# Patient Record
Sex: MALE | Race: WHITE | NOT HISPANIC OR LATINO | Employment: OTHER | URBAN - METROPOLITAN AREA
[De-identification: names, ages, dates, MRNs, and addresses within clinical notes are randomized per-mention and may not be internally consistent; named-entity substitution may affect disease eponyms.]

---

## 2017-02-27 DIAGNOSIS — E78.5 HYPERLIPIDEMIA LDL GOAL <130: ICD-10-CM

## 2017-02-27 RX ORDER — ATORVASTATIN CALCIUM 40 MG/1
40 TABLET, FILM COATED ORAL DAILY
Qty: 30 TABLET | Refills: 0 | Status: SHIPPED | OUTPATIENT
Start: 2017-02-27 | End: 2017-03-28

## 2017-02-27 NOTE — TELEPHONE ENCOUNTER
Pharmacy calls, lipitor extension not sent, see below  Prescription approved per [facility] Protocol.  Christa Madden, RN, BSN

## 2017-02-27 NOTE — TELEPHONE ENCOUNTER
Pt calls, out of Lipitor x 3-4 weeks.  We scheduled fasting OV with PCP in 4 weeks.   OK to restart.  One 30-day refill sent to pharmacy today.    Message handled by Nurse Triage with [facility] - provider name: Gisela Hoover MD.  Mazin Echavarria RN

## 2017-03-28 ENCOUNTER — OFFICE VISIT (OUTPATIENT)
Dept: FAMILY MEDICINE | Facility: CLINIC | Age: 52
End: 2017-03-28
Payer: COMMERCIAL

## 2017-03-28 VITALS
HEIGHT: 69 IN | TEMPERATURE: 97.8 F | OXYGEN SATURATION: 95 % | RESPIRATION RATE: 12 BRPM | BODY MASS INDEX: 33.84 KG/M2 | SYSTOLIC BLOOD PRESSURE: 111 MMHG | HEART RATE: 65 BPM | DIASTOLIC BLOOD PRESSURE: 70 MMHG | WEIGHT: 228.5 LBS

## 2017-03-28 DIAGNOSIS — E78.5 HYPERLIPIDEMIA LDL GOAL <130: ICD-10-CM

## 2017-03-28 DIAGNOSIS — G47.9 SLEEP DISORDER: ICD-10-CM

## 2017-03-28 DIAGNOSIS — E78.1 HYPERTRIGLYCERIDEMIA: ICD-10-CM

## 2017-03-28 DIAGNOSIS — Z12.5 SCREENING FOR PROSTATE CANCER: Primary | ICD-10-CM

## 2017-03-28 LAB
ALBUMIN SERPL-MCNC: 4.4 G/DL (ref 3.4–5)
ALP SERPL-CCNC: 67 U/L (ref 40–150)
ALT SERPL W P-5'-P-CCNC: 33 U/L (ref 0–70)
ANION GAP SERPL CALCULATED.3IONS-SCNC: 7 MMOL/L (ref 3–14)
AST SERPL W P-5'-P-CCNC: 14 U/L (ref 0–45)
BILIRUB SERPL-MCNC: 0.4 MG/DL (ref 0.2–1.3)
BUN SERPL-MCNC: 18 MG/DL (ref 7–30)
CALCIUM SERPL-MCNC: 9.2 MG/DL (ref 8.5–10.1)
CHLORIDE SERPL-SCNC: 105 MMOL/L (ref 94–109)
CHOLEST SERPL-MCNC: 205 MG/DL
CO2 SERPL-SCNC: 30 MMOL/L (ref 20–32)
CREAT SERPL-MCNC: 1.11 MG/DL (ref 0.66–1.25)
GFR SERPL CREATININE-BSD FRML MDRD: 69 ML/MIN/1.7M2
GLUCOSE SERPL-MCNC: 108 MG/DL (ref 70–99)
HDLC SERPL-MCNC: 39 MG/DL
LDLC SERPL CALC-MCNC: ABNORMAL MG/DL
LDLC SERPL DIRECT ASSAY-MCNC: 109 MG/DL
NONHDLC SERPL-MCNC: 166 MG/DL
POTASSIUM SERPL-SCNC: 4.1 MMOL/L (ref 3.4–5.3)
PROT SERPL-MCNC: 7.6 G/DL (ref 6.8–8.8)
PSA SERPL-ACNC: 0.67 UG/L (ref 0–4)
SODIUM SERPL-SCNC: 142 MMOL/L (ref 133–144)
TRIGL SERPL-MCNC: 465 MG/DL

## 2017-03-28 PROCEDURE — 83721 ASSAY OF BLOOD LIPOPROTEIN: CPT | Mod: 59 | Performed by: FAMILY MEDICINE

## 2017-03-28 PROCEDURE — 80061 LIPID PANEL: CPT | Performed by: FAMILY MEDICINE

## 2017-03-28 PROCEDURE — G0103 PSA SCREENING: HCPCS | Performed by: FAMILY MEDICINE

## 2017-03-28 PROCEDURE — 99214 OFFICE O/P EST MOD 30 MIN: CPT | Performed by: FAMILY MEDICINE

## 2017-03-28 PROCEDURE — 80053 COMPREHEN METABOLIC PANEL: CPT | Performed by: FAMILY MEDICINE

## 2017-03-28 PROCEDURE — 36415 COLL VENOUS BLD VENIPUNCTURE: CPT | Performed by: FAMILY MEDICINE

## 2017-03-28 RX ORDER — TRAZODONE HYDROCHLORIDE 50 MG/1
100 TABLET, FILM COATED ORAL AT BEDTIME
Qty: 180 TABLET | Refills: 2 | COMMUNITY
Start: 2017-03-28 | End: 2017-09-13

## 2017-03-28 RX ORDER — ATORVASTATIN CALCIUM 40 MG/1
40 TABLET, FILM COATED ORAL DAILY
Qty: 90 TABLET | Refills: 3 | Status: SHIPPED | OUTPATIENT
Start: 2017-03-28 | End: 2018-02-23

## 2017-03-28 NOTE — MR AVS SNAPSHOT
"              After Visit Summary   3/28/2017    John Reyes    MRN: [id number]           Patient Information     Date Of Birth          1965        Visit Information        Provider Department      3/28/2017 8:45 AM Remy Mcknight MD [facility]        Today's Diagnoses     Screening for prostate cancer    -  1    Hypertriglyceridemia        Sleep disorder        Hyperlipidemia LDL goal <130           Follow-ups after your visit        Who to contact     If you have questions or need follow up information about today's clinic visit or your schedule please contact [facility] directly at [phone].  Normal or non-critical lab and imaging results will be communicated to you by Taniumhart, letter or phone within 4 business days after the clinic has received the results. If you do not hear from us within 7 days, please contact the clinic through Taniumhart or phone. If you have a critical or abnormal lab result, we will notify you by phone as soon as possible.  Submit refill requests through Neovacs or call your pharmacy and they will forward the refill request to us. Please allow 3 business days for your refill to be completed.          Additional Information About Your Visit        MyChart Information     Neovacs gives you secure access to your electronic health record. If you see a primary care provider, you can also send messages to your care team and make appointments. If you have questions, please call your primary care clinic.  If you do not have a primary care provider, please call [phone] and they will assist you.        Care EveryWhere ID     This is your Care EveryWhere ID. This could be used by other organizations to access your [facility] medical records  [id number]        Your Vitals Were     Pulse Temperature Respirations Height Pulse Oximetry BMI (Body Mass Index)    65 97.8  F (36.6  C) (Oral) 12 5' 9\" (1.753 m) 95% 33.74 kg/m2       Blood " Pressure from Last 3 Encounters:   03/28/17 111/70   08/30/16 118/64   04/07/16 124/86    Weight from Last 3 Encounters:   03/28/17 228 lb 8 oz (103.6 kg)   08/30/16 229 lb (103.9 kg)   04/07/16 239 lb (108.4 kg)              We Performed the Following     Comprehensive metabolic panel     Lipid panel reflex to direct LDL     PSA, screen          Today's Medication Changes          These changes are accurate as of: 3/28/17  9:01 AM.  If you have any questions, ask your nurse or doctor.               These medicines have changed or have updated prescriptions.        Dose/Directions    buPROPion 300 MG 24 hr tablet   Commonly known as:  WELLBUTRIN XL   This may have changed:  additional instructions   Used for:  Moderate major depression (H)        Dose:  300 mg   Take 1 tablet (300 mg) by mouth every morning   Quantity:  90 tablet   Refills:  1       sertraline 100 MG tablet   Commonly known as:  ZOLOFT   This may have changed:    - how much to take  - additional instructions   Used for:  Attention deficit disorder without mention of hyperactivity        Take one and one half pills daily   Quantity:  135 tablet   Refills:  1       traZODone 50 MG tablet   Commonly known as:  DESYREL   This may have changed:    - how much to take  - when to take this   Used for:  Sleep disorder   Changed by:  eRmy Mcknight MD        Dose:  100 mg   Take 2 tablets (100 mg) by mouth At Bedtime   Quantity:  180 tablet   Refills:  2            Where to get your medicines      These medications were sent to [address] [address]     Phone:  [phone]     atorvastatin 40 MG tablet                Primary Care Provider Office Phone # Fax #    Remy Mcknight [phone]       [facility] [address] [facility] [place] [address]        Thank you!     Thank you for choosing [facility]  for  your care. Our goal is always to provide you with excellent care. Hearing back from our patients is one way we can continue to improve our services. Please take a few minutes to complete the written survey that you may receive in the mail after your visit with us. Thank you!             Your Updated Medication List - Protect others around you: Learn how to safely use, store and throw away your medicines at www.disposemymeds.org.          This list is accurate as of: 3/28/17  9:01 AM.  Always use your most recent med list.                   Brand Name Dispense Instructions for use    aspirin 81 MG tablet     30 tablet    Take 1 tablet by mouth daily.       atorvastatin 40 MG tablet    LIPITOR    90 tablet    Take 1 tablet (40 mg) by mouth daily       buPROPion 300 MG 24 hr tablet    WELLBUTRIN XL    90 tablet    Take 1 tablet (300 mg) by mouth every morning       econazole nitrate 1 % cream     30 g    Apply topically daily Apply to affected nails daily       fluocinonide 0.05 % solution    LIDEX    60 mL    Apply to scalp daily as needed       propranolol 40 MG tablet    INDERAL    30 tablet    SIG 1 tid prn anxiety       sertraline 100 MG tablet    ZOLOFT    135 tablet    Take one and one half pills daily       solifenacin 5 MG tablet    VESICARE    90 tablet    Take 1 tablet (5 mg) by mouth daily       sulfamethoxazole-trimethoprim 800-160 MG per tablet    BACTRIM DS/SEPTRA DS    20 tablet    Take 1 tablet by mouth 2 times daily       terbinafine 250 MG tablet    lamISIL    90 tablet    Take 1 tablet (250 mg) by mouth daily       traZODone 50 MG tablet    DESYREL    180 tablet    Take 2 tablets (100 mg) by mouth At Bedtime

## 2017-03-28 NOTE — PROGRESS NOTES
"  SUBJECTIVE:                                                    John Reyes is a 51 year old male who presents to clinic today for the following health issues:      Medication Followup of Lipitor    Taking Medication as prescribed: yes    Side Effects:  None    Medication Helping Symptoms:  yes     Past Medical History:   Diagnosis Date     Acute duodenal ulcer without mention of hemorrhage, perforation, or obstruction 1990    diagnosed on EGD     Allergic rhinitis, cause unspecified      Attention deficit disorder without mention of hyperactivity     \"all my life\"     Cataplexy and narcolepsy     had for many years, but diagnosed in sleep lab 6/03     Esophageal reflux 11/28/2007     Hyperlipidemia LDL goal <130 10/31/2010     Hypertriglyceridemia 8/10/2012     Mild major depression (H) 9/15/2011     OBESITY NOS 11/28/2007     PONV (postoperative nausea and vomiting)      Sleep apnea     CPAP     Sleep disorder 4/12/2011       Past Surgical History:   Procedure Laterality Date     ARTHROSCOPY SHOULDER, OPEN ROTATOR CUFF REPAIR, COMBINED Left 11/5/2014    Procedure: COMBINED ARTHROSCOPY SHOULDER, OPEN ROTATOR CUFF REPAIR;  Surgeon: Maykel Khalil MD;  Location: [facility] OR     C HAND/FINGER SURGERY UNLISTED  1983    ORIF right middle finger     COLONOSCOPY  9/9/2013    Procedure: COMBINED COLONOSCOPY, SINGLE BIOPSY/POLYPECTOMY BY BIOPSY;;  Surgeon: Hali Lema MD;  Location:  [facility]     GI SURGERY      henmmriodectoomy     HC EXPLOR MAXILL SINUS,INTRANASAL  1981`     HC VASECTOMY UNILAT/BILAT W POSTOP SEMEN  4/03       Family History   Problem Relation Age of Onset     Lipids Father      On medication     Cancer - colorectal Maternal Uncle        Social History   Substance Use Topics     Smoking status: Former Smoker     Packs/day: 0.50     Types: Cigarettes, Cigars     Quit date: 1/1/2003     Smokeless tobacco: Never Used      Comment: occasional cigar     Alcohol use 0.0 oz/week     0 Standard drinks " "or equivalent per week      Comment: 3-4 beers/week          Follow up on lipid lowering therapy.    Taking meds  and tolerating the med well. Describes dietary adherance as reliable  Side effects include none   Comorbities include obesity,     smoking is  Not  a problem        No problems with vision, hearing, dental or neck pain.  No chest pain, palpitations, dyspnea, change in bowel habits, blood  in stool or dyspepsia.  No rashes, changing moles, weakness, lassitude or back problems.  No diabetes, thyroid or cancer. No dysuria or menstrual irregularity.  Patient not a smoker. No problems with significant headaches.    On exam the vital signs are stable. No neck masses or thyromegaly.  No bruits, murmers, rubs or extrasounds. No cardiomegaly or chest wall tenderness. Lungs clear, no abdominal masses or organomegaly. No CVA tenderness.    Normal gait and stance. Neck is supple.    Med change:continue with same lipitor  and recheck in six months     ROS:  Constitutional, HEENT, cardiovascular, pulmonary, GI, , musculoskeletal, neuro, skin, endocrine and psych systems are negative, except as otherwise noted.    OBJECTIVE:                                                    /70 (BP Location: Right arm, Patient Position: Chair, Cuff Size: Adult Large)  Pulse 65  Temp 97.8  F (36.6  C) (Oral)  Resp 12  Ht 5' 9\" (1.753 m)  Wt 228 lb 8 oz (103.6 kg)  SpO2 95%  BMI 33.74 kg/m2  Body mass index is 33.74 kg/(m^2).  GENERAL: healthy, alert and no distress  EYES: Eyes grossly normal to inspection, PERRL and conjunctivae and sclerae normal  HENT: ear canals and TM's normal, nose and mouth without ulcers or lesions  NECK: no adenopathy, no asymmetry, masses, or scars and thyroid normal to palpation  RESP: lungs clear to auscultation - no rales, rhonchi or wheezes  CV: regular rate and rhythm, normal S1 S2, no S3 or S4, no murmur, click or rub, no peripheral edema and peripheral pulses strong  ABDOMEN: soft, " nontender, no hepatosplenomegaly, no masses and bowel sounds normal  MS: no gross musculoskeletal defects noted, no edema  SKIN: no suspicious lesions or rashes  NEURO: Normal strength and tone, mentation intact and speech normal  PSYCH: mentation appears normal, affect normal/bright         ASSESSMENT/PLAN:                                                        (Z12.5) Screening for prostate cancer  (primary encounter diagnosis)  Comment:   Plan: PSA, screen            (E78.1) Hypertriglyceridemia  Comment:   Plan: check     (G47.9) Sleep disorder  Comment:   Plan: Lipid panel reflex to direct LDL, Comprehensive        metabolic panel, traZODone (DESYREL) 50 MG         tablet            (E78.5) Hyperlipidemia LDL goal <130  Comment:   Plan:           Work on weight loss  Regular exercise    Remy Mcknight MD  [facility]

## 2017-03-28 NOTE — NURSING NOTE
"Chief Complaint   Patient presents with     Recheck Medication     fasting labs       Initial /70 (BP Location: Right arm, Patient Position: Chair, Cuff Size: Adult Large)  Pulse 65  Temp 97.8  F (36.6  C) (Oral)  Resp 12  Ht 5' 9\" (1.753 m)  Wt 228 lb 8 oz (103.6 kg)  SpO2 95%  BMI 33.74 kg/m2 Estimated body mass index is 33.74 kg/(m^2) as calculated from the following:    Height as of this encounter: 5' 9\" (1.753 m).    Weight as of this encounter: 228 lb 8 oz (103.6 kg).  Medication Reconciliation: complete   Vargas Hernández CMA       "

## 2017-03-29 ASSESSMENT — PATIENT HEALTH QUESTIONNAIRE - PHQ9: SUM OF ALL RESPONSES TO PHQ QUESTIONS 1-9: 0

## 2017-04-11 ENCOUNTER — OFFICE VISIT (OUTPATIENT)
Dept: PODIATRY | Facility: CLINIC | Age: 52
End: 2017-04-11
Payer: COMMERCIAL

## 2017-04-11 VITALS
HEIGHT: 69 IN | WEIGHT: 228 LBS | DIASTOLIC BLOOD PRESSURE: 68 MMHG | BODY MASS INDEX: 33.77 KG/M2 | SYSTOLIC BLOOD PRESSURE: 116 MMHG

## 2017-04-11 DIAGNOSIS — L60.0 INGROWN NAIL OF GREAT TOE OF RIGHT FOOT: ICD-10-CM

## 2017-04-11 DIAGNOSIS — M20.41 HAMMER TOE OF RIGHT FOOT: ICD-10-CM

## 2017-04-11 DIAGNOSIS — M79.671 RIGHT FOOT PAIN: Primary | ICD-10-CM

## 2017-04-11 PROCEDURE — 99213 OFFICE O/P EST LOW 20 MIN: CPT | Mod: 25 | Performed by: PODIATRIST

## 2017-04-11 PROCEDURE — 11750 EXCISION NAIL&NAIL MATRIX: CPT | Mod: T5 | Performed by: PODIATRIST

## 2017-04-11 RX ORDER — SILVER SULFADIAZINE 10 MG/G
CREAM TOPICAL 2 TIMES DAILY
Qty: 50 G | Refills: 0 | Status: SHIPPED | OUTPATIENT
Start: 2017-04-11 | End: 2017-11-08

## 2017-04-11 NOTE — PROGRESS NOTES
"Podiatry / Foot and Ankle Surgery Progress Note    April 11, 2017    Subject: Patient was seen for painful ingrown nail right great toe. Notes it has been going on for about month but has had it on and off for 4 years. Previously removed before. Pain is 5/10. Denies injury. Is wondering what can be done for it more permanently.     Objective:  Vitals: Ht 5' 9\" (1.753 m)  Wt 228 lb (103.4 kg)  BMI 33.67 kg/m2  BMI= Body mass index is 33.67 kg/(m^2).    General:  Patient is alert and orientated.  NAD  Vascular:  DP and PT pulses are palpable.  No varicosities noted  CFT's < 3secs.  Skin temp is normal  Neuro:  Light and gross touch sensation intact to digits, dorsum, and plantar aspects of the feet.  Derm:  Lateral border of the right great toenail is incurvated. Localized redness and pain.   Musculoskeletal:  Semi flexible contracture of right 2nd toe.      Assessment:    Right foot pain  Ingrown nail of great toe of right foot  Hammer toe of right foot    Plan:  The potential causes and nature of an ingrown toenail were discussed with the patient.  We reviewed the natural history/prognosis of the condition and potential risks if no treatment is provided.      Treatment options discussed included conservative management (oral antibiotics, soaking of foot, adequate width shoes)  as well as surgical management (partial or total nail removal).  The pros and cons of both forms of treatment were reviewed.      After thorough discussion and answering all questions, the patient elected to have the border removed permanently. Will soak the foot 2x a day for 6 weeks and apply silvadene cream and bandage. Will call with questions or concerns.     Procedure: After verbal consent, the right big toe was anesthetized with 5cc's of 1% lidocaine plain. A tourniquet was applied to the toe. The lateral border was then raised from the nail bed and then cut the length of the nail.  The offending nail border was then removed.  Three 30 " second applications of phenol were applied to the nail bed and nail matrix.  The area was then flushed with copious amounts of alcohol.  Bacitracin was applied to the nail bed.  The tourniquet was removed.  Bandage was applied to the toe.  The patient tolerated the procedure and anesthesia well.           Kanika Ziegler DPM, Podiatry/Foot and Ankle Surgery    Weight management plan: Patient was referred to their PCP to discuss a diet and exercise plan.

## 2017-04-11 NOTE — NURSING NOTE
"Chief Complaint   Patient presents with     Toenail     right big toe latteral side        Initial /68  Ht 5' 9\" (1.753 m)  Wt 228 lb (103.4 kg)  BMI 33.67 kg/m2 Estimated body mass index is 33.67 kg/(m^2) as calculated from the following:    Height as of this encounter: 5' 9\" (1.753 m).    Weight as of this encounter: 228 lb (103.4 kg).  Medication Reconciliation: complete   Chance Grayson MA      "

## 2017-04-11 NOTE — MR AVS SNAPSHOT
After Visit Summary   2017    John Reyes    MRN: [id number]           Patient Information     Date Of Birth          1965        Visit Information        Provider Department      2017 8:30 AM Kanika Ziegler DPM, Podiatry/Foot and Ankle Surgery [facility] [place]        Care Instructions    DR. ZIEGLER'S CLINIC SCHEDULE     [facility]  [address] [place] [address]  P: [phone]  F: [phone] [facility]  [address] [place] [address]  P: [phone]  F: [phone] [facility]  [address] [place] [address]  P: [phone]  F: [phone]   FRIDAY AM FRIDAY PM SURGERY   [facility]     [facility] [place]  [address]  [place] [address]  P: [phone] [facility]  [address]  [place] [address]  P: [phone]  F: [phone] Surgery [phone]   Appointment [phone] General After Hours:  [phone] Patient [phone]             Body Mass Index (BMI)  Many things can cause foot and ankle problems. Foot structure, activity level, foot mechanics and injuries are common causes of pain.    One very important issue that often goes unmentioned, is body weight.  Extra weight can cause increased stress on muscles, ligaments, bones and tendons.  Sometimes just a few extra pounds is all it takes to put one over her/his threshold.   Without reducing that stress, it can be difficult to alleviate pain.      Some people are uncomfortable addressing this issue, but we feel it is important for you to think about it.  As Foot &  Ankle specialists, our job is addressing the lower extremity problem and possible causes.     Regarding extra body weight, we encourage patients to discuss diet and weight management plans with their primary care doctors.  It is this team approach that  gives you the best opportunity for pain relief and getting you back on your feet.        INGROWN TOENAILS  When a toenail is ingrown, it is curved and grows into the skin, usually at the nail borders (the sides of the nail). This  digging in  of the nail irritates the skin, often creating pain, redness, swelling, and warmth in the toe.  If an ingrown nail causes a break in the skin, bacteria may enter and cause an infection in the area, which is often marked by drainage and a foul odor. However, even if the toe isn t painful, red, swollen, or warm, a nail that curves downward into the skin can progress to an infection.  CAUSES:  Heredity: In many people, the tendency for ingrown toenails is inherited.   Trauma: Sometimes an ingrown toenail is the result of trauma, such as stubbing your toe, having an object fall on your toe, or engaging in activities that involve repeated pressure on the toes, such as kicking or running.   Improper Trimming:  The most common cause of ingrown toenails is cutting your nails too short. This encourages the skin next to the nail to fold over the nail.   Improperly Sized Footwear: Ingrown toenails can result from wearing socks and shoes that are tight or short.   Nail Conditions: Ingrown toenails can be caused by nail problems, such as fungal infections or losing a nail due to trauma.   TREATMENT: Sometimes initial treatment for ingrown toenails can be safely performed at home. However, home treatment is strongly discouraged if an infection is suspected, or for those who have medical conditions that put feet at high risk, such as diabetes, nerve damage in the foot, or poor circulation.  Home care: If you don t have an infection or any of the above medical conditions, you can soak your foot in room-temperature water (adding Epsom s salt may be recommended by your doctor), and gently massage the side of the nail fold to help reduce the inflammation.  Avoid attempting  bathroom surgery.   Repeated cutting of the nail can cause the condition to worsen over time. If your symptoms fail to improve, it s time to see a foot and ankle surgeon.  Physician care: After examining the toe, the foot and ankle surgeon will select the treatment best suited for you. If an infection is present, an oral antibiotic may be prescribed.  Sometimes a minor surgical procedure, often performed in the office, will ease the pain and remove the offending nail. After applying a local anesthetic, the doctor removes part of the nail s side border. Some nails may become ingrown again, requiring removal of the nail root.  Following the nail procedure, a light bandage will be applied. Most people experience very little pain after surgery and may resume normal activity the next day. If your surgeon has prescribed an oral antibiotic, be sure to take all the medication, even if your symptoms have improved.  PREVENTION:  Proper Trimming: Cut toenails in a fairly straight line, and don t cut them too short. You should be able to get your fingernail under the sides and end of the nail.   Well-fitting Footwear: Don t wear shoes that are short or tight in the toe area. Avoid shoes that are loose, because they too cause pressure on the toes, especially when running or walking briskly.     INGROWN TOENAIL POSTOPERATIVE INSTRUCTIONS     Go directly home and elevate the affected foot on one or two pillows for the remainder of the day/evening if possible. Your toe may stay numb anywhere from 2-8 hours.     Take Tylenol, ibuprofen or another anti-inflammatory as needed for pain.     Take antibiotic if that has been prescribed. Finish the entire prescribed antibiotic even if your symptoms have improved.     The evening of the procedure, soak/wash the affected area in warm water (you may add Epsom salt) for 5 to 10 minutes. Do this twice a day for 2-4 weeks (6-8 weeks if you had phenol) (you may count showering/bathing as one soak).  After soaks,  pat the area dry and then allow to airdry for a few minutes. Apply antibiotic ointment to the area and cover with 2 X 2 gauze and paper tape or band-aid.    You may pursue everyday activities as tolerated with either an open toe shoe or cut-out shoe as needed or you may wear regular shoes if no pain is noted.    Watch for any signs and symptoms of infection such as: redness, red streaks going up the foot/leg, swelling, pus or foul odor. Those that have had the phenol procedure, the toe will drain longer and will look like it is infected because it is a chemical burn.      Please call with questions.          Follow-ups after your visit        Who to contact     If you have questions or need follow up information about today's clinic visit or your schedule please contact [facility] directly at [phone].  Normal or non-critical lab and imaging results will be communicated to you by MyChart, letter or phone within 4 business days after the clinic has received the results. If you do not hear from us within 7 days, please contact the clinic through [employer] or phone. If you have a critical or abnormal lab result, we will notify you by phone as soon as possible.  Submit refill requests through [employer] or call your pharmacy and they will forward the refill request to us. Please allow 3 business days for your refill to be completed.          Additional Information About Your Visit        MyChart Information     [employer] gives you secure access to your electronic health record. If you see a primary care provider, you can also send messages to your care team and make appointments. If you have questions, please call your primary care clinic.  If you do not have a primary care provider, please call [phone] and they will assist you.        Care EveryWhere ID     This is your Care EveryWhere ID. This could be used by other organizations to access your [facility] medical records  [id number]        Your  "Vitals Were     Height BMI (Body Mass Index)                5' 9\" (1.753 m) 33.67 kg/m2           Blood Pressure from Last 3 Encounters:   04/11/17 116/68   03/28/17 111/70   08/30/16 118/64    Weight from Last 3 Encounters:   04/11/17 228 lb (103.4 kg)   03/28/17 228 lb 8 oz (103.6 kg)   08/30/16 229 lb (103.9 kg)              Today, you had the following     No orders found for display         Today's Medication Changes          These changes are accurate as of: 4/11/17  9:05 AM.  If you have any questions, ask your nurse or doctor.               These medicines have changed or have updated prescriptions.        Dose/Directions    buPROPion 300 MG 24 hr tablet   Commonly known as:  WELLBUTRIN XL   This may have changed:  additional instructions   Used for:  Moderate major depression (H)        Dose:  300 mg   Take 1 tablet (300 mg) by mouth every morning   Quantity:  90 tablet   Refills:  1       sertraline 100 MG tablet   Commonly known as:  ZOLOFT   This may have changed:    - how much to take  - additional instructions   Used for:  Attention deficit disorder without mention of hyperactivity        Take one and one half pills daily   Quantity:  135 tablet   Refills:  1                Primary Care Provider Office Phone # Fax #    Remy Mcknight [phone]       [address] [address]        Thank you!     Thank you for choosing [facility]  for your care. Our goal is always to provide you with excellent care. Hearing back from our patients is one way we can continue to improve our services. Please take a few minutes to complete the written survey that you may receive in the mail after your visit with us. Thank you!             Your Updated Medication List - Protect others around you: Learn how to safely use, store and throw away your medicines at www.disposemymeds.org.          This list is accurate as of: 4/11/17  9:05 AM.  " Always use your most recent med list.                   Brand Name Dispense Instructions for use    aspirin 81 MG tablet     30 tablet    Take 1 tablet by mouth daily.       atorvastatin 40 MG tablet    LIPITOR    90 tablet    Take 1 tablet (40 mg) by mouth daily       buPROPion 300 MG 24 hr tablet    WELLBUTRIN XL    90 tablet    Take 1 tablet (300 mg) by mouth every morning       econazole nitrate 1 % cream     30 g    Apply topically daily Apply to affected nails daily       fluocinonide 0.05 % solution    LIDEX    60 mL    Apply to scalp daily as needed       propranolol 40 MG tablet    INDERAL    30 tablet    SIG 1 tid prn anxiety       sertraline 100 MG tablet    ZOLOFT    135 tablet    Take one and one half pills daily       solifenacin 5 MG tablet    VESICARE    90 tablet    Take 1 tablet (5 mg) by mouth daily       sulfamethoxazole-trimethoprim 800-160 MG per tablet    BACTRIM DS/SEPTRA DS    20 tablet    Take 1 tablet by mouth 2 times daily       terbinafine 250 MG tablet    lamISIL    90 tablet    Take 1 tablet (250 mg) by mouth daily       traZODone 50 MG tablet    DESYREL    180 tablet    Take 2 tablets (100 mg) by mouth At Bedtime

## 2017-04-11 NOTE — LETTER
"  4/11/2017       RE: John Reyes  [address]  [place] [address]           Dear Colleague,    Thank you for referring your patient, John Reyes, to the [facility]. Please see a copy of my visit note below.    Podiatry / Foot and Ankle Surgery Progress Note    April 11, 2017    Subject: Patient was seen for painful ingrown nail right great toe. Notes it has been going on for about month but has had it on and off for 4 years. Previously removed before. Pain is 5/10. Denies injury. Is wondering what can be done for it more permanently.     Objective:  Vitals: Ht 5' 9\" (1.753 m)  Wt 228 lb (103.4 kg)  BMI 33.67 kg/m2  BMI= Body mass index is 33.67 kg/(m^2).    General:  Patient is alert and orientated.  NAD  Vascular:  DP and PT pulses are palpable.  No varicosities noted  CFT's < 3secs.  Skin temp is normal  Neuro:  Light and gross touch sensation intact to digits, dorsum, and plantar aspects of the feet.  Derm:  Lateral border of the right great toenail is incurvated. Localized redness and pain.   Musculoskeletal:  Semi flexible contracture of right 2nd toe.      Assessment:    Right foot pain  Ingrown nail of great toe of right foot  Hammer toe of right foot    Plan:  The potential causes and nature of an ingrown toenail were discussed with the patient.  We reviewed the natural history/prognosis of the condition and potential risks if no treatment is provided.      Treatment options discussed included conservative management (oral antibiotics, soaking of foot, adequate width shoes)  as well as surgical management (partial or total nail removal).  The pros and cons of both forms of treatment were reviewed.      After thorough discussion and answering all questions, the patient elected to have the border removed permanently. Will soak the foot 2x a day for 6 weeks and apply silvadene cream and bandage. Will call with questions or concerns.     Procedure: After verbal " consent, the right big toe was anesthetized with 5cc's of 1% lidocaine plain. A tourniquet was applied to the toe. The lateral border was then raised from the nail bed and then cut the length of the nail.  The offending nail border was then removed.  Three 30 second applications of phenol were applied to the nail bed and nail matrix.  The area was then flushed with copious amounts of alcohol.  Bacitracin was applied to the nail bed.  The tourniquet was removed.  Bandage was applied to the toe.  The patient tolerated the procedure and anesthesia well.           Kanika Ziegler DPM, Podiatry/Foot and Ankle Surgery    Weight management plan: Patient was referred to their PCP to discuss a diet and exercise plan.        Again, thank you for allowing me to participate in the care of your patient.        Sincerely,              Kanika Ziegler DPM, Podiatry/Foot and Ankle Surgery

## 2017-04-11 NOTE — PATIENT INSTRUCTIONS
DR. REYNOSO'S CLINIC SCHEDULE     [facility]  [address] [place] [address]  P: [phone]  F: [phone] [facility]  [address] [place] [address]  P: [phone]  F: [phone] [facility]  [address] [place] [address]  P: [phone]  F: [phone]   FRIDAY AM FRIDAY PM SURGERY   [facility]     [facility] [place]  [address]  [place] [address]  P: [phone]   [address]  [place] [address]  P: [phone]  F: [phone] Surgery [phone]   Appointment [phone] General After Hours:  [phone] Patient [phone]             Body Mass Index (BMI)  Many things can cause foot and ankle problems. Foot structure, activity level, foot mechanics and injuries are common causes of pain.    One very important issue that often goes unmentioned, is body weight.  Extra weight can cause increased stress on muscles, ligaments, bones and tendons.  Sometimes just a few extra pounds is all it takes to put one over her/his threshold.   Without reducing that stress, it can be difficult to alleviate pain.      Some people are uncomfortable addressing this issue, but we feel it is important for you to think about it.  As Foot &  Ankle specialists, our job is addressing the lower extremity problem and possible causes.     Regarding extra body weight, we encourage patients to discuss diet and weight management plans with their primary care doctors.  It is this team approach that gives you the best opportunity for pain relief and getting you back on your feet.        INGROWN TOENAILS  When a toenail is ingrown, it is curved and grows into the skin, usually at the nail borders (the sides of the nail). This  digging in  of the nail irritates the skin, often creating pain, redness, swelling, and warmth in the toe.  If  an ingrown nail causes a break in the skin, bacteria may enter and cause an infection in the area, which is often marked by drainage and a foul odor. However, even if the toe isn t painful, red, swollen, or warm, a nail that curves downward into the skin can progress to an infection.  CAUSES:  Heredity: In many people, the tendency for ingrown toenails is inherited.   Trauma: Sometimes an ingrown toenail is the result of trauma, such as stubbing your toe, having an object fall on your toe, or engaging in activities that involve repeated pressure on the toes, such as kicking or running.   Improper Trimming:  The most common cause of ingrown toenails is cutting your nails too short. This encourages the skin next to the nail to fold over the nail.   Improperly Sized Footwear: Ingrown toenails can result from wearing socks and shoes that are tight or short.   Nail Conditions: Ingrown toenails can be caused by nail problems, such as fungal infections or losing a nail due to trauma.   TREATMENT: Sometimes initial treatment for ingrown toenails can be safely performed at home. However, home treatment is strongly discouraged if an infection is suspected, or for those who have medical conditions that put feet at high risk, such as diabetes, nerve damage in the foot, or poor circulation.  Home care: If you don t have an infection or any of the above medical conditions, you can soak your foot in room-temperature water (adding Epsom s salt may be recommended by your doctor), and gently massage the side of the nail fold to help reduce the inflammation.  Avoid attempting  bathroom surgery.  Repeated cutting of the nail can cause the condition to worsen over time. If your symptoms fail to improve, it s time to see a foot and ankle surgeon.  Physician care: After examining the toe, the foot and ankle surgeon will select the treatment best suited for you. If an infection is present, an oral antibiotic may be prescribed.  Sometimes a  minor surgical procedure, often performed in the office, will ease the pain and remove the offending nail. After applying a local anesthetic, the doctor removes part of the nail s side border. Some nails may become ingrown again, requiring removal of the nail root.  Following the nail procedure, a light bandage will be applied. Most people experience very little pain after surgery and may resume normal activity the next day. If your surgeon has prescribed an oral antibiotic, be sure to take all the medication, even if your symptoms have improved.  PREVENTION:  Proper Trimming: Cut toenails in a fairly straight line, and don t cut them too short. You should be able to get your fingernail under the sides and end of the nail.   Well-fitting Footwear: Don t wear shoes that are short or tight in the toe area. Avoid shoes that are loose, because they too cause pressure on the toes, especially when running or walking briskly.     INGROWN TOENAIL POSTOPERATIVE INSTRUCTIONS     Go directly home and elevate the affected foot on one or two pillows for the remainder of the day/evening if possible. Your toe may stay numb anywhere from 2-8 hours.     Take Tylenol, ibuprofen or another anti-inflammatory as needed for pain.     Take antibiotic if that has been prescribed. Finish the entire prescribed antibiotic even if your symptoms have improved.     The evening of the procedure, soak/wash the affected area in warm water (you may add Epsom salt) for 5 to 10 minutes. Do this twice a day for 2-4 weeks (6-8 weeks if you had phenol) (you may count showering/bathing as one soak).  After soaks, pat the area dry and then allow to airdry for a few minutes. Apply antibiotic ointment to the area and cover with 2 X 2 gauze and paper tape or band-aid.    You may pursue everyday activities as tolerated with either an open toe shoe or cut-out shoe as needed or you may wear regular shoes if no pain is noted.    Watch for any signs and symptoms  of infection such as: redness, red streaks going up the foot/leg, swelling, pus or foul odor. Those that have had the phenol procedure, the toe will drain longer and will look like it is infected because it is a chemical burn.      Please call with questions.

## 2017-09-13 DIAGNOSIS — G47.9 SLEEP DISORDER: ICD-10-CM

## 2017-09-13 RX ORDER — TRAZODONE HYDROCHLORIDE 50 MG/1
100 TABLET, FILM COATED ORAL AT BEDTIME
Qty: 180 TABLET | Refills: 0 | Status: SHIPPED | OUTPATIENT
Start: 2017-09-13 | End: 2017-11-30

## 2017-09-13 NOTE — TELEPHONE ENCOUNTER
Last prescribed by Dr. Gasper Grissom, but now has all meds through you      Last Written Prescription Date: 8/18/16  Last Fill Quantity: 180,  # refills: 3   Last Office Visit with G, P or [facility] prescribing provider: 3/28/17    Aline Del Valle, Pharmacy [facility]

## 2017-10-30 ENCOUNTER — TELEPHONE (OUTPATIENT)
Dept: FAMILY MEDICINE | Facility: CLINIC | Age: 52
End: 2017-10-30

## 2017-10-30 DIAGNOSIS — F32.1 MODERATE MAJOR DEPRESSION (H): ICD-10-CM

## 2017-10-30 RX ORDER — SERTRALINE HYDROCHLORIDE 100 MG/1
TABLET, FILM COATED ORAL
Qty: 135 TABLET | Refills: 1 | Status: CANCELLED | OUTPATIENT
Start: 2017-10-30

## 2017-10-30 RX ORDER — BUPROPION HYDROCHLORIDE 300 MG/1
300 TABLET ORAL EVERY MORNING
Qty: 90 TABLET | Refills: 1 | Status: SHIPPED | OUTPATIENT
Start: 2017-10-30 | End: 2017-10-30

## 2017-10-30 RX ORDER — SERTRALINE HYDROCHLORIDE 100 MG/1
TABLET, FILM COATED ORAL
Qty: 135 TABLET | Refills: 1 | Status: SHIPPED | OUTPATIENT
Start: 2017-10-30 | End: 2017-10-30

## 2017-10-30 RX ORDER — BUPROPION HYDROCHLORIDE 300 MG/1
300 TABLET ORAL EVERY MORNING
Qty: 90 TABLET | Refills: 1 | Status: CANCELLED | OUTPATIENT
Start: 2017-10-30

## 2017-10-30 NOTE — TELEPHONE ENCOUNTER
These were last prescribed by Dr. Grissom; pt said the refills should go to Dr. Mcknight now. Pt will be out before his appt for you     Last Written Prescription Date: 8/18/16 for each  Last Fill Quantity: 3months, # refills: 3  Last Office Visit with [facility] primary care provider:  3/28/17   Next 5 appointments (look out 90 days)     Nov 03, 2017  9:15 AM CDT   SHORT with Remy Mcknight MD   [facility]    [address] [place] [id number] [phone]                   Last PHQ-9 score on record=   PHQ-9 SCORE 3/28/2017   Total Score -   Total Score 0       Aline Del Valle, Pharmacy [facility]

## 2017-10-30 NOTE — TELEPHONE ENCOUNTER
Last we filled from dr. whitehead was Wellbutrin xl 150mg taking 3 tablets every morning and zoloft 100mg daily.  Would you like to stay at that dose?  Aline Del Valle, Pharmacy [facility]

## 2017-11-08 ENCOUNTER — OFFICE VISIT (OUTPATIENT)
Dept: FAMILY MEDICINE | Facility: CLINIC | Age: 52
End: 2017-11-08
Payer: COMMERCIAL

## 2017-11-08 VITALS
WEIGHT: 233.7 LBS | SYSTOLIC BLOOD PRESSURE: 118 MMHG | HEIGHT: 69 IN | OXYGEN SATURATION: 95 % | BODY MASS INDEX: 34.61 KG/M2 | RESPIRATION RATE: 12 BRPM | HEART RATE: 80 BPM | DIASTOLIC BLOOD PRESSURE: 80 MMHG | TEMPERATURE: 98.4 F

## 2017-11-08 DIAGNOSIS — Z00.00 PREVENTATIVE HEALTH CARE: ICD-10-CM

## 2017-11-08 DIAGNOSIS — K12.0 APHTHOUS ULCER: ICD-10-CM

## 2017-11-08 DIAGNOSIS — Z12.5 SCREENING FOR PROSTATE CANCER: ICD-10-CM

## 2017-11-08 DIAGNOSIS — F41.9 ANXIETY: ICD-10-CM

## 2017-11-08 DIAGNOSIS — F32.0 MILD MAJOR DEPRESSION (H): ICD-10-CM

## 2017-11-08 DIAGNOSIS — Z23 NEED FOR PROPHYLACTIC VACCINATION AND INOCULATION AGAINST INFLUENZA: ICD-10-CM

## 2017-11-08 DIAGNOSIS — E78.5 HYPERLIPIDEMIA LDL GOAL <130: Primary | ICD-10-CM

## 2017-11-08 DIAGNOSIS — Z00.00 ROUTINE GENERAL MEDICAL EXAMINATION AT A HEALTH CARE FACILITY: ICD-10-CM

## 2017-11-08 PROCEDURE — 80061 LIPID PANEL: CPT | Performed by: FAMILY MEDICINE

## 2017-11-08 PROCEDURE — 36415 COLL VENOUS BLD VENIPUNCTURE: CPT | Performed by: FAMILY MEDICINE

## 2017-11-08 PROCEDURE — 90686 IIV4 VACC NO PRSV 0.5 ML IM: CPT | Performed by: FAMILY MEDICINE

## 2017-11-08 PROCEDURE — 99214 OFFICE O/P EST MOD 30 MIN: CPT | Performed by: FAMILY MEDICINE

## 2017-11-08 PROCEDURE — 80053 COMPREHEN METABOLIC PANEL: CPT | Performed by: FAMILY MEDICINE

## 2017-11-08 PROCEDURE — G0103 PSA SCREENING: HCPCS | Performed by: FAMILY MEDICINE

## 2017-11-08 PROCEDURE — 90471 IMMUNIZATION ADMIN: CPT | Performed by: FAMILY MEDICINE

## 2017-11-08 PROCEDURE — 83721 ASSAY OF BLOOD LIPOPROTEIN: CPT | Mod: 59 | Performed by: FAMILY MEDICINE

## 2017-11-08 ASSESSMENT — PATIENT HEALTH QUESTIONNAIRE - PHQ9: SUM OF ALL RESPONSES TO PHQ QUESTIONS 1-9: 13

## 2017-11-08 NOTE — PROGRESS NOTES
"  SUBJECTIVE:   John Reyes is a 52 year old male who presents to clinic today for the following health issues:  Well adult exam and review weight and lipids. Med lab checks. Discuss strategies for weight control . He does well with fitness activity but weight is another matter.    Patient is here to have labs and physical,  flu shot, and has two canker sores on left side of mouth that have been there for about a week and are a little sore.  Discussed viral stomatitis and topical rx     Past Medical History:   Diagnosis Date     Acute duodenal ulcer without mention of hemorrhage, perforation, or obstruction 1990    diagnosed on EGD     Allergic rhinitis, cause unspecified      Attention deficit disorder without mention of hyperactivity     \"all my life\"     Cataplexy and narcolepsy     had for many years, but diagnosed in sleep lab 6/03     Esophageal reflux 11/28/2007     Hyperlipidemia LDL goal <130 10/31/2010     Hypertriglyceridemia 8/10/2012     Mild major depression (H) 9/15/2011     OBESITY NOS 11/28/2007     PONV (postoperative nausea and vomiting)      Sleep apnea     CPAP     Sleep disorder 4/12/2011       Past Surgical History:   Procedure Laterality Date     ARTHROSCOPY SHOULDER, OPEN ROTATOR CUFF REPAIR, COMBINED Left 11/5/2014    Procedure: COMBINED ARTHROSCOPY SHOULDER, OPEN ROTATOR CUFF REPAIR;  Surgeon: Maykel Khalil MD;  Location: [facility] OR     C HAND/FINGER SURGERY UNLISTED  1983    ORIF right middle finger     COLONOSCOPY  9/9/2013    Procedure: COMBINED COLONOSCOPY, SINGLE BIOPSY/POLYPECTOMY BY BIOPSY;;  Surgeon: Hali Lema MD;  Location:  GI     GI SURGERY      henmmriodectoomy     HC EXPLOR MAXILL SINUS,INTRANASAL  1981`     HC VASECTOMY UNILAT/BILAT W POSTOP SEMEN  4/03       Family History   Problem Relation Age of Onset     Lipids Father      On medication     Cancer - colorectal Maternal Uncle        Social History   Substance Use Topics     Smoking status: Former Smoker "     Packs/day: 0.50     Types: Cigarettes, Cigars     Quit date: 1/1/2003     Smokeless tobacco: Never Used      Comment: occasional cigar     Alcohol use 0.0 oz/week     0 Standard drinks or equivalent per week      Comment: 3-4 beers/week      REVIEW OF SYSTEMS    Generally has been  feeling well until this episode. No problems with vision, hearing, dental or neck pain.Has mild  airborne or ingestion allergy  No chest pain, palpitations, dyspnea, change in bowel habits, blood  in stool or dyspepsia.  No rashes, changing moles, weakness, lassitude or back problems.  No chronic issues . No dysuria  Patient not  a smoker. No problems with significant headaches.  On exam the vital signs are stable  Weight is Body mass index is 34.51 kg/(m^2).     Eyes show madelaine   No neck masses or thyromegaly.Ear nose and throat shows the canker sores   No bruits, murmers, rubs or extrasounds. No cardiomegaly or chest wall tenderness. Lungs clear, no abdominal masses or organomegaly. No CVA tenderness.  Skin eval no activic lesion   No hernias, good range of motion neck, back and extremities. No abnormal skin lesions. Normal genitalia. Good peripheral pulses. No adenopathy.  Normal gait and stance. Neck is supple.  Back exam shows good rom   (E78.5) Hyperlipidemia LDL goal <130  (primary encounter diagnosis)  Comment: work on diet and weight   Plan: Lipid panel reflex to direct LDL Fasting,         Comprehensive metabolic panel, LDL cholesterol         direct            (E66.01) Class 3 obesity due to excess calories with serious comorbidity in adult, unspecified BMI  Comment:   Plan:     (F32.0) Mild major depression (H)  Comment:   Plan: DEPRESSION ACTION PLAN (DAP)        meds stable, doing well in his counselling    (F41.9) Anxiety  Comment:   Plan:     (Z12.5) Screening for prostate cancer  Comment:   Plan: PSA, screen        No hematuria     (Z23) Need for prophylactic vaccination and inoculation against influenza  Comment:    Plan: FLU VAC, SPLIT VIRUS IM > 3 YO (QUADRIVALENT)         [98426]          \

## 2017-11-08 NOTE — NURSING NOTE
"Chief Complaint   Patient presents with     Derm Problem     Lab Only     cholesterol       Initial /80 (BP Location: Left arm, Patient Position: Chair, Cuff Size: Adult Large)  Pulse 80  Temp 98.4  F (36.9  C) (Oral)  Resp 12  Ht 5' 9\" (1.753 m)  Wt 233 lb 11.2 oz (106 kg)  SpO2 95%  BMI 34.51 kg/m2 Estimated body mass index is 34.51 kg/(m^2) as calculated from the following:    Height as of this encounter: 5' 9\" (1.753 m).    Weight as of this encounter: 233 lb 11.2 oz (106 kg).  Medication Reconciliation: complete   Vargas Hernández CMA       "

## 2017-11-08 NOTE — MR AVS SNAPSHOT
After Visit Summary   11/8/2017    John Reyes    MRN: [id number]           Patient Information     Date Of Birth          1965        Visit Information        Provider Department      11/8/2017 11:30 AM Remy Mcknight MD [facility]        Today's Diagnoses     Hyperlipidemia LDL goal <130    -  1    Class 3 obesity due to excess calories with serious comorbidity in adult, unspecified BMI        Mild major depression (H)        Anxiety        Screening for prostate cancer           Follow-ups after your visit        Who to contact     If you have questions or need follow up information about today's clinic visit or your schedule please contact [facility] directly at [phone].  Normal or non-critical lab and imaging results will be communicated to you by MyChart, letter or phone within 4 business days after the clinic has received the results. If you do not hear from us within 7 days, please contact the clinic through [employer] or phone. If you have a critical or abnormal lab result, we will notify you by phone as soon as possible.  Submit refill requests through [employer] or call your pharmacy and they will forward the refill request to us. Please allow 3 business days for your refill to be completed.          Additional Information About Your Visit        MyChart Information     [employer] gives you secure access to your electronic health record. If you see a primary care provider, you can also send messages to your care team and make appointments. If you have questions, please call your primary care clinic.  If you do not have a primary care provider, please call [phone] and they will assist you.        Care EveryWhere ID     This is your Care EveryWhere ID. This could be used by other organizations to access your [place] medical records  [id number]        Your Vitals Were     Pulse Temperature Respirations Height Pulse Oximetry BMI (Body  "Mass Index)    80 98.4  F (36.9  C) (Oral) 12 5' 9\" (1.753 m) 95% 34.51 kg/m2       Blood Pressure from Last 3 Encounters:   11/08/17 118/80   04/11/17 116/68   03/28/17 111/70    Weight from Last 3 Encounters:   11/08/17 233 lb 11.2 oz (106 kg)   04/11/17 228 lb (103.4 kg)   03/28/17 228 lb 8 oz (103.6 kg)              We Performed the Following     Comprehensive metabolic panel     DEPRESSION ACTION PLAN (DAP)     Lipid panel reflex to direct LDL Fasting     PSA, screen          Today's Medication Changes          These changes are accurate as of: 11/8/17 12:06 PM.  If you have any questions, ask your nurse or doctor.               Stop taking these medicines if you haven't already. Please contact your care team if you have questions.     sulfamethoxazole-trimethoprim 800-160 MG per tablet   Commonly known as:  BACTRIM DS/SEPTRA DS   Stopped by:  Remy Mcknight MD           terbinafine 250 MG tablet   Commonly known as:  lamISIL   Stopped by:  Remy Mcknight MD                    Primary Care Provider Office Phone # Fax #    Remy Mcknight [phone] [address] [facility] [address]        Equal Access to Services     [facility]: Hadii maria de jesus ku hadasho Soomaali, waaxda luqadaha, qaybta kaalmada adeegyada, waxay idiin hayaan mohan khbruna bolden . So [facility] [phone].    ATENCIÓN: Si habla español, tiene a miller disposición servicios gratuitos de asistencia lingüística. Reneame al [phone].    We comply with applicable federal civil rights laws and [place] laws. We do not discriminate on the basis of race, color, national origin, age, disability, sex, sexual orientation, or gender identity.            Thank you!     Thank you for choosing [facility]  for your care. Our goal is always to provide you with excellent care. Hearing back from our patients is one way we can continue to improve our services. Please take a few minutes to complete " the written survey that you may receive in the mail after your visit with us. Thank you!             Your Updated Medication List - Protect others around you: Learn how to safely use, store and throw away your medicines at www.disposemymeds.org.          This list is accurate as of: 11/8/17 12:06 PM.  Always use your most recent med list.                   Brand Name Dispense Instructions for use Diagnosis    ALPRAZOLAM PO      Take 0.25 mg by mouth daily as needed for anxiety        aspirin 81 MG tablet     30 tablet    Take 1 tablet by mouth daily.    Mixed hyperlipidemia, Hyperlipidemia LDL goal <130       atorvastatin 40 MG tablet    LIPITOR    90 tablet    Take 1 tablet (40 mg) by mouth daily    Hyperlipidemia LDL goal <130       econazole nitrate 1 % cream     30 g    Apply topically daily Apply to affected nails daily    Onychomycosis of right great toe       fluocinonide 0.05 % solution    LIDEX    60 mL    Apply to scalp daily as needed    Dandruff       propranolol 40 MG tablet    INDERAL    30 tablet    SIG 1 tid prn anxiety    Test anxiety       solifenacin 5 MG tablet    VESICARE    90 tablet    Take 1 tablet (5 mg) by mouth daily    Urinary urgency       traZODone 50 MG tablet    DESYREL    180 tablet    Take 2 tablets (100 mg) by mouth At Bedtime    Sleep disorder

## 2017-11-08 NOTE — LETTER
November 10, 2017      John Reyes  [address]  [place] [address]        Dear ,    Blood is decent except for the triglycerides that reflect the weight issue we discussed. This is a good time to declare war on it and consider a formal program with carb counting and calorie reduction. Our Endocrine consultant here, Marina Pratt , can be useful in formulating a plan that you can apply on your own at home with those principles. You schedule with her at our regular number.           Resulted Orders   Lipid panel reflex to direct LDL Fasting   Result Value Ref Range    Cholesterol 216 (H) <200 mg/dL      Comment:      Desirable:       <200 mg/dl    Triglycerides 414 (H) <150 mg/dL      Comment:      Borderline high:  150-199 mg/dl  High:             200-499 mg/dl  Very high:       >499 mg/dl      HDL Cholesterol 42 >39 mg/dL    LDL Cholesterol Calculated  <100 mg/dL     Cannot estimate LDL when triglyceride exceeds 400 mg/dL    Non HDL Cholesterol 174 (H) <130 mg/dL      Comment:      Above Desirable:  130-159 mg/dl  Borderline high:  160-189 mg/dl  High:             190-219 mg/dl  Very high:       >219 mg/dl     Comprehensive metabolic panel   Result Value Ref Range    Sodium 138 133 - 144 mmol/L    Potassium 4.5 3.4 - 5.3 mmol/L    Chloride 103 94 - 109 mmol/L    Carbon Dioxide 28 20 - 32 mmol/L    Anion Gap 7 3 - 14 mmol/L    Glucose 100 (H) 70 - 99 mg/dL    Urea Nitrogen 17 7 - 30 mg/dL    Creatinine 1.14 0.66 - 1.25 mg/dL    GFR Estimate 67 >60 mL/min/1.7m2      Comment:      Non  GFR Calc    GFR Estimate If Black 81 >60 mL/min/1.7m2      Comment:       GFR Calc    Calcium 9.5 8.5 - 10.1 mg/dL    Bilirubin Total 0.6 0.2 - 1.3 mg/dL    Albumin 4.6 3.4 - 5.0 g/dL    Protein Total 7.6 6.8 - 8.8 g/dL    Alkaline Phosphatase 70 40 - 150 U/L    ALT 47 0 - 70 U/L    AST 32 0 - 45 U/L   PSA, screen   Result Value Ref Range    PSA 0.58 0 - 4 ug/L      Comment:       Assay Method:  Chemiluminescence using Siemens Vista analyzer   LDL cholesterol direct   Result Value Ref Range    LDL Cholesterol Direct 110 (H) <100 mg/dL      Comment:      Above desirable:  100-129 mg/dl  Borderline High:  130-159 mg/dL  High:             160-189 mg/dL  Very high:       >189 mg/dl         If you have any questions or concerns, please call the clinic at the number listed above.       Sincerely,        Remy Mcknight MD

## 2017-11-08 NOTE — PROGRESS NOTES

## 2017-11-09 LAB
ALBUMIN SERPL-MCNC: 4.6 G/DL (ref 3.4–5)
ALP SERPL-CCNC: 70 U/L (ref 40–150)
ALT SERPL W P-5'-P-CCNC: 47 U/L (ref 0–70)
ANION GAP SERPL CALCULATED.3IONS-SCNC: 7 MMOL/L (ref 3–14)
AST SERPL W P-5'-P-CCNC: 32 U/L (ref 0–45)
BILIRUB SERPL-MCNC: 0.6 MG/DL (ref 0.2–1.3)
BUN SERPL-MCNC: 17 MG/DL (ref 7–30)
CALCIUM SERPL-MCNC: 9.5 MG/DL (ref 8.5–10.1)
CHLORIDE SERPL-SCNC: 103 MMOL/L (ref 94–109)
CHOLEST SERPL-MCNC: 216 MG/DL
CO2 SERPL-SCNC: 28 MMOL/L (ref 20–32)
CREAT SERPL-MCNC: 1.14 MG/DL (ref 0.66–1.25)
GFR SERPL CREATININE-BSD FRML MDRD: 67 ML/MIN/1.7M2
GLUCOSE SERPL-MCNC: 100 MG/DL (ref 70–99)
HDLC SERPL-MCNC: 42 MG/DL
LDLC SERPL CALC-MCNC: ABNORMAL MG/DL
LDLC SERPL DIRECT ASSAY-MCNC: 110 MG/DL
NONHDLC SERPL-MCNC: 174 MG/DL
POTASSIUM SERPL-SCNC: 4.5 MMOL/L (ref 3.4–5.3)
PROT SERPL-MCNC: 7.6 G/DL (ref 6.8–8.8)
PSA SERPL-ACNC: 0.58 UG/L (ref 0–4)
SODIUM SERPL-SCNC: 138 MMOL/L (ref 133–144)
TRIGL SERPL-MCNC: 414 MG/DL

## 2017-11-30 DIAGNOSIS — G47.9 SLEEP DISORDER: ICD-10-CM

## 2017-11-30 RX ORDER — TRAZODONE HYDROCHLORIDE 50 MG/1
TABLET, FILM COATED ORAL
Qty: 180 TABLET | Refills: 0 | Status: SHIPPED | OUTPATIENT
Start: 2017-11-30 | End: 2018-03-02

## 2017-11-30 NOTE — TELEPHONE ENCOUNTER
Patient calling and states forgot to get filled at visit and only has 2 pills left for tonight.  Medication approved per standing orders.     Khloe Núñez RN

## 2017-12-05 ENCOUNTER — OFFICE VISIT (OUTPATIENT)
Dept: FAMILY MEDICINE | Facility: CLINIC | Age: 52
End: 2017-12-05
Payer: COMMERCIAL

## 2017-12-05 VITALS
SYSTOLIC BLOOD PRESSURE: 114 MMHG | TEMPERATURE: 98.2 F | DIASTOLIC BLOOD PRESSURE: 76 MMHG | RESPIRATION RATE: 16 BRPM | BODY MASS INDEX: 34.8 KG/M2 | HEIGHT: 69 IN | OXYGEN SATURATION: 95 % | WEIGHT: 235 LBS | HEART RATE: 66 BPM

## 2017-12-05 DIAGNOSIS — R19.7 DIARRHEA OF PRESUMED INFECTIOUS ORIGIN: Primary | ICD-10-CM

## 2017-12-05 PROCEDURE — 99213 OFFICE O/P EST LOW 20 MIN: CPT | Performed by: FAMILY MEDICINE

## 2017-12-05 RX ORDER — CIPROFLOXACIN 500 MG/1
500 TABLET, FILM COATED ORAL 2 TIMES DAILY
Qty: 14 TABLET | Refills: 0 | Status: SHIPPED | OUTPATIENT
Start: 2017-12-05 | End: 2018-01-09

## 2017-12-05 NOTE — MR AVS SNAPSHOT
"              After Visit Summary   12/5/2017    John Reyes    MRN: [id number]           Patient Information     Date Of Birth          1965        Visit Information        Provider Department      12/5/2017 8:40 AM Amy Sims, DO [facility]        Today's Diagnoses     Diarrhea of presumed infectious origin    -  1       Follow-ups after your visit        Who to contact     If you have questions or need follow up information about today's clinic visit or your schedule please contact [facility] directly at [phone].  Normal or non-critical lab and imaging results will be communicated to you by Purchhart, letter or phone within 4 business days after the clinic has received the results. If you do not hear from us within 7 days, please contact the clinic through [employer] or phone. If you have a critical or abnormal lab result, we will notify you by phone as soon as possible.  Submit refill requests through [employer] or call your pharmacy and they will forward the refill request to us. Please allow 3 business days for your refill to be completed.          Additional Information About Your Visit        MyChart Information     [employer] gives you secure access to your electronic health record. If you see a primary care provider, you can also send messages to your care team and make appointments. If you have questions, please call your primary care clinic.  If you do not have a primary care provider, please call [phone] and they will assist you.        Care EveryWhere ID     This is your Care EveryWhere ID. This could be used by other organizations to access your [place] medical records  [id number]        Your Vitals Were     Pulse Temperature Respirations Height Pulse Oximetry BMI (Body Mass Index)    66 98.2  F (36.8  C) (Oral) 16 5' 9\" (1.753 m) 95% 34.7 kg/m2       Blood Pressure from Last 3 Encounters:   12/05/17 114/76   11/08/17 118/80   04/11/17 116/68 "    Weight from Last 3 Encounters:   12/05/17 235 lb (106.6 kg)   11/08/17 233 lb 11.2 oz (106 kg)   04/11/17 228 lb (103.4 kg)              Today, you had the following     No orders found for display         Today's Medication Changes          These changes are accurate as of: 12/5/17  9:43 AM.  If you have any questions, ask your nurse or doctor.               Start taking these medicines.        Dose/Directions    ciprofloxacin 500 MG tablet   Commonly known as:  CIPRO   Used for:  Diarrhea of presumed infectious origin   Started by:  Amy Sims,         Dose:  500 mg   Take 1 tablet (500 mg) by mouth 2 times daily   Quantity:  14 tablet   Refills:  0            Where to get your medicines      These medications were sent to [facility] [id number] [place] [address]  [id number] [place] [address]     Phone:  [phone]     ciprofloxacin 500 MG tablet                Primary Care Provider Office Phone # Fax #    Remy Mcknight [phone] [address] [facility] [address]        Equal Access to Services     [facility]: Hadii maria de jesus greene hadasho Sonic, waaxda luqadaha, qaybta kaalmada khushbu, chandni smith. So [facility] [phone].    ATENCIÓN: Si habla español, tiene a miller disposición servicios gratuitos de asistencia lingüística. Chloe al [phone].    We comply with applicable federal civil rights laws and [place] laws. We do not discriminate on the basis of race, color, national origin, age, disability, sex, sexual orientation, or gender identity.            Thank you!     Thank you for choosing [facility]  for your care. Our goal is always to provide you with excellent care. Hearing back from our patients is one way we can continue to improve our services. Please take a few minutes to complete the written survey that you may receive in the mail after your visit with us. Thank  you!             Your Updated Medication List - Protect others around you: Learn how to safely use, store and throw away your medicines at www.disposemymeds.org.          This list is accurate as of: 12/5/17  9:43 AM.  Always use your most recent med list.                   Brand Name Dispense Instructions for use Diagnosis    ALPRAZOLAM PO      Take 0.25 mg by mouth daily as needed for anxiety        aspirin 81 MG tablet     30 tablet    Take 1 tablet by mouth daily.    Mixed hyperlipidemia, Hyperlipidemia LDL goal <130       atorvastatin 40 MG tablet    LIPITOR    90 tablet    Take 1 tablet (40 mg) by mouth daily    Hyperlipidemia LDL goal <130       ciprofloxacin 500 MG tablet    CIPRO    14 tablet    Take 1 tablet (500 mg) by mouth 2 times daily    Diarrhea of presumed infectious origin       econazole nitrate 1 % cream     30 g    Apply topically daily Apply to affected nails daily    Onychomycosis of right great toe       fluocinonide 0.05 % solution    LIDEX    60 mL    Apply to scalp daily as needed    Dandruff       propranolol 40 MG tablet    INDERAL    30 tablet    SIG 1 tid prn anxiety    Test anxiety       solifenacin 5 MG tablet    VESICARE    90 tablet    Take 1 tablet (5 mg) by mouth daily    Urinary urgency       traZODone 50 MG tablet    DESYREL    180 tablet    TAKE TWO TABLETS BY MOUTH AT BEDTIME    Sleep disorder

## 2017-12-05 NOTE — NURSING NOTE
"Chief Complaint   Patient presents with     Diarrhea       Initial /76 (BP Location: Right arm, Patient Position: Chair, Cuff Size: Adult Large)  Pulse 66  Temp 98.2  F (36.8  C) (Oral)  Resp 16  Ht 5' 9\" (1.753 m)  Wt 235 lb (106.6 kg)  SpO2 95%  BMI 34.7 kg/m2 Estimated body mass index is 34.7 kg/(m^2) as calculated from the following:    Height as of this encounter: 5' 9\" (1.753 m).    Weight as of this encounter: 235 lb (106.6 kg).  Medication Reconciliation: complete   "

## 2017-12-05 NOTE — PROGRESS NOTES
SUBJECTIVE:   John Reyes is a 52 year old male who presents to clinic today for the following health issues:      Diarrhea  Onset: 9- 10 days     Description:   Consistency of stool: watery and loose  Blood in stool: no   Number of loose stools in past 24 hours: 5    Progression of Symptoms:  same    Accompanying Signs & Symptoms:  Fever: no   Nausea or vomiting; YES  Abdominal pain: no   Episodes of constipation: no   Weight loss: no     History:   Ill contacts: no   Recent use of antibiotics: no    Recent travels: no          Recent medication-new or changes(Rx or OTC): no     Precipitating factors: Possibly from some  Cottage cheese      Alleviating factors:   None    Therapies Tried and outcome:  Imodium AD; Outcome: Helps a little      Problem list and histories reviewed & adjusted, as indicated.  Additional history: as documented    Patient Active Problem List   Diagnosis     Attention deficit disorder     Obesity     Esophageal reflux     HYPERLIPIDEMIA LDL GOAL <130     Sleep disorder     Mild major depression (H)     Hypertriglyceridemia     Anxiety     Testosterone deficiency     Impaired fasting glucose     Hypogonadism male     Elevated ferritin     Past Surgical History:   Procedure Laterality Date     ARTHROSCOPY SHOULDER, OPEN ROTATOR CUFF REPAIR, COMBINED Left 2014    Procedure: COMBINED ARTHROSCOPY SHOULDER, OPEN ROTATOR CUFF REPAIR;  Surgeon: Maykel Khalil MD;  Location:  OR      HAND/FINGER SURGERY UNLISTED      ORIF right middle finger     COLONOSCOPY  2013    Procedure: COMBINED COLONOSCOPY, SINGLE BIOPSY/POLYPECTOMY BY BIOPSY;;  Surgeon: Hali Lema MD;  Location:  GI     GI SURGERY      henmmriodectoomy      EXPLOR MAXILL SINUS,INTRANASAL        VASECTOMY UNILAT/BILAT W POSTOP SEMEN         Social History   Substance Use Topics     Smoking status: Former Smoker     Packs/day: 0.50     Types: Cigarettes, Cigars     Quit date: 2003  "    Smokeless tobacco: Never Used      Comment: occasional cigar     Alcohol use 0.0 oz/week     0 Standard drinks or equivalent per week      Comment: 3-4 beers/week     Family History   Problem Relation Age of Onset     Lipids Father      On medication     Cancer - colorectal Maternal Uncle              Reviewed and updated as needed this visit by clinical staff     Reviewed and updated as needed this visit by Provider         ROS:  Constitutional, HEENT, cardiovascular, pulmonary, gi and gu systems are negative, except as otherwise noted.      OBJECTIVE:   /76 (BP Location: Right arm, Patient Position: Chair, Cuff Size: Adult Large)  Pulse 66  Temp 98.2  F (36.8  C) (Oral)  Resp 16  Ht 5' 9\" (1.753 m)  Wt 235 lb (106.6 kg)  SpO2 95%  BMI 34.7 kg/m2  Body mass index is 34.7 kg/(m^2).  GENERAL: healthy, alert and no distress  RESP: lungs clear to auscultation - no rales, rhonchi or wheezes  CV: regular rates and rhythm, normal S1 S2, no S3 or S4 and no murmur, click or rub  ABDOMEN: tenderness LLQ, no organomegaly or masses and bowel sounds normal    ASSESSMENT/PLAN:     1. Diarrhea of presumed infectious origin  - Continue immodium  - Will start cipro for presumed infectious diarrhea  - If symptoms do not improve will get stool testing done  - ciprofloxacin (CIPRO) 500 MG tablet; Take 1 tablet (500 mg) by mouth 2 times daily  Dispense: 14 tablet; Refill: 0    Follow up if not improved within a few days    Amy Sims, DO  [facility]"

## 2018-01-04 ENCOUNTER — TELEPHONE (OUTPATIENT)
Dept: FAMILY MEDICINE | Facility: CLINIC | Age: 53
End: 2018-01-04

## 2018-01-04 DIAGNOSIS — L21.0 DANDRUFF: ICD-10-CM

## 2018-01-04 RX ORDER — FLUOCINONIDE TOPICAL SOLUTION USP, 0.05% 0.5 MG/ML
SOLUTION TOPICAL
Qty: 60 ML | Refills: 1 | Status: SHIPPED | OUTPATIENT
Start: 2018-01-04 | End: 2023-04-17

## 2018-01-04 NOTE — TELEPHONE ENCOUNTER
Approved per pharmacist refill protocol. Patient is current on office visit and labs. Thanks.    Mirtha Pratt, Pharm.D  Pharmacist in Charge  [facility]

## 2018-01-08 ENCOUNTER — TELEPHONE (OUTPATIENT)
Dept: FAMILY MEDICINE | Facility: CLINIC | Age: 53
End: 2018-01-08

## 2018-01-08 NOTE — TELEPHONE ENCOUNTER
Pt calls stating he recently saw Dr. Mcknight for a cold but now he has discomfort in one of his lungs with cough.  Advised needs visit.  PCP is already over booked today , we transferred to appointment line to see if any openings in nearby clinics, if not,  will give you hours/locations UC today.  Pt agrees to plan.   Mazin Echavarria RN

## 2018-01-09 ENCOUNTER — RADIANT APPOINTMENT (OUTPATIENT)
Dept: GENERAL RADIOLOGY | Facility: CLINIC | Age: 53
End: 2018-01-09
Attending: FAMILY MEDICINE
Payer: COMMERCIAL

## 2018-01-09 ENCOUNTER — OFFICE VISIT (OUTPATIENT)
Dept: FAMILY MEDICINE | Facility: CLINIC | Age: 53
End: 2018-01-09
Payer: COMMERCIAL

## 2018-01-09 VITALS
HEART RATE: 90 BPM | OXYGEN SATURATION: 95 % | DIASTOLIC BLOOD PRESSURE: 81 MMHG | SYSTOLIC BLOOD PRESSURE: 131 MMHG | WEIGHT: 233.1 LBS | RESPIRATION RATE: 14 BRPM | HEIGHT: 69 IN | BODY MASS INDEX: 34.52 KG/M2 | TEMPERATURE: 97.5 F

## 2018-01-09 DIAGNOSIS — R07.1 PAINFUL RESPIRATION: Primary | ICD-10-CM

## 2018-01-09 PROCEDURE — 71046 X-RAY EXAM CHEST 2 VIEWS: CPT

## 2018-01-09 PROCEDURE — 99213 OFFICE O/P EST LOW 20 MIN: CPT | Performed by: FAMILY MEDICINE

## 2018-01-09 RX ORDER — AZITHROMYCIN 250 MG/1
TABLET, FILM COATED ORAL
Qty: 6 TABLET | Refills: 0 | Status: SHIPPED | OUTPATIENT
Start: 2018-01-09 | End: 2018-12-26

## 2018-01-09 NOTE — NURSING NOTE
"Chief Complaint   Patient presents with     URI     chest tightness and hurts to cough        Initial /81 (BP Location: Right arm, Patient Position: Chair, Cuff Size: Adult Large)  Pulse 90  Temp 97.5  F (36.4  C) (Oral)  Resp 14  Ht 5' 9\" (1.753 m)  Wt 233 lb 1.6 oz (105.7 kg)  SpO2 95%  BMI 34.42 kg/m2 Estimated body mass index is 34.42 kg/(m^2) as calculated from the following:    Height as of this encounter: 5' 9\" (1.753 m).    Weight as of this encounter: 233 lb 1.6 oz (105.7 kg).  Medication Reconciliation: complete   "

## 2018-01-09 NOTE — PROGRESS NOTES
"  SUBJECTIVE:   John Reyes is a 52 year old male who presents to clinic today for the following health issues:      RESPIRATORY SYMPTOMS  /81 (BP Location: Right arm, Patient Position: Chair, Cuff Size: Adult Large)  Pulse 90  Temp 97.5  F (36.4  C) (Oral)  Resp 14  Ht 5' 9\" (1.753 m)  Wt 233 lb 1.6 oz (105.7 kg)  SpO2 95%  BMI 34.42 kg/m2      Duration: 1 1/2 week     Description  rhinorrhea, sore throat, facial pain/pressure, fever, chills and fatigue/malaise    Severity: severe    Accompanying signs and symptoms: None    History (predisposing factors):  none    Precipitating or alleviating factors: None    Therapies tried and outcome:  Ibuprofen   Chest tightness and productive cough,     Past Medical History:   Diagnosis Date     Acute duodenal ulcer without mention of hemorrhage, perforation, or obstruction 1990    diagnosed on EGD     Allergic rhinitis, cause unspecified      Attention deficit disorder without mention of hyperactivity     \"all my life\"     Cataplexy and narcolepsy     had for many years, but diagnosed in sleep lab 6/03     Esophageal reflux 11/28/2007     Hyperlipidemia LDL goal <130 10/31/2010     Hypertriglyceridemia 8/10/2012     Mild major depression (H) 9/15/2011     OBESITY NOS 11/28/2007     PONV (postoperative nausea and vomiting)      Sleep apnea     CPAP     Sleep disorder 4/12/2011       Past Surgical History:   Procedure Laterality Date     ARTHROSCOPY SHOULDER, OPEN ROTATOR CUFF REPAIR, COMBINED Left 11/5/2014    Procedure: COMBINED ARTHROSCOPY SHOULDER, OPEN ROTATOR CUFF REPAIR;  Surgeon: Maykel Khalil MD;  Location:  OR     C HAND/FINGER SURGERY UNLISTED  1983    ORIF right middle finger     COLONOSCOPY  9/9/2013    Procedure: COMBINED COLONOSCOPY, SINGLE BIOPSY/POLYPECTOMY BY BIOPSY;;  Surgeon: Hali Lema MD;  Location:  GI     GI SURGERY      henmmriodectoomy     HC EXPLOR MAXILL SINUS,INTRANASAL  1981`     HC VASECTOMY UNILAT/BILAT W " POSTOP SEMEN  4/03       Family History   Problem Relation Age of Onset     Lipids Father      On medication     Cancer - colorectal Maternal Uncle        Social History   Substance Use Topics     Smoking status: Former Smoker     Packs/day: 0.50     Types: Cigarettes, Cigars     Quit date: 1/1/2003     Smokeless tobacco: Never Used      Comment: occasional cigar     Alcohol use 0.0 oz/week     0 Standard drinks or equivalent per week      Comment: 3-4 beers/week     Patient complains of congestion with sore throat, nasal congestion and sinus pain. Some mucopurulant discharge but no upper dental pain and no sustained fever. Duration less than .  Has past history of airborn allergy or asthma.   No chest pain, diaphoresis, or sob.  moderatelyproductive cough.  TMs dull not *red, sinus tenderness left, has pleuritic left chest pain  lungs clear, s1s2,soft abd,no distress, cyanosis or stridor.  A:URI with acute bronchitis and pleurisy   P:fluids, antipyretics,rest and meds as directed.  Recheck PRN worsening or non-improvement over 5 days.  Discussed signs of systemic or constutional illness.  Current Outpatient Prescriptions   Medication     azithromycin (ZITHROMAX) 250 MG tablet     fluocinonide (LIDEX) 0.05 % solution     ciprofloxacin (CIPRO) 500 MG tablet     traZODone (DESYREL) 50 MG tablet     ALPRAZOLAM PO     atorvastatin (LIPITOR) 40 MG tablet     solifenacin (VESICARE) 5 MG tablet     econazole nitrate 1 % cream     propranolol (INDERAL) 40 MG tablet     aspirin 81 MG tablet     No current facility-administered medications for this visit.

## 2018-01-09 NOTE — MR AVS SNAPSHOT
"              After Visit Summary   1/9/2018    John Reyes    MRN: [id number]           Patient Information     Date Of Birth          1965        Visit Information        Provider Department      1/9/2018 1:30 PM Remy Mcknight MD [facility]        Today's Diagnoses     Painful respiration    -  1       Follow-ups after your visit        Who to contact     If you have questions or need follow up information about today's clinic visit or your schedule please contact [facility] directly at [phone].  Normal or non-critical lab and imaging results will be communicated to you by Faradayhart, letter or phone within 4 business days after the clinic has received the results. If you do not hear from us within 7 days, please contact the clinic through VocalIQt or phone. If you have a critical or abnormal lab result, we will notify you by phone as soon as possible.  Submit refill requests through [email or web address] or call your pharmacy and they will forward the refill request to us. Please allow 3 business days for your refill to be completed.          Additional Information About Your Visit        MyChart Information     [email or web address] gives you secure access to your electronic health record. If you see a primary care provider, you can also send messages to your care team and make appointments. If you have questions, please call your primary care clinic.  If you do not have a primary care provider, please call [phone] and they will assist you.        Care EveryWhere ID     This is your Care EveryWhere ID. This could be used by other organizations to access your [facility] medical records  [id number]        Your Vitals Were     Pulse Temperature Respirations Height Pulse Oximetry BMI (Body Mass Index)    90 97.5  F (36.4  C) (Oral) 14 5' 9\" (1.753 m) 95% 34.42 kg/m2       Blood Pressure from Last 3 Encounters:   01/09/18 131/81   12/05/17 114/76   11/08/17 118/80    Weight from " Last 3 Encounters:   01/09/18 233 lb 1.6 oz (105.7 kg)   12/05/17 235 lb (106.6 kg)   11/08/17 233 lb 11.2 oz (106 kg)              We Performed the Following     XR Chest 2 Views          Today's Medication Changes          These changes are accurate as of: 1/9/18  2:18 PM.  If you have any questions, ask your nurse or doctor.               Start taking these medicines.        Dose/Directions    azithromycin 250 MG tablet   Commonly known as:  ZITHROMAX   Used for:  Painful respiration   Started by:  Remy Mcknight MD        Two tablets first day, then one tablet daily for four days.   Quantity:  6 tablet   Refills:  0            Where to get your medicines      These medications were sent to [facility] [address]  [id number] [place] [address]     Phone:  [phone]     azithromycin 250 MG tablet                Primary Care Provider Office Phone # Fax #    Remy Mcknight [phone] [address] [facility] [address]        Equal Access to Services     [facility]: Hadii aad ku hadasho Soomaali, waaxda luqadaha, qaybta kaalmada adeegyada, waxcarlene bolden . So [facility] [phone].    ATENCIÓN: Si habla español, tiene a miller disposición servicios gratuitos de asistencia lingüística. [facility] [phone].    We comply with applicable federal civil rights laws and [place] laws. We do not discriminate on the basis of race, color, national origin, age, disability, sex, sexual orientation, or gender identity.            Thank you!     Thank you for choosing [facility]  for your care. Our goal is always to provide you with excellent care. Hearing back from our patients is one way we can continue to improve our services. Please take a few minutes to complete the written survey that you may receive in the mail after your visit with us. Thank you!             Your Updated Medication  List - Protect others around you: Learn how to safely use, store and throw away your medicines at www.disposemymeds.org.          This list is accurate as of: 1/9/18  2:18 PM.  Always use your most recent med list.                   Brand Name Dispense Instructions for use Diagnosis    ALPRAZOLAM PO      Take 0.25 mg by mouth daily as needed for anxiety        aspirin 81 MG tablet     30 tablet    Take 1 tablet by mouth daily.    Mixed hyperlipidemia, Hyperlipidemia LDL goal <130       atorvastatin 40 MG tablet    LIPITOR    90 tablet    Take 1 tablet (40 mg) by mouth daily    Hyperlipidemia LDL goal <130       azithromycin 250 MG tablet    ZITHROMAX    6 tablet    Two tablets first day, then one tablet daily for four days.    Painful respiration       ciprofloxacin 500 MG tablet    CIPRO    14 tablet    Take 1 tablet (500 mg) by mouth 2 times daily    Diarrhea of presumed infectious origin       econazole nitrate 1 % cream     30 g    Apply topically daily Apply to affected nails daily    Onychomycosis of right great toe       fluocinonide 0.05 % solution    LIDEX    60 mL    Apply to scalp daily as needed    Dandruff       propranolol 40 MG tablet    INDERAL    30 tablet    SIG 1 tid prn anxiety    Test anxiety       solifenacin 5 MG tablet    VESICARE    90 tablet    Take 1 tablet (5 mg) by mouth daily    Urinary urgency       traZODone 50 MG tablet    DESYREL    180 tablet    TAKE TWO TABLETS BY MOUTH AT BEDTIME    Sleep disorder

## 2018-02-23 DIAGNOSIS — R39.15 URINARY URGENCY: ICD-10-CM

## 2018-02-23 DIAGNOSIS — F32.1 MODERATE MAJOR DEPRESSION (H): ICD-10-CM

## 2018-02-23 DIAGNOSIS — E78.5 HYPERLIPIDEMIA LDL GOAL <130: ICD-10-CM

## 2018-02-23 RX ORDER — BUPROPION HYDROCHLORIDE 300 MG/1
300 TABLET ORAL EVERY MORNING
Qty: 90 TABLET | Refills: 0 | Status: SHIPPED | OUTPATIENT
Start: 2018-02-23 | End: 2018-06-04

## 2018-02-23 RX ORDER — ATORVASTATIN CALCIUM 40 MG/1
40 TABLET, FILM COATED ORAL DAILY
Qty: 90 TABLET | Refills: 1 | Status: SHIPPED | OUTPATIENT
Start: 2018-02-23 | End: 2018-08-31

## 2018-02-23 RX ORDER — SERTRALINE HYDROCHLORIDE 100 MG/1
TABLET, FILM COATED ORAL
Qty: 135 TABLET | Refills: 0 | Status: SHIPPED | OUTPATIENT
Start: 2018-02-23 | End: 2018-06-04

## 2018-02-23 RX ORDER — SOLIFENACIN SUCCINATE 5 MG/1
5 TABLET, FILM COATED ORAL DAILY
Qty: 90 TABLET | Refills: 1 | Status: SHIPPED | OUTPATIENT
Start: 2018-02-23 | End: 2020-11-30

## 2018-02-23 NOTE — TELEPHONE ENCOUNTER
Seolifenacin (Vesicare)    Last Written Prescription Date:  12/7/2016  Last Fill Quantity: 90,  # refills: 1   Last office visit: 1/9/2018 with prescribing provider:  1/9/2018   Future Office Visit:   Next 5 appointments (look out 90 days)     Feb 26, 2018  9:45 AM CST   Telephone Visit with Remy Mcknight MD   [facility])    [id number] [place] [address]   [phone]                Routing refill request to provider for review/approval because:  A break in medication    Bupropion (Wellbutrin XL)    Last Written Prescription Date:  10/30/2017  Last Fill Quantity: 90,  # refills: 1   Last office visit: 1/9/2018 with prescribing provider:  1/9/2018   Future Office Visit:   Next 5 appointments (look out 90 days)     Feb 26, 2018  9:45 AM CST   Telephone Visit with Remy [facility]cknight MD   [facility]    [address] [place] [address]   [phone]                 Routing refill request to provider for review/approval because:  Drug not active on patient's medication list- Was this an Accident?    PHQ-9 score:    PHQ-9 SCORE 11/8/2017   Total Score -   Total Score 13       Sertraline (Zoloft)  Last Written Prescription Date:  10/30/2017  Last Fill Quantity: 90,  # refills: 1   Last office visit: 1/9/2018 with prescribing provider:  1/9/2018   Future Office Visit:   Next 5 appointments (look out 90 days)     Feb 26, 2018  9:45 AM CST   Telephone Visit with Remy Mcknight MD   [facility]    [address] [facility] [address]   [phone]                 Routing refill request to provider for review/approval because:  Drug not active on patient's medication list  PHQ-9>5    Vilma HURST RN, BSN, PHN  [facility]

## 2018-02-23 NOTE — TELEPHONE ENCOUNTER
"Requested Prescriptions   Pending Prescriptions Disp Refills     buPROPion (WELLBUTRIN XL) 300 MG 24 hr tablet 90 tablet 0     Sig: Take 1 tablet (300 mg) by mouth every morning    SSRIs Protocol Failed    2/23/2018  1:47 PM       Failed - PHQ-9 score less than 5 in past 6 months    The PHQ-9 criteria is meant to fail. It requires a PHQ-9 score review         Passed - Medication is Bupropion    If the medication is Bupropion (Wellbutrin), and the patient is taking for smoking cessation; OK to refill.         Passed - Patient is age 18 or older       Passed - Recent (6 mo) or future visit with authorizing provider's specialty    Patient had office visit in the last 6 months or has a visit in the next 30 days with authorizing provider.  See \"Patient Info\" tab in inbasket, or \"Choose Columns\" in Meds & Orders section of the refill encounter.            atorvastatin (LIPITOR) 40 MG tablet 90 tablet 1     Sig: Take 1 tablet (40 mg) by mouth daily    Statins Protocol Passed    2/23/2018  1:47 PM       Passed - LDL on file in past 12 months    Recent Labs   Lab Test  11/08/17   1213   LDL  Cannot estimate LDL when triglyceride exceeds 400 mg/dL  110*            Passed - No abnormal creatine kinase in past 12 months    No lab results found.         Passed - Recent or future visit with authorizing provider    Patient had office visit in the last year or has a visit in the next 30 days with authorizing provider.  See \"Patient Info\" tab in inbasket, or \"Choose Columns\" in Meds & Orders section of the refill encounter.            Passed - Patient is age 18 or older        sertraline (ZOLOFT) 100 MG tablet 135 tablet 0     Sig: Take one and one half pills daily    SSRIs Protocol Failed    2/23/2018  1:47 PM       Failed - PHQ-9 score less than 5 in past 6 months    The PHQ-9 criteria is meant to fail. It requires a PHQ-9 score review         Passed - Medication is Bupropion    If the medication is Bupropion (Wellbutrin), and " "the patient is taking for smoking cessation; OK to refill.         Passed - Patient is age 18 or older       Passed - Recent (6 mo) or future visit with authorizing provider's specialty    Patient had office visit in the last 6 months or has a visit in the next 30 days with authorizing provider.  See \"Patient Info\" tab in inbasket, or \"Choose Columns\" in Meds & Orders section of the refill encounter.            solifenacin (VESICARE) 5 MG tablet 90 tablet 1     Sig: Take 1 tablet (5 mg) by mouth daily    Muscarinic Antagonists (Urinary Incontinence Agents) Passed    2/23/2018  1:47 PM       Passed - Recent or future visit with authorizing provider's specialty    Patient had office visit in the last year or has a visit in the next 30 days with authorizing provider.  See \"Patient Info\" tab in inbasket, or \"Choose Columns\" in Meds & Orders section of the refill encounter.            Passed - Patient does not have a diagnosis of glaucoma on the problem list    If glaucoma diagnosis is new, refer refill to physician.         Passed - Patient is 18 years of age or older          "

## 2018-02-23 NOTE — TELEPHONE ENCOUNTER
Atorvastatin     Last Written Prescription Date:  3/28/2017  Last Fill Quantity: 90,  # refills: 3   Last office visit: 1/9/2018 with prescribing provider:  1/9/2018   Future Office Visit:   Next 5 appointments (look out 90 days)     Feb 26, 2018  9:45 AM CST   Telephone Visit with Remy Mcknight MD   [facility] ([facility])    [address] [place] [id number] [phone]                 Prescription approved per [facility] Refill Protocol.    Patient reported he will schedule soon to see LS for dieting needs d/t elevated TRIG.    Vilma HURST RN, BSN, PHN  [facility] RN

## 2018-02-26 ENCOUNTER — VIRTUAL VISIT (OUTPATIENT)
Dept: FAMILY MEDICINE | Facility: CLINIC | Age: 53
End: 2018-02-26

## 2018-02-26 DIAGNOSIS — T75.3XXD MOTION SICKNESS, SUBSEQUENT ENCOUNTER: Primary | ICD-10-CM

## 2018-02-26 PROCEDURE — 99441 ZZC PHYSICIAN TELEPHONE EVALUATION 5-10 MIN: CPT | Performed by: FAMILY MEDICINE

## 2018-02-26 RX ORDER — SCOLOPAMINE TRANSDERMAL SYSTEM 1 MG/1
PATCH, EXTENDED RELEASE TRANSDERMAL
Qty: 2 PATCH | Refills: 0 | Status: SHIPPED | OUTPATIENT
Start: 2018-02-26 | End: 2018-06-05

## 2018-02-26 NOTE — PROGRESS NOTES
"Jonh Reyes is a 52 year old male who is being evaluated via a telephone visit.      The patient has been notified of following:     \"This telephone visit will be conducted via a call between you and your physician/provider. We have found that certain health care needs can be provided without the need for a physical exam.  This service lets us provide the care you need with a short phone conversation.  If a prescription is necessary we can send it directly to your pharmacy.  If lab work is needed we can place an order for that and you can then stop by our lab to have the test done at a later time.    We will bill your insurance company for this service.  Please check with your medical insurance if this type of visit is covered. You may be responsible for the cost of this type of visit if insurance coverage is denied.  The typical cost is $30 (10min), $59 (11-20min) and $85 (21-30min).  Most often these visits are shorter than 10 minutes.    If during the course of the call the physician/provider feels a telephone visit is not appropriate, you will not be charged for this service.\"       Consent has been obtained for this service by care team member: yes.   See the scanned image in the medical record.    John Reyes complains of  No chief complaint on file.      I have reviewed and updated the patient's Past Medical History, Social History, Family History and Medication List.    ALLERGIES  Penicillins    Discussed seagoing motions sickness and the interactions of anti vertigo agents with alcohol. He's had previous good experience. Plans to try prn and cover all of the sea days if needed   (T75.3XXD) Motion sickness, subsequent encounter  (primary encounter diagnosis)  Comment:   Plan: scopolamine (TRANSDERM) 72 hr patch        I have reviewed the note as documented above.  This accurately captures the substance of my conversation with the patient,      Total time of call between patient and provider was " 8 minutes

## 2018-02-26 NOTE — MR AVS SNAPSHOT
After Visit Summary   2/26/2018    John Reyes    MRN: [id number]           Patient Information     Date Of Birth          1965        Visit Information        Provider Department      2/26/2018 9:45 AM Remy Mcknight MD [facility]        Today's Diagnoses     Motion sickness, subsequent encounter    -  1       Follow-ups after your visit        Who to contact     If you have questions or need follow up information about today's clinic visit or your schedule please contact [facility] directly at [phone].  Normal or non-critical lab and imaging results will be communicated to you by HoozOnhart, letter or phone within 4 business days after the clinic has received the results. If you do not hear from us within 7 days, please contact the clinic through DecisionPoint Systemst or phone. If you have a critical or abnormal lab result, we will notify you by phone as soon as possible.  Submit refill requests through [employer] or call your pharmacy and they will forward the refill request to us. Please allow 3 business days for your refill to be completed.          Additional Information About Your Visit        MyChart Information     [employer] gives you secure access to your electronic health record. If you see a primary care provider, you can also send messages to your care team and make appointments. If you have questions, please call your primary care clinic.  If you do not have a primary care provider, please call [phone] and they will assist you.        Care EveryWhere ID     This is your Care EveryWhere ID. This could be used by other organizations to access your [facility] medical records  [id number]         Blood Pressure from Last 3 Encounters:   01/09/18 131/81   12/05/17 114/76   11/08/17 118/80    Weight from Last 3 Encounters:   01/09/18 233 lb 1.6 oz (105.7 kg)   12/05/17 235 lb (106.6 kg)   11/08/17 233 lb 11.2 oz (106 kg)              Today, you had  the following     No orders found for display         Today's Medication Changes          These changes are accurate as of 2/26/18  9:48 AM.  If you have any questions, ask your nurse or doctor.               Start taking these medicines.        Dose/Directions    scopolamine 72 hr patch   Commonly known as:  TRANSDERM   Used for:  Motion sickness, subsequent encounter   Started by:  Remy Mcknight MD        Apply 1 patch to hairless area behind one ear at least 4 hours before travel.  Remove old patch and change every 3 days (72 hours).   Quantity:  2 patch   Refills:  0            Where to get your medicines      These medications were sent to [facility] [address]  [address] [facility] [address]     Phone:  [phone]     scopolamine 72 hr patch                Primary Care Provider Office Phone # Fax #    Remy Mcknight [phone] [address] [facility] [address]        Equal Access to Services     [facility]: Hadii aad ku hadasho Soomaali, waaxda luqadaha, qaybta kaalmada adeegyada, waxay idiin hayaan mohan kharaelly bolden . So [facility] [phone].    ATENCIÓN: Si habla español, tiene a miller disposición servicios gratuitos de asistencia lingüística. Llame al [phone].    We comply with applicable federal civil rights laws and [place] laws. We do not discriminate on the basis of race, color, national origin, age, disability, sex, sexual orientation, or gender identity.            Thank you!     Thank you for choosing [facility]  for your care. Our goal is always to provide you with excellent care. Hearing back from our patients is one way we can continue to improve our services. Please take a few minutes to complete the written survey that you may receive in the mail after your visit with us. Thank you!             Your Updated Medication List - Protect others around you: Learn how to  safely use, store and throw away your medicines at www.disposemymeds.org.          This list is accurate as of 2/26/18  9:48 AM.  Always use your most recent med list.                   Brand Name Dispense Instructions for use Diagnosis    ALPRAZOLAM PO      Take 0.25 mg by mouth daily as needed for anxiety        aspirin 81 MG tablet     30 tablet    Take 1 tablet by mouth daily.    Mixed hyperlipidemia, Hyperlipidemia LDL goal <130       atorvastatin 40 MG tablet    LIPITOR    90 tablet    Take 1 tablet (40 mg) by mouth daily    Hyperlipidemia LDL goal <130       azithromycin 250 MG tablet    ZITHROMAX    6 tablet    Two tablets first day, then one tablet daily for four days.    Painful respiration       buPROPion 300 MG 24 hr tablet    WELLBUTRIN XL    90 tablet    Take 1 tablet (300 mg) by mouth every morning    Moderate major depression (H)       econazole nitrate 1 % cream     30 g    Apply topically daily Apply to affected nails daily    Onychomycosis of right great toe       fluocinonide 0.05 % solution    LIDEX    60 mL    Apply to scalp daily as needed    Dandruff       propranolol 40 MG tablet    INDERAL    30 tablet    SIG 1 tid prn anxiety    Test anxiety       scopolamine 72 hr patch    TRANSDERM    2 patch    Apply 1 patch to hairless area behind one ear at least 4 hours before travel.  Remove old patch and change every 3 days (72 hours).    Motion sickness, subsequent encounter       sertraline 100 MG tablet    ZOLOFT    135 tablet    Take one and one half pills daily    Moderate major depression (H)       solifenacin 5 MG tablet    VESICARE    90 tablet    Take 1 tablet (5 mg) by mouth daily    Urinary urgency       traZODone 50 MG tablet    DESYREL    180 tablet    TAKE TWO TABLETS BY MOUTH AT BEDTIME    Sleep disorder

## 2018-05-14 ENCOUNTER — TELEPHONE (OUTPATIENT)
Dept: FAMILY MEDICINE | Facility: CLINIC | Age: 53
End: 2018-05-14

## 2018-05-14 DIAGNOSIS — E66.01 MORBID OBESITY (H): Primary | ICD-10-CM

## 2018-05-14 NOTE — TELEPHONE ENCOUNTER
Pt calling and requesting a referral to see a nutritionist. Pt states he has spoken to you about this.    Yanira-Referrals

## 2018-05-21 ENCOUNTER — TELEPHONE (OUTPATIENT)
Dept: FAMILY MEDICINE | Facility: CLINIC | Age: 53
End: 2018-05-21

## 2018-05-21 ENCOUNTER — MYC MEDICAL ADVICE (OUTPATIENT)
Dept: FAMILY MEDICINE | Facility: CLINIC | Age: 53
End: 2018-05-21

## 2018-05-21 ASSESSMENT — PATIENT HEALTH QUESTIONNAIRE - PHQ9
SUM OF ALL RESPONSES TO PHQ QUESTIONS 1-9: 5
SUM OF ALL RESPONSES TO PHQ QUESTIONS 1-9: 5
10. IF YOU CHECKED OFF ANY PROBLEMS, HOW DIFFICULT HAVE THESE PROBLEMS MADE IT FOR YOU TO DO YOUR WORK, TAKE CARE OF THINGS AT HOME, OR GET ALONG WITH OTHER PEOPLE: NOT DIFFICULT AT ALL

## 2018-05-21 NOTE — TELEPHONE ENCOUNTER
Panel Management Review      Patient has the following on his problem list:     Depression / Dysthymia review    Measure:  Needs PHQ-9 score of 4 or less during index window.  Administer PHQ-9 and if score is 5 or more, send encounter to provider for next steps.    5 - 7 month window range: 4/8/2018 - 6/8/2018    PHQ-9 SCORE 8/30/2016 3/28/2017 11/8/2017   Total Score - - -   Total Score 8 0 13       If PHQ-9 recheck is 5 or more, route to provider for next steps.    Patient is due for:  PHQ9      Composite cancer screening  Chart review shows that this patient is due/due soon for the following None  Summary:    Patient is due/failing the following:   PHQ9    Action needed:   Patient needs to do PHQ9.    Type of outreach:    Sent Codesion message.    Questions for provider review:    None                                                                                                                                    Jigna Avendano, [facility]       Chart routed to Care Team .

## 2018-05-22 ASSESSMENT — PATIENT HEALTH QUESTIONNAIRE - PHQ9: SUM OF ALL RESPONSES TO PHQ QUESTIONS 1-9: 5

## 2018-06-04 DIAGNOSIS — F32.1 MODERATE MAJOR DEPRESSION (H): ICD-10-CM

## 2018-06-04 DIAGNOSIS — G47.9 SLEEP DISORDER: ICD-10-CM

## 2018-06-05 ENCOUNTER — TELEPHONE (OUTPATIENT)
Dept: FAMILY MEDICINE | Facility: CLINIC | Age: 53
End: 2018-06-05

## 2018-06-05 NOTE — TELEPHONE ENCOUNTER
"Requested Prescriptions   Pending Prescriptions Disp Refills     buPROPion (WELLBUTRIN XL) 300 MG 24 hr tablet [Pharmacy Med Name: BUPROPION HCL ER (XL) 300MG TB24] 90 tablet 0     Sig: TAKE ONE TABLET BY MOUTH EVERY MORNING    SSRIs Protocol Failed    6/4/2018 12:58 PM       Failed - PHQ-9 score less than 5 in past 6 months    Please review last PHQ-9 score.          Passed - Medication is Bupropion    If the medication is Bupropion (Wellbutrin), and the patient is taking for smoking cessation; OK to refill.         Passed - Patient is age 18 or older       Passed - Recent (6 mo) or future (30 days) visit within the authorizing provider's specialty    Patient had office visit in the last 6 months or has a visit in the next 30 days with authorizing provider or within the authorizing provider's specialty.  See \"Patient Info\" tab in inbasket, or \"Choose Columns\" in Meds & Orders section of the refill encounter.         Last Written Prescription Date:  2/23/18  Last Fill Quantity: 90 tablet,  # refills: 0   Last office visit: 1/9/2018 with prescribing provider:  Roxanna   Future Office Visit:           sertraline (ZOLOFT) 100 MG tablet [Pharmacy Med Name: SERTRALINE HCL 100MG TABS] 135 tablet 0     Sig: TAKE ONE AND ONE-HALF TABLETS BY MOUTH EVERY DAY    SSRIs Protocol Failed    6/4/2018 12:58 PM       Failed - PHQ-9 score less than 5 in past 6 months    Please review last PHQ-9 score.          Passed - Medication is Bupropion    If the medication is Bupropion (Wellbutrin), and the patient is taking for smoking cessation; OK to refill.         Passed - Patient is age 18 or older       Passed - Recent (6 mo) or future (30 days) visit within the authorizing provider's specialty    Patient had office visit in the last 6 months or has a visit in the next 30 days with authorizing provider or within the authorizing provider's specialty.  See \"Patient Info\" tab in inbasket, or \"Choose Columns\" in Meds & Orders section of the " "refill encounter.         Last Written Prescription Date:  2/23/18  Last Fill Quantity: 135 tablet,  # refills: 0   Last office visit: 1/9/2018 with prescribing provider:  Roxanna Parker Office Visit:           traZODone (DESYREL) 50 MG tablet [Pharmacy Med Name: TRAZODONE HCL 50MG TABS] 180 tablet 0     Sig: TAKE TWO TABLETS BY MOUTH EVERY NIGHT AT BEDTIME    Serotonin Modulators Passed    6/4/2018 12:58 PM       Passed - Recent (12 mo) or future (30 days) visit within the authorizing provider's specialty    Patient had office visit in the last 12 months or has a visit in the next 30 days with authorizing provider or within the authorizing provider's specialty.  See \"Patient Info\" tab in inbasket, or \"Choose Columns\" in Meds & Orders section of the refill encounter.           Passed - Patient is age 18 or older     Last Written Prescription Date:  3/02/18  Last Fill Quantity: 180 tablet,  # refills: 0   Last office visit: 1/9/2018 with prescribing provider:  Roxanna Parker Office Visit:           PHQ-9 SCORE 3/28/2017 11/8/2017 5/21/2018   Total Score - - -   Total Score MyChart - - 5 (Mild depression)   Total Score 0 13 5     CARLOS-7 SCORE 5/28/2014 3/10/2016 8/30/2016   Total Score 15 - -   Total Score - 9 5         "

## 2018-06-06 NOTE — TELEPHONE ENCOUNTER
I don't have a way to know what your insurance plan will allow you in terms of alternatives (tolteradine, or oxybutynin) but your pharmacy may be able to determine what you'd get the best coverage for.  Check with our pharmacy and see if they can help pin it down OR if they have access to coupon savings for the vesicare or an equivalent.

## 2018-06-06 NOTE — TELEPHONE ENCOUNTER
Patient Contact    Attempt # 1    Was call answered?  No.  Unable to leave message.mail box full  Christa Madden RN, BSN  Message handled by Nurse Triage.

## 2018-06-07 RX ORDER — TRAZODONE HYDROCHLORIDE 50 MG/1
TABLET, FILM COATED ORAL
Qty: 180 TABLET | Refills: 1 | Status: SHIPPED | OUTPATIENT
Start: 2018-06-07 | End: 2018-11-30

## 2018-06-07 RX ORDER — SERTRALINE HYDROCHLORIDE 100 MG/1
TABLET, FILM COATED ORAL
Qty: 135 TABLET | Refills: 1 | Status: SHIPPED | OUTPATIENT
Start: 2018-06-07 | End: 2018-11-30

## 2018-06-07 RX ORDER — BUPROPION HYDROCHLORIDE 300 MG/1
TABLET ORAL
Qty: 90 TABLET | Refills: 1 | Status: SHIPPED | OUTPATIENT
Start: 2018-06-07 | End: 2018-11-30

## 2018-06-07 NOTE — TELEPHONE ENCOUNTER
PHQ-9 score:    PHQ-9 SCORE 5/21/2018   Total Score -   Total Score MyChart 5 (Mild depression)   Total Score 5       Prescription approved per [facility] [place].  Christa Madden RN, BSN

## 2018-06-08 NOTE — TELEPHONE ENCOUNTER
Called pt, will investigate and call us back with update prn  Christa Madden RN, BSN  Message handled by Nurse Triage.

## 2018-06-11 ENCOUNTER — ALLIED HEALTH/NURSE VISIT (OUTPATIENT)
Dept: EDUCATION SERVICES | Facility: CLINIC | Age: 53
End: 2018-06-11
Payer: COMMERCIAL

## 2018-06-11 DIAGNOSIS — E78.1 HYPERTRIGLYCERIDEMIA: Primary | ICD-10-CM

## 2018-06-11 DIAGNOSIS — E66.01 MORBID OBESITY (H): ICD-10-CM

## 2018-06-11 PROCEDURE — 97802 MEDICAL NUTRITION INDIV IN: CPT

## 2018-06-11 NOTE — PATIENT INSTRUCTIONS
Triglycerides -- too high from added sugars and/or alcohol intake    Granola is quite high in sugar -- maybe change to 1/4 cup serving with plain yogurt OR Cheerios/Wheaties/Special K (no add ins) as cereal options     2 slices PB toast could work in the mornings    Could try 4 ounce milk with 1 scoop protein shake (no banana) as a smaller between meal snack    Most between meal snacks ~100-150 calories total    Aim for 1/2 plate vegetables at EACH lunch and dinner - helps with both cholesterol and weight!    4 ounces protein per meal (eventually..), lean as possible     Try to add a day of Spin class to add in some exercise

## 2018-06-11 NOTE — PROGRESS NOTES
Medical Nutrition Therapy  Visit Type:Initial assessment and intervention    John Reyes presents today for MNT and education related to weight management.   He is accompanied by self.     ASSESSMENT:   Patient comments/concerns relating to nutrition: Wants to try to lose weight, cholesterol is too high    Has tried Nutrisystem and Weight Watchers, but wonders if he is eating the right foods? Has lost weight, but usually gains it back    NUTRITION HISTORY:    Breakfast: granola ([place], usually 1 cup or so) with milk, and coffee (sometimes some cream) OR eggs, connell  Snack - shake (protein shake, store bought, [employer] - meal replacement shake 170 calories, then with a banana)  Lunch: chicken salad sandwich (homemade) OR salad  Dinner: chicken lettuce wraps (restaurant) OR homemade fajitas  Snacks: not usually after dinner, sometimes cheese stick, mini M&Ms  Beverages: milk (skim), water, coffee, diet coke, 2 beers on occasion    Misses meals? no  Eats out:  2 meals/per week     Previous diet education:  No     Food allergies/intolerances: no    Diet is high in: calories and carbs  Diet is low in: fiber, fruits and vegetables    EXERCISE: 3-4 times per week, 2 days spin classes, yoga, strength training    SOCIO/ECONOMIC:   Lives with: significant other    MEDICATIONS:  Current Outpatient Prescriptions   Medication     ALPRAZOLAM PO     aspirin 81 MG tablet     atorvastatin (LIPITOR) 40 MG tablet     azithromycin (ZITHROMAX) 250 MG tablet     buPROPion (WELLBUTRIN XL) 300 MG 24 hr tablet     econazole nitrate 1 % cream     fluocinonide (LIDEX) 0.05 % solution     propranolol (INDERAL) 40 MG tablet     sertraline (ZOLOFT) 100 MG tablet     solifenacin (VESICARE) 5 MG tablet     traZODone (DESYREL) 50 MG tablet     No current facility-administered medications for this visit.        LABS:  Last Basic Metabolic Panel:  Lab Results   Component Value Date     11/08/2017      Lab Results   Component  Value Date    POTASSIUM 4.5 11/08/2017     Lab Results   Component Value Date    CHLORIDE 103 11/08/2017     Lab Results   Component Value Date    FRANCISCO 9.5 11/08/2017     Lab Results   Component Value Date    CO2 28 11/08/2017     Lab Results   Component Value Date    BUN 17 11/08/2017     Lab Results   Component Value Date    CR 1.14 11/08/2017     Lab Results   Component Value Date     11/08/2017       ANTHROPOMETRICS:  Vitals: There were no vitals taken for this visit.  There is no height or weight on file to calculate BMI.      Wt Readings from Last 5 Encounters:   01/09/18 105.7 kg (233 lb 1.6 oz)   12/05/17 106.6 kg (235 lb)   11/08/17 106 kg (233 lb 11.2 oz)   04/11/17 103.4 kg (228 lb)   03/28/17 103.6 kg (228 lb 8 oz)       Weight Change: stable    NUTRITION DIAGNOSIS: Food- and nutrition-related knowledge deficit related to no prior formal education on weight loss as evidenced by patient report    NUTRITION INTERVENTION:  Education given to support: general nutrition guidelines, weight reduction, fat modification, behavior modification and portion control  Education Materials Provided: My Plate Planner/Choose My Plate, Heart Health Guidelines and Heart Healthy Food Choices  Motivational Interviewing    PATIENT'S BEHAVIOR CHANGE GOALS:   See Patient Instructions for patient stated behavior change goals. AVS was printed and given to patient at today's appointment.    MONITOR / EVALUATE:  RD will monitor/evaluate:  Readiness to change nutrition-related behaviors  Weight change    FOLLOW-UP:  Follow up with RD as needed.    Nicole Wayne RD LD CDE  Time spent in minutes: 45  Encounter: Individual

## 2018-06-11 NOTE — MR AVS SNAPSHOT
After Visit Summary   6/11/2018    John Reyes    MRN: [id number]           Patient Information     Date Of Birth          1965        Visit Information        Provider Department      6/11/2018 1:30 PM [facility] [place]        Care Instructions    Triglycerides -- too high from added sugars and/or alcohol intake    Granola is quite high in sugar -- maybe change to 1/4 cup serving with plain yogurt OR Cheerios/Wheaties/Special K (no add ins) as cereal options     2 slices PB toast could work in the mornings    Could try 4 ounce milk with 1 scoop protein shake (no banana) as a smaller between meal snack    Most between meal snacks ~100-150 calories total    Aim for 1/2 plate vegetables at EACH lunch and dinner - helps with both cholesterol and weight!    4 ounces protein per meal (eventually..), lean as possible     Try to add a day of Spin class to add in some exercise               Follow-ups after your visit        Who to contact     If you have questions or need follow up information about today's clinic visit or your schedule please contact [facility] directly at [phone].  Normal or non-critical lab and imaging results will be communicated to you by eCareerhart, letter or phone within 4 business days after the clinic has received the results. If you do not hear from us within 7 days, please contact the clinic through Ailolat or phone. If you have a critical or abnormal lab result, we will notify you by phone as soon as possible.  Submit refill requests through Testif or call your pharmacy and they will forward the refill request to us. Please allow 3 business days for your refill to be completed.          Additional Information About Your Visit        MyChart Information     Testif gives you secure access to your electronic health record. If you see a primary care provider, you can also send messages to your care  team and make appointments. If you have questions, please call your primary care clinic.  If you do not have a primary care provider, please call [phone] and they will assist you.        Care EveryWhere ID     This is your Care EveryWhere ID. This could be used by other organizations to access your [facility] medical records  [id number]         Blood Pressure from Last 3 Encounters:   01/09/18 131/81   12/05/17 114/76   11/08/17 118/80    Weight from Last 3 Encounters:   01/09/18 105.7 kg (233 lb 1.6 oz)   12/05/17 106.6 kg (235 lb)   11/08/17 106 kg (233 lb 11.2 oz)              Today, you had the following     No orders found for display       Primary Care Provider Office Phone # Fax #    Remy Mcknight [phone] [address] [facility] [address]        Equal Access to Services     [facility] : Hadii aad ku hadasho Sonic, waaxda luqadaha, qaybta kaalmada adeegyada, chandni bolden . So [facility] [phone].    ATENCIÓN: Si habla español, tiene a miller disposición servicios gratuitos de asistencia lingüística. Llame al [phone].    We comply with applicable federal civil rights laws and [place] laws. We do not discriminate on the basis of race, color, national origin, age, disability, sex, sexual orientation, or gender identity.            Thank you!     Thank you for choosing [facility]  for your care. Our goal is always to provide you with excellent care. Hearing back from our patients is one way we can continue to improve our services. Please take a few minutes to complete the written survey that you may receive in the mail after your visit with us. Thank you!             Your Updated Medication List - Protect others around you: Learn how to safely use, store and throw away your medicines at www.disposemymeds.org.          This list is accurate as of 6/11/18  2:12 PM.  Always use your most recent med list.                    Brand Name Dispense Instructions for use Diagnosis    ALPRAZOLAM PO      Take 0.25 mg by mouth daily as needed for anxiety        aspirin 81 MG tablet     30 tablet    Take 1 tablet by mouth daily.    Mixed hyperlipidemia, Hyperlipidemia LDL goal <130       atorvastatin 40 MG tablet    LIPITOR    90 tablet    Take 1 tablet (40 mg) by mouth daily    Hyperlipidemia LDL goal <130       azithromycin 250 MG tablet    ZITHROMAX    6 tablet    Two tablets first day, then one tablet daily for four days.    Painful respiration       buPROPion 300 MG 24 hr tablet    WELLBUTRIN XL    90 tablet    TAKE ONE TABLET BY MOUTH EVERY MORNING    Moderate major depression (H)       econazole nitrate 1 % cream     30 g    Apply topically daily Apply to affected nails daily    Onychomycosis of right great toe       fluocinonide 0.05 % solution    LIDEX    60 mL    Apply to scalp daily as needed    Dandruff       propranolol 40 MG tablet    INDERAL    30 tablet    SIG 1 tid prn anxiety    Test anxiety       sertraline 100 MG tablet    ZOLOFT    135 tablet    TAKE ONE AND ONE-HALF TABLETS BY MOUTH EVERY DAY    Moderate major depression (H)       solifenacin 5 MG tablet    VESICARE    90 tablet    Take 1 tablet (5 mg) by mouth daily    Urinary urgency       traZODone 50 MG tablet    DESYREL    180 tablet    TAKE TWO TABLETS BY MOUTH EVERY NIGHT AT BEDTIME    Sleep disorder

## 2018-08-31 DIAGNOSIS — E78.5 HYPERLIPIDEMIA LDL GOAL <130: ICD-10-CM

## 2018-08-31 NOTE — TELEPHONE ENCOUNTER
"Requested Prescriptions   Pending Prescriptions Disp Refills     atorvastatin (LIPITOR) 40 MG tablet [Pharmacy Med Name: ATORVASTATIN CALCIUM 40MG TABS]    Last Written Prescription Date:  2/23/18  Last Fill Quantity: 90 tablest,  # refills: 1   Last office visit: 2/26/2018 with prescribing provider:  Roxanna   Future Office Visit:     90 tablet 1     Sig: TAKE ONE TABLET BY MOUTH EVERY DAY    Statins Protocol Passed    8/31/2018 12:03 PM       Passed - LDL on file in past 12 months    Recent Labs   Lab Test  11/08/17   1213   LDL  Cannot estimate LDL when triglyceride exceeds 400 mg/dL  110*            Passed - No abnormal creatine kinase in past 12 months    No lab results found.            Passed - Recent (12 mo) or future (30 days) visit within the authorizing provider's specialty    Patient had office visit in the last 12 months or has a visit in the next 30 days with authorizing provider or within the authorizing provider's specialty.  See \"Patient Info\" tab in inbasket, or \"Choose Columns\" in Meds & Orders section of the refill encounter.           Passed - Patient is age 18 or older          "

## 2018-09-05 RX ORDER — ATORVASTATIN CALCIUM 40 MG/1
TABLET, FILM COATED ORAL
Qty: 90 TABLET | Refills: 0 | Status: SHIPPED | OUTPATIENT
Start: 2018-09-05 | End: 2018-11-30

## 2018-09-05 NOTE — TELEPHONE ENCOUNTER
Last refill sent  Prescription approved per [facility] Refill Protocol.  Christa Madden, RN, BSN

## 2018-11-30 DIAGNOSIS — G47.9 SLEEP DISORDER: ICD-10-CM

## 2018-11-30 DIAGNOSIS — F32.1 MODERATE MAJOR DEPRESSION (H): ICD-10-CM

## 2018-11-30 DIAGNOSIS — E78.5 HYPERLIPIDEMIA LDL GOAL <130: ICD-10-CM

## 2018-11-30 RX ORDER — TRAZODONE HYDROCHLORIDE 50 MG/1
TABLET, FILM COATED ORAL
Qty: 60 TABLET | Refills: 0 | Status: SHIPPED | OUTPATIENT
Start: 2018-11-30 | End: 2018-12-26

## 2018-11-30 RX ORDER — BUPROPION HYDROCHLORIDE 300 MG/1
TABLET ORAL
Qty: 30 TABLET | Refills: 0 | Status: SHIPPED | OUTPATIENT
Start: 2018-11-30 | End: 2018-12-26

## 2018-11-30 RX ORDER — ATORVASTATIN CALCIUM 40 MG/1
TABLET, FILM COATED ORAL
Qty: 30 TABLET | Refills: 0 | Status: SHIPPED | OUTPATIENT
Start: 2018-11-30 | End: 2018-12-26

## 2018-11-30 RX ORDER — SERTRALINE HYDROCHLORIDE 100 MG/1
TABLET, FILM COATED ORAL
Qty: 45 TABLET | Refills: 0 | Status: SHIPPED | OUTPATIENT
Start: 2018-11-30 | End: 2018-12-26

## 2018-11-30 NOTE — LETTER
November 30, 2018      John Reyes  [address] [id number]        Dear John,     We recently received a call from your pharmacy requesting a refill of your medications.    A review of your chart indicates that an appointment is required.  Please contact our office at [phone] to schedule your FASTING doctor's appointment.    We have authorized one refill (one month) of your medication to allow time for you to schedule your appointment.    Taking care of your health is important to us and ongoing visits with your provider are vital to your care.  We look forward to seeing you in the near future.    Sincerely,    Remy Mcknight MD/Christa MARSHALL

## 2018-11-30 NOTE — TELEPHONE ENCOUNTER
Annual visit due, letter sent, one month sent  Prescription approved per [facility] Refill Protocol.  Christa Madden RN, BSN

## 2018-11-30 NOTE — TELEPHONE ENCOUNTER
"Requested Prescriptions   Pending Prescriptions Disp Refills     traZODone (DESYREL) 50 MG tablet [Pharmacy Med Name: TRAZODONE HCL 50MG TABS]    Last Written Prescription Date:  6/7/18  Last Fill Quantity: 180 tablets,  # refills: 1   Last office visit: 2/26/2018 with prescribing provider:  Roxanna Parker Office Visit:  Roxanna   180 tablet 1     Sig: TAKE TWO TABLETS BY MOUTH EVERY NIGHT AT BEDTIME    Serotonin Modulators Passed    11/30/2018  8:24 AM       Passed - Recent (12 mo) or future (30 days) visit within the authorizing provider's specialty    Patient had office visit in the last 12 months or has a visit in the next 30 days with authorizing provider or within the authorizing provider's specialty.  See \"Patient Info\" tab in inbasket, or \"Choose Columns\" in Meds & Orders section of the refill encounter.             Passed - Patient is age 18 or older        sertraline (ZOLOFT) 100 MG tablet [Pharmacy Med Name: SERTRALINE HCL 100MG TABS]    Last Written Prescription Date:  6/7/18  Last Fill Quantity: 135 tablet,  # refills: 1   Last office visit: 2/26/2018 with prescribing provider:  Roxanna Parker Office Visit:     135 tablet 1     Sig: TAKE ONE AND ONE-HALF TABLETS BY MOUTH EVERY DAY    SSRIs Protocol Failed    11/30/2018  8:24 AM       Failed - PHQ-9 score less than 5 in past 6 months    Please review last PHQ-9 score.       PHQ-9 SCORE 3/28/2017 11/8/2017 5/21/2018   PHQ-9 Total Score - - -   PHQ-9 Total Score MyChart - - 5 (Mild depression)   PHQ-9 Total Score 0 13 5       CARLOS-7 SCORE 5/28/2014 3/10/2016 8/30/2016   Total Score 15 - -   Total Score - 9 5            Failed - Recent (6 mo) or future (30 days) visit within the authorizing provider's specialty    Patient had office visit in the last 6 months or has a visit in the next 30 days with authorizing provider or within the authorizing provider's specialty.  See \"Patient Info\" tab in inbasket, or \"Choose Columns\" in Meds & Orders section of the " "refill encounter.           Passed - Medication is Bupropion    If the medication is Bupropion (Wellbutrin), and the patient is taking for smoking cessation; OK to refill.         Passed - Patient is age 18 or older        buPROPion (WELLBUTRIN XL) 300 MG 24 hr tablet [Pharmacy Med Name: BUPROPION HCL ER (XL) 300MG TB24]    Last Written Prescription Date:  6/7/18  Last Fill Quantity: 90 tablet,  # refills: 1   Last office visit: 2/26/2018 with prescribing provider:  Roxanna   Future Office Visit:     90 tablet 1     Sig: TAKE ONE TABLET BY MOUTH EVERY MORNING    SSRIs Protocol Failed    11/30/2018  8:24 AM       Failed - PHQ-9 score less than 5 in past 6 months    Please review last PHQ-9 score.       PHQ-9 SCORE 3/28/2017 11/8/2017 5/21/2018   PHQ-9 Total Score - - -   PHQ-9 Total Score MyChart - - 5 (Mild depression)   PHQ-9 Total Score 0 13 5         CARLOS-7 SCORE 5/28/2014 3/10/2016 8/30/2016   Total Score 15 - -   Total Score - 9 5            Failed - Recent (6 mo) or future (30 days) visit within the authorizing provider's specialty    Patient had office visit in the last 6 months or has a visit in the next 30 days with authorizing provider or within the authorizing provider's specialty.  See \"Patient Info\" tab in inbasket, or \"Choose Columns\" in Meds & Orders section of the refill encounter.           Passed - Medication is Bupropion    If the medication is Bupropion (Wellbutrin), and the patient is taking for smoking cessation; OK to refill.         Passed - Patient is age 18 or older        atorvastatin (LIPITOR) 40 MG tablet [Pharmacy Med Name: ATORVASTATIN CALCIUM 40MG TABS]    Last Written Prescription Date:  9/5/18  Last Fill Quantity: 90 tablet,  # refills: 0   Last office visit: 2/26/2018 with prescribing provider:  Roxanna   Future Office Visit:     90 tablet 0     Sig: TAKE ONE TABLET BY MOUTH EVERY DAY (NEED TO BE SEEN IN CLINIC FOR FURTHER REFILLS - FASTING LABS AND OFFICE VISIT)    Statins Protocol " "Failed    11/30/2018  8:24 AM       Failed - LDL on file in past 12 months    Recent Labs   Lab Test  11/08/17   1213   LDL  Cannot estimate LDL when triglyceride exceeds 400 mg/dL  110*            Passed - No abnormal creatine kinase in past 12 months    No lab results found.            Passed - Recent (12 mo) or future (30 days) visit within the authorizing provider's specialty    Patient had office visit in the last 12 months or has a visit in the next 30 days with authorizing provider or within the authorizing provider's specialty.  See \"Patient Info\" tab in inbasket, or \"Choose Columns\" in Meds & Orders section of the refill encounter.             Passed - Patient is age 18 or older          "

## 2018-12-26 ENCOUNTER — OFFICE VISIT (OUTPATIENT)
Dept: FAMILY MEDICINE | Facility: CLINIC | Age: 53
End: 2018-12-26
Payer: COMMERCIAL

## 2018-12-26 VITALS
DIASTOLIC BLOOD PRESSURE: 83 MMHG | HEART RATE: 66 BPM | TEMPERATURE: 97.8 F | WEIGHT: 233.7 LBS | BODY MASS INDEX: 34.51 KG/M2 | SYSTOLIC BLOOD PRESSURE: 127 MMHG | OXYGEN SATURATION: 93 %

## 2018-12-26 DIAGNOSIS — E78.5 HYPERLIPIDEMIA LDL GOAL <100: Primary | ICD-10-CM

## 2018-12-26 DIAGNOSIS — F32.1 MODERATE MAJOR DEPRESSION (H): ICD-10-CM

## 2018-12-26 DIAGNOSIS — G47.9 SLEEP DISORDER: ICD-10-CM

## 2018-12-26 DIAGNOSIS — F32.0 MILD MAJOR DEPRESSION (H): ICD-10-CM

## 2018-12-26 DIAGNOSIS — I10 ESSENTIAL HYPERTENSION: ICD-10-CM

## 2018-12-26 DIAGNOSIS — E66.01 MORBID OBESITY (H): ICD-10-CM

## 2018-12-26 DIAGNOSIS — E78.5 HYPERLIPIDEMIA LDL GOAL <130: ICD-10-CM

## 2018-12-26 PROCEDURE — 99214 OFFICE O/P EST MOD 30 MIN: CPT | Performed by: FAMILY MEDICINE

## 2018-12-26 PROCEDURE — 80061 LIPID PANEL: CPT | Performed by: FAMILY MEDICINE

## 2018-12-26 PROCEDURE — 84443 ASSAY THYROID STIM HORMONE: CPT | Performed by: FAMILY MEDICINE

## 2018-12-26 PROCEDURE — 80053 COMPREHEN METABOLIC PANEL: CPT | Performed by: FAMILY MEDICINE

## 2018-12-26 PROCEDURE — 36415 COLL VENOUS BLD VENIPUNCTURE: CPT | Performed by: FAMILY MEDICINE

## 2018-12-26 RX ORDER — BUPROPION HYDROCHLORIDE 300 MG/1
300 TABLET ORAL EVERY MORNING
Qty: 90 TABLET | Refills: 1 | Status: SHIPPED | OUTPATIENT
Start: 2018-12-26 | End: 2019-06-25

## 2018-12-26 RX ORDER — SERTRALINE HYDROCHLORIDE 100 MG/1
TABLET, FILM COATED ORAL
Qty: 135 TABLET | Refills: 1 | Status: SHIPPED | OUTPATIENT
Start: 2018-12-26 | End: 2019-06-25

## 2018-12-26 RX ORDER — TRAZODONE HYDROCHLORIDE 50 MG/1
150 TABLET, FILM COATED ORAL AT BEDTIME
Qty: 270 TABLET | Refills: 1 | Status: SHIPPED | OUTPATIENT
Start: 2018-12-26 | End: 2019-06-25

## 2018-12-26 RX ORDER — ATORVASTATIN CALCIUM 40 MG/1
TABLET, FILM COATED ORAL
Qty: 90 TABLET | Refills: 1 | Status: SHIPPED | OUTPATIENT
Start: 2018-12-26 | End: 2019-06-25

## 2018-12-26 ASSESSMENT — PATIENT HEALTH QUESTIONNAIRE - PHQ9
SUM OF ALL RESPONSES TO PHQ QUESTIONS 1-9: 8
10. IF YOU CHECKED OFF ANY PROBLEMS, HOW DIFFICULT HAVE THESE PROBLEMS MADE IT FOR YOU TO DO YOUR WORK, TAKE CARE OF THINGS AT HOME, OR GET ALONG WITH OTHER PEOPLE: SOMEWHAT DIFFICULT
SUM OF ALL RESPONSES TO PHQ QUESTIONS 1-9: 8

## 2018-12-26 NOTE — PROGRESS NOTES
"Answers for HPI/ROS submitted by the patient on 12/26/2018   If you checked off any problems, how difficult have these problems made it for you to do your work, take care of things at home, or get along with other people?: Somewhat difficult  PHQ9 TOTAL SCORE: 8    SUBJECTIVE:   John Reyes is a 53 year old male who presents to clinic today for the following health issues:      SUBJECTIVE:    CC: John Reyes is a 53 year old male who presents for follow up depression and morbid obesity with hyperlipidemia     HPI: feels generally well, does yoga now for mindfulness, exercise otherwise is moderate         PROBLEM LIST:                   Patient Active Problem List   Diagnosis     Attention deficit disorder     Esophageal reflux     HYPERLIPIDEMIA LDL GOAL <130     Sleep disorder     Mild major depression (H)     Hypertriglyceridemia     Anxiety     Testosterone deficiency     Impaired fasting glucose     Hypogonadism male     Elevated ferritin       PAST MEDICAL HISTORY:                  Past Medical History:   Diagnosis Date     Acute duodenal ulcer without mention of hemorrhage, perforation, or obstruction 1990    diagnosed on EGD     Allergic rhinitis, cause unspecified      Attention deficit disorder without mention of hyperactivity     \"all my life\"     Cataplexy and narcolepsy     had for many years, but diagnosed in sleep lab 6/03     Esophageal reflux 11/28/2007     Hyperlipidemia LDL goal <130 10/31/2010     Hypertriglyceridemia 8/10/2012     Mild major depression (H) 9/15/2011     OBESITY NOS 11/28/2007     PONV (postoperative nausea and vomiting)      Sleep apnea     CPAP     Sleep disorder 4/12/2011       PAST SURGICAL HISTORY:                  Past Surgical History:   Procedure Laterality Date     ARTHROSCOPY SHOULDER, OPEN ROTATOR CUFF REPAIR, COMBINED Left 11/5/2014    Procedure: COMBINED ARTHROSCOPY SHOULDER, OPEN ROTATOR CUFF REPAIR;  Surgeon: Maykel Khalil MD;  Location:  OR     " C HAND/FINGER SURGERY UNLISTED  1983    [facility] right middle finger     COLONOSCOPY  9/9/2013    Procedure: COMBINED COLONOSCOPY, SINGLE BIOPSY/POLYPECTOMY BY BIOPSY;;  Surgeon: Hali Lema MD;  Location:  GI     GI SURGERY      henmmriodectoomy     HC EXPLOR MAXILL SINUS,INTRANASAL  1981`     HC VASECTOMY UNILAT/BILAT W POSTOP SEMEN  4/03       CURRENT MEDICATIONS:                  Current Outpatient Medications   Medication Sig Dispense Refill     ALPRAZOLAM PO Take 0.25 mg by mouth daily as needed for anxiety       aspirin 81 MG tablet Take 1 tablet by mouth daily. 30 tablet 3     atorvastatin (LIPITOR) 40 MG tablet TAKE ONE TABLET BY MOUTH EVERY DAY (NEED TO BE SEEN IN CLINIC FOR FURTHER REFILLS - FASTING LABS AND OFFICE VISIT) 30 tablet 0     buPROPion (WELLBUTRIN XL) 300 MG 24 hr tablet TAKE ONE TABLET BY MOUTH EVERY MORNING 30 tablet 0     econazole nitrate 1 % cream Apply topically daily Apply to affected nails daily 30 g 3     fluocinonide (LIDEX) 0.05 % solution Apply to scalp daily as needed 60 mL 1     propranolol (INDERAL) 40 MG tablet SIG 1 tid prn anxiety 30 tablet 11     sertraline (ZOLOFT) 100 MG tablet TAKE ONE AND ONE-HALF TABLETS BY MOUTH EVERY DAY 45 tablet 0     solifenacin (VESICARE) 5 MG tablet Take 1 tablet (5 mg) by mouth daily 90 tablet 1     traZODone (DESYREL) 50 MG tablet TAKE TWO TABLETS BY MOUTH EVERY NIGHT AT BEDTIME 60 tablet 0             FAMILY HISTORY:                   Family History   Problem Relation Age of Onset     Lipids Father         On medication     Cancer - colorectal Maternal Uncle        HEALTH MAINTENANCE:                    REVIEW OF OUTSIDE RECORDS: NO    REVIEW OF SYSTEMS:  CONSTITUTIONAL: NEGATIVE for fever, chills  EYES: NEGATIVE for vision changes   RESP: NEGATIVE for significant cough or SOB  CV: NEGATIVE for chest pain, palpitations   GI: NEGATIVE for nausea, abdominal pain, heartburn, or change in bowel habits  : NEGATIVE for frequency, dysuria,  or hematuria  MUSCULOSKELETAL: NEGATIVE for significant arthralgias or myalgia  NEURO: NEGATIVE for weakness, dizziness or paresthesias or headache  PSYCHIATRIC: POSITIVE forHx anxiety and stress management steadily getting better   NVS:no headache or balance issus  INTEG:no moles or new rashes  LYMPH:no nodes or night sweats    EXAM:    /83 (BP Location: Right arm, Patient Position: Chair, Cuff Size: Adult Large)   Pulse 66   Temp 97.8  F (36.6  C) (Oral)   Wt 106 kg (233 lb 11.2 oz)   SpO2 93%   BMI 34.51 kg/m    GENERAL APPEARANCE: healthy, alert and no distress   EXAM:  GENERAL APPEARANCE: healthy, alert and no distress  EYES: EOMI,  PERRL  HENT: ear canals and TM's normal and nose and mouth without ulcers or lesions  RESP: lungs clear to auscultation - no rales, rhonchi or wheezes  CV: regular rates and rhythm, normal S1 S2, no S3 or S4 and no murmur, click or rub -  ABDOMEN:  soft, nontender, no HSM or masses and bowel sounds normal  MS: extremities normal- no gross deformities noted, no evidence of inflammation in joints, FROM in all extremities.  BACK:no tenderness or pain on straight let raise           ASSESSMENT/PLAN    (E78.5) Hyperlipidemia LDL goal <100  (primary encounter diagnosis)  Comment:   Plan: Lipid panel reflex to direct LDL Fasting            (I10) Essential hypertension  Comment:   Plan: Comprehensive metabolic panel, TSH        /83 (BP Location: Right arm, Patient Position: Chair, Cuff Size: Adult Large)   Pulse 66   Temp 97.8  F (36.6  C) (Oral)   Wt 106 kg (233 lb 11.2 oz)   SpO2 93%   BMI 34.51 kg/m        (E66.01) Morbid obesity (H)  Comment:   Plan: Body mass index is 34.51 kg/m .      (E78.5) Hyperlipidemia LDL goal <130  Comment: check lab   Plan: atorvastatin (LIPITOR) 40 MG tablet            (F32.0) Mild major depression (H)  Comment:   Plan: improved, meds effective, six month follow up     (F32.1) Moderate major depression (H)  Comment:   Plan: buPROPion  (WELLBUTRIN XL) 300 MG 24 hr tablet,         sertraline (ZOLOFT) 100 MG tablet        See trazodone doseage    (G47.9) Sleep disorder  Comment:   Plan: traZODone (DESYREL) 50 MG tablet                                           I have discussed with patient the risks, benefits, medications, treatment options and modalities.   I have instructed the patient to call or schedule a follow-up appointment if any problems or failure to improve.

## 2018-12-26 NOTE — LETTER
December 31, 2018      John Reyes  [address]  [place] [address]        Dear ,    We are writing to inform you of your test results. Blood sugar and triglycerides are up from your weight. Work on trimming down 10 pounds to bring it down to safer levels.       Resulted Orders   Lipid panel reflex to direct LDL Fasting   Result Value Ref Range    Cholesterol 202 (H) <200 mg/dL      Comment:      Desirable:       <200 mg/dl    Triglycerides 388 (H) <150 mg/dL      Comment:      Borderline high:  150-199 mg/dl  High:             200-499 mg/dl  Very high:       >499 mg/dl  Fasting specimen      HDL Cholesterol 36 (L) >39 mg/dL    LDL Cholesterol Calculated 88 <100 mg/dL      Comment:      Desirable:       <100 mg/dl    Non HDL Cholesterol 166 (H) <130 mg/dL      Comment:      Above Desirable:  130-159 mg/dl  Borderline high:  160-189 mg/dl  High:             190-219 mg/dl  Very high:       >219 mg/dl     Comprehensive metabolic panel   Result Value Ref Range    Sodium 139 133 - 144 mmol/L    Potassium 4.7 3.4 - 5.3 mmol/L    Chloride 106 94 - 109 mmol/L    Carbon Dioxide 28 20 - 32 mmol/L    Anion Gap 5 3 - 14 mmol/L    Glucose 113 (H) 70 - 99 mg/dL      Comment:      Fasting specimen    Urea Nitrogen 16 7 - 30 mg/dL    Creatinine 1.06 0.66 - 1.25 mg/dL    GFR Estimate 78 >60 mL/min/[1.73_m2]      Comment:      Non  GFR Calc  Starting 12/18/2018, serum creatinine based estimated GFR (eGFR) will be   calculated using the Chronic Kidney Disease Epidemiology Collaboration   (CKD-EPI) equation.      GFR Estimate If Black >90 >60 mL/min/[1.73_m2]      Comment:       GFR Calc  Starting 12/18/2018, serum creatinine based estimated GFR (eGFR) will be   calculated using the Chronic Kidney Disease Epidemiology Collaboration   (CKD-EPI) equation.      Calcium 9.2 8.5 - 10.1 mg/dL    Bilirubin Total 0.6 0.2 - 1.3 mg/dL    Albumin 4.2 3.4 - 5.0 g/dL    Protein Total 7.2 6.8  - 8.8 g/dL    Alkaline Phosphatase 66 40 - 150 U/L    ALT 41 0 - 70 U/L    AST 24 0 - 45 U/L   TSH   Result Value Ref Range    TSH 2.93 0.40 - 4.00 mU/L       If you have any questions or concerns, please call the clinic at the number listed above.       Sincerely,        Remy Mcknight MD

## 2018-12-27 LAB
ALBUMIN SERPL-MCNC: 4.2 G/DL (ref 3.4–5)
ALP SERPL-CCNC: 66 U/L (ref 40–150)
ALT SERPL W P-5'-P-CCNC: 41 U/L (ref 0–70)
ANION GAP SERPL CALCULATED.3IONS-SCNC: 5 MMOL/L (ref 3–14)
AST SERPL W P-5'-P-CCNC: 24 U/L (ref 0–45)
BILIRUB SERPL-MCNC: 0.6 MG/DL (ref 0.2–1.3)
BUN SERPL-MCNC: 16 MG/DL (ref 7–30)
CALCIUM SERPL-MCNC: 9.2 MG/DL (ref 8.5–10.1)
CHLORIDE SERPL-SCNC: 106 MMOL/L (ref 94–109)
CHOLEST SERPL-MCNC: 202 MG/DL
CO2 SERPL-SCNC: 28 MMOL/L (ref 20–32)
CREAT SERPL-MCNC: 1.06 MG/DL (ref 0.66–1.25)
GFR SERPL CREATININE-BSD FRML MDRD: 78 ML/MIN/{1.73_M2}
GLUCOSE SERPL-MCNC: 113 MG/DL (ref 70–99)
HDLC SERPL-MCNC: 36 MG/DL
LDLC SERPL CALC-MCNC: 88 MG/DL
NONHDLC SERPL-MCNC: 166 MG/DL
POTASSIUM SERPL-SCNC: 4.7 MMOL/L (ref 3.4–5.3)
PROT SERPL-MCNC: 7.2 G/DL (ref 6.8–8.8)
SODIUM SERPL-SCNC: 139 MMOL/L (ref 133–144)
TRIGL SERPL-MCNC: 388 MG/DL
TSH SERPL DL<=0.005 MIU/L-ACNC: 2.93 MU/L (ref 0.4–4)

## 2018-12-27 ASSESSMENT — PATIENT HEALTH QUESTIONNAIRE - PHQ9: SUM OF ALL RESPONSES TO PHQ QUESTIONS 1-9: 8

## 2019-06-25 DIAGNOSIS — F32.1 MODERATE MAJOR DEPRESSION (H): ICD-10-CM

## 2019-06-25 DIAGNOSIS — E78.5 HYPERLIPIDEMIA LDL GOAL <130: ICD-10-CM

## 2019-06-25 DIAGNOSIS — G47.9 SLEEP DISORDER: ICD-10-CM

## 2019-06-25 NOTE — TELEPHONE ENCOUNTER
"atorvastatin (LIPITOR) 40 MG tablet  Last Written Prescription Date:  12/26/18  Last Fill Quantity: 90 tablet,  # refills: 1   Last office visit: 12/26/2018 with prescribing provider:  STEVIE Mcknight   Future Office Visit:    Requested Prescriptions   Pending Prescriptions Disp Refills     atorvastatin (LIPITOR) 40 MG tablet [Pharmacy Med Name: ATORVASTATIN CALCIUM 40MG TABS] 90 tablet 1     Sig: TAKE ONE TABLET BY MOUTH ONCE DAILY       Statins Protocol Passed - 6/25/2019  2:43 PM        Passed - LDL on file in past 12 months     Recent Labs   Lab Test 12/26/18  1040   LDL 88           Passed - No abnormal creatine kinase in past 12 months     No lab results found.         Passed - Recent (12 mo) or future (30 days) visit within the authorizing provider's specialty     Patient had office visit in the last 12 months or has a visit in the next 30 days with authorizing provider or within the authorizing provider's specialty.  See \"Patient Info\" tab in inbasket, or \"Choose Columns\" in Meds & Orders section of the refill encounter.              Passed - Medication is active on med list        Passed - Patient is age 18 or older   Prescription approved per [facility] Refill Protocol.         sertraline (ZOLOFT) 100 MG tablet [Pharmacy Med Name: SERTRALINE HCL 100MG TABS] 135 tablet 1     Sig: TAKE ONE AND ONE-HALF TABLETS BY MOUTH ONCE DAILY       sertraline (ZOLOFT) 100 MG tablet  Last Written Prescription Date:  12/26/18  Last Fill Quantity: 135 tablet,  # refills: 1   Last office visit: 12/26/2018 with prescribing provider:  STEVIE Mcknight   Future Office Visit:      SSRIs Protocol Failed - 6/25/2019  2:43 PM        Failed - PHQ-9 score less than 5 in past 6 months     Please review last PHQ-9 score.   PHQ-9 score:    PHQ-9 SCORE 12/26/2018   PHQ-9 Total Score -   PHQ-9 Total Score MyChart 8 (Mild depression)   PHQ-9 Total Score 8           Failed - Recent (6 mo) or future (30 days) visit within the authorizing provider's specialty " "    Patient had office visit in the last 6 months or has a visit in the next 30 days with authorizing provider or within the authorizing provider's specialty.  See \"Patient Info\" tab in inbasket, or \"Choose Columns\" in Meds & Orders section of the refill encounter.            Passed - Medication is Bupropion     If the medication is Bupropion (Wellbutrin), and the patient is taking for smoking cessation; OK to refill.          Passed - Medication is active on med list        Passed - Patient is age 18 or older   Routing refill request to provider for review/approval because:  Labs out of range:  PHQ9  Labs not current:  PHQ9         buPROPion (WELLBUTRIN XL) 300 MG 24 hr tablet [Pharmacy Med Name: BUPROPION HCL ER (XL) 300MG TB24] 90 tablet 1     Sig: TAKE ONE TABLET BY MOUTH EVERY MORNING     buPROPion (WELLBUTRIN XL) 300 MG 24 hr tablet  Last Written Prescription Date:  12/26/18  Last Fill Quantity: 90 tablet,  # refills: 1   Last office visit: 12/26/2018 with prescribing provider:  STEVIE Mcknight   Future Office Visit:      SSRIs Protocol Failed - 6/25/2019  2:43 PM        Failed - PHQ-9 score less than 5 in past 6 months     Please review last PHQ-9 score.   PHQ-9 score:    PHQ-9 SCORE 12/26/2018   PHQ-9 Total Score -   PHQ-9 Total Score MyChart 8 (Mild depression)   PHQ-9 Total Score 8           Failed - Recent (6 mo) or future (30 days) visit within the authorizing provider's specialty     Patient had office visit in the last 6 months or has a visit in the next 30 days with authorizing provider or within the authorizing provider's specialty.  See \"Patient Info\" tab in inbasket, or \"Choose Columns\" in Meds & Orders section of the refill encounter.            Passed - Medication is Bupropion     If the medication is Bupropion (Wellbutrin), and the patient is taking for smoking cessation; OK to refill.          Passed - Medication is active on med list        Passed - Patient is age 18 or older   Routing refill " "request to provider for review/approval because:  Labs out of range:  PHQ9  Labs not current:  PHQ9           traZODone (DESYREL) 50 MG tablet [Pharmacy Med Name: TRAZODONE HCL 50MG TABS] 270 tablet 1     Sig: TAKE THREE TABLETS BY MOUTH AT BEDTIME     traZODone (DESYREL) 50 MG tablet  Last Written Prescription Date:  12/26/18  Last Fill Quantity: 270 tablet,  # refills: 1   Last office visit: 12/26/2018 with prescribing provider:  STEVIE Mcknight   Future Office Visit:        Serotonin Modulators Passed - 6/25/2019  2:43 PM        Passed - Recent (12 mo) or future (30 days) visit within the authorizing provider's specialty     Patient had office visit in the last 12 months or has a visit in the next 30 days with authorizing provider or within the authorizing provider's specialty.  See \"Patient Info\" tab in inbasket, or \"Choose Columns\" in Meds & Orders section of the refill encounter.              Passed - Medication is active on med list        Passed - Patient is age 18 or older      Prescription approved per [facility] Refill Protocol.        "

## 2019-06-26 RX ORDER — SERTRALINE HYDROCHLORIDE 100 MG/1
TABLET, FILM COATED ORAL
Qty: 135 TABLET | Refills: 1 | Status: SHIPPED | OUTPATIENT
Start: 2019-06-26 | End: 2020-06-16

## 2019-06-26 RX ORDER — BUPROPION HYDROCHLORIDE 300 MG/1
TABLET ORAL
Qty: 90 TABLET | Refills: 1 | Status: SHIPPED | OUTPATIENT
Start: 2019-06-26 | End: 2020-02-19

## 2019-06-26 RX ORDER — ATORVASTATIN CALCIUM 40 MG/1
TABLET, FILM COATED ORAL
Qty: 90 TABLET | Refills: 1 | Status: SHIPPED | OUTPATIENT
Start: 2019-06-26 | End: 2020-01-21

## 2019-06-26 RX ORDER — TRAZODONE HYDROCHLORIDE 50 MG/1
TABLET, FILM COATED ORAL
Qty: 270 TABLET | Refills: 1 | Status: SHIPPED | OUTPATIENT
Start: 2019-06-26 | End: 2019-11-21

## 2019-07-11 ENCOUNTER — APPOINTMENT (OUTPATIENT)
Dept: GENERAL RADIOLOGY | Facility: CLINIC | Age: 54
End: 2019-07-11
Attending: EMERGENCY MEDICINE
Payer: COMMERCIAL

## 2019-07-11 ENCOUNTER — HOSPITAL ENCOUNTER (EMERGENCY)
Facility: CLINIC | Age: 54
Discharge: HOME | End: 2019-07-11
Attending: EMERGENCY MEDICINE | Admitting: EMERGENCY MEDICINE
Payer: COMMERCIAL

## 2019-07-11 VITALS
SYSTOLIC BLOOD PRESSURE: 150 MMHG | WEIGHT: 225 LBS | RESPIRATION RATE: 14 BRPM | DIASTOLIC BLOOD PRESSURE: 100 MMHG | BODY MASS INDEX: 33.23 KG/M2 | TEMPERATURE: 98.4 F | OXYGEN SATURATION: 94 %

## 2019-07-11 DIAGNOSIS — S97.101A CRUSHING INJURY OF TOE OF RIGHT FOOT, INITIAL ENCOUNTER: ICD-10-CM

## 2019-07-11 DIAGNOSIS — S90.111A CONTUSION OF RIGHT GREAT TOE WITHOUT DAMAGE TO NAIL, INITIAL ENCOUNTER: ICD-10-CM

## 2019-07-11 PROCEDURE — 99284 EMERGENCY DEPT VISIT MOD MDM: CPT

## 2019-07-11 PROCEDURE — 25000132 ZZH RX MED GY IP 250 OP 250 PS 637: Performed by: EMERGENCY MEDICINE

## 2019-07-11 PROCEDURE — 73660 X-RAY EXAM OF TOE(S): CPT | Mod: RT

## 2019-07-11 RX ORDER — OXYCODONE AND ACETAMINOPHEN 5; 325 MG/1; MG/1
2 TABLET ORAL ONCE
Status: COMPLETED | OUTPATIENT
Start: 2019-07-11 | End: 2019-07-11

## 2019-07-11 RX ORDER — OXYCODONE AND ACETAMINOPHEN 5; 325 MG/1; MG/1
1-2 TABLET ORAL EVERY 6 HOURS PRN
Qty: 6 TABLET | Refills: 0 | Status: SHIPPED | OUTPATIENT
Start: 2019-07-11 | End: 2019-10-17

## 2019-07-11 RX ADMIN — OXYCODONE HYDROCHLORIDE AND ACETAMINOPHEN 2 TABLET: 5; 325 TABLET ORAL at 21:39

## 2019-07-11 NOTE — ED AVS SNAPSHOT
[facility]  [address]  [facility] [address]  Phone:  [phone]  Fax:  [phone]                                    John Reyes   MRN: [id number]    Department:  [facility]   Date of Visit:  7/11/2019           After Visit Summary Signature Page    I have received my discharge instructions, and my questions have been answered. I have discussed any challenges I see with this plan with the nurse or doctor.    ..........................................................................................................................................  Patient/Patient Representative Signature      ..........................................................................................................................................  Patient Representative Print Name and Relationship to Patient    ..................................................               ................................................  Date                                   Time    ..........................................................................................................................................  Reviewed by Signature/Title    ...................................................              ..............................................  Date                                               Time          22EPIC Rev 08/18

## 2019-07-12 NOTE — DISCHARGE INSTRUCTIONS
You can wear hard sole shoe for immobilization of the great toe while it heals. Ice and ibuprofen (600mg every 6 hours) and elevation can help. You can take percocet for severe pain.

## 2019-07-12 NOTE — ED TRIAGE NOTES
Dropped about 100# piece of equipment onto his right great toe. CMS is intact. Toe is bruised and painful. Able to ambulate.

## 2019-09-28 ENCOUNTER — HEALTH MAINTENANCE LETTER (OUTPATIENT)
Age: 54
End: 2019-09-28

## 2019-10-17 ENCOUNTER — OFFICE VISIT (OUTPATIENT)
Dept: FAMILY MEDICINE | Facility: CLINIC | Age: 54
End: 2019-10-17
Payer: COMMERCIAL

## 2019-10-17 VITALS
OXYGEN SATURATION: 95 % | HEART RATE: 69 BPM | TEMPERATURE: 97.9 F | BODY MASS INDEX: 34.29 KG/M2 | SYSTOLIC BLOOD PRESSURE: 126 MMHG | DIASTOLIC BLOOD PRESSURE: 77 MMHG | RESPIRATION RATE: 14 BRPM | HEIGHT: 69 IN | WEIGHT: 231.5 LBS

## 2019-10-17 DIAGNOSIS — E66.01 MORBID OBESITY (H): ICD-10-CM

## 2019-10-17 DIAGNOSIS — H00.024 HORDEOLUM INTERNUM OF LEFT UPPER EYELID: Primary | ICD-10-CM

## 2019-10-17 DIAGNOSIS — F32.0 MILD MAJOR DEPRESSION (H): ICD-10-CM

## 2019-10-17 DIAGNOSIS — Z23 NEED FOR PROPHYLACTIC VACCINATION AND INOCULATION AGAINST INFLUENZA: ICD-10-CM

## 2019-10-17 DIAGNOSIS — H91.93 BILATERAL HEARING LOSS, UNSPECIFIED HEARING LOSS TYPE: ICD-10-CM

## 2019-10-17 PROCEDURE — 90682 RIV4 VACC RECOMBINANT DNA IM: CPT | Performed by: PHYSICIAN ASSISTANT

## 2019-10-17 PROCEDURE — 90471 IMMUNIZATION ADMIN: CPT | Performed by: PHYSICIAN ASSISTANT

## 2019-10-17 PROCEDURE — 99214 OFFICE O/P EST MOD 30 MIN: CPT | Mod: 25 | Performed by: PHYSICIAN ASSISTANT

## 2019-10-17 RX ORDER — FEXOFENADINE HCL 180 MG/1
180 TABLET ORAL DAILY
COMMUNITY
End: 2021-02-10 | Stop reason: ALTCHOICE

## 2019-10-17 RX ORDER — FLUTICASONE PROPIONATE 50 MCG
1-2 SPRAY, SUSPENSION (ML) NASAL DAILY
Qty: 16 G | Refills: 3 | Status: SHIPPED | OUTPATIENT
Start: 2019-10-17 | End: 2021-02-10 | Stop reason: ALTCHOICE

## 2019-10-17 RX ORDER — AZELASTINE HYDROCHLORIDE 0.5 MG/ML
1 SOLUTION/ DROPS OPHTHALMIC 2 TIMES DAILY
Qty: 1 BOTTLE | Refills: 0 | Status: SHIPPED | OUTPATIENT
Start: 2019-10-17 | End: 2021-02-10 | Stop reason: ALTCHOICE

## 2019-10-17 ASSESSMENT — ANXIETY QUESTIONNAIRES
2. NOT BEING ABLE TO STOP OR CONTROL WORRYING: NEARLY EVERY DAY
5. BEING SO RESTLESS THAT IT IS HARD TO SIT STILL: SEVERAL DAYS
GAD7 TOTAL SCORE: 14
7. FEELING AFRAID AS IF SOMETHING AWFUL MIGHT HAPPEN: SEVERAL DAYS
3. WORRYING TOO MUCH ABOUT DIFFERENT THINGS: NEARLY EVERY DAY
1. FEELING NERVOUS, ANXIOUS, OR ON EDGE: MORE THAN HALF THE DAYS
IF YOU CHECKED OFF ANY PROBLEMS ON THIS QUESTIONNAIRE, HOW DIFFICULT HAVE THESE PROBLEMS MADE IT FOR YOU TO DO YOUR WORK, TAKE CARE OF THINGS AT HOME, OR GET ALONG WITH OTHER PEOPLE: SOMEWHAT DIFFICULT
6. BECOMING EASILY ANNOYED OR IRRITABLE: MORE THAN HALF THE DAYS

## 2019-10-17 ASSESSMENT — MIFFLIN-ST. JEOR: SCORE: 1870.46

## 2019-10-17 ASSESSMENT — PATIENT HEALTH QUESTIONNAIRE - PHQ9
5. POOR APPETITE OR OVEREATING: MORE THAN HALF THE DAYS
SUM OF ALL RESPONSES TO PHQ QUESTIONS 1-9: 15

## 2019-10-17 NOTE — PROGRESS NOTES
Subjective     John Reyse is a 54 year old male who presents to clinic today for the following health issues:    HPI   Eye(s) Problem  Onset: 2 weeks    Description:   Location: left  Pain: YES- mostly on eye lid not able to sleep  Redness: YES    Accompanying Signs & Symptoms:  Discharge/mattering: YES- water and goopy  Swelling: YES- mornings  Visual changes: no   Fever: no   Nasal Congestion: YES- regular allergies  Bothered by bright lights: YES    History:   Trauma: no   Foreign body exposure: no     Precipitating factors:   Wearing contacts: no     Alleviating factors:  Improved by: saline solution    Therapies Tried and outcome: hot rag helps temporarily.    States worsening hearing bilaterally. Feels it's related to his allergies. No treatments tried other than typical allergra.       Patient Active Problem List   Diagnosis     Attention deficit disorder     Esophageal reflux     HYPERLIPIDEMIA LDL GOAL <130     Sleep disorder     Mild major depression (H)     Hypertriglyceridemia     Anxiety     Testosterone deficiency     Impaired fasting glucose     Hypogonadism male     Elevated ferritin     Morbid obesity (H)     Past Surgical History:   Procedure Laterality Date     ARTHROSCOPY SHOULDER, OPEN ROTATOR CUFF REPAIR, COMBINED Left 2014    Procedure: COMBINED ARTHROSCOPY SHOULDER, OPEN ROTATOR CUFF REPAIR;  Surgeon: Maykel Khalil MD;  Location:  OR     C HAND/FINGER SURGERY UNLISTED      [facility] right middle finger     COLONOSCOPY  2013    Procedure: COMBINED COLONOSCOPY, SINGLE BIOPSY/POLYPECTOMY BY BIOPSY;;  Surgeon: Hali Lema MD;  Location:  GI     GI SURGERY      henmmriodectoomy     HC EXPLOR MAXILL SINUS,INTRANASAL       HC VASECTOMY UNILAT/BILAT W POSTOP SEMEN         Social History     Tobacco Use     Smoking status: Former Smoker     Packs/day: 0.50     Types: Cigarettes, Cigars     Last attempt to quit: 2003     Years since quittin.8      Smokeless tobacco: Never Used     Tobacco comment: occasional cigar   Substance Use Topics     Alcohol use: Yes     Alcohol/week: 0.0 standard drinks     Comment: 3-4 beers/week     Family History   Problem Relation Age of Onset     Lipids Father         On medication     Cancer - colorectal Maternal Uncle          Current Outpatient Medications   Medication Sig Dispense Refill     azelastine (OPTIVAR) 0.05 % ophthalmic solution Apply 1 drop to eye 2 times daily 1 Bottle 0     fexofenadine (ALLEGRA) 180 MG tablet Take 180 mg by mouth daily       fluticasone (FLONASE) 50 MCG/ACT nasal spray Spray 1-2 sprays into both nostrils daily 16 g 3     atorvastatin (LIPITOR) 40 MG tablet TAKE ONE TABLET BY MOUTH ONCE DAILY 90 tablet 1     buPROPion (WELLBUTRIN XL) 300 MG 24 hr tablet TAKE ONE TABLET BY MOUTH EVERY MORNING 90 tablet 1     fluocinonide (LIDEX) 0.05 % solution Apply to scalp daily as needed 60 mL 1     sertraline (ZOLOFT) 100 MG tablet TAKE ONE AND ONE-HALF TABLETS BY MOUTH ONCE DAILY 135 tablet 1     solifenacin (VESICARE) 5 MG tablet Take 1 tablet (5 mg) by mouth daily 90 tablet 1     traZODone (DESYREL) 50 MG tablet TAKE THREE TABLETS BY MOUTH AT BEDTIME 270 tablet 1     Allergies   Allergen Reactions     Penicillins Itching, Swelling and Rash     Rash     Recent Labs   Lab Test 12/26/18  1040 11/08/17  1213 03/28/17  0900  09/04/15  1228 10/23/14  0930   A1C  --   --   --   --  5.3 5.2   LDL 88 Cannot estimate LDL when triglyceride exceeds 400 mg/dL  110* Cannot estimate LDL when triglyceride exceeds 400 mg/dL  109*   < >  --  72   HDL 36* 42 39*   < >  --  43   TRIG 388* 414* 465*   < >  --  365*   ALT 41 47 33   < > 48 47   CR 1.06 1.14 1.11   < > 1.05 1.07   GFRESTIMATED 78 67 69   < > 74 73   GFRESTBLACK >90 81 84   < > 90 88   POTASSIUM 4.7 4.5 4.1   < > 4.4 4.1   TSH 2.93  --   --   --  3.90 3.73    < > = values in this interval not displayed.      BP Readings from Last 3 Encounters:   10/17/19 126/77  "  07/11/19 (!) 150/100   12/26/18 127/83    Wt Readings from Last 3 Encounters:   10/17/19 105 kg (231 lb 8 oz)   07/11/19 102.1 kg (225 lb)   12/26/18 106 kg (233 lb 11.2 oz)                    Reviewed and updated as needed this visit by Provider  Tobacco  Allergies  Meds  Problems  Med Hx  Surg Hx  Fam Hx         Review of Systems   ROS COMP: Constitutional, HEENT, cardiovascular, pulmonary, gi and gu systems are negative, except as otherwise noted.      Objective    /77 (BP Location: Right arm, Patient Position: Chair, Cuff Size: Adult Large)   Pulse 69   Temp 97.9  F (36.6  C) (Oral)   Resp 14   Ht 1.753 m (5' 9\")   Wt 105 kg (231 lb 8 oz)   SpO2 95%   BMI 34.19 kg/m    Body mass index is 34.19 kg/m .  Physical Exam   GENERAL: alert and no distress  EYES: PERRL, EOMI, visual fields normal and eyelids- hordeolum/sty OS  HENT: ear canals and TM's normal, nose and mouth without ulcers or lesions  PSYCH: mentation appears normal, affect normal/bright    Diagnostic Test Results:  none         Assessment & Plan     (H00.024) Hordeolum internum of left upper eyelid  (primary encounter diagnosis)  Comment: evident on exam. Educated on hot compresses and avoiding irritation. Likely allergic etiology. optivar for etiology and continued supportive care. If no improvement in 3 weeks, will have see ophthalmology   Plan: azelastine (OPTIVAR) 0.05 % ophthalmic solution        -Medication use and side effects discussed with the patient. Patient is in complete understanding and agreement with plan.       (H91.93) Bilateral hearing loss, unspecified hearing loss type  Comment: will attempt adding flonase. However, possible natural sensorineural loss. If no improvement, referral to be given for formal testing.   Plan: fluticasone (FLONASE) 50 MCG/ACT nasal spray        -Medication use and side effects discussed with the patient. Patient is in complete understanding and agreement with plan.       (F32.0) Mild " major depression (H)  Comment: patient reports stable.   Plan:     (E66.01) Morbid obesity (H)  Comment: see pcp in 2 months for physical.   Plan:     (Z23) Need for prophylactic vaccination and inoculation against influenza  Comment:   Plan: INFLUENZA QUAD, RECOMBINANT, P-FREE (RIV4)         (FLUBLOCK) [00752], Vaccine Administration,         Initial [97658]                   Return in about 2 months (around 12/17/2019) for Physical Exam, Lab Work.    Cleveland Lopez PA-C  [facility]

## 2019-10-17 NOTE — PATIENT INSTRUCTIONS
Zaditor-over the counter allergy eye drop.     Patient Education     Sty (or Stye)  A sty is an infection of the oil gland of the eyelid. It may develop into a small pocket of pus (an abscess). This can cause pain, redness, and swelling. In early stages, a sty is treated with antibiotic cream, eye drops, or a small towel soaked in warm water (a warm compress). More severe cases may need to be opened and drained by a healthcare provider.  Home care    Eye drops or ointment are usually prescribed to treat the infection. Use these as directed.     Artificial tears may also be used to lubricate the eye and make it more comfortable. You can buy these over the counter without a prescription. Talk with your healthcare provider before using any over-the-counter treatment for a sty.    Apply a warm, damp towel to the affected eye for at least 5 minutes, 3 to 4 times a day for a week. Warm compresses open the pores and speed the healing. But if the compresses are too hot, they may burn your eyelid.    Sometimes the sty will drain with this treatment alone. If this happens, keep using the antibiotic until all the redness and swelling are gone.    Wash your hands before and after touching the infected eyelid to avoid spreading the infection.    Don t squeeze or try to break open the sty.  Follow-up care  Follow up with your healthcare provider, or as advised.   When to seek medical advice  Call your healthcare provider right away if any of these occur:    Increase in swelling or redness around the eyelid after 48 to 72 hours    Increase in eye pain or the eyelid blisters    Increase in warmth--the eyelid feels hot    Drainage of blood or thick pus from the sty    Blister on the eyelid    Inability to open the eyelid due to swelling    Fever of 100.4 F (38 C) or above, or as directed by your provider    Vision changes    Headache or stiff neck    The sty comes back  Date Last Reviewed: 8/1/2017 2000-2018 [employer]. [address] [place] [address], [place] [address]. All rights reserved. This information is not intended as a substitute for professional medical care. Always follow your healthcare professional's instructions.

## 2019-10-18 ASSESSMENT — ANXIETY QUESTIONNAIRES: GAD7 TOTAL SCORE: 14

## 2019-11-21 DIAGNOSIS — G47.9 SLEEP DISORDER: ICD-10-CM

## 2019-11-22 RX ORDER — TRAZODONE HYDROCHLORIDE 50 MG/1
TABLET, FILM COATED ORAL
Qty: 270 TABLET | Refills: 0 | Status: SHIPPED | OUTPATIENT
Start: 2019-11-22 | End: 2020-03-25

## 2019-11-22 NOTE — TELEPHONE ENCOUNTER
"Requested Prescriptions   Pending Prescriptions Disp Refills     traZODone (DESYREL) 50 MG tablet [Pharmacy Med Name: TRAZODONE HCL 50MG TABS] 270 tablet 1     Sig: TAKE THREE TABLETS BY MOUTH AT BEDTIME       Last Written Prescription Date: 6/26/19   Last Fill Quantity: 270 tablets,  # refills: 1   Last office visit: 10/17/2019 with prescribing provider:  John   Future Office Visit:   Next 5 appointments (look out 90 days)    Dec 18, 2019  8:25 AM CST  Adult physical with Remy Mcknight MD, CR EXAM ROOM 14  [facility] [address] [place] [id number]  [phone]             Serotonin Modulators Passed - 11/21/2019  1:31 PM        Passed - Recent (12 mo) or future (30 days) visit within the authorizing provider's specialty     Patient has had an office visit with the authorizing provider or a provider within the authorizing providers department within the previous 12 mos or has a future within next 30 days. See \"Patient Info\" tab in inbasket, or \"Choose Columns\" in Meds & Orders section of the refill encounter.              Passed - Medication is active on med list        Passed - Patient is age 18 or older          "

## 2019-12-17 NOTE — PROGRESS NOTES
Pre-Visit Planning     Future Appointments   Date Time Provider Department Center   12/18/2019  8:25 AM Remy Mcknight MD [facility]     Arrival Time for this Appointment:  8:25 AM   Appointment Notes for this encounter:   Physical    Questionnaires Reviewed/Assigned  No additional questionnaires are needed      Patient preferred phone number: [phone]    Unable to reach. Left voicemail. Advised patient to call clinic back at [phone].    Omi Webb, EMT/Clinic Health Guide

## 2019-12-18 ENCOUNTER — OFFICE VISIT (OUTPATIENT)
Dept: FAMILY MEDICINE | Facility: CLINIC | Age: 54
End: 2019-12-18
Payer: COMMERCIAL

## 2019-12-18 VITALS
HEART RATE: 60 BPM | DIASTOLIC BLOOD PRESSURE: 80 MMHG | WEIGHT: 227.1 LBS | BODY MASS INDEX: 33.64 KG/M2 | TEMPERATURE: 97.7 F | SYSTOLIC BLOOD PRESSURE: 128 MMHG | HEIGHT: 69 IN

## 2019-12-18 DIAGNOSIS — T75.3XXD MOTION SICKNESS, SUBSEQUENT ENCOUNTER: ICD-10-CM

## 2019-12-18 DIAGNOSIS — Z12.5 SCREENING FOR PROSTATE CANCER: ICD-10-CM

## 2019-12-18 DIAGNOSIS — E78.5 HYPERLIPIDEMIA LDL GOAL <130: Primary | ICD-10-CM

## 2019-12-18 LAB
ALBUMIN SERPL-MCNC: 4.2 G/DL (ref 3.4–5)
ALP SERPL-CCNC: 78 U/L (ref 40–150)
ALT SERPL W P-5'-P-CCNC: 44 U/L (ref 0–70)
ANION GAP SERPL CALCULATED.3IONS-SCNC: 7 MMOL/L (ref 3–14)
AST SERPL W P-5'-P-CCNC: 23 U/L (ref 0–45)
BILIRUB SERPL-MCNC: 0.5 MG/DL (ref 0.2–1.3)
BUN SERPL-MCNC: 19 MG/DL (ref 7–30)
CALCIUM SERPL-MCNC: 9.1 MG/DL (ref 8.5–10.1)
CHLORIDE SERPL-SCNC: 106 MMOL/L (ref 94–109)
CHOLEST SERPL-MCNC: 177 MG/DL
CO2 SERPL-SCNC: 28 MMOL/L (ref 20–32)
CREAT SERPL-MCNC: 1.09 MG/DL (ref 0.66–1.25)
GFR SERPL CREATININE-BSD FRML MDRD: 75 ML/MIN/{1.73_M2}
GLUCOSE SERPL-MCNC: 117 MG/DL (ref 70–99)
HDLC SERPL-MCNC: 33 MG/DL
LDLC SERPL CALC-MCNC: ABNORMAL MG/DL
LDLC SERPL DIRECT ASSAY-MCNC: 84 MG/DL
NONHDLC SERPL-MCNC: 144 MG/DL
POTASSIUM SERPL-SCNC: 4.2 MMOL/L (ref 3.4–5.3)
PROT SERPL-MCNC: 7.6 G/DL (ref 6.8–8.8)
PSA SERPL-ACNC: 0.63 UG/L (ref 0–4)
SODIUM SERPL-SCNC: 141 MMOL/L (ref 133–144)
TRIGL SERPL-MCNC: 452 MG/DL

## 2019-12-18 PROCEDURE — 83721 ASSAY OF BLOOD LIPOPROTEIN: CPT | Mod: 59 | Performed by: FAMILY MEDICINE

## 2019-12-18 PROCEDURE — 99396 PREV VISIT EST AGE 40-64: CPT | Mod: 25 | Performed by: FAMILY MEDICINE

## 2019-12-18 PROCEDURE — 90471 IMMUNIZATION ADMIN: CPT | Performed by: FAMILY MEDICINE

## 2019-12-18 PROCEDURE — 90750 HZV VACC RECOMBINANT IM: CPT | Performed by: FAMILY MEDICINE

## 2019-12-18 PROCEDURE — G0103 PSA SCREENING: HCPCS | Performed by: FAMILY MEDICINE

## 2019-12-18 PROCEDURE — 36415 COLL VENOUS BLD VENIPUNCTURE: CPT | Performed by: FAMILY MEDICINE

## 2019-12-18 PROCEDURE — 80061 LIPID PANEL: CPT | Performed by: FAMILY MEDICINE

## 2019-12-18 PROCEDURE — 80053 COMPREHEN METABOLIC PANEL: CPT | Performed by: FAMILY MEDICINE

## 2019-12-18 RX ORDER — SCOLOPAMINE TRANSDERMAL SYSTEM 1 MG/1
1 PATCH, EXTENDED RELEASE TRANSDERMAL
Qty: 4 PATCH | Refills: 1 | Status: SHIPPED | OUTPATIENT
Start: 2019-12-18 | End: 2020-11-30

## 2019-12-18 RX ORDER — SCOLOPAMINE TRANSDERMAL SYSTEM 1 MG/1
1 PATCH, EXTENDED RELEASE TRANSDERMAL ONCE
Status: DISCONTINUED | OUTPATIENT
Start: 2019-12-18 | End: 2019-12-18

## 2019-12-18 SDOH — ECONOMIC STABILITY: INCOME INSECURITY: HOW HARD IS IT FOR YOU TO PAY FOR THE VERY BASICS LIKE FOOD, HOUSING, MEDICAL CARE, AND HEATING?: SOMEWHAT HARD

## 2019-12-18 SDOH — HEALTH STABILITY: PHYSICAL HEALTH: ON AVERAGE, HOW MANY MINUTES DO YOU ENGAGE IN EXERCISE AT THIS LEVEL?: 50 MIN

## 2019-12-18 SDOH — HEALTH STABILITY: MENTAL HEALTH
STRESS IS WHEN SOMEONE FEELS TENSE, NERVOUS, ANXIOUS, OR CAN'T SLEEP AT NIGHT BECAUSE THEIR MIND IS TROUBLED. HOW STRESSED ARE YOU?: TO SOME EXTENT

## 2019-12-18 SDOH — HEALTH STABILITY: MENTAL HEALTH: HOW MANY STANDARD DRINKS CONTAINING ALCOHOL DO YOU HAVE ON A TYPICAL DAY?: 5 OR 6

## 2019-12-18 SDOH — SOCIAL STABILITY: SOCIAL NETWORK: IN A TYPICAL WEEK, HOW MANY TIMES DO YOU TALK ON THE PHONE WITH FAMILY, FRIENDS, OR NEIGHBORS?: THREE TIMES A WEEK

## 2019-12-18 SDOH — HEALTH STABILITY: PHYSICAL HEALTH: ON AVERAGE, HOW MANY DAYS PER WEEK DO YOU ENGAGE IN MODERATE TO STRENUOUS EXERCISE (LIKE A BRISK WALK)?: 3 DAYS

## 2019-12-18 SDOH — HEALTH STABILITY: MENTAL HEALTH: HOW OFTEN DO YOU HAVE 6 OR MORE DRINKS ON ONE OCCASION?: MONTHLY

## 2019-12-18 SDOH — SOCIAL STABILITY: SOCIAL NETWORK: HOW OFTEN DO YOU GET TOGETHER WITH FRIENDS OR RELATIVES?: ONCE A WEEK

## 2019-12-18 SDOH — SOCIAL STABILITY: SOCIAL NETWORK: HOW OFTEN DO YOU ATTENT MEETINGS OF THE CLUB OR ORGANIZATION YOU BELONG TO?: 1 TO 4 TIMES PER YEAR

## 2019-12-18 SDOH — ECONOMIC STABILITY: FOOD INSECURITY: WITHIN THE PAST 12 MONTHS, YOU WORRIED THAT YOUR FOOD WOULD RUN OUT BEFORE YOU GOT MONEY TO BUY MORE.: NEVER TRUE

## 2019-12-18 SDOH — SOCIAL STABILITY: SOCIAL NETWORK: HOW OFTEN DO YOU ATTEND CHURCH OR RELIGIOUS SERVICES?: MORE THAN 4 TIMES PER YEAR

## 2019-12-18 SDOH — SOCIAL STABILITY: SOCIAL NETWORK
DO YOU BELONG TO ANY CLUBS OR ORGANIZATIONS SUCH AS CHURCH GROUPS UNIONS, FRATERNAL OR ATHLETIC GROUPS, OR SCHOOL GROUPS?: YES

## 2019-12-18 SDOH — ECONOMIC STABILITY: TRANSPORTATION INSECURITY
IN THE PAST 12 MONTHS, HAS THE LACK OF TRANSPORTATION KEPT YOU FROM MEDICAL APPOINTMENTS OR FROM GETTING MEDICATIONS?: NO

## 2019-12-18 SDOH — HEALTH STABILITY: MENTAL HEALTH: HOW OFTEN DO YOU HAVE A DRINK CONTAINING ALCOHOL?: 2-3 TIMES A WEEK

## 2019-12-18 SDOH — SOCIAL STABILITY: SOCIAL NETWORK: ARE YOU MARRIED, WIDOWED, DIVORCED, SEPARATED, NEVER MARRIED, OR LIVING WITH A PARTNER?: MARRIED

## 2019-12-18 SDOH — ECONOMIC STABILITY: TRANSPORTATION INSECURITY
IN THE PAST 12 MONTHS, HAS LACK OF TRANSPORTATION KEPT YOU FROM MEETINGS, WORK, OR FROM GETTING THINGS NEEDED FOR DAILY LIVING?: NO

## 2019-12-18 ASSESSMENT — ENCOUNTER SYMPTOMS
DIZZINESS: 0
ABDOMINAL PAIN: 0
PARESTHESIAS: 0
JOINT SWELLING: 1
SHORTNESS OF BREATH: 0
HEMATOCHEZIA: 0
PALPITATIONS: 0
DYSURIA: 0
CONSTIPATION: 0
MYALGIAS: 1
SORE THROAT: 0
NAUSEA: 1
HEMATURIA: 0
HEADACHES: 1
CHILLS: 0
FREQUENCY: 1
ARTHRALGIAS: 1
FEVER: 0
COUGH: 0
HEARTBURN: 1
NERVOUS/ANXIOUS: 0
DIARRHEA: 0
EYE PAIN: 0

## 2019-12-18 ASSESSMENT — ANXIETY QUESTIONNAIRES
1. FEELING NERVOUS, ANXIOUS, OR ON EDGE: SEVERAL DAYS
4. TROUBLE RELAXING: SEVERAL DAYS
5. BEING SO RESTLESS THAT IT IS HARD TO SIT STILL: SEVERAL DAYS
GAD7 TOTAL SCORE: 7
GAD7 TOTAL SCORE: 7
7. FEELING AFRAID AS IF SOMETHING AWFUL MIGHT HAPPEN: SEVERAL DAYS
GAD7 TOTAL SCORE: 7
3. WORRYING TOO MUCH ABOUT DIFFERENT THINGS: SEVERAL DAYS
2. NOT BEING ABLE TO STOP OR CONTROL WORRYING: SEVERAL DAYS
6. BECOMING EASILY ANNOYED OR IRRITABLE: SEVERAL DAYS
7. FEELING AFRAID AS IF SOMETHING AWFUL MIGHT HAPPEN: SEVERAL DAYS

## 2019-12-18 ASSESSMENT — MIFFLIN-ST. JEOR: SCORE: 1850.5

## 2019-12-18 ASSESSMENT — PATIENT HEALTH QUESTIONNAIRE - PHQ9
10. IF YOU CHECKED OFF ANY PROBLEMS, HOW DIFFICULT HAVE THESE PROBLEMS MADE IT FOR YOU TO DO YOUR WORK, TAKE CARE OF THINGS AT HOME, OR GET ALONG WITH OTHER PEOPLE: SOMEWHAT DIFFICULT
SUM OF ALL RESPONSES TO PHQ QUESTIONS 1-9: 8
SUM OF ALL RESPONSES TO PHQ QUESTIONS 1-9: 8

## 2019-12-18 NOTE — PROGRESS NOTES
SUBJECTIVE:   CC: John Reyes is an 54 year old male who presents for preventative health visit.     Healthy Habits:     Getting at least 3 servings of Calcium per day:  Yes    Bi-annual eye exam:  Yes    Dental care twice a year:  Yes    Sleep apnea or symptoms of sleep apnea:  Daytime drowsiness and Excessive snoring    Diet:  Low salt and Low fat/cholesterol    Frequency of exercise:  4-5 days/week    Duration of exercise:  30-45 minutes    Taking medications regularly:  Yes    Medication side effects:  Muscle aches    PHQ-2 Total Score: 2    Additional concerns today:  No          Feels well, going on a cruise     Today's PHQ-2 Score:   PHQ-2 (  [employer]) 2019   Q1: Little interest or pleasure in doing things 1   Q2: Feeling down, depressed or hopeless 1   PHQ-2 Score 2   Q1: Little interest or pleasure in doing things Several days   Q2: Feeling down, depressed or hopeless Several days   PHQ-2 Score 2       Abuse: Current or Past(Physical, Sexual or Emotional)- No  Do you feel safe in your environment? Yes    Have you ever done Advance Care Planning? (For example, a Health Directive, POLST, or a discussion with a medical provider or your loved ones about your wishes): Yes, advance care planning is on file.    Social History     Tobacco Use     Smoking status: Former Smoker     Packs/day: 0.50     Types: Cigarettes, Cigars     Last attempt to quit: 2003     Years since quittin.9     Smokeless tobacco: Never Used     Tobacco comment: occasional cigar   Substance Use Topics     Alcohol use: Yes     Alcohol/week: 0.0 standard drinks     Frequency: 2-3 times a week     Drinks per session: 5 or 6     Binge frequency: Monthly     Comment: 3-4 beers/week         Alcohol Use 2019   Prescreen: >3 drinks/day or >7 drinks/week? No       Last PSA:   PSA   Date Value Ref Range Status   2017 0.58 0 - 4 ug/L Final     Comment:     Assay Method:  Chemiluminescence using Siemens Vista analyzer        Reviewed orders with patient. Reviewed health maintenance and updated orders accordingly - Yes  BP Readings from Last 3 Encounters:   12/18/19 128/80   10/17/19 126/77   07/11/19 (!) 150/100    Wt Readings from Last 3 Encounters:   12/18/19 103 kg (227 lb 1.6 oz)   10/17/19 105 kg (231 lb 8 oz)   07/11/19 102.1 kg (225 lb)                    Reviewed and updated as needed this visit by clinical staff         Reviewed and updated as needed this visit by Provider            Review of Systems   Constitutional: Negative for chills and fever.   HENT: Negative for congestion, ear pain, hearing loss and sore throat.    Eyes: Positive for visual disturbance. Negative for pain.   Respiratory: Negative for cough and shortness of breath.    Cardiovascular: Negative for chest pain, palpitations and peripheral edema.   Gastrointestinal: Positive for heartburn and nausea. Negative for abdominal pain, constipation, diarrhea and hematochezia.   Genitourinary: Positive for frequency. Negative for dysuria, genital sores, hematuria and urgency.   Musculoskeletal: Positive for arthralgias, joint swelling and myalgias.   Skin: Negative for rash.   Neurological: Positive for headaches. Negative for dizziness and paresthesias.   Psychiatric/Behavioral: Positive for mood changes. The patient is not nervous/anxious.      CONSTITUTIONAL: NEGATIVE for fever, chills, change in weight  INTEGUMENTARY/SKIN: NEGATIVE for worrisome rashes, moles or lesions  EYES: NEGATIVE for vision changes or irritation  ENT: NEGATIVE for ear, mouth and throat problems  RESP: NEGATIVE for significant cough or SOB  CV: NEGATIVE for chest pain, palpitations or peripheral edema  GI: NEGATIVE for nausea, abdominal pain, heartburn, or change in bowel habits   male: negative for dysuria, hematuria, decreased urinary stream, erectile dysfunction, urethral discharge  MUSCULOSKELETAL: NEGATIVE for significant arthralgias or myalgia  NEURO: NEGATIVE for weakness,  "dizziness or paresthesias  PSYCHIATRIC: NEGATIVE for changes in mood or affect    OBJECTIVE:   There were no vitals taken for this visit.    Physical Exam  GENERAL: healthy, alert and no distress  EYES: Eyes grossly normal to inspection, PERRL and conjunctivae and sclerae normal  HENT: ear canals and TM's normal, nose and mouth without ulcers or lesions  NECK: no adenopathy, no asymmetry, masses, or scars and thyroid normal to palpation  RESP: lungs clear to auscultation - no rales, rhonchi or wheezes  CV: regular rate and rhythm, normal S1 S2, no S3 or S4, no murmur, click or rub, no peripheral edema and peripheral pulses strong  ABDOMEN: soft, nontender, no hepatosplenomegaly, no masses and bowel sounds normal  MS: no gross musculoskeletal defects noted, no edema  SKIN: no suspicious lesions or rashes  NEURO: Normal strength and tone, mentation intact and speech normal  PSYCH: mentation appears normal, affect normal/bright    Diagnostic Test Results:  Labs reviewed in Epic    ASSESSMENT/PLAN:   1. Hyperlipidemia LDL goal <130  Continue meds   - Lipid panel reflex to direct LDL Fasting  - Comprehensive metabolic panel    2. Motion sickness, subsequent encounter  Situational on cruise   - scopolamine (TRANSDERM) 72 hr patch 1 patch    3. Screening for prostate cancer    - PSA, screen    COUNSELING:   Reviewed preventive health counseling, as reflected in patient instructions       Regular exercise       Healthy diet/nutrition       Vision screening    Estimated body mass index is 34.19 kg/m  as calculated from the following:    Height as of 10/17/19: 1.753 m (5' 9\").    Weight as of 10/17/19: 105 kg (231 lb 8 oz).     Weight management plan: Specific weight management program called carbohydrate reduction and total calories  discussed     reports that he quit smoking about 16 years ago. His smoking use included cigarettes and cigars. He smoked 0.50 packs per day. He has never used smokeless tobacco.      Counseling " Resources:  ATP IV Guidelines  Pooled Cohorts Equation Calculator  FRAX Risk Assessment  ICSI Preventive Guidelines  Dietary Guidelines for Americans, 2010  USDA's MyPlate  ASA Prophylaxis  Lung CA Screening    Remy Mcknight MD  [facility]  Answers for HPI/ROS submitted by the patient on 12/18/2019   Annual Exam:  If you checked off any problems, how difficult have these problems made it for you to do your work, take care of things at home, or get along with other people?: Somewhat difficult  PHQ9 TOTAL SCORE: 8  CARLOS 7 TOTAL SCORE: 7

## 2019-12-19 ASSESSMENT — ANXIETY QUESTIONNAIRES: GAD7 TOTAL SCORE: 7

## 2019-12-19 ASSESSMENT — PATIENT HEALTH QUESTIONNAIRE - PHQ9: SUM OF ALL RESPONSES TO PHQ QUESTIONS 1-9: 8

## 2020-01-21 DIAGNOSIS — E78.5 HYPERLIPIDEMIA LDL GOAL <130: ICD-10-CM

## 2020-01-21 RX ORDER — ATORVASTATIN CALCIUM 40 MG/1
TABLET, FILM COATED ORAL
Qty: 90 TABLET | Refills: 1 | Status: SHIPPED | OUTPATIENT
Start: 2020-01-21 | End: 2020-08-28

## 2020-01-21 NOTE — TELEPHONE ENCOUNTER
"Requested Prescriptions   Pending Prescriptions Disp Refills     atorvastatin (LIPITOR) 40 MG tablet [Pharmacy Med Name: ATORVASTATIN CALCIUM 40MG TABS] 90 tablet 1     Sig: TAKE ONE TABLET BY MOUTH ONCE DAILY       Statins Protocol Passed - 1/21/2020 12:27 PM        Passed - LDL on file in past 12 months     Recent Labs   Lab Test 12/18/19  0921   LDL Cannot estimate LDL when triglyceride exceeds 400 mg/dL  84             Passed - No abnormal creatine kinase in past 12 months     No lab results found.             Passed - Recent (12 mo) or future (30 days) visit within the authorizing provider's specialty     Patient has had an office visit with the authorizing provider or a provider within the authorizing providers department within the previous 12 mos or has a future within next 30 days. See \"Patient Info\" tab in inbasket, or \"Choose Columns\" in Meds & Orders section of the refill encounter.              Passed - Medication is active on med list        Passed - Patient is age 18 or older        Prescription approved per [facility] Refill Protocol.  Madai Shah RN on 1/21/2020 at 1:20 PM    "

## 2020-02-18 DIAGNOSIS — F32.1 MODERATE MAJOR DEPRESSION (H): ICD-10-CM

## 2020-02-19 RX ORDER — BUPROPION HYDROCHLORIDE 300 MG/1
TABLET ORAL
Qty: 90 TABLET | Refills: 0 | Status: SHIPPED | OUTPATIENT
Start: 2020-02-19 | End: 2020-03-25

## 2020-02-19 NOTE — TELEPHONE ENCOUNTER
"  Prescription approved per [facility] Refill Protocol.  Libra AHMADI RN    Last Written Prescription Date:  6/26/2019  Last Fill Quantity: 90,  # refills: 1   Last office visit: 12/18/2019 with prescribing provider:     Future Office Visit:    Requested Prescriptions   Pending Prescriptions Disp Refills     BUPROPION 300 MG PO 24 hr tablet [Pharmacy Med Name: BUPROPION HCL ER (XL) 300MG TB24] 90 tablet 1     Sig: TAKE ONE TABLET BY MOUTH EVERY MORNING       SSRIs Protocol Failed - 2/18/2020  1:39 PM        Failed - PHQ-9 score less than 5 in past 6 months     Please review last PHQ-9 score.           Passed - Medication is Bupropion     If the medication is Bupropion (Wellbutrin), and the patient is taking for smoking cessation; OK to refill.          Passed - Medication is active on med list        Passed - Patient is age 18 or older        Passed - Recent (6 mo) or future (30 days) visit within the authorizing provider's specialty     Patient had office visit in the last 6 months or has a visit in the next 30 days with authorizing provider or within the authorizing provider's specialty.  See \"Patient Info\" tab in inbasket, or \"Choose Columns\" in Meds & Orders section of the refill encounter.            "

## 2020-03-25 DIAGNOSIS — G47.9 SLEEP DISORDER: ICD-10-CM

## 2020-03-25 RX ORDER — TRAZODONE HYDROCHLORIDE 50 MG/1
TABLET, FILM COATED ORAL
Qty: 270 TABLET | Refills: 0 | Status: SHIPPED | OUTPATIENT
Start: 2020-03-25 | End: 2020-06-16

## 2020-03-25 NOTE — TELEPHONE ENCOUNTER
Routing refill request to provider to review approval because:  Last appointment for sleep > 12 months (12/2018)    Christa Madden RN, BSN  Message handled by Nurse Triage.

## 2020-04-04 ENCOUNTER — E-VISIT (OUTPATIENT)
Dept: FAMILY MEDICINE | Facility: CLINIC | Age: 55
End: 2020-04-04
Payer: COMMERCIAL

## 2020-04-04 DIAGNOSIS — K12.30 STOMATITIS AND MUCOSITIS: Primary | ICD-10-CM

## 2020-04-04 DIAGNOSIS — K12.1 STOMATITIS AND MUCOSITIS: Primary | ICD-10-CM

## 2020-04-04 PROCEDURE — 99421 OL DIG E/M SVC 5-10 MIN: CPT | Performed by: FAMILY MEDICINE

## 2020-05-18 ENCOUNTER — OFFICE VISIT (OUTPATIENT)
Dept: URGENT CARE | Facility: URGENT CARE | Age: 55
End: 2020-05-18
Payer: COMMERCIAL

## 2020-05-18 ENCOUNTER — VIRTUAL VISIT (OUTPATIENT)
Dept: FAMILY MEDICINE | Facility: OTHER | Age: 55
End: 2020-05-18

## 2020-05-18 DIAGNOSIS — Z20.822 SUSPECTED 2019 NOVEL CORONAVIRUS INFECTION: Primary | ICD-10-CM

## 2020-05-18 PROCEDURE — 99000 SPECIMEN HANDLING OFFICE-LAB: CPT | Performed by: FAMILY MEDICINE

## 2020-05-18 PROCEDURE — 87635 SARS-COV-2 COVID-19 AMP PRB: CPT | Mod: 90 | Performed by: FAMILY MEDICINE

## 2020-05-18 PROCEDURE — 99207 ZZC NO BILLABLE SERVICE THIS VISIT: CPT

## 2020-05-18 NOTE — PROGRESS NOTES
"Date: 2020 10:45:54  Clinician: Julio Cesar Luo  Clinician NPI: [id number]  Patient: John Reyes  Patient : 1965  Patient Address: [address] [place] [id number]  Patient Phone: [phone]  Visit Protocol: URI  Patient Summary:  John is a 55 year old ( : 1965 ) male who initiated a Visit for COVID-19 (Coronavirus) evaluation and screening. When asked the question \"Please sign me up to receive news, health information and promotions from [employer].\", John responded \"No\".    John states his symptoms started gradually 5-6 days ago.   His symptoms consist of nausea, ageusia, diarrhea, chills, a sore throat, rhinitis, a headache, malaise, and myalgia. John also feels feverish but was unable to measure his temperature.   Symptom details     Nasal secretions: The color of his mucus is clear.    Sore throat: John reports having mild throat pain (1-3 on a 10 point pain scale), does not have exudate on his tonsils, and can swallow liquids. He is not sure if the lymph nodes in his neck are enlarged. A rash has not appeared on the skin since the sore throat started.     Headache: He states the headache is moderate (4-6 on a 10 point pain scale).      John denies having teeth pain, facial pain or pressure, wheezing, cough, nasal congestion, vomiting, ear pain, and anosmia. He also denies having recent facial or sinus surgery in the past 60 days, double sickening (worsening symptoms after initial improvement), taking antibiotic medication for the symptoms, and having a sinus infection within the past year. He is not experiencing dyspnea.   Precipitating events  Within the past week, John has not been exposed to someone with strep throat. He has not recently been exposed to someone with influenza. John has not been in close contact with any high risk individuals.   Pertinent COVID-19 (Coronavirus) information  In the past 14 days, John has not worked in a congregate " living setting.   He does not work or volunteer as healthcare worker or a  and does not work or volunteer in a healthcare facility.   John also has not lived in a congregate living setting in the past 14 days. He lives with a healthcare worker.   John has not had a close contact with a laboratory-confirmed COVID-19 patient within 14 days of symptom onset.   Pertinent medical history  John does not need a return to work/school note.   Weight: 235 lbs   John does not smoke or use smokeless tobacco.   Weight: 235 lbs    MEDICATIONS: sertraline oral, Wellbutrin SR oral, trazodone oral, Lipitor oral, ALLERGIES: Penicillins  Clinician Response:  Dear John,   Dear John  Your symptoms show that you may have coronavirus (COVID-19). This illness can cause fever, cough and trouble breathing. Many people get a mild case and get better on their own. Some people can get very sick.  What should I do?  We would like to test you for this virus. This will be a curbside test done outside the clinic.  Please call [phone] to schedule your visit. Explain that you were referred by [facility] to have a COVID-19 test. Be ready to share your [facility] visit ID number.  Starting now:  Stay at least 6 feet away from others. (If someone will drive you to your test, stay in the backseat, as far away from the  as you can.)   Don't go to work, school or anywhere else. When it's time for your test, go straight to the testing site. Don't make any stops on the way there or back.   Wash your hands and face often. Use soap and water.   Cover your mouth and nose with a mask, tissue or washcloth.   Don't touch anyone. No hugging, kissing or handshakes.  While at home   Stay home and away from others (self-isolate) until:  You've had no fever---and no medicine that reduces fever---for 3 full days (72 hours). And...  Your other symptoms have gotten better. For example, your cough or breathing has improved. And...  At  "least 10 days have passed since your symptoms started.  During this time:  Stay in your own room (and use your own bathroom), if you can.  Don't go to work, school or anywhere else.  Stay away from others in your home. No hugging, kissing or shaking hands.  Don't let anyone visit.  Cover your mouth and nose with a mask, tissue or washcloth to avoid spreading germs.  Clean \"high touch\" surfaces often (doorknobs, counters, handles, etc.). Use a household cleaning spray or wipes.  Wash your hands and face often. Use soap and water.  How can I take care of myself?  1. Get lots of rest. Drink extra fluids (unless your doctor has told you not to).  2. Take Tylenol (acetaminophen) for fever or pain. If you have liver or kidney problems, ask your family doctor if it's okay to take Tylenol.  Adults can take either:   650 mg (two 325 mg pills) every 4 to 6 hours, or...  1,000 mg (two 500 mg pills) every 8 hours as needed.   Note: Don't take more than 3,000 mg in one day.   Acetaminophen is found in many medicines (both prescribed and over-the-counter medicines). Read all labels to be sure you don't take too much.   For children, check the Tylenol bottle for the right dose. The dose is based on the child's age or weight.  3. If you have other health problems (like cancer, heart failure, an organ transplant or severe kidney disease): Call your specialty clinic if you don't feel better in the next 2 days.  4. Know when to call 911: If your breathing is so bad that it keeps you from doing normal activities, call 911 or go to the emergency room. Tell them that you've been staying home and may have COVID-19.  5. Sign up for [employer]. We know it's scary to hear that you might have COVID-19. We want to track your symptoms to make sure you're okay over the next 2 weeks. Please look for an email from [employer]---this is a free, online program that we'll use to keep in touch. To sign up, follow the link in the email. Learn more " at [email or web address].  6. The following will serve as your written order for this Covid Test ordered by me for the indication of suspected Covid [Z20.828]: The test will be ordered in [employer], our electronic health record after you are scheduled and will show as ordered and authorized by Sky Carbajal MD   Order: Covid-19 (Coronavirus) PCR for SYMPTOMATIC testing from [facility]  Where can I get more information?  To learn more about COVID-19 and how to care for yourself at home, please visit the CDC website at https://www.cdc.gov/coronavirus/2019-ncov/about/steps-when-sick.html.  For more about your care at [facility], please visit [email or web address]  If you'd like to be part of a COVID-19 clinical trial (research study) at the [facility], go to [email or web address] for details.    Diagnosis: Acute upper respiratory infection, unspecified  Diagnosis ICD: J06.9

## 2020-05-19 LAB
SARS-COV-2 RNA SPEC QL NAA+PROBE: NOT DETECTED
SPECIMEN SOURCE: NORMAL

## 2020-06-22 ENCOUNTER — MYC MEDICAL ADVICE (OUTPATIENT)
Dept: FAMILY MEDICINE | Facility: CLINIC | Age: 55
End: 2020-06-22

## 2020-08-28 DIAGNOSIS — F32.1 MODERATE MAJOR DEPRESSION (H): ICD-10-CM

## 2020-08-28 DIAGNOSIS — G47.9 SLEEP DISORDER: ICD-10-CM

## 2020-08-28 DIAGNOSIS — E78.5 HYPERLIPIDEMIA LDL GOAL <130: ICD-10-CM

## 2020-08-28 RX ORDER — TRAZODONE HYDROCHLORIDE 50 MG/1
TABLET, FILM COATED ORAL
Qty: 270 TABLET | Refills: 0 | Status: SHIPPED | OUTPATIENT
Start: 2020-08-28 | End: 2020-11-24

## 2020-08-28 RX ORDER — SERTRALINE HYDROCHLORIDE 100 MG/1
TABLET, FILM COATED ORAL
Qty: 135 TABLET | Refills: 0 | Status: SHIPPED | OUTPATIENT
Start: 2020-08-28 | End: 2020-11-24

## 2020-08-28 RX ORDER — ATORVASTATIN CALCIUM 40 MG/1
TABLET, FILM COATED ORAL
Qty: 90 TABLET | Refills: 0 | Status: SHIPPED | OUTPATIENT
Start: 2020-08-28 | End: 2021-02-19

## 2020-08-28 NOTE — TELEPHONE ENCOUNTER
Routing refill request to provider for review/approval because:  Drug interaction warning  Labs not current:  PHQ  Patient needs to be seen because:  Patient due for visit    Claire LYONS RN, BSN

## 2020-11-30 ENCOUNTER — VIRTUAL VISIT (OUTPATIENT)
Dept: FAMILY MEDICINE | Facility: CLINIC | Age: 55
End: 2020-11-30
Payer: COMMERCIAL

## 2020-11-30 DIAGNOSIS — R68.82 LOSS OF LIBIDO: ICD-10-CM

## 2020-11-30 DIAGNOSIS — E66.01 MORBID OBESITY (H): ICD-10-CM

## 2020-11-30 DIAGNOSIS — F33.41 RECURRENT MAJOR DEPRESSIVE DISORDER, IN PARTIAL REMISSION (H): Primary | ICD-10-CM

## 2020-11-30 PROCEDURE — 99214 OFFICE O/P EST MOD 30 MIN: CPT | Mod: 95 | Performed by: FAMILY MEDICINE

## 2020-11-30 ASSESSMENT — ANXIETY QUESTIONNAIRES
5. BEING SO RESTLESS THAT IT IS HARD TO SIT STILL: NOT AT ALL
2. NOT BEING ABLE TO STOP OR CONTROL WORRYING: SEVERAL DAYS
7. FEELING AFRAID AS IF SOMETHING AWFUL MIGHT HAPPEN: NOT AT ALL
GAD7 TOTAL SCORE: 3
3. WORRYING TOO MUCH ABOUT DIFFERENT THINGS: NOT AT ALL
6. BECOMING EASILY ANNOYED OR IRRITABLE: SEVERAL DAYS
IF YOU CHECKED OFF ANY PROBLEMS ON THIS QUESTIONNAIRE, HOW DIFFICULT HAVE THESE PROBLEMS MADE IT FOR YOU TO DO YOUR WORK, TAKE CARE OF THINGS AT HOME, OR GET ALONG WITH OTHER PEOPLE: SOMEWHAT DIFFICULT
1. FEELING NERVOUS, ANXIOUS, OR ON EDGE: SEVERAL DAYS

## 2020-11-30 ASSESSMENT — PATIENT HEALTH QUESTIONNAIRE - PHQ9
SUM OF ALL RESPONSES TO PHQ QUESTIONS 1-9: 12
5. POOR APPETITE OR OVEREATING: NOT AT ALL

## 2020-11-30 NOTE — PROGRESS NOTES
"John Reyes is a 55 year old male who is being evaluated via a billable video visit.      The patient has been notified of following:     \"This video visit will be conducted via a call between you and your physician/provider. We have found that certain health care needs can be provided without the need for an in-person physical exam.  This service lets us provide the care you need with a video conversation.  If a prescription is necessary we can send it directly to your pharmacy.  If lab work is needed we can place an order for that and you can then stop by our lab to have the test done at a later time.    Video visits are billed at different rates depending on your insurance coverage.  Please reach out to your insurance provider with any questions.    If during the course of the call the physician/provider feels a video visit is not appropriate, you will not be charged for this service.\"    Patient has given verbal consent for Video visit? Yes  How would you like to obtain your AVS? MyChart  If you are dropped from the video visit, the video invite should be resent to: Text to cell phone: [phone]  Will anyone else be joining your video visit? No  Subjective     John Reyes is a 55 year old male who presents today via video visit for the following health issues:    HPI       Worries that he has little sexual interest  Wonders about testosterone boosters     How are you doing with your depression since your last visit? No change    How are you doing with your anxiety since your last visit?  No change    Are you having other symptoms that might be associated with depression or anxiety? Low energy and weight gain    Have you had a significant life event? No     Do you have any concerns with your use of alcohol or other drugs? No    Social History     Tobacco Use     Smoking status: Former Smoker     Packs/day: 0.50     Types: Cigarettes, Cigars     Quit date: 2003     Years since quittin.9 "     Smokeless tobacco: Never Used     Tobacco comment: occasional cigar   Substance Use Topics     Alcohol use: Yes     Alcohol/week: 0.0 standard drinks     Frequency: 2-3 times a week     Drinks per session: 5 or 6     Binge frequency: Monthly     Comment: 3-4 beers/week     Drug use: No     PHQ 12/26/2018 10/17/2019 12/18/2019   PHQ-9 Total Score 8 15 8   Q9: Thoughts of better off dead/self-harm past 2 weeks Not at all Not at all Not at all     CARLOS-7 SCORE 8/30/2016 10/17/2019 12/18/2019   Total Score - - -   Total Score - - 7 (mild anxiety)   Total Score 5 14 7     Last PHQ-9 11/30/2020   1.  Little interest or pleasure in doing things 1   2.  Feeling down, depressed, or hopeless 1   3.  Trouble falling or staying asleep, or sleeping too much 3   4.  Feeling tired or having little energy 3   5.  Poor appetite or overeating 1   6.  Feeling bad about yourself 0   7.  Trouble concentrating 3   8.  Moving slowly or restless 0   Q9: Thoughts of better off dead/self-harm past 2 weeks 0   PHQ-9 Total Score 12   Difficulty at work, home, or with people Somewhat difficult       Suicide Assessment Five-step Evaluation and Treatment (SAFE-T)      How many servings of fruits and vegetables do you eat daily?  2-3    On average, how many sweetened beverages do you drink each day (Examples: soda, juice, sweet tea, etc.  Do NOT count diet or artificially sweetened beverages)?   1    How many days per week do you exercise enough to make your heart beat faster? 5    How many minutes a day do you exercise enough to make your heart beat faster? 60 or more    How many days per week do you miss taking your medication? 0    Abdominal/Flank Pain  Onset/Duration: 1 month  Description:   Character: pressure  Location: entire stomach  Radiation: None  Intensity: moderate  Progression of Symptoms:  worsening  Accompanying Signs & Symptoms:  Fever/Chills: no  Gas/Bloating: YES  Nausea: no  Vomitting: no  Diarrhea: no  Constipation:  YES  Dysuria or Hematuriia: none   FOOD QUELLS THE FEELING, HE TAKES PEPCID. WE DISCUSSED TRYING A FULL ppi   History:   Trauma: no  Previous similar pain: YES  Previous tests done: none  Precipitating factors:   Does the pain change with:     Food: no    Bowel Movement: YES    Urination: going more frequently but smaller amount   Other factors:  no  Therapies tried and outcome: zantac      Inventory of mental status shows issues with anxiety  depression.    Discussed regarding anxiety, sleep, anhedonia, irritability, energy,  perspective, self image, affect, current stressors, holistic parameters, work situation,  physical and social mitigating factors.    Past history has included past depressive  episodes and positive family history.    Prior treatment has included ssri and talk therapy is not current  .    Suicidal ideation is  absent.    No problems with vision, hearing, thyroid, chest pain, dyspnea,rash,  stomach upset, polyuria, polydipsia, dysuria,muscle aches, numbness,  cough, tics or balance issues. Memory is reliable  .    KRISTA,thyroid normal, lungs clear, s1s2,soft abdoman, symmetrical dtrs,  no abdominal mass,good peripheral pulses, vs stable.  Discussed ssri vs dop/ser grouping vs wellbutrin in depression mgmt  Discussed anxiolytics as situational tools not specific therapy  And the potential adverse effects for testosterone on the heart      Video Start Time: 10:00    Visit from home, video end time 10:25    Review of Systems   Constitutional, HEENT, cardiovascular, pulmonary, GI, , musculoskeletal, neuro, skin, endocrine and psych systems are negative, except as otherwise noted.      Objective      sounds gloomy and discouraged     Vitals:  No vitals were obtained today due to virtual visit.    Physical Exam     GENERAL: Healthy, alert and no distress  EYES: Eyes grossly normal to inspection.  No discharge or erythema, or obvious scleral/conjunctival abnormalities.          Assessment & Plan  "  Depression   Loss of Libido   (F33.41) Recurrent major depressive disorder, in partial remission (H)  (primary encounter diagnosis)  Comment: work on his physical and mental determinents   Plan: consider CBT    (R68.82) Loss of libido  Comment:   Plan:     (E66.01) Morbid obesity (H)  Comment:   Plan: not new issues          BMI:   Estimated body mass index is 33.54 kg/m  as calculated from the following:    Height as of 12/18/19: 1.753 m (5' 9\").    Weight as of 12/18/19: 103 kg (227 lb 1.6 oz).   Weight management plan: Specific weight management program called carb reduction  discussed              - Schedule a fasting blood draw and check his  Steroid hormones   No follow-ups on file.    Remy Mcknight MD  [facility]      Video-Visit Details    Type of service:  Video Doximit  Originating Location (pt. Location): Home    Distant Location (provider location):  [facility]     Platform used for Video Visit: Doximity        "

## 2020-12-01 ASSESSMENT — ANXIETY QUESTIONNAIRES: GAD7 TOTAL SCORE: 3

## 2020-12-10 ENCOUNTER — DOCUMENTATION ONLY (OUTPATIENT)
Dept: LAB | Facility: CLINIC | Age: 55
End: 2020-12-10

## 2020-12-10 NOTE — PROGRESS NOTES
Patient is coming in for a lab only appointment tomorrow 12/11/20 and the appointment notes state he is coming in for a Hemoglobin check but we do not have any orders. Please place orders for this patient. Thank You

## 2020-12-11 DIAGNOSIS — E66.01 MORBID OBESITY (H): ICD-10-CM

## 2020-12-11 DIAGNOSIS — E78.5 HYPERLIPIDEMIA LDL GOAL <130: Primary | ICD-10-CM

## 2020-12-11 DIAGNOSIS — R73.01 IMPAIRED FASTING GLUCOSE: ICD-10-CM

## 2020-12-11 DIAGNOSIS — Z12.5 SCREENING FOR PROSTATE CANCER: ICD-10-CM

## 2020-12-11 DIAGNOSIS — F34.1 DYSTHYMIA: Primary | ICD-10-CM

## 2020-12-11 DIAGNOSIS — E78.1 HYPERTRIGLYCERIDEMIA: ICD-10-CM

## 2020-12-11 DIAGNOSIS — F34.1 DYSTHYMIA: ICD-10-CM

## 2020-12-11 LAB
BASOPHILS # BLD AUTO: 0 10E9/L (ref 0–0.2)
BASOPHILS NFR BLD AUTO: 0.3 %
DIFFERENTIAL METHOD BLD: ABNORMAL
EOSINOPHIL # BLD AUTO: 0.2 10E9/L (ref 0–0.7)
EOSINOPHIL NFR BLD AUTO: 3.2 %
ERYTHROCYTE [DISTWIDTH] IN BLOOD BY AUTOMATED COUNT: 12.6 % (ref 10–15)
HCT VFR BLD AUTO: 43.1 % (ref 40–53)
HGB BLD-MCNC: 14.7 G/DL (ref 13.3–17.7)
LYMPHOCYTES # BLD AUTO: 1.4 10E9/L (ref 0.8–5.3)
LYMPHOCYTES NFR BLD AUTO: 21.6 %
MCH RBC QN AUTO: 31.1 PG (ref 26.5–33)
MCHC RBC AUTO-ENTMCNC: 34.1 G/DL (ref 31.5–36.5)
MCV RBC AUTO: 91 FL (ref 78–100)
MONOCYTES # BLD AUTO: 0.6 10E9/L (ref 0–1.3)
MONOCYTES NFR BLD AUTO: 9.1 %
NEUTROPHILS # BLD AUTO: 4.2 10E9/L (ref 1.6–8.3)
NEUTROPHILS NFR BLD AUTO: 65.8 %
PLATELET # BLD AUTO: 123 10E9/L (ref 150–450)
RBC # BLD AUTO: 4.72 10E12/L (ref 4.4–5.9)
WBC # BLD AUTO: 6.3 10E9/L (ref 4–11)

## 2020-12-11 PROCEDURE — 36415 COLL VENOUS BLD VENIPUNCTURE: CPT | Performed by: FAMILY MEDICINE

## 2020-12-11 PROCEDURE — 85025 COMPLETE CBC W/AUTO DIFF WBC: CPT | Performed by: FAMILY MEDICINE

## 2020-12-11 NOTE — PROGRESS NOTES
Lab informs pt here per CBC, wants fasting labs today, see earlier appointment note to RTC Feb fasting, orders placed per pt request, FYI to MD Christa KAYE, RN, BSN  Message handled by CLINIC NURSE.

## 2020-12-22 ENCOUNTER — MYC MEDICAL ADVICE (OUTPATIENT)
Dept: FAMILY MEDICINE | Facility: CLINIC | Age: 55
End: 2020-12-22

## 2021-01-25 ENCOUNTER — PATIENT OUTREACH (OUTPATIENT)
Dept: FAMILY MEDICINE | Facility: CLINIC | Age: 56
End: 2021-01-25

## 2021-01-25 NOTE — TELEPHONE ENCOUNTER
[employer] message sent to patient as RN PAL outreach. Patient due for preventative care visit, anxiety follow-up, and med check.     Cristo CRUZ RN

## 2021-02-08 ASSESSMENT — ENCOUNTER SYMPTOMS
PARESTHESIAS: 0
NERVOUS/ANXIOUS: 1
SHORTNESS OF BREATH: 0
DIZZINESS: 0
HEARTBURN: 1
ABDOMINAL PAIN: 1
EYE PAIN: 0
CHILLS: 0
HEMATURIA: 0
FREQUENCY: 1
ARTHRALGIAS: 1
COUGH: 0
SORE THROAT: 0
WEAKNESS: 0
HEADACHES: 0
DIARRHEA: 1
FEVER: 0
PALPITATIONS: 0
JOINT SWELLING: 0
NAUSEA: 1
CONSTIPATION: 0
MYALGIAS: 0
DYSURIA: 0
HEMATOCHEZIA: 0

## 2021-02-10 ENCOUNTER — OFFICE VISIT (OUTPATIENT)
Dept: FAMILY MEDICINE | Facility: CLINIC | Age: 56
End: 2021-02-10
Payer: COMMERCIAL

## 2021-02-10 VITALS
DIASTOLIC BLOOD PRESSURE: 83 MMHG | OXYGEN SATURATION: 95 % | TEMPERATURE: 97.4 F | HEIGHT: 69 IN | WEIGHT: 230.9 LBS | BODY MASS INDEX: 34.2 KG/M2 | SYSTOLIC BLOOD PRESSURE: 135 MMHG | HEART RATE: 80 BPM | RESPIRATION RATE: 16 BRPM

## 2021-02-10 DIAGNOSIS — Z99.89 CPAP (CONTINUOUS POSITIVE AIRWAY PRESSURE) DEPENDENCE: ICD-10-CM

## 2021-02-10 DIAGNOSIS — E66.01 MORBID OBESITY (H): Primary | ICD-10-CM

## 2021-02-10 DIAGNOSIS — Z00.00 ROUTINE GENERAL MEDICAL EXAMINATION AT A HEALTH CARE FACILITY: ICD-10-CM

## 2021-02-10 DIAGNOSIS — E29.1 HYPOGONADISM MALE: ICD-10-CM

## 2021-02-10 DIAGNOSIS — N32.81 OVERACTIVE BLADDER: ICD-10-CM

## 2021-02-10 DIAGNOSIS — Z12.5 SCREENING FOR PROSTATE CANCER: ICD-10-CM

## 2021-02-10 DIAGNOSIS — K21.00 GASTROESOPHAGEAL REFLUX DISEASE WITH ESOPHAGITIS WITHOUT HEMORRHAGE: ICD-10-CM

## 2021-02-10 DIAGNOSIS — F33.41 RECURRENT MAJOR DEPRESSIVE DISORDER, IN PARTIAL REMISSION (H): ICD-10-CM

## 2021-02-10 DIAGNOSIS — Z91.018 FOOD ALLERGY: ICD-10-CM

## 2021-02-10 PROCEDURE — 99396 PREV VISIT EST AGE 40-64: CPT | Performed by: FAMILY MEDICINE

## 2021-02-10 RX ORDER — SOLIFENACIN SUCCINATE 5 MG/1
5 TABLET, FILM COATED ORAL DAILY
Qty: 90 TABLET | Refills: 0 | Status: SHIPPED | OUTPATIENT
Start: 2021-02-10 | End: 2021-05-21

## 2021-02-10 ASSESSMENT — MIFFLIN-ST. JEOR: SCORE: 1862.74

## 2021-02-10 ASSESSMENT — ENCOUNTER SYMPTOMS
NAUSEA: 1
HEARTBURN: 1
HEMATOCHEZIA: 0
DYSURIA: 0
PALPITATIONS: 0
DIZZINESS: 0
SHORTNESS OF BREATH: 0
DIARRHEA: 1
FREQUENCY: 1
FEVER: 0
HEADACHES: 0
ABDOMINAL PAIN: 1
EYE PAIN: 0
SORE THROAT: 0
CHILLS: 0
HEMATURIA: 0
PARESTHESIAS: 0
CONSTIPATION: 0
WEAKNESS: 0
ARTHRALGIAS: 1
NERVOUS/ANXIOUS: 1
COUGH: 0
JOINT SWELLING: 0
MYALGIAS: 0

## 2021-02-10 ASSESSMENT — ANXIETY QUESTIONNAIRES
7. FEELING AFRAID AS IF SOMETHING AWFUL MIGHT HAPPEN: SEVERAL DAYS
7. FEELING AFRAID AS IF SOMETHING AWFUL MIGHT HAPPEN: SEVERAL DAYS
GAD7 TOTAL SCORE: 11
GAD7 TOTAL SCORE: 11
1. FEELING NERVOUS, ANXIOUS, OR ON EDGE: SEVERAL DAYS
2. NOT BEING ABLE TO STOP OR CONTROL WORRYING: MORE THAN HALF THE DAYS
3. WORRYING TOO MUCH ABOUT DIFFERENT THINGS: MORE THAN HALF THE DAYS
5. BEING SO RESTLESS THAT IT IS HARD TO SIT STILL: MORE THAN HALF THE DAYS
GAD7 TOTAL SCORE: 11
4. TROUBLE RELAXING: SEVERAL DAYS
6. BECOMING EASILY ANNOYED OR IRRITABLE: MORE THAN HALF THE DAYS

## 2021-02-10 ASSESSMENT — PATIENT HEALTH QUESTIONNAIRE - PHQ9
10. IF YOU CHECKED OFF ANY PROBLEMS, HOW DIFFICULT HAVE THESE PROBLEMS MADE IT FOR YOU TO DO YOUR WORK, TAKE CARE OF THINGS AT HOME, OR GET ALONG WITH OTHER PEOPLE: SOMEWHAT DIFFICULT
SUM OF ALL RESPONSES TO PHQ QUESTIONS 1-9: 16
SUM OF ALL RESPONSES TO PHQ QUESTIONS 1-9: 16

## 2021-02-10 NOTE — PROGRESS NOTES
Subjective   John is a 55 year old who presents for the following health issues well adult exam and issues with overactive bladder, allergy and he's getting a workup at [facility] for stool retention and potential pelvic florr issues     Healthy Habits:     Getting at least 3 servings of Calcium per day:  Yes    Bi-annual eye exam:  Yes    Dental care twice a year:  Yes    Sleep apnea or symptoms of sleep apnea:  Excessive snoring    Diet:  Low fat/cholesterol    Frequency of exercise:  4-5 days/week    Duration of exercise:  30-45 minutes    Taking medications regularly:  Yes    PHQ-2 Total Score: 1    Additional concerns today:  Yes         Concern - Discuss allergies, sleep disorder and CPAP --has a severe problem    Review of Systems   Constitutional: Negative for chills and fever.   HENT: Negative for congestion, ear pain, hearing loss and sore throat.    Eyes: Negative for pain and visual disturbance.   Respiratory: Negative for cough and shortness of breath.    Cardiovascular: Negative for chest pain, palpitations and peripheral edema.   Gastrointestinal: Positive for abdominal pain, diarrhea, heartburn and nausea. Negative for constipation and hematochezia.   Genitourinary: Positive for frequency. Negative for discharge, dysuria, genital sores, hematuria, impotence and urgency.   Musculoskeletal: Positive for arthralgias. Negative for joint swelling and myalgias.   Skin: Negative for rash.   Neurological: Negative for dizziness, weakness, headaches and paresthesias.   Psychiatric/Behavioral: Negative for mood changes. The patient is nervous/anxious.             Objective    There were no vitals taken for this visit.  There is no height or weight on file to calculate BMI.  Physical Exam   GENERAL: healthy, alert and no distress  EYES: Eyes grossly normal to inspection, PERRL and conjunctivae and sclerae normal  HENT: ear canals and TM's normal, nose and mouth without ulcers or lesions  NECK: no  adenopathy, no asymmetry, masses, or scars and thyroid normal to palpation  RESP: lungs clear to auscultation - no rales, rhonchi or wheezes  CV: regular rate and rhythm, normal S1 S2, no S3 or S4, no murmur, click or rub, no peripheral edema and peripheral pulses strong  ABDOMEN: soft, nontender, no hepatosplenomegaly, no masses and bowel sounds normal  MS: no gross musculoskeletal defects noted, no edema  SKIN: no suspicious lesions or rashes  NEURO: Normal strength and tone, mentation intact and speech normal  PSYCH: mentation appears normal, affect normal/bright    Body mass index is 34.1 kg/m .  Morbid obesity (sleep disorder)    (E66.01) Morbid obesity (H)  (primary encounter diagnosis)  Comment:   Plan: REVIEW OF HEALTH MAINTENANCE PROTOCOL ORDERS            (Z00.00) Routine general medical examination at a health care facility  Comment:   Plan:     (F33.41) Recurrent major depressive disorder, in partial remission (H)  Comment:   Plan: **Testosterone Free and Total FUTURE anytime        This was overlooked at last draw     (N32.81) Overactive bladder  Comment:   Plan: solifenacin (VESICARE) 5 MG tablet        Worked in the past, cost may have come down     (Z99.89) CPAP (continuous positive airway pressure) dependence  Comment:   Plan: sleep clinic    (Z12.5) Screening for prostate cancer  Comment:   Plan:     (K21.00) Gastroesophageal reflux disease with esophagitis without hemorrhage  Comment:   Plan: omeprazole (PRILOSEC) 20 MG DR capsule        Takes daily otc     (Z91.018) Food allergy  Comment:   Plan: ALLERGY/ASTHMA ADULT REFERRAL        None established, wants to research     (E29.1) Hypogonadism male  Comment: tbd   Plan: **Testosterone Free and Total FUTURE anytime

## 2021-02-11 ASSESSMENT — ANXIETY QUESTIONNAIRES: GAD7 TOTAL SCORE: 11

## 2021-02-11 ASSESSMENT — PATIENT HEALTH QUESTIONNAIRE - PHQ9: SUM OF ALL RESPONSES TO PHQ QUESTIONS 1-9: 16

## 2021-02-19 DIAGNOSIS — F32.1 MODERATE MAJOR DEPRESSION (H): ICD-10-CM

## 2021-02-19 DIAGNOSIS — G47.9 SLEEP DISORDER: ICD-10-CM

## 2021-02-19 DIAGNOSIS — E78.5 HYPERLIPIDEMIA LDL GOAL <130: ICD-10-CM

## 2021-02-19 RX ORDER — TRAZODONE HYDROCHLORIDE 50 MG/1
TABLET, FILM COATED ORAL
Qty: 270 TABLET | Refills: 0 | Status: SHIPPED | OUTPATIENT
Start: 2021-02-19 | End: 2021-05-17

## 2021-02-19 RX ORDER — SERTRALINE HYDROCHLORIDE 100 MG/1
TABLET, FILM COATED ORAL
Qty: 135 TABLET | Refills: 0 | Status: SHIPPED | OUTPATIENT
Start: 2021-02-19 | End: 2021-05-17

## 2021-02-19 RX ORDER — ATORVASTATIN CALCIUM 40 MG/1
TABLET, FILM COATED ORAL
Qty: 90 TABLET | Refills: 0 | Status: ON HOLD | OUTPATIENT
Start: 2021-02-19 | End: 2021-06-18

## 2021-02-19 RX ORDER — BUPROPION HYDROCHLORIDE 300 MG/1
TABLET ORAL
Qty: 90 TABLET | Refills: 0 | Status: SHIPPED | OUTPATIENT
Start: 2021-02-19 | End: 2021-05-17

## 2021-02-19 NOTE — TELEPHONE ENCOUNTER
Medication is being filled for 1 time refill only due to:  Patient needs to be seen because visit due.  Pt has appointment scheduled  Prescription approved per [facility] Refill Protocol.  Christa Madden RN, BSN  Message handled by CLINIC NURSE.

## 2021-03-02 ENCOUNTER — MYC MEDICAL ADVICE (OUTPATIENT)
Dept: FAMILY MEDICINE | Facility: CLINIC | Age: 56
End: 2021-03-02

## 2021-03-05 DIAGNOSIS — Z12.5 SCREENING FOR PROSTATE CANCER: ICD-10-CM

## 2021-03-05 DIAGNOSIS — E29.1 HYPOGONADISM MALE: ICD-10-CM

## 2021-03-05 DIAGNOSIS — F33.41 RECURRENT MAJOR DEPRESSIVE DISORDER, IN PARTIAL REMISSION (H): ICD-10-CM

## 2021-03-05 DIAGNOSIS — E78.5 HYPERLIPIDEMIA LDL GOAL <130: ICD-10-CM

## 2021-03-05 DIAGNOSIS — E78.1 HYPERTRIGLYCERIDEMIA: ICD-10-CM

## 2021-03-05 DIAGNOSIS — E66.01 MORBID OBESITY (H): ICD-10-CM

## 2021-03-05 PROCEDURE — 80061 LIPID PANEL: CPT | Performed by: FAMILY MEDICINE

## 2021-03-05 PROCEDURE — 83721 ASSAY OF BLOOD LIPOPROTEIN: CPT | Mod: 59 | Performed by: FAMILY MEDICINE

## 2021-03-05 PROCEDURE — G0103 PSA SCREENING: HCPCS | Performed by: FAMILY MEDICINE

## 2021-03-05 PROCEDURE — 84403 ASSAY OF TOTAL TESTOSTERONE: CPT | Mod: 90 | Performed by: FAMILY MEDICINE

## 2021-03-05 PROCEDURE — 84270 ASSAY OF SEX HORMONE GLOBUL: CPT | Performed by: FAMILY MEDICINE

## 2021-03-05 PROCEDURE — 99000 SPECIMEN HANDLING OFFICE-LAB: CPT | Performed by: FAMILY MEDICINE

## 2021-03-05 PROCEDURE — 80053 COMPREHEN METABOLIC PANEL: CPT | Performed by: FAMILY MEDICINE

## 2021-03-05 PROCEDURE — 36415 COLL VENOUS BLD VENIPUNCTURE: CPT | Performed by: FAMILY MEDICINE

## 2021-03-06 LAB
ALBUMIN SERPL-MCNC: 4.4 G/DL (ref 3.4–5)
ALP SERPL-CCNC: 75 U/L (ref 40–150)
ALT SERPL W P-5'-P-CCNC: 65 U/L (ref 0–70)
ANION GAP SERPL CALCULATED.3IONS-SCNC: 3 MMOL/L (ref 3–14)
AST SERPL W P-5'-P-CCNC: 30 U/L (ref 0–45)
BILIRUB SERPL-MCNC: 0.7 MG/DL (ref 0.2–1.3)
BUN SERPL-MCNC: 17 MG/DL (ref 7–30)
CALCIUM SERPL-MCNC: 9.5 MG/DL (ref 8.5–10.1)
CHLORIDE SERPL-SCNC: 107 MMOL/L (ref 94–109)
CHOLEST SERPL-MCNC: 255 MG/DL
CO2 SERPL-SCNC: 28 MMOL/L (ref 20–32)
CREAT SERPL-MCNC: 1.1 MG/DL (ref 0.66–1.25)
GFR SERPL CREATININE-BSD FRML MDRD: 74 ML/MIN/{1.73_M2}
GLUCOSE SERPL-MCNC: 103 MG/DL (ref 70–99)
HDLC SERPL-MCNC: 35 MG/DL
LDLC SERPL CALC-MCNC: ABNORMAL MG/DL
LDLC SERPL DIRECT ASSAY-MCNC: 158 MG/DL
NONHDLC SERPL-MCNC: 220 MG/DL
POTASSIUM SERPL-SCNC: 4.4 MMOL/L (ref 3.4–5.3)
PROT SERPL-MCNC: 7.7 G/DL (ref 6.8–8.8)
PSA SERPL-ACNC: 0.63 UG/L (ref 0–4)
SODIUM SERPL-SCNC: 138 MMOL/L (ref 133–144)
TRIGL SERPL-MCNC: 404 MG/DL

## 2021-03-09 LAB
SHBG SERPL-SCNC: 31 NMOL/L (ref 11–80)
TESTOST FREE SERPL-MCNC: 4.71 NG/DL (ref 4.7–24.4)
TESTOST SERPL-MCNC: 236 NG/DL (ref 240–950)

## 2021-03-11 ENCOUNTER — TRANSFERRED RECORDS (OUTPATIENT)
Dept: HEALTH INFORMATION MANAGEMENT | Facility: CLINIC | Age: 56
End: 2021-03-11

## 2021-03-30 ENCOUNTER — DOCUMENTATION ONLY (OUTPATIENT)
Dept: FAMILY MEDICINE | Facility: CLINIC | Age: 56
End: 2021-03-30

## 2021-03-31 ENCOUNTER — OFFICE VISIT (OUTPATIENT)
Dept: FAMILY MEDICINE | Facility: CLINIC | Age: 56
End: 2021-03-31
Payer: COMMERCIAL

## 2021-03-31 VITALS
SYSTOLIC BLOOD PRESSURE: 123 MMHG | WEIGHT: 230.1 LBS | HEIGHT: 69 IN | OXYGEN SATURATION: 95 % | TEMPERATURE: 97.6 F | HEART RATE: 77 BPM | DIASTOLIC BLOOD PRESSURE: 80 MMHG | BODY MASS INDEX: 34.08 KG/M2 | RESPIRATION RATE: 16 BRPM

## 2021-03-31 DIAGNOSIS — N52.1 ERECTILE DYSFUNCTION DUE TO DISEASES CLASSIFIED ELSEWHERE: ICD-10-CM

## 2021-03-31 DIAGNOSIS — F41.9 ANXIETY: ICD-10-CM

## 2021-03-31 DIAGNOSIS — R35.0 URINARY FREQUENCY: ICD-10-CM

## 2021-03-31 DIAGNOSIS — F32.0 MILD MAJOR DEPRESSION (H): ICD-10-CM

## 2021-03-31 DIAGNOSIS — F90.0 ATTENTION DEFICIT HYPERACTIVITY DISORDER (ADHD), PREDOMINANTLY INATTENTIVE TYPE: ICD-10-CM

## 2021-03-31 DIAGNOSIS — Z11.4 SCREENING FOR HIV (HUMAN IMMUNODEFICIENCY VIRUS): Primary | ICD-10-CM

## 2021-03-31 DIAGNOSIS — G47.9 SLEEP DISORDER: ICD-10-CM

## 2021-03-31 DIAGNOSIS — E34.9 TESTOSTERONE DEFICIENCY: ICD-10-CM

## 2021-03-31 PROCEDURE — 99214 OFFICE O/P EST MOD 30 MIN: CPT | Performed by: FAMILY MEDICINE

## 2021-03-31 RX ORDER — SILDENAFIL CITRATE 20 MG/1
TABLET ORAL
Qty: 30 TABLET | Refills: 1 | Status: SHIPPED | OUTPATIENT
Start: 2021-03-31 | End: 2022-04-22

## 2021-03-31 ASSESSMENT — MIFFLIN-ST. JEOR: SCORE: 1859.11

## 2021-03-31 NOTE — PROGRESS NOTES
"        Subjective   John is a 55 year old who presents for the following health issues     HPI     Concern - Recheck bladder, Rash    Description: Per Pt the bladder infection has been improved with medication.No symptoms currently.    Doing much better, sleep still a big challenge, also wonders about ed treatment options   Does have ED issues.  Has past smoking history, no diabetes history,   known hypertension and  lipid history  no trauma history and his  urological problems        Pt says he has a rash on his left lower calf. Started 4 days ago, Itching and pain tried lotion, no changes its the same., no visible rash, dry skin and discussed hard water and detergent with moisturizers     Review of Systems   Constitutional, HEENT, cardiovascular, pulmonary, GI, , musculoskeletal, neuro, skin, endocrine and psych systems are negative, except as otherwise noted.      Physical Exam   /80 (BP Location: Right arm, Patient Position: Chair, Cuff Size: Adult Large)   Pulse 77   Temp 97.6  F (36.4  C) (Oral)   Resp 16   Ht 1.753 m (5' 9\")   Wt 104.4 kg (230 lb 1.6 oz)   SpO2 95%   BMI 33.98 kg/m      GENERAL: healthy, alert and no distress  EYES: Eyes grossly normal to inspection, PERRL and conjunctivae and sclerae normal  HENT: ear canals and TM's normal, nose and mouth without ulcers or lesions  NECK: no adenopathy, no asymmetry, masses, or scars and thyroid normal to palpation  RESP: lungs clear to auscultation - no rales, rhonchi or wheezes  CV: regular rate and rhythm, normal S1 S2, no S3 or S4, no murmur, click or rub, no peripheral edema and peripheral pulses strong  ABDOMEN: soft, nontender, no hepatosplenomegaly, no masses and bowel sounds normal  MS: no gross musculoskeletal defects noted, no edema  SKIN: no suspicious lesions or rashes  NEURO: Normal strength and tone, mentation intact and speech normal  PSYCH: mentation appears normal, affect normal/bright  (Z11.4) Screening for HIV (human " immunodeficiency virus)  (primary encounter diagnosis)  Comment:   Plan: lifetime screen    (R35.0) Urinary frequency  Comment:   Plan: especially at night     (N52.1) Erectile dysfunction due to diseases classified elsewhere  Comment:   Plan: sildenafil (REVATIO) 20 MG tablet        Discussed use and effects    (E34.9) Testosterone deficiency  Comment:   Plan: discussed libido and his prior endocrine consult and treatment failure     (F32.0) Mild major depression (H)  Comment:   Plan: contributes     (F41.9) Anxiety  Comment:   Plan: performance anxiety     (F90.0) Attention deficit hyperactivity disorder (ADHD), predominantly inattentive type  Comment:   Plan: wellbutrin helps him focus     (G47.9) Sleep disorder  Comment:   Plan: sleep onset

## 2021-04-08 ENCOUNTER — IMMUNIZATION (OUTPATIENT)
Dept: NURSING | Facility: CLINIC | Age: 56
End: 2021-04-08
Payer: COMMERCIAL

## 2021-04-08 PROCEDURE — 91300 PR COVID VAC PFIZER DIL RECON 30 MCG/0.3 ML IM: CPT

## 2021-04-08 PROCEDURE — 0001A PR COVID VAC PFIZER DIL RECON 30 MCG/0.3 ML IM: CPT

## 2021-04-29 ENCOUNTER — IMMUNIZATION (OUTPATIENT)
Dept: NURSING | Facility: CLINIC | Age: 56
End: 2021-04-29
Attending: INTERNAL MEDICINE
Payer: COMMERCIAL

## 2021-04-29 PROCEDURE — 0002A PR COVID VAC PFIZER DIL RECON 30 MCG/0.3 ML IM: CPT

## 2021-04-29 PROCEDURE — 91300 PR COVID VAC PFIZER DIL RECON 30 MCG/0.3 ML IM: CPT

## 2021-05-15 DIAGNOSIS — F32.1 MODERATE MAJOR DEPRESSION (H): ICD-10-CM

## 2021-05-15 DIAGNOSIS — G47.9 SLEEP DISORDER: ICD-10-CM

## 2021-05-17 RX ORDER — BUPROPION HYDROCHLORIDE 300 MG/1
TABLET ORAL
Qty: 90 TABLET | Refills: 0 | Status: SHIPPED | OUTPATIENT
Start: 2021-05-17 | End: 2021-08-16

## 2021-05-17 RX ORDER — TRAZODONE HYDROCHLORIDE 50 MG/1
TABLET, FILM COATED ORAL
Qty: 270 TABLET | Refills: 0 | Status: SHIPPED | OUTPATIENT
Start: 2021-05-17 | End: 2021-08-16

## 2021-05-17 RX ORDER — SERTRALINE HYDROCHLORIDE 100 MG/1
TABLET, FILM COATED ORAL
Qty: 135 TABLET | Refills: 0 | Status: SHIPPED | OUTPATIENT
Start: 2021-05-17 | End: 2021-08-16

## 2021-05-17 NOTE — TELEPHONE ENCOUNTER
Routing refill request to provider for review/approval because:  Drug interaction warning- sertraline and trazodone  Labs out of range:  PHQ-9    Cristo CRUZ RN

## 2021-05-20 DIAGNOSIS — N32.81 OVERACTIVE BLADDER: ICD-10-CM

## 2021-05-21 ENCOUNTER — MYC MEDICAL ADVICE (OUTPATIENT)
Dept: FAMILY MEDICINE | Facility: CLINIC | Age: 56
End: 2021-05-21

## 2021-05-21 ENCOUNTER — PATIENT OUTREACH (OUTPATIENT)
Dept: FAMILY MEDICINE | Facility: CLINIC | Age: 56
End: 2021-05-21

## 2021-05-21 RX ORDER — SOLIFENACIN SUCCINATE 5 MG/1
TABLET, FILM COATED ORAL
Qty: 90 TABLET | Refills: 2 | Status: SHIPPED | OUTPATIENT
Start: 2021-05-21 | End: 2022-04-22

## 2021-05-21 NOTE — TELEPHONE ENCOUNTER
"Called patient regarding: \"Return in about 2 months (around 5/31/2021) for follow up, Routine Visit.\" He asked to be sent a [employer] message.    [employer] message sent to patient as RN PAL outreach. Patient due for follow-up.     RENETTA Suarez, RN - Patient Advocate and Liaison (PAL)   [facility]   [phone]    "

## 2021-06-09 ENCOUNTER — OFFICE VISIT (OUTPATIENT)
Dept: FAMILY MEDICINE | Facility: CLINIC | Age: 56
End: 2021-06-09
Payer: COMMERCIAL

## 2021-06-09 ENCOUNTER — TELEPHONE (OUTPATIENT)
Dept: FAMILY MEDICINE | Facility: CLINIC | Age: 56
End: 2021-06-09

## 2021-06-09 VITALS
BODY MASS INDEX: 34.41 KG/M2 | WEIGHT: 233 LBS | RESPIRATION RATE: 16 BRPM | OXYGEN SATURATION: 96 % | TEMPERATURE: 98.6 F | DIASTOLIC BLOOD PRESSURE: 64 MMHG | HEART RATE: 74 BPM | SYSTOLIC BLOOD PRESSURE: 120 MMHG

## 2021-06-09 DIAGNOSIS — J30.2 SEASONAL ALLERGIC RHINITIS, UNSPECIFIED TRIGGER: ICD-10-CM

## 2021-06-09 DIAGNOSIS — K21.00 GASTROESOPHAGEAL REFLUX DISEASE WITH ESOPHAGITIS WITHOUT HEMORRHAGE: ICD-10-CM

## 2021-06-09 DIAGNOSIS — E78.5 HYPERLIPIDEMIA LDL GOAL <130: Primary | ICD-10-CM

## 2021-06-09 DIAGNOSIS — R06.09 DOE (DYSPNEA ON EXERTION): ICD-10-CM

## 2021-06-09 PROCEDURE — 99214 OFFICE O/P EST MOD 30 MIN: CPT | Performed by: PHYSICIAN ASSISTANT

## 2021-06-09 PROCEDURE — 93000 ELECTROCARDIOGRAM COMPLETE: CPT | Performed by: PHYSICIAN ASSISTANT

## 2021-06-09 RX ORDER — ROSUVASTATIN CALCIUM 20 MG/1
20 TABLET, COATED ORAL DAILY
Qty: 90 TABLET | Refills: 1 | Status: ON HOLD | OUTPATIENT
Start: 2021-06-09 | End: 2021-06-23

## 2021-06-09 RX ORDER — FLUTICASONE PROPIONATE 50 MCG
1 SPRAY, SUSPENSION (ML) NASAL DAILY
Qty: 16 G | Refills: 11 | Status: SHIPPED | OUTPATIENT
Start: 2021-06-09 | End: 2022-07-07

## 2021-06-09 RX ORDER — ALBUTEROL SULFATE 90 UG/1
2 AEROSOL, METERED RESPIRATORY (INHALATION) EVERY 4 HOURS PRN
Qty: 18 G | Refills: 0 | Status: SHIPPED | OUTPATIENT
Start: 2021-06-09 | End: 2023-04-17

## 2021-06-09 RX ORDER — OMEPRAZOLE 40 MG/1
40 CAPSULE, DELAYED RELEASE ORAL DAILY
Qty: 90 CAPSULE | Refills: 1 | Status: SHIPPED | OUTPATIENT
Start: 2021-06-09 | End: 2022-01-12

## 2021-06-09 NOTE — PATIENT INSTRUCTIONS
Follow-up with the stress test.     You can try using albuterol inhaler 30 minutes prior to physical activity. You may also use it as needed when symptoms develop. About 4 hours between doses.

## 2021-06-09 NOTE — TELEPHONE ENCOUNTER
Wife calls, has CTC on file, wants message sent to AB, please review and advise:    ~[facility] does not do stress test with PFT's, talked to cardiopulmonary and not sure who does this  ~wife works at [employer], talked to cardiologist and wife informs recommend stress echo  ~wants AB to order Stress echo instead, pt has lots of risk factors, exertional SOB, diaphoresis, former cholesterol, BP and cholesterol issues    Routed to AB, please advise order, inform Tracy when final  Christa Madden RN, BSN  Message handled by CLINIC NURSE.

## 2021-06-09 NOTE — PROGRESS NOTES
"    Assessment & Plan     Hyperlipidemia LDL goal <130    Will switch to rosuvastatin as this was the recommendation from primary care provider to help with triglyceride level.    - rosuvastatin (CRESTOR) 20 MG tablet; Take 1 tablet (20 mg) by mouth daily      METZ (dyspnea on exertion)    EKG is normal. Will check stress test. Trial albuterol to see if lung related.    - EKG 12-lead complete w/read - Clinics  - Exercise Stress Test - Adult; Future  - albuterol (PROAIR HFA/PROVENTIL HFA/VENTOLIN HFA) 108 (90 Base) MCG/ACT inhaler; Inhale 2 puffs into the lungs every 4 hours as needed for shortness of breath / dyspnea or wheezing      Gastroesophageal reflux disease with esophagitis without hemorrhage    Will increase omeprazole to 40 mg.     - omeprazole (PRILOSEC) 40 MG DR capsule; Take 1 capsule (40 mg) by mouth daily      Seasonal allergic rhinitis, unspecified trigger    Re-start Flonase.    - fluticasone (FLONASE) 50 MCG/ACT nasal spray; Spray 1 spray into both nostrils daily             BMI:   Estimated body mass index is 34.41 kg/m  as calculated from the following:    Height as of 3/31/21: 1.753 m (5' 9\").    Weight as of this encounter: 105.7 kg (233 lb).   Weight management plan: Discussed healthy diet and exercise guidelines    Patient Instructions   Follow-up with the stress test.     You can try using albuterol inhaler 30 minutes prior to physical activity. You may also use it as needed when symptoms develop. About 4 hours between doses.      No follow-ups on file.    Roosevelt Gleason PA-C  [facility]    Gamaliel Lopez is a 56 year old who presents for the following health issues     HPI       Hyperlipidemia Follow-Up      Are you regularly taking any medication or supplement to lower your cholesterol?   No    Are you having muscle aches or other side effects that you think could be caused by your cholesterol lowering medication?  No      -SOB with exercise 2-3x per week " x 1 month, getting worse this past week noticing sx daily. Happening more frequently and lasts longer. Mowing the lawn last night and started to get short of breath, dizzy, and lightheaded. Sat down and drank water which helped it pass. Sometimes waking up at night short of breath. Occurs during intercourse. Has chest tightness when this happens. Occasionally sore throat with it. He does have seasonal allergies and wears a CPAP. Hasn't worn it lately because allergies have been worse. Takes Allegra and has used Flonase in the past.       Review of Systems   Constitutional, HEENT, cardiovascular, pulmonary, gi and gu systems are negative, except as otherwise noted.        Objective    /64 (BP Location: Right arm, Patient Position: Chair, Cuff Size: Adult Large)   Pulse 74   Temp 98.6  F (37  C) (Oral)   Resp 16   Wt 105.7 kg (233 lb)   SpO2 96%   BMI 34.41 kg/m    Body mass index is 34.41 kg/m .         Physical Exam   GENERAL: healthy, alert and no distress  EYES: Eyes grossly normal to inspection, PERRL and conjunctivae and sclerae normal  RESP: lungs clear to auscultation - no rales, rhonchi or wheezes  CV: regular rate and rhythm, normal S1 S2, no S3 or S4, no murmur, click or rub, no peripheral edema and peripheral pulses strong  MS: no gross musculoskeletal defects noted, no edema  SKIN: no suspicious lesions or rashes  NEURO: Normal strength and tone, mentation intact and speech normal  PSYCH: mentation appears normal, affect normal/bright    EKG - Reviewed and interpreted by me appears normal, NSR, normal axis, normal intervals, no acute ST/T changes c/w ischemia, no LVH by voltage criteria, unchanged from previous tracings

## 2021-06-09 NOTE — TELEPHONE ENCOUNTER
Patient informed via phone call of ordered stress test.     GISELLE ToddN, RN  [facility]

## 2021-06-18 ENCOUNTER — HOSPITAL ENCOUNTER (INPATIENT)
Facility: CLINIC | Age: 56
LOS: 5 days | Discharge: HOME | DRG: 247 | End: 2021-06-23
Attending: INTERNAL MEDICINE | Admitting: INTERNAL MEDICINE
Payer: COMMERCIAL

## 2021-06-18 ENCOUNTER — HOSPITAL ENCOUNTER (OUTPATIENT)
Dept: CARDIOLOGY | Facility: CLINIC | Age: 56
Discharge: HOME | End: 2021-06-18
Attending: PHYSICIAN ASSISTANT | Admitting: PHYSICIAN ASSISTANT
Payer: COMMERCIAL

## 2021-06-18 ENCOUNTER — HOSPITAL ENCOUNTER (OUTPATIENT)
Facility: CLINIC | Age: 56
Discharge: OTHER INSTITUTION | End: 2021-06-18
Admitting: INTERNAL MEDICINE
Payer: COMMERCIAL

## 2021-06-18 ENCOUNTER — CARE COORDINATION (OUTPATIENT)
Dept: CARDIOLOGY | Facility: CLINIC | Age: 56
End: 2021-06-18

## 2021-06-18 VITALS
DIASTOLIC BLOOD PRESSURE: 78 MMHG | HEART RATE: 66 BPM | OXYGEN SATURATION: 97 % | TEMPERATURE: 96.2 F | SYSTOLIC BLOOD PRESSURE: 122 MMHG | RESPIRATION RATE: 16 BRPM

## 2021-06-18 DIAGNOSIS — I20.0 UNSTABLE ANGINA (H): Primary | ICD-10-CM

## 2021-06-18 DIAGNOSIS — R94.39 ABNORMAL STRESS TEST: ICD-10-CM

## 2021-06-18 DIAGNOSIS — I20.0 UNSTABLE ANGINA (H): ICD-10-CM

## 2021-06-18 DIAGNOSIS — Z98.61 PERCUTANEOUS TRANSLUMINAL CORONARY ANGIOPLASTY STATUS: ICD-10-CM

## 2021-06-18 DIAGNOSIS — R06.09 DOE (DYSPNEA ON EXERTION): ICD-10-CM

## 2021-06-18 LAB
ANION GAP SERPL CALCULATED.3IONS-SCNC: 5 MMOL/L (ref 3–14)
BUN SERPL-MCNC: 19 MG/DL (ref 7–30)
CALCIUM SERPL-MCNC: 8.9 MG/DL (ref 8.5–10.1)
CHLORIDE SERPL-SCNC: 108 MMOL/L (ref 94–109)
CO2 SERPL-SCNC: 25 MMOL/L (ref 20–32)
CREAT SERPL-MCNC: 1.02 MG/DL (ref 0.66–1.25)
ERYTHROCYTE [DISTWIDTH] IN BLOOD BY AUTOMATED COUNT: 12.1 % (ref 10–15)
ERYTHROCYTE [DISTWIDTH] IN BLOOD BY AUTOMATED COUNT: 12.2 % (ref 10–15)
GFR SERPL CREATININE-BSD FRML MDRD: 81 ML/MIN/{1.73_M2}
GLUCOSE SERPL-MCNC: 112 MG/DL (ref 70–99)
HBA1C MFR BLD: 5.1 % (ref 0–5.6)
HCT VFR BLD AUTO: 39.5 % (ref 40–53)
HCT VFR BLD AUTO: 43.2 % (ref 40–53)
HGB BLD-MCNC: 13.5 G/DL (ref 13.3–17.7)
HGB BLD-MCNC: 14.7 G/DL (ref 13.3–17.7)
INR PPP: 1.03 (ref 0.86–1.14)
KCT BLD-ACNC: 272 SEC (ref 75–150)
KCT BLD-ACNC: 284 SEC (ref 75–150)
KCT BLD-ACNC: 376 SEC (ref 75–150)
LABORATORY COMMENT REPORT: NORMAL
MCH RBC QN AUTO: 31.5 PG (ref 26.5–33)
MCH RBC QN AUTO: 31.7 PG (ref 26.5–33)
MCHC RBC AUTO-ENTMCNC: 34 G/DL (ref 31.5–36.5)
MCHC RBC AUTO-ENTMCNC: 34.2 G/DL (ref 31.5–36.5)
MCV RBC AUTO: 93 FL (ref 78–100)
MCV RBC AUTO: 93 FL (ref 78–100)
PLATELET # BLD AUTO: 106 10E9/L (ref 150–450)
PLATELET # BLD AUTO: 118 10E9/L (ref 150–450)
POTASSIUM SERPL-SCNC: 4 MMOL/L (ref 3.4–5.3)
RBC # BLD AUTO: 4.26 10E12/L (ref 4.4–5.9)
RBC # BLD AUTO: 4.66 10E12/L (ref 4.4–5.9)
SARS-COV-2 RNA RESP QL NAA+PROBE: NEGATIVE
SODIUM SERPL-SCNC: 138 MMOL/L (ref 133–144)
SPECIMEN SOURCE: NORMAL
WBC # BLD AUTO: 6.7 10E9/L (ref 4–11)
WBC # BLD AUTO: 7 10E9/L (ref 4–11)

## 2021-06-18 PROCEDURE — 99152 MOD SED SAME PHYS/QHP 5/>YRS: CPT | Performed by: INTERNAL MEDICINE

## 2021-06-18 PROCEDURE — 250N000011 HC RX IP 250 OP 636

## 2021-06-18 PROCEDURE — 255N000002 HC RX 255 OP 636: Performed by: PHYSICIAN ASSISTANT

## 2021-06-18 PROCEDURE — C1894 INTRO/SHEATH, NON-LASER: HCPCS | Performed by: INTERNAL MEDICINE

## 2021-06-18 PROCEDURE — 85027 COMPLETE CBC AUTOMATED: CPT | Performed by: INTERNAL MEDICINE

## 2021-06-18 PROCEDURE — 92920 PRQ TRLUML C ANGIOP 1ART&/BR: CPT | Performed by: INTERNAL MEDICINE

## 2021-06-18 PROCEDURE — 250N000013 HC RX MED GY IP 250 OP 250 PS 637

## 2021-06-18 PROCEDURE — 80048 BASIC METABOLIC PNL TOTAL CA: CPT | Performed by: INTERNAL MEDICINE

## 2021-06-18 PROCEDURE — 93016 CV STRESS TEST SUPVJ ONLY: CPT | Performed by: INTERNAL MEDICINE

## 2021-06-18 PROCEDURE — 93325 DOPPLER ECHO COLOR FLOW MAPG: CPT | Mod: TC

## 2021-06-18 PROCEDURE — 93458 L HRT ARTERY/VENTRICLE ANGIO: CPT | Mod: XU | Performed by: INTERNAL MEDICINE

## 2021-06-18 PROCEDURE — 85347 COAGULATION TIME ACTIVATED: CPT

## 2021-06-18 PROCEDURE — 93321 DOPPLER ECHO F-UP/LMTD STD: CPT | Mod: 26 | Performed by: INTERNAL MEDICINE

## 2021-06-18 PROCEDURE — 250N000009 HC RX 250

## 2021-06-18 PROCEDURE — 99153 MOD SED SAME PHYS/QHP EA: CPT | Performed by: INTERNAL MEDICINE

## 2021-06-18 PROCEDURE — 210N000002 HC R&B HEART CARE

## 2021-06-18 PROCEDURE — C1887 CATHETER, GUIDING: HCPCS | Performed by: INTERNAL MEDICINE

## 2021-06-18 PROCEDURE — C1769 GUIDE WIRE: HCPCS | Performed by: INTERNAL MEDICINE

## 2021-06-18 PROCEDURE — 99223 1ST HOSP IP/OBS HIGH 75: CPT | Mod: AI | Performed by: INTERNAL MEDICINE

## 2021-06-18 PROCEDURE — 93018 CV STRESS TEST I&R ONLY: CPT | Performed by: INTERNAL MEDICINE

## 2021-06-18 PROCEDURE — 99204 OFFICE O/P NEW MOD 45 MIN: CPT | Mod: 25 | Performed by: INTERNAL MEDICINE

## 2021-06-18 PROCEDURE — 83036 HEMOGLOBIN GLYCOSYLATED A1C: CPT | Performed by: INTERNAL MEDICINE

## 2021-06-18 PROCEDURE — 93350 STRESS TTE ONLY: CPT | Mod: 26 | Performed by: INTERNAL MEDICINE

## 2021-06-18 PROCEDURE — 85610 PROTHROMBIN TIME: CPT | Performed by: INTERNAL MEDICINE

## 2021-06-18 PROCEDURE — 272N000001 HC OR GENERAL SUPPLY STERILE: Performed by: INTERNAL MEDICINE

## 2021-06-18 PROCEDURE — 36415 COLL VENOUS BLD VENIPUNCTURE: CPT | Performed by: INTERNAL MEDICINE

## 2021-06-18 PROCEDURE — C1725 CATH, TRANSLUMIN NON-LASER: HCPCS | Performed by: INTERNAL MEDICINE

## 2021-06-18 PROCEDURE — 5A09357 ASSISTANCE WITH RESPIRATORY VENTILATION, LESS THAN 24 CONSECUTIVE HOURS, CONTINUOUS POSITIVE AIRWAY PRESSURE: ICD-10-PCS | Performed by: INTERNAL MEDICINE

## 2021-06-18 PROCEDURE — 87635 SARS-COV-2 COVID-19 AMP PRB: CPT | Performed by: INTERNAL MEDICINE

## 2021-06-18 PROCEDURE — 93325 DOPPLER ECHO COLOR FLOW MAPG: CPT | Mod: 26 | Performed by: INTERNAL MEDICINE

## 2021-06-18 PROCEDURE — 250N000013 HC RX MED GY IP 250 OP 250 PS 637: Performed by: INTERNAL MEDICINE

## 2021-06-18 PROCEDURE — 258N000003 HC RX IP 258 OP 636: Performed by: INTERNAL MEDICINE

## 2021-06-18 RX ORDER — LIDOCAINE 40 MG/G
CREAM TOPICAL
Status: DISCONTINUED | OUTPATIENT
Start: 2021-06-18 | End: 2021-06-18 | Stop reason: HOSPADM

## 2021-06-18 RX ORDER — LIDOCAINE HYDROCHLORIDE 10 MG/ML
INJECTION, SOLUTION EPIDURAL; INFILTRATION; INTRACAUDAL; PERINEURAL
Status: DISCONTINUED
Start: 2021-06-18 | End: 2021-06-18 | Stop reason: HOSPADM

## 2021-06-18 RX ORDER — ONDANSETRON 2 MG/ML
4 INJECTION INTRAMUSCULAR; INTRAVENOUS EVERY 6 HOURS PRN
Status: DISCONTINUED | OUTPATIENT
Start: 2021-06-18 | End: 2021-06-23 | Stop reason: HOSPADM

## 2021-06-18 RX ORDER — OXYCODONE HYDROCHLORIDE 5 MG/1
10 TABLET ORAL EVERY 4 HOURS PRN
Status: DISCONTINUED | OUTPATIENT
Start: 2021-06-18 | End: 2021-06-18 | Stop reason: HOSPADM

## 2021-06-18 RX ORDER — ATORVASTATIN CALCIUM 40 MG/1
40 TABLET, FILM COATED ORAL DAILY
Status: DISCONTINUED | OUTPATIENT
Start: 2021-06-18 | End: 2021-06-23 | Stop reason: HOSPADM

## 2021-06-18 RX ORDER — ONDANSETRON 4 MG/1
4 TABLET, ORALLY DISINTEGRATING ORAL EVERY 6 HOURS PRN
Status: DISCONTINUED | OUTPATIENT
Start: 2021-06-18 | End: 2021-06-18 | Stop reason: HOSPADM

## 2021-06-18 RX ORDER — CLOPIDOGREL 300 MG/1
TABLET, FILM COATED ORAL
Status: DISCONTINUED
Start: 2021-06-18 | End: 2021-06-18 | Stop reason: HOSPADM

## 2021-06-18 RX ORDER — HEPARIN SODIUM 10000 [USP'U]/100ML
0-5000 INJECTION, SOLUTION INTRAVENOUS CONTINUOUS
Status: DISCONTINUED | OUTPATIENT
Start: 2021-06-18 | End: 2021-06-18 | Stop reason: HOSPADM

## 2021-06-18 RX ORDER — NITROGLYCERIN 0.4 MG/1
0.4 TABLET SUBLINGUAL EVERY 5 MIN PRN
Status: DISCONTINUED | OUTPATIENT
Start: 2021-06-18 | End: 2021-06-18 | Stop reason: HOSPADM

## 2021-06-18 RX ORDER — VERAPAMIL HYDROCHLORIDE 2.5 MG/ML
INJECTION, SOLUTION INTRAVENOUS
Status: DISCONTINUED
Start: 2021-06-18 | End: 2021-06-18 | Stop reason: HOSPADM

## 2021-06-18 RX ORDER — FENTANYL CITRATE 50 UG/ML
INJECTION, SOLUTION INTRAMUSCULAR; INTRAVENOUS
Status: DISCONTINUED
Start: 2021-06-18 | End: 2021-06-18 | Stop reason: HOSPADM

## 2021-06-18 RX ORDER — ATROPINE SULFATE 0.1 MG/ML
0.5 INJECTION INTRAVENOUS
Status: DISCONTINUED | OUTPATIENT
Start: 2021-06-18 | End: 2021-06-18 | Stop reason: HOSPADM

## 2021-06-18 RX ORDER — ASPIRIN 81 MG/1
81 TABLET, CHEWABLE ORAL DAILY
Status: DISCONTINUED | OUTPATIENT
Start: 2021-06-19 | End: 2021-06-21

## 2021-06-18 RX ORDER — BUPROPION HYDROCHLORIDE 150 MG/1
300 TABLET ORAL DAILY
Status: DISCONTINUED | OUTPATIENT
Start: 2021-06-19 | End: 2021-06-18 | Stop reason: HOSPADM

## 2021-06-18 RX ORDER — HEPARIN SODIUM 1000 [USP'U]/ML
INJECTION, SOLUTION INTRAVENOUS; SUBCUTANEOUS
Status: DISCONTINUED
Start: 2021-06-18 | End: 2021-06-18 | Stop reason: HOSPADM

## 2021-06-18 RX ORDER — BUPROPION HYDROCHLORIDE 150 MG/1
300 TABLET ORAL DAILY
Status: DISCONTINUED | OUTPATIENT
Start: 2021-06-19 | End: 2021-06-23 | Stop reason: HOSPADM

## 2021-06-18 RX ORDER — TOLTERODINE 2 MG/1
2 CAPSULE, EXTENDED RELEASE ORAL DAILY
Refills: 2 | Status: DISCONTINUED | OUTPATIENT
Start: 2021-06-19 | End: 2021-06-18 | Stop reason: HOSPADM

## 2021-06-18 RX ORDER — FLUMAZENIL 0.1 MG/ML
0.2 INJECTION, SOLUTION INTRAVENOUS
Status: DISCONTINUED | OUTPATIENT
Start: 2021-06-18 | End: 2021-06-18 | Stop reason: HOSPADM

## 2021-06-18 RX ORDER — METOPROLOL TARTRATE 1 MG/ML
5-10 INJECTION, SOLUTION INTRAVENOUS
Status: DISCONTINUED | OUTPATIENT
Start: 2021-06-18 | End: 2021-06-18 | Stop reason: HOSPADM

## 2021-06-18 RX ORDER — TRAZODONE HYDROCHLORIDE 100 MG/1
100 TABLET ORAL
Status: DISCONTINUED | OUTPATIENT
Start: 2021-06-18 | End: 2021-06-18 | Stop reason: HOSPADM

## 2021-06-18 RX ORDER — AMOXICILLIN 250 MG
2 CAPSULE ORAL 2 TIMES DAILY PRN
Status: DISCONTINUED | OUTPATIENT
Start: 2021-06-18 | End: 2021-06-23 | Stop reason: HOSPADM

## 2021-06-18 RX ORDER — HEPARIN SODIUM 10000 [USP'U]/100ML
0-5000 INJECTION, SOLUTION INTRAVENOUS CONTINUOUS
Status: DISCONTINUED | OUTPATIENT
Start: 2021-06-18 | End: 2021-06-19

## 2021-06-18 RX ORDER — ASPIRIN 325 MG
325 TABLET ORAL DAILY
Status: DISCONTINUED | OUTPATIENT
Start: 2021-06-18 | End: 2021-06-19 | Stop reason: HOSPADM

## 2021-06-18 RX ORDER — ONDANSETRON 4 MG/1
4 TABLET, ORALLY DISINTEGRATING ORAL EVERY 6 HOURS PRN
Status: DISCONTINUED | OUTPATIENT
Start: 2021-06-18 | End: 2021-06-23 | Stop reason: HOSPADM

## 2021-06-18 RX ORDER — FENTANYL CITRATE 50 UG/ML
25-50 INJECTION, SOLUTION INTRAMUSCULAR; INTRAVENOUS
Status: DISCONTINUED | OUTPATIENT
Start: 2021-06-18 | End: 2021-06-18 | Stop reason: HOSPADM

## 2021-06-18 RX ORDER — LIDOCAINE 40 MG/G
CREAM TOPICAL
Status: CANCELLED | OUTPATIENT
Start: 2021-06-18

## 2021-06-18 RX ORDER — NALOXONE HYDROCHLORIDE 0.4 MG/ML
0.2 INJECTION, SOLUTION INTRAMUSCULAR; INTRAVENOUS; SUBCUTANEOUS
Status: DISCONTINUED | OUTPATIENT
Start: 2021-06-18 | End: 2021-06-18 | Stop reason: HOSPADM

## 2021-06-18 RX ORDER — HYDRALAZINE HYDROCHLORIDE 20 MG/ML
10 INJECTION INTRAMUSCULAR; INTRAVENOUS EVERY 4 HOURS PRN
Status: DISCONTINUED | OUTPATIENT
Start: 2021-06-18 | End: 2021-06-18 | Stop reason: HOSPADM

## 2021-06-18 RX ORDER — ACETAMINOPHEN 325 MG/1
650 TABLET ORAL EVERY 4 HOURS PRN
Status: DISCONTINUED | OUTPATIENT
Start: 2021-06-18 | End: 2021-06-18 | Stop reason: HOSPADM

## 2021-06-18 RX ORDER — POTASSIUM CHLORIDE 1500 MG/1
20 TABLET, EXTENDED RELEASE ORAL
Status: DISCONTINUED | OUTPATIENT
Start: 2021-06-18 | End: 2021-06-18 | Stop reason: HOSPADM

## 2021-06-18 RX ORDER — ASPIRIN 81 MG/1
81 TABLET ORAL DAILY
Status: DISCONTINUED | OUTPATIENT
Start: 2021-06-19 | End: 2021-06-18 | Stop reason: HOSPADM

## 2021-06-18 RX ORDER — ATORVASTATIN CALCIUM 40 MG/1
80 TABLET, FILM COATED ORAL DAILY
Status: DISCONTINUED | OUTPATIENT
Start: 2021-06-19 | End: 2021-06-18 | Stop reason: HOSPADM

## 2021-06-18 RX ORDER — SODIUM CHLORIDE 9 MG/ML
INJECTION, SOLUTION INTRAVENOUS CONTINUOUS
Status: DISCONTINUED | OUTPATIENT
Start: 2021-06-18 | End: 2021-06-18 | Stop reason: HOSPADM

## 2021-06-18 RX ORDER — TOLTERODINE 2 MG/1
2 CAPSULE, EXTENDED RELEASE ORAL DAILY
Refills: 2 | Status: DISCONTINUED | OUTPATIENT
Start: 2021-06-18 | End: 2021-06-23 | Stop reason: HOSPADM

## 2021-06-18 RX ORDER — LIDOCAINE 40 MG/G
CREAM TOPICAL
Status: DISCONTINUED | OUTPATIENT
Start: 2021-06-18 | End: 2021-06-19

## 2021-06-18 RX ORDER — SODIUM CHLORIDE 9 MG/ML
INJECTION, SOLUTION INTRAVENOUS CONTINUOUS
Status: DISCONTINUED | OUTPATIENT
Start: 2021-06-19 | End: 2021-06-22

## 2021-06-18 RX ORDER — SODIUM CHLORIDE 9 MG/ML
INJECTION, SOLUTION INTRAVENOUS CONTINUOUS
Status: CANCELLED | OUTPATIENT
Start: 2021-06-18

## 2021-06-18 RX ORDER — OXYCODONE HYDROCHLORIDE 5 MG/1
5-10 TABLET ORAL
Status: DISCONTINUED | OUTPATIENT
Start: 2021-06-18 | End: 2021-06-23 | Stop reason: HOSPADM

## 2021-06-18 RX ORDER — CLOPIDOGREL BISULFATE 75 MG/1
TABLET ORAL
Status: DISCONTINUED | OUTPATIENT
Start: 2021-06-18 | End: 2021-06-18 | Stop reason: HOSPADM

## 2021-06-18 RX ORDER — NITROGLYCERIN 5 MG/ML
VIAL (ML) INTRAVENOUS
Status: DISCONTINUED | OUTPATIENT
Start: 2021-06-18 | End: 2021-06-18 | Stop reason: HOSPADM

## 2021-06-18 RX ORDER — NITROGLYCERIN 5 MG/ML
VIAL (ML) INTRAVENOUS
Status: DISCONTINUED
Start: 2021-06-18 | End: 2021-06-18 | Stop reason: HOSPADM

## 2021-06-18 RX ORDER — ACETAMINOPHEN 325 MG/1
650 TABLET ORAL EVERY 4 HOURS PRN
Status: DISCONTINUED | OUTPATIENT
Start: 2021-06-18 | End: 2021-06-21

## 2021-06-18 RX ORDER — ACETAMINOPHEN 650 MG/1
650 SUPPOSITORY RECTAL EVERY 4 HOURS PRN
Status: DISCONTINUED | OUTPATIENT
Start: 2021-06-18 | End: 2021-06-23 | Stop reason: HOSPADM

## 2021-06-18 RX ORDER — NITROGLYCERIN 0.4 MG/1
0.4 TABLET SUBLINGUAL EVERY 5 MIN PRN
Status: DISCONTINUED | OUTPATIENT
Start: 2021-06-18 | End: 2021-06-18

## 2021-06-18 RX ORDER — OXYCODONE HYDROCHLORIDE 5 MG/1
5 TABLET ORAL EVERY 4 HOURS PRN
Status: DISCONTINUED | OUTPATIENT
Start: 2021-06-18 | End: 2021-06-18 | Stop reason: HOSPADM

## 2021-06-18 RX ORDER — NALOXONE HYDROCHLORIDE 0.4 MG/ML
0.4 INJECTION, SOLUTION INTRAMUSCULAR; INTRAVENOUS; SUBCUTANEOUS
Status: DISCONTINUED | OUTPATIENT
Start: 2021-06-18 | End: 2021-06-18 | Stop reason: HOSPADM

## 2021-06-18 RX ORDER — ASPIRIN 81 MG/1
81 TABLET, CHEWABLE ORAL ONCE
Status: DISCONTINUED | OUTPATIENT
Start: 2021-06-18 | End: 2021-06-18 | Stop reason: HOSPADM

## 2021-06-18 RX ORDER — ATORVASTATIN CALCIUM 40 MG/1
40 TABLET, FILM COATED ORAL DAILY
Status: DISCONTINUED | OUTPATIENT
Start: 2021-06-19 | End: 2021-06-18

## 2021-06-18 RX ORDER — FENTANYL CITRATE 50 UG/ML
INJECTION, SOLUTION INTRAMUSCULAR; INTRAVENOUS
Status: DISCONTINUED | OUTPATIENT
Start: 2021-06-18 | End: 2021-06-18 | Stop reason: HOSPADM

## 2021-06-18 RX ORDER — HEPARIN SODIUM 10000 [USP'U]/100ML
0-5000 INJECTION, SOLUTION INTRAVENOUS CONTINUOUS
Status: DISCONTINUED | OUTPATIENT
Start: 2021-06-18 | End: 2021-06-18

## 2021-06-18 RX ORDER — ONDANSETRON 2 MG/ML
4 INJECTION INTRAMUSCULAR; INTRAVENOUS EVERY 6 HOURS PRN
Status: DISCONTINUED | OUTPATIENT
Start: 2021-06-18 | End: 2021-06-18 | Stop reason: HOSPADM

## 2021-06-18 RX ORDER — HEPARIN SODIUM 1000 [USP'U]/ML
INJECTION, SOLUTION INTRAVENOUS; SUBCUTANEOUS
Status: DISCONTINUED | OUTPATIENT
Start: 2021-06-18 | End: 2021-06-18 | Stop reason: HOSPADM

## 2021-06-18 RX ORDER — ALBUTEROL SULFATE 90 UG/1
2 AEROSOL, METERED RESPIRATORY (INHALATION) EVERY 4 HOURS PRN
Status: DISCONTINUED | OUTPATIENT
Start: 2021-06-18 | End: 2021-06-23 | Stop reason: HOSPADM

## 2021-06-18 RX ORDER — CLOPIDOGREL BISULFATE 75 MG/1
75 TABLET ORAL DAILY
Status: DISCONTINUED | OUTPATIENT
Start: 2021-06-19 | End: 2021-06-18 | Stop reason: HOSPADM

## 2021-06-18 RX ORDER — POTASSIUM CHLORIDE 750 MG/1
20 TABLET, EXTENDED RELEASE ORAL
Status: CANCELLED | OUTPATIENT
Start: 2021-06-18

## 2021-06-18 RX ORDER — ALBUTEROL SULFATE 90 UG/1
2 AEROSOL, METERED RESPIRATORY (INHALATION) EVERY 4 HOURS PRN
Status: DISCONTINUED | OUTPATIENT
Start: 2021-06-18 | End: 2021-06-18 | Stop reason: HOSPADM

## 2021-06-18 RX ORDER — AMOXICILLIN 250 MG
1 CAPSULE ORAL 2 TIMES DAILY PRN
Status: DISCONTINUED | OUTPATIENT
Start: 2021-06-18 | End: 2021-06-23 | Stop reason: HOSPADM

## 2021-06-18 RX ORDER — METOPROLOL TARTRATE 25 MG/1
25 TABLET, FILM COATED ORAL 2 TIMES DAILY
Status: DISCONTINUED | OUTPATIENT
Start: 2021-06-18 | End: 2021-06-18 | Stop reason: HOSPADM

## 2021-06-18 RX ADMIN — ACETAMINOPHEN 650 MG: 325 TABLET, FILM COATED ORAL at 23:26

## 2021-06-18 RX ADMIN — ATORVASTATIN CALCIUM 40 MG: 40 TABLET, FILM COATED ORAL at 23:26

## 2021-06-18 RX ADMIN — ASPIRIN 325 MG: 325 TABLET, COATED ORAL at 12:49

## 2021-06-18 RX ADMIN — SODIUM CHLORIDE: 9 INJECTION, SOLUTION INTRAVENOUS at 12:49

## 2021-06-18 RX ADMIN — METOPROLOL TARTRATE 12.5 MG: 25 TABLET, FILM COATED ORAL at 23:40

## 2021-06-18 RX ADMIN — TOLTERODINE TARTRATE 2 MG: 2 CAPSULE, EXTENDED RELEASE ORAL at 23:26

## 2021-06-18 RX ADMIN — OXYCODONE HYDROCHLORIDE 5 MG: 5 TABLET ORAL at 23:43

## 2021-06-18 RX ADMIN — HUMAN ALBUMIN MICROSPHERES AND PERFLUTREN 3 ML: 10; .22 INJECTION, SOLUTION INTRAVENOUS at 08:14

## 2021-06-18 NOTE — PRE-PROCEDURE
GENERAL PRE-PROCEDURE:   Date/Time:  6/18/2021 3:47 PM    Written consent obtained?: Yes    Risks and benefits: Risks, benefits and alternatives were discussed    Consent given by:  Patient  Patient states understanding of procedure being performed: Yes    Patient's understanding of procedure matches consent: Yes    Procedure consent matches procedure scheduled: Yes    Expected level of sedation:  Moderate  Appropriately NPO:  Yes  ASA Class:  Class 3- Severe systemic disease, definite functional limitations  Mallampati  :  Grade 1- soft palate, uvula, tonsillar pillars, and posterior pharyngeal wall visible  Lungs:  Other (comment)  Lung exam comment:  Normal respirations.   Heart:  Normal heart sounds and rate and RRR  History & Physical reviewed:  History and physical reviewed and no updates needed  Statement of review:  I have reviewed the lab findings, diagnostic data, medications, and the plan for sedation

## 2021-06-18 NOTE — Clinical Note
Single balloon inflation. The first balloon was inserted into the left anterior descending and middle left anterior descending.Max pressure = 6 shira. Total duration = 7 seconds.

## 2021-06-18 NOTE — Clinical Note
The first balloon was inserted into the left anterior descending and middle left anterior descending.Max pressure = 6 shira. Total duration = 15 seconds.     Max pressure = 6 shira. Total duration = 10 seconds.    Balloon reinflated a second time: Max pressure = 6 shira. Total duration = 10 seconds.  Balloon reinflated a third time: Max pressure = 6 shira. Total duration = 10 seconds.  Balloon reinflated a fourth time: Max pressure = 6 shira. Total duration = 10 seconds.  Balloon reinflated a fourth time: Max pressure = 12 shira. Total duration = 15 seconds.

## 2021-06-18 NOTE — PROGRESS NOTES
Recommend Starting heparin low dose protocol with no bolus 6 hours after TR band removal if access site is stable with no hematoma.      A possible small hematoma was noted, so the order for heparin was discontinued and will need to be reordered.    Roxie Schrader MD on 6/18/2021 at 7:00 PM

## 2021-06-18 NOTE — PROGRESS NOTES
VORB per Dr. Blair, pt with unstable angina and abnormal stress test. Discuss with Dr. Schrader and she agreed to add pt to [facility] Cath schedule this afternoon for 12pm check in. Dr. Blair already placed orders for 325mg ASA now and labs. Scheduled Cath and placed pre-procedural orders. Pt also to have 2 week RAYRAY follow up post cath.     Alycia Staley RN, BSN, CHFN  06/18/21 at 10:17 AM

## 2021-06-18 NOTE — Clinical Note
Stent deployed in the ostium marginal circumflex. Max pressure = 13 shira. Total duration = 21 seconds. Balloon reinflated a second time: Max pressure = 12 shira. Total duration = 17 seconds.

## 2021-06-18 NOTE — Clinical Note
The first balloon was inserted into the circumflex.Max pressure = 12 shira. Total duration = 12 seconds.     Max pressure = 12 shira. Total duration = 17 seconds.    Balloon reinflated a second time: Max pressure = 12 shira. Total duration = 17 seconds.  Balloon reinflated a third time: Max pressure = 12 shira. Total duration = 11 seconds.  Balloon reinflated a fourth time: Max pressure = 12 shira. Total duration = 7 seconds.

## 2021-06-18 NOTE — Clinical Note
The first balloon was inserted into the left anterior descending and proximal left anterior descending.Max pressure = 12 shira. Total duration = 10 seconds.     Max pressure = 12 shira. Total duration = 10 seconds.    Balloon reinflated a second time: Max pressure = 12 shira. Total duration = 10 seconds.  Balloon reinflated a third time: Max pressure = 12 shira. Total duration = 8 seconds.  Balloon reinflated a fourth time: Max pressure = 12 shira. Total duration = 7 seconds.

## 2021-06-18 NOTE — Clinical Note
Stent deployed in the ostium marginal circumflex. Max pressure = 12 shira. Total duration = 23 seconds. Balloon reinflated a second time: Max pressure = 12 shira. Total duration = 18 seconds.

## 2021-06-18 NOTE — Clinical Note
The first balloon was inserted into the left anterior descending and proximal left anterior descending.Max pressure = 6 shira. Total duration = 10 seconds.     Max pressure = 6 shira. Total duration = 6 seconds.    Balloon reinflated a second time: Max pressure = 6 shira. Total duration = 6 seconds.  Balloon reinflated a third time: Max pressure = 10 shira. Total duration = 10 seconds.  Balloon reinflated a fourth time: Max pressure = 12 shira. Total duration = 6 seconds.

## 2021-06-18 NOTE — Clinical Note
The first balloon was inserted into the circumflex and ostium marginal circumflex.Max pressure = 6 shira. Total duration = 18 seconds.

## 2021-06-18 NOTE — Clinical Note
Procedure aborted.   Unable to cross lesion with balloons or stent.  Will transfer to [facility] for Rotoblader Therapy

## 2021-06-18 NOTE — CONSULTS
"CARDIOLOGY CONSULT    REASON FOR CONSULT: Abnormal stress test    PRIMARY CARE PHYSICIAN:  Remy Mcknight    HISTORY OF PRESENT ILLNESS:  56-year-old male with no cardiac history is seen for unstable angina and abnormal stress test.  He has dyslipidemia and family history of coronary disease, otherwise no known hypertension or diabetes, he is a non-smoker.    At baseline he is moderately active.  He will do regular yard work and other activities.  In the past 1 month he has had progressive exertional dyspnea and midsternal chest tightness.  This has occurred with less exertion in the past 1 week including climbing stairs and mowing the lawn.  He has had a few episodes of rest angina as well that awoke him from sleep.    He comes in for stress test today which reproduced his typical chest tightness, he had 1 mm of ST depression diffusely with upsloping segments and 1 mm of ST elevation in aVR.  Resting echo showed EF of 60% with no valve disease, stress echo showed mid to distal anterolateral and lateral hypokinesis.    Results were reviewed with the patient in the stress lab today.  He denies any history of bleeding issues and any upcoming procedures.  He denies any significant drug allergies.    PAST MEDICAL HISTORY:  Past Medical History:   Diagnosis Date     Acute duodenal ulcer without mention of hemorrhage, perforation, or obstruction 1990    diagnosed on EGD     Allergic rhinitis, cause unspecified      Attention deficit disorder without mention of hyperactivity     \"all my life\"     Cataplexy and narcolepsy     had for many years, but diagnosed in sleep lab 6/03     Esophageal reflux 11/28/2007     Hyperlipidemia LDL goal <130 10/31/2010     Hypertriglyceridemia 8/10/2012     Mild major depression (H) 9/15/2011     OBESITY NOS 11/28/2007     PONV (postoperative nausea and vomiting)      Sleep apnea     CPAP     Sleep disorder 4/12/2011     MEDICATIONS:  Current Outpatient Medications   Medication     " albuterol (PROAIR HFA/PROVENTIL HFA/VENTOLIN HFA) 108 (90 Base) MCG/ACT inhaler     atorvastatin (LIPITOR) 40 MG tablet     buPROPion (WELLBUTRIN XL) 300 MG 24 hr tablet     fluocinonide (LIDEX) 0.05 % solution     fluticasone (FLONASE) 50 MCG/ACT nasal spray     omeprazole (PRILOSEC) 20 MG DR capsule     omeprazole (PRILOSEC) 40 MG DR capsule     rosuvastatin (CRESTOR) 20 MG tablet     sertraline (ZOLOFT) 100 MG tablet     sildenafil (REVATIO) 20 MG tablet     solifenacin (VESICARE) 5 MG tablet     traZODone (DESYREL) 50 MG tablet     Current Facility-Administered Medications   Medication     aspirin (ASA) tablet 325 mg       ALLERGIES:  Allergies   Allergen Reactions     Penicillins Itching, Swelling and Rash     Rash       SOCIAL HISTORY:  I have reviewed this patient's social history and updated it with pertinent information if needed. John Reyes  reports that he quit smoking about 18 years ago. His smoking use included cigarettes and cigars. He smoked 0.50 packs per day. He has never used smokeless tobacco. He reports current alcohol use. He reports that he does not use drugs.    FAMILY HISTORY:  I have reviewed this patient's family history and updated it with pertinent information if needed.   Family History   Problem Relation Age of Onset     Lipids Father         On medication     Cancer - colorectal Maternal Uncle        REVIEW OF SYSTEMS:  Constitutional:  No weight loss, fever, chills, weakness or fatigue.  HEENT:  Eyes:  No visual loss, blurred vision, double vision or yellow sclerae. No hearing loss, sneezing, congestion, runny nose or sore throat.  Skin:  No rash or itching.  Cardiovascular: per HPI  Respiratory: per HPI  GI:  No anorexia, nausea, vomiting or diarrhea. No abdominal pain or blood.  :  No dysurea, hematuria  Neurologic:  No headache, dizziness, syncope, paralysis, ataxia, numbness or tingling in the extremities. No change in bowel or bladder control.  Musculoskeletal:  No  muscle, back pain, joint pain or stiffness.  Hematologic:  No anemia, bleeding or bruising.  Lymphatics:  No enlarged nodes. No history of splenectomy.  Psychiatric:  No history of depression or anxiety.  Endocrine:  No reports of sweating, cold or heat intolerance. No polyuria or polydipsia.  Allergies:  No history of asthma, hives, eczema or rhinitis.    PHYSICAL EXAM:  Heart rate 70, blood pressure 130/80  Constitutional: awake, alert, no distress  Eyes: PERRL, sclera nonicteric  ENT: trachea midline  Respiratory: Lungs clear  Cardiovascular: Regular rate and rhythm, no murmurs  GI: nondistended, nontender, bowel sounds present  Lymph/Hematologic: no lymphadenopathy  Skin: dry, no rash  Musculoskeletal: good muscle tone, strength 5/5 in upper and lower extremities  Neurologic: no focal deficits  Neuropsychiatric: appropriate affact    DATA:  Labs:     Recent Labs   Lab Test 03/05/21  1124 12/18/19  0921 10/30/15  1100 10/30/15  1100 10/23/14  0930   CHOL 255* 177   < > 177 188   HDL 35* 33*   < > 37* 43   LDL Cannot estimate LDL when triglyceride exceeds 400 mg/dL  158* Cannot estimate LDL when triglyceride exceeds 400 mg/dL  84   < > 78 72   TRIG 404* 452*   < > 308* 365*   CHOLHDLRATIO  --   --   --  4.8 4.4    < > = values in this interval not displayed.     ASSESSMENT:  57 y/o male seen for classic unstable angina with abnormal stress test.  Based on his symptoms and stress test, there is a high probability of obstructive coronary disease.  Results were reviewed with him and his wife today.  Angiogram is recommended.  The risk and benefit of the procedure was explained in detail, he understands and wishes to proceed.  Plans are being made to do this this afternoon.    RECOMMENDATIONS:  1.  Unstable angina with abnormal stress test  -Coronary angiogram today   - aspirin 325mg now  - labs including CBC, BMP, INR, A1c    Will arrange follow-up within a few weeks in the cardiology clinic regardless of angiogram  findings.    Marcus Blair MD  Cardiology - [facility]  Pager:  [phone]  Text Page  June 18, 2021

## 2021-06-18 NOTE — Clinical Note
The first balloon was inserted into the left anterior descending and proximal left anterior descending.Max pressure = 18 shira. Total duration = 25 seconds.     Max pressure = 12 shira. Total duration = 12 seconds.    Balloon reinflated a second time: Max pressure = 12 shira. Total duration = 12 seconds.  Balloon reinflated a third time: Max pressure = 14 shira. Total duration = 15 seconds.

## 2021-06-18 NOTE — Clinical Note
The first balloon was inserted into the circumflex and ostium marginal circumflex.Max pressure = 12 shira. Total duration = 18 seconds.     Max pressure = 14 shira. Total duration = 20 seconds.    Balloon reinflated a second time: Max pressure = 14 shira. Total duration = 20 seconds.

## 2021-06-18 NOTE — Clinical Note
The first balloon was inserted into the left anterior descending and middle left anterior descending.Max pressure = 12 shira. Total duration = 10 seconds.     Max pressure = 14 shira. Total duration = 15 seconds.    Balloon reinflated a second time: Max pressure = 14 shira. Total duration = 15 seconds.  Balloon reinflated a third time: Max pressure = 14 shira. Total duration = 10 seconds.  Balloon reinflated a fourth time: Max pressure = 18 shira. Total duration = 30 seconds.

## 2021-06-18 NOTE — PROGRESS NOTES
Stress echo completed. Contrast Optison used for image enhancement. Patient experienced up to 7/10 chest pain during exercise. Ended test due to chest pain.     Contacted Dr. Blair regarding positive stress test. Dr. Blair will have a cardiology consult with the patient this morning with the probable plan to set up an angiogram for the patient ASAP.     Morgan Landaverde [facility]iac Testing  [phone]

## 2021-06-18 NOTE — Clinical Note
Max pressure = 12 shira. Total duration = 13 seconds.     Max pressure = 12 shira. Total duration = 18 seconds.    Balloon reinflated a second time: Max pressure = 12 shira. Total duration = 18 seconds.  Balloon reinflated a third time: Max pressure = 8 shira. Total duration = 13 seconds.

## 2021-06-19 LAB
ANION GAP SERPL CALCULATED.3IONS-SCNC: 3 MMOL/L (ref 3–14)
BUN SERPL-MCNC: 16 MG/DL (ref 7–30)
CALCIUM SERPL-MCNC: 8.3 MG/DL (ref 8.5–10.1)
CHLORIDE SERPL-SCNC: 109 MMOL/L (ref 94–109)
CO2 SERPL-SCNC: 27 MMOL/L (ref 20–32)
CREAT SERPL-MCNC: 1.05 MG/DL (ref 0.66–1.25)
GFR SERPL CREATININE-BSD FRML MDRD: 78 ML/MIN/{1.73_M2}
GLUCOSE SERPL-MCNC: 122 MG/DL (ref 70–99)
POTASSIUM SERPL-SCNC: 4.3 MMOL/L (ref 3.4–5.3)
SODIUM SERPL-SCNC: 139 MMOL/L (ref 133–144)
UFH PPP CHRO-ACNC: 0.44 IU/ML
UFH PPP CHRO-ACNC: 0.54 IU/ML
UFH PPP CHRO-ACNC: <0.1 IU/ML

## 2021-06-19 PROCEDURE — 85520 HEPARIN ASSAY: CPT | Performed by: INTERNAL MEDICINE

## 2021-06-19 PROCEDURE — 36415 COLL VENOUS BLD VENIPUNCTURE: CPT | Performed by: INTERNAL MEDICINE

## 2021-06-19 PROCEDURE — 258N000003 HC RX IP 258 OP 636: Performed by: STUDENT IN AN ORGANIZED HEALTH CARE EDUCATION/TRAINING PROGRAM

## 2021-06-19 PROCEDURE — 210N000002 HC R&B HEART CARE

## 2021-06-19 PROCEDURE — 80048 BASIC METABOLIC PNL TOTAL CA: CPT | Performed by: INTERNAL MEDICINE

## 2021-06-19 PROCEDURE — 93005 ELECTROCARDIOGRAM TRACING: CPT

## 2021-06-19 PROCEDURE — 99233 SBSQ HOSP IP/OBS HIGH 50: CPT | Performed by: INTERNAL MEDICINE

## 2021-06-19 PROCEDURE — 258N000003 HC RX IP 258 OP 636: Performed by: INTERNAL MEDICINE

## 2021-06-19 PROCEDURE — 250N000011 HC RX IP 250 OP 636: Performed by: INTERNAL MEDICINE

## 2021-06-19 PROCEDURE — 250N000013 HC RX MED GY IP 250 OP 250 PS 637

## 2021-06-19 PROCEDURE — 250N000011 HC RX IP 250 OP 636: Performed by: STUDENT IN AN ORGANIZED HEALTH CARE EDUCATION/TRAINING PROGRAM

## 2021-06-19 PROCEDURE — 250N000013 HC RX MED GY IP 250 OP 250 PS 637: Performed by: INTERNAL MEDICINE

## 2021-06-19 PROCEDURE — 36415 COLL VENOUS BLD VENIPUNCTURE: CPT | Performed by: HOSPITALIST

## 2021-06-19 PROCEDURE — 85520 HEPARIN ASSAY: CPT | Performed by: HOSPITALIST

## 2021-06-19 PROCEDURE — 99232 SBSQ HOSP IP/OBS MODERATE 35: CPT | Performed by: HOSPITALIST

## 2021-06-19 PROCEDURE — 250N000013 HC RX MED GY IP 250 OP 250 PS 637: Performed by: STUDENT IN AN ORGANIZED HEALTH CARE EDUCATION/TRAINING PROGRAM

## 2021-06-19 RX ORDER — HEPARIN SODIUM 10000 [USP'U]/100ML
0-5000 INJECTION, SOLUTION INTRAVENOUS CONTINUOUS
Status: DISCONTINUED | OUTPATIENT
Start: 2021-06-19 | End: 2021-06-21

## 2021-06-19 RX ORDER — LIDOCAINE 40 MG/G
CREAM TOPICAL
Status: DISCONTINUED | OUTPATIENT
Start: 2021-06-19 | End: 2021-06-22

## 2021-06-19 RX ORDER — CLOPIDOGREL BISULFATE 75 MG/1
75 TABLET ORAL DAILY
Status: DISCONTINUED | OUTPATIENT
Start: 2021-06-19 | End: 2021-06-23 | Stop reason: HOSPADM

## 2021-06-19 RX ORDER — SUMATRIPTAN 50 MG/1
50 TABLET, FILM COATED ORAL ONCE
Status: COMPLETED | OUTPATIENT
Start: 2021-06-19 | End: 2021-06-19

## 2021-06-19 RX ORDER — NITROGLYCERIN 0.4 MG/1
TABLET SUBLINGUAL
Status: COMPLETED
Start: 2021-06-19 | End: 2021-06-19

## 2021-06-19 RX ORDER — NITROGLYCERIN 0.4 MG/1
0.4 TABLET SUBLINGUAL EVERY 5 MIN PRN
Status: DISCONTINUED | OUTPATIENT
Start: 2021-06-19 | End: 2021-06-19

## 2021-06-19 RX ORDER — NITROGLYCERIN 20 MG/100ML
10-200 INJECTION INTRAVENOUS CONTINUOUS
Status: DISCONTINUED | OUTPATIENT
Start: 2021-06-19 | End: 2021-06-19

## 2021-06-19 RX ORDER — NITROGLYCERIN 0.4 MG/1
0.4 TABLET SUBLINGUAL EVERY 5 MIN PRN
Status: DISCONTINUED | OUTPATIENT
Start: 2021-06-19 | End: 2021-06-23 | Stop reason: HOSPADM

## 2021-06-19 RX ADMIN — Medication 1 MG: at 20:27

## 2021-06-19 RX ADMIN — ACETAMINOPHEN 650 MG: 325 TABLET, FILM COATED ORAL at 09:28

## 2021-06-19 RX ADMIN — OXYCODONE HYDROCHLORIDE 5 MG: 5 TABLET ORAL at 09:28

## 2021-06-19 RX ADMIN — SUMATRIPTAN SUCCINATE 50 MG: 50 TABLET ORAL at 19:00

## 2021-06-19 RX ADMIN — TRAZODONE HYDROCHLORIDE 150 MG: 100 TABLET ORAL at 20:27

## 2021-06-19 RX ADMIN — OMEPRAZOLE 40 MG: 20 CAPSULE, DELAYED RELEASE ORAL at 08:25

## 2021-06-19 RX ADMIN — HEPARIN SODIUM 1200 UNITS/HR: 10000 INJECTION, SOLUTION INTRAVENOUS at 06:12

## 2021-06-19 RX ADMIN — TOLTERODINE TARTRATE 2 MG: 2 CAPSULE, EXTENDED RELEASE ORAL at 20:28

## 2021-06-19 RX ADMIN — ASPIRIN 81 MG CHEWABLE TABLET 81 MG: 81 TABLET CHEWABLE at 08:25

## 2021-06-19 RX ADMIN — ATORVASTATIN CALCIUM 40 MG: 40 TABLET, FILM COATED ORAL at 20:29

## 2021-06-19 RX ADMIN — METOPROLOL TARTRATE 12.5 MG: 25 TABLET, FILM COATED ORAL at 08:25

## 2021-06-19 RX ADMIN — SODIUM CHLORIDE 500 ML: 9 INJECTION, SOLUTION INTRAVENOUS at 18:55

## 2021-06-19 RX ADMIN — MAGNESIUM SULFATE HEPTAHYDRATE 1 G: 500 INJECTION, SOLUTION INTRAMUSCULAR; INTRAVENOUS at 18:55

## 2021-06-19 RX ADMIN — NITROGLYCERIN 0.4 MG: 0.4 TABLET SUBLINGUAL at 11:30

## 2021-06-19 RX ADMIN — BUPROPION HYDROCHLORIDE 300 MG: 150 TABLET, FILM COATED, EXTENDED RELEASE ORAL at 08:24

## 2021-06-19 RX ADMIN — CLOPIDOGREL BISULFATE 75 MG: 75 TABLET ORAL at 13:42

## 2021-06-19 RX ADMIN — OXYCODONE HYDROCHLORIDE 10 MG: 5 TABLET ORAL at 16:07

## 2021-06-19 RX ADMIN — SERTRALINE HYDROCHLORIDE 150 MG: 100 TABLET ORAL at 08:25

## 2021-06-19 RX ADMIN — ACETAMINOPHEN 650 MG: 325 TABLET, FILM COATED ORAL at 13:42

## 2021-06-19 RX ADMIN — METOPROLOL TARTRATE 12.5 MG: 25 TABLET, FILM COATED ORAL at 20:29

## 2021-06-19 RX ADMIN — SODIUM CHLORIDE: 9 INJECTION, SOLUTION INTRAVENOUS at 00:00

## 2021-06-19 RX ADMIN — NITROGLYCERIN 10 MCG/MIN: 20 INJECTION INTRAVENOUS at 13:38

## 2021-06-19 ASSESSMENT — ACTIVITIES OF DAILY LIVING (ADL)
ADLS_ACUITY_SCORE: 12
ADLS_ACUITY_SCORE: 14
ADLS_ACUITY_SCORE: 12

## 2021-06-19 NOTE — PROVIDER NOTIFICATION
Pt ambulated in halls with wife. After returning to his room pt called RN to report that he was having 6/10 CP. STAT EKG completed and cardiology was notified and reviewed EKG. 1 SL nitroglycerin was given after EKG completed pt rating pain 2/10. CP was relieved. Dr. Denis would like pt on bedrest.

## 2021-06-19 NOTE — PLAN OF CARE
Patient arrived via EMS from [facility] at 2000. VSS. A&O x4. Denies chest pain or shortness of breath. C/o pain in right neck and shoulder which is not new, heat applied. Right wrist radial site with TR band in place upon admission. Site slightly firm, non tender, scant bruising below TR band. CMS intact. Started releasing air from TR band at 2030. No change in site upon air removal. Second RN assessed site x2 and verified no change at site. Plan for heparin to start 6 hours after TR band removal if no hematoma. Patient up independently to bathroom. C/o headache upon shift change, tylenol given and next shift RN aware.

## 2021-06-19 NOTE — PROGRESS NOTES
[facility]    Cardiology Progress Note    Date of Service (when I saw the patient): 06/19/2021            Assessment and Plan:   1,  Unstable angina: Coronary angiogram performed at [facility] demonstrates two vessel CAD with severe proximal to mid LAD disease and severe OM2 disease.  S/P balloon angioplasty of LAD stenosis.   Unable to cross with cutting balloon or stent resultin gin small/subtle contained dissection without perforation in the LAD.  Transferred to [facility] for complex PIC using rotablator  2.  Hyperlipidemia    PLAN/RECOMMENDATIONS:  -Reviewed case with interventional colleagues Dr. Schrader and Dr. Cohen (on call this weekend).   Plan to do complex PCI on Monday.  -Given recurrent chest pain, recommend starting nitroglycerin gtt  -Continue ASA, plavix, statin, metoprolol  -Continue heparin gtt  -Anticipate coronary angiogram on Monday.  Would perform urgently over the weekend in the event of worsening chest pain unrelieved with medical management, dynamic ECG changes or hemodynamic instability.    Africa Denis MD [facility]          Interval History:     No new issues overnight.  Chest pain while ambulating in hallway and to bathroom today.           Medications:       aspirin  81 mg Oral Daily     atorvastatin  40 mg Oral Daily     buPROPion  300 mg Oral Daily     metoprolol tartrate  12.5 mg Oral BID     omeprazole  40 mg Oral Daily     sertraline  150 mg Oral Daily     sodium chloride (PF)  3 mL Intracatheter Q8H     tolterodine ER  2 mg Oral Daily     traZODone  150 mg Oral At Bedtime            Physical Exam:   Blood pressure (!) 144/82, pulse 57, temperature 97.8  F (36.6  C), temperature source Oral, resp. rate 18, weight 104.8 kg (231 lb), SpO2 97 %.  Vitals:    06/19/21 0521   Weight: 104.8 kg (231 lb)       Intake/Output Summary (Last 24 hours) at 6/19/2021 1310  Last data filed at 6/19/2021 0900  Gross per 24 hour   Intake 240 ml   Output  --   Net 240 ml           Vital Sign Ranges  Temperature Temp  Av  F (36.1  C)  Min: 96.2  F (35.7  C)  Max: 97.8  F (36.6  C)   Blood pressure Systolic (24hrs), Av , Min:107 , Max:149        Diastolic (24hrs), Av, Min:68, Max:101      Pulse Pulse  Av.3  Min: 57  Max: 74   Respirations Resp  Avg: 15.1  Min: 8  Max: 20   Pulse oximetry SpO2  Av.3 %  Min: 97 %  Max: 98 %         EXAM:    Constitutional:    in no apparent distress    Skin:    normal    Head:    Normocephalic, atramatic    Eyes:    pupils equal, round and reactive to light, extra ocular muscles intact, sclera clear, conjunctiva normal    Neck:    JVP    Lungs:    CTAB   Cardiovascular:    S1, S2 RRR on m/r/g, no JVD   Abdomen:    normal bowel sounds    Extremities and Back:    no cyanosis or clubbing and No edema bilaterally   Neurological:    No gross or focal neurologic abnormalities               Data:     Recent Labs   Lab Test 21  1756 21  1005   WBC 7.0 6.7   HGB 13.5 14.7   MCV 93 93   * 118*   INR  --  1.03      Recent Labs   Lab Test 21  0604 21  1005    138   POTASSIUM 4.3 4.0   CHLORIDE 109 108   BUN 16 19   CR 1.05 1.02     Recent Labs   Lab 21  0604 21  1005   * 112*     Recent Labs   Lab Test 21  1124 19  0921   ALT 65 44   AST 30 23     No results found for: TROPI      No lab results found.    Lab Results   Component Value Date    CHOL 255 2021     Lab Results   Component Value Date    HDL 35 2021     Lab Results   Component Value Date    LDL  2021     Cannot estimate LDL when triglyceride exceeds 400 mg/dL     2021     Lab Results   Component Value Date    TRIG 404 2021     Lab Results   Component Value Date    CHOLHDLRATIO 4.8 10/30/2015          TSH   Date Value Ref Range Status   2018 2.93 0.40 - 4.00 mU/L Final     Africa Denis MD, FACC  Cardiology

## 2021-06-19 NOTE — PROVIDER NOTIFICATION
Cross Cover     Pt has been having a pounding headache all day. He is on nitroglycering gtt for chest pain but only 10mcg.     They have attempted tylenol and oxycodone without relief. Pt also had some caffeine earlier without relief.     Will avoid NSAIDs while on heparin and plavix.     Will try 500cc NS, 1g mag and 50mg Imitrex now.     Blanca Childress MD

## 2021-06-19 NOTE — PLAN OF CARE
CR: Orders received, chart reviewed. Pt awaiting cardiology consult to determine plan. Will hold CR eval/intervention until POC determined.

## 2021-06-19 NOTE — PHARMACY-ADMISSION MEDICATION HISTORY
Pharmacy Medication History  Admission medication history interview status for the 6/18/2021  admission is complete. See EPIC admission navigator for prior to admission medications     Location of Interview: Patient room  Medication history sources: Patient    Significant changes made to the medication list:  Changed Fluticasone to PRN  Deleted Atorvastatin      In the past week, patient estimated taking medication this percent of the time: greater than 90%    Additional medication history information:       Medication reconciliation completed by provider prior to medication history? No    Time spent in this activity: 10 minutes     Prior to Admission medications    Medication Sig Last Dose Taking? Auth Provider   albuterol (PROAIR HFA/PROVENTIL HFA/VENTOLIN HFA) 108 (90 Base) MCG/ACT inhaler Inhale 2 puffs into the lungs every 4 hours as needed for shortness of breath / dyspnea or wheezing  at PRN Yes Roosevelt Gleason PA-C   buPROPion (WELLBUTRIN XL) 300 MG 24 hr tablet TAKE ONE TABLET BY MOUTH ONCE DAILY 6/18/2021 at 0630 Yes Cleveland Lopez PA-C   fluocinonide (LIDEX) 0.05 % solution Apply to scalp daily as needed  at PRN Yes Remy Mcknight MD   fluticasone (FLONASE) 50 MCG/ACT nasal spray Spray 1 spray into both nostrils daily  Patient taking differently: Spray 1 spray into both nostrils daily as needed for rhinitis or allergies   at PRN Yes Roosevelt Gleason PA-C   omeprazole (PRILOSEC) 20 MG DR capsule Take 1 capsule (20 mg) by mouth daily 6/18/2021 at 0630 Yes Remy Mcknight MD   rosuvastatin (CRESTOR) 20 MG tablet Take 1 tablet (20 mg) by mouth daily 6/18/2021 at 0630 Yes Roosevelt Gleason PA-C   sertraline (ZOLOFT) 100 MG tablet TAKE ONE AND ONE-HALF TABLETS BY MOUTH EVERY DAY 6/18/2021 at 0630 Yes Cleveland Lopez PA-C   sildenafil (REVATIO) 20 MG tablet Take one to three tablets one hour before sex  at PRN Yes Remy Mcknight MD   solifenacin (VESICARE) 5 MG tablet  TAKE ONE TABLET BY MOUTH ONCE DAILY 6/17/2021 at 2100 Yes Remy Mcknight MD   traZODone (DESYREL) 50 MG tablet TAKE THREE TABLETS BY MOUTH EVERY NIGHT AT BEDTIME 6/17/2021 at 2100 Yes Cleveland Lopez PA-C   omeprazole (PRILOSEC) 40 MG DR capsule Take 1 capsule (40 mg) by mouth daily  at [facility]  Roosevelt Gleason PA-C       The information provided in this note is only as accurate as the sources available at the time of update(s)

## 2021-06-19 NOTE — PROVIDER NOTIFICATION
Pt c/o severe pressure HA that has not be relieved with Tylenol, Oxycodone and ice-packs. Asking for additional PRNs to try help relieve BOWDEN. On-call Dr. Monroe BARAJAS was paged. Called back and orders will be placed.

## 2021-06-19 NOTE — H&P
Admitted: 06/18/2021    CHIEF COMPLAINT:  Chest pain.    HISTORY OF PRESENT ILLNESS:  Has been obtained from the patient, who is a good historian.  I did discuss with Cardiology, Dr. Roxie Schrader from cath lab from [facility].  He had been transferred from [facility] earlier today.    Mr. John Reyes is an extremely pleasant 56-year-old gentleman with a past medical history of dyslipidemia, depression/anxiety, gastroesophageal reflux disease, obstructive sleep apnea and attention deficit disorder, who was transferred from [facility] cath lab after he had a coronary angiogram and was found to have 2-vessel coronary artery disease.  He was transferred to [facility] for complex PCI.    The patient reports that for the last month, he is experiencing chest pain with exertion associated with dyspnea on exertion and dizziness.  He states that initially the chest pain was with exertion and relieved by rest.  More recently the chest pain was coming more frequently, even at rest.  He describes the pain as being located retrosternal, radiating to his neck, and sometimes he was feeling a sore throat.  He also reports having some headaches that are worse in the last month.  He denies any recent fevers.  No coughing.  He denies any nausea, no vomiting.  No abdominal pain.  He denies any leg swelling recently.  He was seen in his primary care physician's office, and initial impression was that his dyspnea on exertion may be related to asthma, and he was given a trial of albuterol, but also he had ordered a stress test as outpatient, which was done today, and it was abnormal.  As per report, the patient had 7/10 chest pain and dyspnea with exertion that resolved a few minutes into recovery.  There was also diffuse 1 mm ST depression with upsloping segment.  Rest echocardiogram showed EF of 55-60%, and a stress echocardiogram showed EF of 60-65% and mid to distal anterolateral and lateral hypokinesis.   He was taken to cath lab at [facility], and he was found to have 2-vessel coronary artery disease with severe calcification and stenosis of the proximal to mid LAD and severe stenosis of OM1 and moderate stenosis of mid left circumflex.  He had a balloon angioplasty of LAD stenosis, but unable to cross with cutting balloon or stent.  There is a small, subtle, contained dissection without perforation of LAD.  It was recommended the patient to be transferred to [facility] for complex PCI with possible rotablation and surgical back-up due to complex coronary artery disease.  He developed a small right wrist hematoma that seemed to be stable.  It was recommended the patient be started on low-dose heparin drip with no bolus 6 hours after TR band removal.  He was also given aspirin and Plavix at [facility].    I saw the patient after he arrived in [facility] at [facility].  The patient was resting comfortable.  He was chest pain-free.  His wife and his daughter were present at the time of my examination.    PAST MEDICAL HISTORY:    1.  Dyslipidemia.  2.  Depression/anxiety.  3.  Gastroesophageal reflux disease.  4.  Attention deficit disorder.  5.  Obstructive sleep apnea, using CPAP.    PAST SURGICAL HISTORY:  Past Surgical History:   Procedure Laterality Date     ARTHROSCOPY SHOULDER, OPEN ROTATOR CUFF REPAIR, COMBINED Left 11/5/2014    Procedure: COMBINED ARTHROSCOPY SHOULDER, OPEN ROTATOR CUFF REPAIR;  Surgeon: Maykel Khalil MD;  Location:  OR     C HAND/FINGER SURGERY UNLISTED  1983    ORIF right middle finger     COLONOSCOPY  9/9/2013    Procedure: COMBINED COLONOSCOPY, SINGLE BIOPSY/POLYPECTOMY BY BIOPSY;;  Surgeon: Hali Lema MD;  Location:  GI     GI SURGERY      henmmriodectoomy     HC EXPLOR MAXILL SINUS,INTRANASAL  1981`      VASECTOMY UNILAT/BILAT W POSTOP SEMEN  4/03       SOCIAL HISTORY:  The patient denies smoking.  He reports drinking 5-6 alcoholic beverages weekly.   He denies illicit drug abuse.    FAMILY HISTORY:  Reviewed with the patient.  It seems that his father had a myocardial infarction when he was 44 years of age.  His mother had elevated blood pressure and stroke.  His brothers seems to be in good state of health.  He has 2 daughters with no major medical problems.      PRIOR TO ADMISSION MEDICATIONS:  No current facility-administered medications on file prior to encounter.   albuterol (PROAIR HFA/PROVENTIL HFA/VENTOLIN HFA) 108 (90 Base) MCG/ACT inhaler, Inhale 2 puffs into the lungs every 4 hours as needed for shortness of breath / dyspnea or wheezing  buPROPion (WELLBUTRIN XL) 300 MG 24 hr tablet, TAKE ONE TABLET BY MOUTH ONCE DAILY  fluocinonide (LIDEX) 0.05 % solution, Apply to scalp daily as needed  fluticasone (FLONASE) 50 MCG/ACT nasal spray, Spray 1 spray into both nostrils daily (Patient taking differently: Spray 1 spray into both nostrils daily as needed for rhinitis or allergies )  omeprazole (PRILOSEC) 20 MG DR capsule, Take 1 capsule (20 mg) by mouth daily  rosuvastatin (CRESTOR) 20 MG tablet, Take 1 tablet (20 mg) by mouth daily  sertraline (ZOLOFT) 100 MG tablet, TAKE ONE AND ONE-HALF TABLETS BY MOUTH EVERY DAY  sildenafil (REVATIO) 20 MG tablet, Take one to three tablets one hour before sex  solifenacin (VESICARE) 5 MG tablet, TAKE ONE TABLET BY MOUTH ONCE DAILY  traZODone (DESYREL) 50 MG tablet, TAKE THREE TABLETS BY MOUTH EVERY NIGHT AT BEDTIME  omeprazole (PRILOSEC) 40 MG DR capsule, Take 1 capsule (40 mg) by mouth daily    ALLERGIES:  Allergies   Allergen Reactions     Penicillins Itching, Swelling and Rash     Rash       REVIEW OF SYSTEMS:  A 10-point review of systems was conducted, and it was negative except for pertinent positives mentioned in History of Present Illness.    PHYSICAL EXAMINATION:    VITAL SIGNS:  Blood pressure is 136/77, heart rate 66, respiratory rate 16, oxygen saturation 97% on room air.  GENERAL:  The patient is awake,  alert, very pleasant, in no acute distress at the time of my examination.  HEENT:  Head is normocephalic, atraumatic.  Pupils are equally round and reactive to light.  Oral mucosa is mildly dry.  NECK:  Supple, no cervical lymphadenopathy, no thyromegaly.  CHEST:  There is bilateral air entry.  No wheezing, no rales, no crackles.  CARDIOVASCULAR:  There is normal S1 and S2, regular rate and rhythm.  No murmurs, no rubs.  ABDOMEN:  Soft, nontender, nondistended.  Bowel sounds are present.  EXTREMITIES:  There is no leg swelling, no calf tenderness;  2+ peripheral pulses are palpable.  SKIN:  Intact.  No rash, no cyanosis.  NEUROLOGIC:  The patient is awake, alert, oriented to self, place and time.  There are no focal neurological deficits.  PSYCHIATRIC:  Normal mood, normal affect.  MUSCULOSKELETAL:  He moves all extremities freely.  There are no obvious joint deformities.    LABORATORY DATA:  From earlier today at [facility] showed BMP with sodium of 138, potassium 4, chloride 108, bicarbonate 25, BUN 19, creatinine 1.02.  His calcium was 8.9, anion gap of 5, hemoglobin A1c 5.1, glucose 112.  CBC with white blood cells of 6.7, hemoglobin 14.7, hematocrit 43.2 and platelet count 118.  INR was 1.03.  He was tested negative for COVID-19.  A stress echocardiogram was abnormal with the report dictated above, and a cardiac catheterization also with the above-mentioned report.    IMPRESSION AND PLAN:  Mr. John Reyes is an extremely pleasant 56-year-old gentleman with a past medical history of dyslipidemia, depression/anxiety, gastroesophageal reflux disease, obstructive sleep apnea and attention deficit disorder, who was having worsening exertional chest pain and dyspnea on exertion for the last month.  He was found to have an abnormal stress test earlier today and was taken to cath lab and found to have 2-vessel coronary artery disease, and he underwent a balloon angioplasty to left anterior descending stenosis, and he  was transferred to [facility] for a complex percutaneous coronary intervention with possible rotablation..    PLAN:    1.  Unstable angina.  2.  Two-vessel coronary artery disease.   The patient was experiencing exertional chest pain in the last month, associated with dyspnea on exertion that would get better with rest.  More recently, he started having chest pain at rest suggestive of unstable angina.  He had a stress echocardiogram earlier today at [facility], which was abnormal, and he had immediate cardiac catheterization at [facility], which showed a 2-vessel coronary artery disease with severe calcified stenosis of the proximal to mid LAD and severe stenosis of OM2.  He underwent balloon angioplasty of LAD stenosis, but unable to cross with cutting balloon or stent.  He had a small, subtle, contained dissection without perforation of the LAD.  He was transferred to [facility] for complex PCI with possible rotablation.  He was given aspirin and Plavix at [facility].  It was recommended him to be started on a heparin drip 6 hours after TR band will be removed, given concern for a small right wrist hematoma.  I saw the patient after he arrived in [facility].  He is resting comfortable.  Denies any chest pain.  Plan to continue with aspirin and a heparin drip at this time.  He will have fasting lipid profile checked in the morning.  His qpdol-ze-acivgtyrg Crestor was switched to Lipitor.  He was started on a low dose of Lopressor 12.5 mg p.o. twice daily.  He will be monitored on telemetry.  Cardiology consult requested.  We will keep him n.p.o. after midnight in case that he might have to go to cath lab tomorrow, but I anticipate that if he remains stable, his procedure might be postponed until Monday.  Defer to Cardiology if they want to start Plavix before or after complex PCI.    3.  Dyslipidemia:  Again, his pvzzg-iv-sxmovhdgr Crestor was switched to Lipitor.    4.   Gastroesophageal reflux disease:  We will resume his lnwyp-qm-fvbpvipwc omeprazole.    5.  Depression/anxiety:  We will resume his efeth-gs-uzzsgdfhq Wellbutrin 300 mg p.o. daily, Zoloft 150 mg p.o. daily and trazodone at bedtime.    6.  Obstructive sleep apnea:  CPAP at bedtime had been ordered.    CODE STATUS:  Was discussed with the patient.  The patient is full code.    DEEP VENOUS THROMBOSIS PROPHYLAXIS:  The patient is fully anticoagulated with heparin drip.    DISPOSITION:  Admit inpatient.  I anticipate a couple of days of hospitalization.    Carole Mays MD        D: 2021   T: 2021   MT: CMAMT    Name:     JEREMIE COLLIER  MRN:      [id number]        Account:     [id number]   :      1965           Admitted:    2021       Document: [id number]    cc:  Remy Mcknight MD

## 2021-06-19 NOTE — PROGRESS NOTES
[facility]    Medicine Progress Note - Hospitalist Service       Date of Admission:  6/18/2021  Assessment & Plan       Mr. John Reyes is an extremely pleasant 56-year-old gentleman with a past medical history of dyslipidemia, depression/anxiety, gastroesophageal reflux disease, obstructive sleep apnea and attention deficit disorder, who was having worsening exertional chest pain and dyspnea on exertion for the last month.  He was found to have an abnormal stress test earlier today and was taken to cath lab and found to have 2-vessel coronary artery disease, and he underwent a balloon angioplasty to left anterior descending stenosis, and he was transferred to [facility] for a complex percutaneous coronary intervention with possible rotablator.     Unstable angina.  Two-vessel coronary artery disease.   The patient was experiencing exertional chest pain in the last month, associated with dyspnea on exertion that would get better with rest.  More recently, he started having chest pain at rest suggestive of unstable angina.  He had a stress echocardiogram earlier today at [facility], which was abnormal, and he had immediate cardiac catheterization at [facility], which showed a 2-vessel coronary artery disease with severe calcified stenosis of the proximal to mid LAD and severe stenosis of OM2.  He underwent balloon angioplasty of LAD stenosis, but unable to cross with cutting balloon or stent.  He had a small, subtle, contained dissection without perforation of the LAD.  He was transferred to [facility] for complex PCI with possible rotablation.  He was given aspirin and Plavix at [facility].  It was recommended him to be started on a heparin drip 6 hours after TR band will be removed, given concern for a small right wrist hematoma.  I saw the patient after he arrived in [facility].  He is resting comfortable.  Denies any chest pain.    - continue  telemetry monitoring  - ASA and heparin drip  - lipid profile pending  - Crestor changed to lipitor  - Metoprolol 12.5mg BID started. Continue  - Cardiology following  - defer plavix to cardiology  - complex PCI planned for Monday. Will make NPO at midnight tomorrow.    Dyslipidemia:  Again, his covoo-qq-kljsdxhln Crestor was switched to Lipitor.    Gastroesophageal reflux disease:  We will resume his trqyq-zo-mvbwggmcy omeprazole.    Depression/anxiety:  We will resume his kgkup-ms-rhlgcgzso Wellbutrin 300 mg p.o. daily, Zoloft 150 mg p.o. daily and trazodone at bedtime.    Obstructive sleep apnea:  CPAP at bedtime had been ordered.       Diet: NPO per Anesthesia Guidelines for Procedure/Surgery Except for: Meds    DVT Prophylaxis: heparin drip  Melissa Catheter: not present  Code Status: Full Code           Disposition Plan   Expected discharge: 2 - 3 days, recommended to prior living arrangement once cardiac procedure complete and pt recovered.  Entered: Janay Salgado MD 06/19/2021, 9:45 AM       The patient's care was discussed with the Bedside Nurse, Patient and Patient's Family.    Janay Salgado MD  Hospitalist Service  [facility]  Contact information available via [facility] Paging/Directory    ______________________________________________________________________    Interval History   Patient has no complaints this morning. Sitting up eating breakfast. Denies CP or SOB now. Aware of plan for PCI Monday. Had already talked to cardiology. Wife at bedside.    Data reviewed today: I reviewed all medications, new labs and imaging results over the last 24 hours. I personally reviewed no images or EKG's today.    Physical Exam   Vital Signs: Temp: 97.8  F (36.6  C) Temp src: Oral BP: 107/68 Pulse: 57   Resp: 18 SpO2: 97 % O2 Device: None (Room air)    Weight: 231 lbs 0 oz  Constitutional: Awake, alert, cooperative, no apparent distress  HEENT: neck supple, no LAD noted, moist mucous  membranes  Respiratory: Clear to auscultation bilaterally, no crackles or wheezing  Cardiovascular: Regular rate and rhythm, normal S1 and S2, and no murmur noted  GI: Normal bowel sounds, soft, non-distended, non-tender  Skin/Integumen: No rashes, no cyanosis, no edema  Ext: no edema, moving all extremities  Neuro: CN 2-12 intact, non focal exam      Data   Recent Labs   Lab 06/19/21  0604 06/18/21  1756 06/18/21  1005   WBC  --  7.0 6.7   HGB  --  13.5 14.7   MCV  --  93 93   PLT  --  106* 118*   INR  --   --  1.03     --  138   POTASSIUM 4.3  --  4.0   CHLORIDE 109  --  108   CO2 27  --  25   BUN 16  --  19   CR 1.05  --  1.02   ANIONGAP 3  --  5   FRANCISCO 8.3*  --  8.9   *  --  112*     Recent Results (from the past 24 hour(s))   Cardiac Catheterization    Narrative      Prox LAD to Mid LAD lesion is 75% stenosed.    Dist LAD lesion is 40% stenosed.    Prox RCA lesion is 50% stenosed.    1st Diag lesion is 60% stenosed.    2nd Mrg-1 lesion is 90% stenosed.    2nd Mrg-2 lesion is 50% stenosed.    Dist Cx lesion is 50% stenosed.    Left ventricular filling pressures are mildly elevated.     1.  Left dominant coronary arteries with two vessel CAD.  2.  Severe calcific stenosis of the proximal to midLAD.  3.  Severe stenosis of OM2. Moderate stenosis of the midLCx.   4.  Balloon angioplasty of LAD stenosis.  Unable to cross with cutting   balloon or stent.    5.  Small/subtle contained dissection without perforation in the LAD.    6.  Mildly elevated LVEDP 14mmHg.        Medications     heparin 1,200 Units/hr (06/19/21 0830)     - MEDICATION INSTRUCTIONS -       - MEDICATION INSTRUCTIONS -       ACE/ARB/ARNI NOT PRESCRIBED       sodium chloride 100 mL/hr at 06/19/21 0000       aspirin  81 mg Oral Daily     atorvastatin  40 mg Oral Daily     buPROPion  300 mg Oral Daily     metoprolol tartrate  12.5 mg Oral BID     omeprazole  40 mg Oral Daily     sertraline  150 mg Oral Daily     sodium chloride (PF)  3 mL  Intracatheter Q8H     tolterodine ER  2 mg Oral Daily     traZODone  150 mg Oral At Bedtime

## 2021-06-19 NOTE — PLAN OF CARE
VSS. Tele: SR/ SB. Pt had CP 2 times this afternoon and was started on nitroglycerin drip, remains CP free. Heparin drips continues. Plan for angio Monday. See provider notification notes regarding HA. Will continue to monitor.

## 2021-06-19 NOTE — PROGRESS NOTES
Patient initially complained of severe 8/10 headache, PRN tylenol and 5mg oxy given along with ice pack. This adequately relieved his pain. A&O. Vitals stable on room air. Sinus rhythm. Small hematoma near radial artery access point from previous angio, has remained unchanged throughout the night. TR band was re-inflated with 2mls, and then gradually removed. TR band was completely off by 0040. Due to order stating not to start heparin drip until 8hrs after TR band removal, heparin drip was started at 0615. Patient has been NPO since midnight in anticipation for cath lab today.  Yoselin Nicole RN on 6/19/2021 at 6:36 AM

## 2021-06-20 LAB
ANION GAP SERPL CALCULATED.3IONS-SCNC: 5 MMOL/L (ref 3–14)
BUN SERPL-MCNC: 13 MG/DL (ref 7–30)
CALCIUM SERPL-MCNC: 8.6 MG/DL (ref 8.5–10.1)
CHLORIDE SERPL-SCNC: 107 MMOL/L (ref 94–109)
CO2 SERPL-SCNC: 29 MMOL/L (ref 20–32)
CREAT SERPL-MCNC: 1.06 MG/DL (ref 0.66–1.25)
ERYTHROCYTE [DISTWIDTH] IN BLOOD BY AUTOMATED COUNT: 12.3 % (ref 10–15)
GFR SERPL CREATININE-BSD FRML MDRD: 77 ML/MIN/{1.73_M2}
GLUCOSE SERPL-MCNC: 100 MG/DL (ref 70–99)
HCT VFR BLD AUTO: 39.5 % (ref 40–53)
HGB BLD-MCNC: 13.3 G/DL (ref 13.3–17.7)
MAGNESIUM SERPL-MCNC: 2.3 MG/DL (ref 1.6–2.3)
MCH RBC QN AUTO: 31 PG (ref 26.5–33)
MCHC RBC AUTO-ENTMCNC: 33.7 G/DL (ref 31.5–36.5)
MCV RBC AUTO: 92 FL (ref 78–100)
PLATELET # BLD AUTO: 122 10E9/L (ref 150–450)
POTASSIUM SERPL-SCNC: 4.1 MMOL/L (ref 3.4–5.3)
RBC # BLD AUTO: 4.29 10E12/L (ref 4.4–5.9)
SODIUM SERPL-SCNC: 141 MMOL/L (ref 133–144)
UFH PPP CHRO-ACNC: 0.32 IU/ML
UFH PPP CHRO-ACNC: 0.34 IU/ML
WBC # BLD AUTO: 7 10E9/L (ref 4–11)

## 2021-06-20 PROCEDURE — 99232 SBSQ HOSP IP/OBS MODERATE 35: CPT | Performed by: HOSPITALIST

## 2021-06-20 PROCEDURE — 85027 COMPLETE CBC AUTOMATED: CPT | Performed by: INTERNAL MEDICINE

## 2021-06-20 PROCEDURE — 80048 BASIC METABOLIC PNL TOTAL CA: CPT | Performed by: INTERNAL MEDICINE

## 2021-06-20 PROCEDURE — 210N000002 HC R&B HEART CARE

## 2021-06-20 PROCEDURE — 250N000013 HC RX MED GY IP 250 OP 250 PS 637: Performed by: INTERNAL MEDICINE

## 2021-06-20 PROCEDURE — 93005 ELECTROCARDIOGRAM TRACING: CPT

## 2021-06-20 PROCEDURE — 250N000011 HC RX IP 250 OP 636: Performed by: INTERNAL MEDICINE

## 2021-06-20 PROCEDURE — 99233 SBSQ HOSP IP/OBS HIGH 50: CPT | Performed by: INTERNAL MEDICINE

## 2021-06-20 PROCEDURE — 36415 COLL VENOUS BLD VENIPUNCTURE: CPT | Performed by: INTERNAL MEDICINE

## 2021-06-20 PROCEDURE — 85520 HEPARIN ASSAY: CPT | Performed by: INTERNAL MEDICINE

## 2021-06-20 PROCEDURE — 83735 ASSAY OF MAGNESIUM: CPT | Performed by: INTERNAL MEDICINE

## 2021-06-20 RX ORDER — NALOXONE HYDROCHLORIDE 0.4 MG/ML
0.4 INJECTION, SOLUTION INTRAMUSCULAR; INTRAVENOUS; SUBCUTANEOUS
Status: DISCONTINUED | OUTPATIENT
Start: 2021-06-20 | End: 2021-06-23 | Stop reason: HOSPADM

## 2021-06-20 RX ORDER — POTASSIUM CHLORIDE 1500 MG/1
20 TABLET, EXTENDED RELEASE ORAL
Status: COMPLETED | OUTPATIENT
Start: 2021-06-21 | End: 2021-06-21

## 2021-06-20 RX ORDER — LORAZEPAM 2 MG/ML
0.5 INJECTION INTRAMUSCULAR
Status: DISCONTINUED | OUTPATIENT
Start: 2021-06-21 | End: 2021-06-21 | Stop reason: HOSPADM

## 2021-06-20 RX ORDER — HYDROMORPHONE HYDROCHLORIDE 2 MG/1
2 TABLET ORAL
Status: DISCONTINUED | OUTPATIENT
Start: 2021-06-20 | End: 2021-06-22

## 2021-06-20 RX ORDER — LORAZEPAM 0.5 MG/1
0.5 TABLET ORAL
Status: DISCONTINUED | OUTPATIENT
Start: 2021-06-21 | End: 2021-06-21 | Stop reason: HOSPADM

## 2021-06-20 RX ORDER — NALOXONE HYDROCHLORIDE 0.4 MG/ML
0.2 INJECTION, SOLUTION INTRAMUSCULAR; INTRAVENOUS; SUBCUTANEOUS
Status: DISCONTINUED | OUTPATIENT
Start: 2021-06-20 | End: 2021-06-23 | Stop reason: HOSPADM

## 2021-06-20 RX ADMIN — HEPARIN SODIUM 1000 UNITS/HR: 10000 INJECTION, SOLUTION INTRAVENOUS at 03:51

## 2021-06-20 RX ADMIN — CLOPIDOGREL BISULFATE 75 MG: 75 TABLET ORAL at 08:40

## 2021-06-20 RX ADMIN — ACETAMINOPHEN 650 MG: 325 TABLET, FILM COATED ORAL at 09:59

## 2021-06-20 RX ADMIN — TRAZODONE HYDROCHLORIDE 150 MG: 100 TABLET ORAL at 20:07

## 2021-06-20 RX ADMIN — ATORVASTATIN CALCIUM 40 MG: 40 TABLET, FILM COATED ORAL at 20:07

## 2021-06-20 RX ADMIN — METOPROLOL TARTRATE 12.5 MG: 25 TABLET, FILM COATED ORAL at 20:07

## 2021-06-20 RX ADMIN — TOLTERODINE TARTRATE 2 MG: 2 CAPSULE, EXTENDED RELEASE ORAL at 20:07

## 2021-06-20 RX ADMIN — METOPROLOL TARTRATE 12.5 MG: 25 TABLET, FILM COATED ORAL at 08:41

## 2021-06-20 RX ADMIN — ASPIRIN 81 MG CHEWABLE TABLET 81 MG: 81 TABLET CHEWABLE at 08:40

## 2021-06-20 RX ADMIN — ACETAMINOPHEN 650 MG: 325 TABLET, FILM COATED ORAL at 21:09

## 2021-06-20 RX ADMIN — OMEPRAZOLE 40 MG: 20 CAPSULE, DELAYED RELEASE ORAL at 08:40

## 2021-06-20 RX ADMIN — HYDROMORPHONE HYDROCHLORIDE 2 MG: 2 TABLET ORAL at 23:57

## 2021-06-20 RX ADMIN — SERTRALINE HYDROCHLORIDE 150 MG: 100 TABLET ORAL at 08:40

## 2021-06-20 RX ADMIN — HYDROMORPHONE HYDROCHLORIDE 2 MG: 2 TABLET ORAL at 18:32

## 2021-06-20 RX ADMIN — BUPROPION HYDROCHLORIDE 300 MG: 150 TABLET, FILM COATED, EXTENDED RELEASE ORAL at 08:40

## 2021-06-20 RX ADMIN — ACETAMINOPHEN 650 MG: 325 TABLET, FILM COATED ORAL at 16:05

## 2021-06-20 RX ADMIN — OXYCODONE HYDROCHLORIDE 10 MG: 5 TABLET ORAL at 10:57

## 2021-06-20 RX ADMIN — HYDROMORPHONE HYDROCHLORIDE 2 MG: 2 TABLET ORAL at 21:09

## 2021-06-20 RX ADMIN — HYDROMORPHONE HYDROCHLORIDE 2 MG: 2 TABLET ORAL at 15:02

## 2021-06-20 ASSESSMENT — ACTIVITIES OF DAILY LIVING (ADL)
ADLS_ACUITY_SCORE: 12

## 2021-06-20 NOTE — PROGRESS NOTES
[facility]    Cardiology Progress Note    Date of Service (when I saw the patient): 06/20/2021            Assessment and Plan:   1,  Unstable angina: Coronary angiogram performed at [facility] demonstrates two vessel CAD with severe proximal to mid LAD disease and severe OM2 disease.  S/P balloon angioplasty of LAD stenosis.   Unable to cross with cutting balloon or stent resultin gin small/subtle contained dissection without perforation in the LAD.  Transferred to [facility] for complex PCI using rotablator.  Chest pain when ambulating to the bathroom, resolved with increasing nitroglycerin gtt  2.  Hyperlipidemia     PLAN/RECOMMENDATIONS:  -Reviewed case with interventional colleagues Dr. Schrader and Dr. Cohen (on call this weekend).   Plan to do complex PCI on Monday.  -Continue nitroglycerin gtt  -Continue ASA, plavix, statin, metoprolol  -Continue heparin gtt  -Headache management per hospitalist service. Would avoid further use of imitrex given risk for adverse cardiovascular events  -Anticipate coronary angiogram on Monday.  Would perform urgently over the weekend in the event of worsening chest pain unrelieved with medical management, dynamic ECG changes or hemodynamic instability.     Africa Denis MD [facility]                Interval History:     Recurrent chest pain with ambulation to the bathroom.  Relieved with increasing nitroglycerin gtt.  No ECG changes.  No having headache.            Medications:       aspirin  81 mg Oral Daily     atorvastatin  40 mg Oral Daily     buPROPion  300 mg Oral Daily     clopidogrel  75 mg Oral Daily     metoprolol tartrate  12.5 mg Oral BID     omeprazole  40 mg Oral Daily     sertraline  150 mg Oral Daily     sodium chloride (PF)  3 mL Intracatheter Q8H     tolterodine ER  2 mg Oral Daily     traZODone  150 mg Oral At Bedtime            Physical Exam:   Blood pressure 111/71, pulse 60, temperature 97.9  F (36.6  C),  temperature source Oral, resp. rate 16, weight 104.8 kg (231 lb), SpO2 96 %.  Vitals:    21 0521   Weight: 104.8 kg (231 lb)       Intake/Output Summary (Last 24 hours) at 2021 1341  Last data filed at 2021 0600  Gross per 24 hour   Intake 1650 ml   Output --   Net 1650 ml           Vital Sign Ranges  Temperature Temp  Av.7  F (36.5  C)  Min: 97.4  F (36.3  C)  Max: 97.9  F (36.6  C)   Blood pressure Systolic (24hrs), Av , Min:111 , Max:166        Diastolic (24hrs), Av, Min:65, Max:79      Pulse Pulse  Av.3  Min: 54  Max: 67   Respirations Resp  Av.1  Min: 12  Max: 20   Pulse oximetry SpO2  Av.6 %  Min: 94 %  Max: 97 %         EXAM:    Constitutional:    in no apparent distress    Skin:    normal    Head:    Normocephalic, atramatic    Eyes:    pupils equal, round and reactive to light, extra ocular muscles intact, sclera clear, conjunctiva normal    Neck:    JVP    Lungs:    CTAB   Cardiovascular:    S1, S2  RRR no m/rg   Abdomen:    normal bowel sounds    Extremities and Back:    no cyanosis or clubbing and No edema bilaterally   Neurological:    No gross or focal neurologic abnormalities               Data:     Recent Labs   Lab Test 21  0342 21  1756 21  1005   WBC 7.0 7.0 6.7   HGB 13.3 13.5 14.7   MCV 92 93 93   * 106* 118*   INR  --   --  1.03      Recent Labs   Lab Test 21  0342 21  0604    139   POTASSIUM 4.1 4.3   CHLORIDE 107 109   BUN 13 16   CR 1.06 1.05     Recent Labs   Lab 21  0342 21  0604 21  1005   * 122* 112*     Recent Labs   Lab Test 21  1124 19  0921   ALT 65 44   AST 30 23     No results found for: TROPI      Recent Labs   Lab Test 21  0342   MAG 2.3       Lab Results   Component Value Date    CHOL 255 2021     Lab Results   Component Value Date    HDL 35 2021     Lab Results   Component Value Date    LDL  2021     Cannot estimate LDL when  triglyceride exceeds 400 mg/dL     03/05/2021     Lab Results   Component Value Date    TRIG 404 03/05/2021     Lab Results   Component Value Date    CHOLHDLRATIO 4.8 10/30/2015          TSH   Date Value Ref Range Status   12/26/2018 2.93 0.40 - 4.00 mU/L Final         Africa Denis MD, FACC  Cardiology

## 2021-06-20 NOTE — PROGRESS NOTES
[facility]    Medicine Progress Note - Hospitalist Service       Date of Admission:  6/18/2021  Assessment & Plan       Mr. John Reyes is an extremely pleasant 56-year-old gentleman with a past medical history of dyslipidemia, depression/anxiety, gastroesophageal reflux disease, obstructive sleep apnea and attention deficit disorder, who was having worsening exertional chest pain and dyspnea on exertion for the last month.  He was found to have an abnormal stress test earlier today and was taken to cath lab and found to have 2-vessel coronary artery disease, and he underwent a balloon angioplasty to left anterior descending stenosis, and he was transferred to [facility] for a complex percutaneous coronary intervention with possible rotablator.     Unstable angina.  Two-vessel coronary artery disease.   The patient was experiencing exertional chest pain in the last month, associated with dyspnea on exertion that would get better with rest.  More recently, he started having chest pain at rest suggestive of unstable angina.  He had a stress echocardiogram earlier today at [facility], which was abnormal, and he had immediate cardiac catheterization at [facility], which showed a 2-vessel coronary artery disease with severe calcified stenosis of the proximal to mid LAD and severe stenosis of OM2.  He underwent balloon angioplasty of LAD stenosis, but unable to cross with cutting balloon or stent.  He had a small, subtle, contained dissection without perforation of the LAD.  He was transferred to [facility] for complex PCI with possible rotablation.  He was given aspirin and Plavix at [facility].  It was recommended him to be started on a heparin drip 6 hours after TR band will be removed, given concern for a small right wrist hematoma.  I saw the patient after he arrived in [facility].  He is resting comfortable.  Denies any chest pain.    - continue  telemetry monitoring  - ASA and heparin drip  - on nitroglycerin drip since yesterday late afternoon per cardiology  - Crestor changed to lipitor  - Metoprolol 12.5mg BID started.   - Cardiology following  - defer plavix to cardiology  - complex PCI planned for Monday. Will make NPO at midnight.  - HA with nitroglycerin drip - avoid any further imitrex for HAs.    Dyslipidemia:  Again, his ypmec-xn-btvujbfag Crestor was switched to Lipitor.    Gastroesophageal reflux disease:  We will resume his rqajx-ke-kcjisnuyf omeprazole.    Depression/anxiety:  We will resume his nzccu-ye-hvztasoyl Wellbutrin 300 mg p.o. daily, Zoloft 150 mg p.o. daily and trazodone at bedtime.    Obstructive sleep apnea:  CPAP at bedtime had been ordered.       Diet: Low Saturated Fat Na <2400 mg    DVT Prophylaxis: heparin drip  Melissa Catheter: not present  Code Status: Full Code           Disposition Plan   Expected discharge: 2 - 3 days, recommended to prior living arrangement once cardiac procedure complete and pt recovered.  Entered: Janay Salgado MD 06/20/2021, 9:16 AM       The patient's care was discussed with the Bedside Nurse, Patient and Patient's Family.    Janay Salgado MD  Hospitalist Service  [facility]  Contact information available via [facility] Paging/Directory    ______________________________________________________________________    Interval History   Patient had an episode of chest pain yesterday afternoon and SL nitroglycerin was given. Cardiology then started nitroglycerin infusion for ongoing chest pain. Noted that overnight cross cover was called for HA that began with infusion. Patient woke up feeling better but on my visit was having HA again; this worsened after nitroglycerin drip rate increased. Had just received oxycodone.     Data reviewed today: I reviewed all medications, new labs and imaging results over the last 24 hours. I personally reviewed no images or EKG's  today.    Physical Exam   Vital Signs: Temp: 97.6  F (36.4  C) Temp src: Oral BP: 133/79 Pulse: 60   Resp: 16 SpO2: 94 % O2 Device: None (Room air)    Weight: 231 lbs 0 oz  Constitutional: Awake, alert, cooperative, no apparent distress  HEENT: neck supple, no LAD noted, moist mucous membranes  Respiratory: Clear to auscultation bilaterally, no crackles or wheezing  Cardiovascular: Regular rate and rhythm, normal S1 and S2, and no murmur noted  GI: Normal bowel sounds, soft, non-distended, non-tender  Skin/Integumen: No rashes, no cyanosis, no edema  Ext: no edema, moving all extremities  Neuro: CN 2-12 intact, non focal exam      Data   Recent Labs   Lab 06/20/21  0342 06/19/21  0604 06/18/21  1756 06/18/21  1005   WBC 7.0  --  7.0 6.7   HGB 13.3  --  13.5 14.7   MCV 92  --  93 93   *  --  106* 118*   INR  --   --   --  1.03    139  --  138   POTASSIUM 4.1 4.3  --  4.0   CHLORIDE 107 109  --  108   CO2 29 27  --  25   BUN 13 16  --  19   CR 1.06 1.05  --  1.02   ANIONGAP 5 3  --  5   FRANCISCO 8.6 8.3*  --  8.9   * 122*  --  112*     No results found for this or any previous visit (from the past 24 hour(s)).  Medications     heparin 1,000 Units/hr (06/20/21 0842)     - MEDICATION INSTRUCTIONS -       nitroGLYcerin 50 mg in D5W 250 mL 10 mcg/min (06/20/21 0842)     - MEDICATION INSTRUCTIONS -       ACE/ARB/ARNI NOT PRESCRIBED       sodium chloride 100 mL/hr at 06/19/21 0000       aspirin  81 mg Oral Daily     atorvastatin  40 mg Oral Daily     buPROPion  300 mg Oral Daily     clopidogrel  75 mg Oral Daily     metoprolol tartrate  12.5 mg Oral BID     omeprazole  40 mg Oral Daily     sertraline  150 mg Oral Daily     sodium chloride (PF)  3 mL Intracatheter Q8H     tolterodine ER  2 mg Oral Daily     traZODone  150 mg Oral At Bedtime

## 2021-06-20 NOTE — PLAN OF CARE
PT/CR:  coronary angiogram on Monday, per cardiology notes. Hold CR for optimal education and assessment post PCI.

## 2021-06-20 NOTE — PLAN OF CARE
Tele: SB/SR. VSS. Pt had chest pain after going to the bathroom. Nitroglycerin drip titrated up until relief of CP. Per cardiology - bedrest. Heparin drip continues. Pt C/o HA most of day, tylenol, oxycodone, Dilaudid and ice packs given with some relief noted. NPO and midnight, angio tomorrow.  Will continue to monitor.

## 2021-06-20 NOTE — PLAN OF CARE
Patient alert , SBA. Pt  had throbbing headache,gone after sleeping a few hours s/p HS meds, 500ml bolus, Mg+ and Imitrex. Nitroglycerin drip at 10mcg hr continues. Denies CP or SOB entire shift. BP stable. Tele SR SB 58.Heparin drip continues. Uses own CPAP at night. Pt to have complex PCI on Monday if remains stable.

## 2021-06-21 LAB
ANION GAP SERPL CALCULATED.3IONS-SCNC: 3 MMOL/L (ref 3–14)
BUN SERPL-MCNC: 19 MG/DL (ref 7–30)
CALCIUM SERPL-MCNC: 8.5 MG/DL (ref 8.5–10.1)
CHLORIDE SERPL-SCNC: 107 MMOL/L (ref 94–109)
CO2 SERPL-SCNC: 28 MMOL/L (ref 20–32)
CREAT SERPL-MCNC: 1.14 MG/DL (ref 0.66–1.25)
ERYTHROCYTE [DISTWIDTH] IN BLOOD BY AUTOMATED COUNT: 12.2 % (ref 10–15)
GFR SERPL CREATININE-BSD FRML MDRD: 70 ML/MIN/{1.73_M2}
GLUCOSE SERPL-MCNC: 101 MG/DL (ref 70–99)
HCT VFR BLD AUTO: 38.4 % (ref 40–53)
HGB BLD-MCNC: 12.9 G/DL (ref 13.3–17.7)
KCT BLD-ACNC: 324 SEC (ref 75–150)
KCT BLD-ACNC: 392 SEC (ref 75–150)
MCH RBC QN AUTO: 30.9 PG (ref 26.5–33)
MCHC RBC AUTO-ENTMCNC: 33.6 G/DL (ref 31.5–36.5)
MCV RBC AUTO: 92 FL (ref 78–100)
PLATELET # BLD AUTO: 114 10E9/L (ref 150–450)
POTASSIUM SERPL-SCNC: 3.8 MMOL/L (ref 3.4–5.3)
RBC # BLD AUTO: 4.17 10E12/L (ref 4.4–5.9)
SODIUM SERPL-SCNC: 138 MMOL/L (ref 133–144)
UFH PPP CHRO-ACNC: 0.36 IU/ML
WBC # BLD AUTO: 7.1 10E9/L (ref 4–11)

## 2021-06-21 PROCEDURE — 80048 BASIC METABOLIC PNL TOTAL CA: CPT | Performed by: HOSPITALIST

## 2021-06-21 PROCEDURE — 250N000011 HC RX IP 250 OP 636: Performed by: INTERNAL MEDICINE

## 2021-06-21 PROCEDURE — 99232 SBSQ HOSP IP/OBS MODERATE 35: CPT | Performed by: HOSPITALIST

## 2021-06-21 PROCEDURE — 250N000013 HC RX MED GY IP 250 OP 250 PS 637: Performed by: INTERNAL MEDICINE

## 2021-06-21 PROCEDURE — 99153 MOD SED SAME PHYS/QHP EA: CPT | Performed by: INTERNAL MEDICINE

## 2021-06-21 PROCEDURE — 250N000009 HC RX 250: Performed by: INTERNAL MEDICINE

## 2021-06-21 PROCEDURE — B2111ZZ FLUOROSCOPY OF MULTIPLE CORONARY ARTERIES USING LOW OSMOLAR CONTRAST: ICD-10-PCS | Performed by: INTERNAL MEDICINE

## 2021-06-21 PROCEDURE — 272N000001 HC OR GENERAL SUPPLY STERILE: Performed by: INTERNAL MEDICINE

## 2021-06-21 PROCEDURE — 99232 SBSQ HOSP IP/OBS MODERATE 35: CPT | Mod: 25 | Performed by: INTERNAL MEDICINE

## 2021-06-21 PROCEDURE — 210N000002 HC R&B HEART CARE

## 2021-06-21 PROCEDURE — C1894 INTRO/SHEATH, NON-LASER: HCPCS | Performed by: INTERNAL MEDICINE

## 2021-06-21 PROCEDURE — 85347 COAGULATION TIME ACTIVATED: CPT

## 2021-06-21 PROCEDURE — 85520 HEPARIN ASSAY: CPT | Performed by: HOSPITALIST

## 2021-06-21 PROCEDURE — C1760 CLOSURE DEV, VASC: HCPCS | Performed by: INTERNAL MEDICINE

## 2021-06-21 PROCEDURE — 93005 ELECTROCARDIOGRAM TRACING: CPT

## 2021-06-21 PROCEDURE — 85027 COMPLETE CBC AUTOMATED: CPT | Performed by: HOSPITALIST

## 2021-06-21 PROCEDURE — 027035Z DILATION OF CORONARY ARTERY, ONE ARTERY WITH TWO DRUG-ELUTING INTRALUMINAL DEVICES, PERCUTANEOUS APPROACH: ICD-10-PCS | Performed by: INTERNAL MEDICINE

## 2021-06-21 PROCEDURE — C9600 PERC DRUG-EL COR STENT SING: HCPCS | Mod: LC | Performed by: INTERNAL MEDICINE

## 2021-06-21 PROCEDURE — 99152 MOD SED SAME PHYS/QHP 5/>YRS: CPT | Performed by: INTERNAL MEDICINE

## 2021-06-21 PROCEDURE — C1874 STENT, COATED/COV W/DEL SYS: HCPCS | Performed by: INTERNAL MEDICINE

## 2021-06-21 PROCEDURE — 258N000003 HC RX IP 258 OP 636: Performed by: INTERNAL MEDICINE

## 2021-06-21 PROCEDURE — 36415 COLL VENOUS BLD VENIPUNCTURE: CPT | Performed by: HOSPITALIST

## 2021-06-21 PROCEDURE — C1725 CATH, TRANSLUMIN NON-LASER: HCPCS | Performed by: INTERNAL MEDICINE

## 2021-06-21 PROCEDURE — C1769 GUIDE WIRE: HCPCS | Performed by: INTERNAL MEDICINE

## 2021-06-21 PROCEDURE — C1887 CATHETER, GUIDING: HCPCS | Performed by: INTERNAL MEDICINE

## 2021-06-21 DEVICE — STENT SYNERGY DRUG ELUTING 2.75X24MM  H7493926024270: Type: IMPLANTABLE DEVICE | Status: FUNCTIONAL

## 2021-06-21 RX ORDER — IOPAMIDOL 755 MG/ML
INJECTION, SOLUTION INTRAVASCULAR
Status: DISCONTINUED | OUTPATIENT
Start: 2021-06-21 | End: 2021-06-21 | Stop reason: HOSPADM

## 2021-06-21 RX ORDER — NALOXONE HYDROCHLORIDE 0.4 MG/ML
0.2 INJECTION, SOLUTION INTRAMUSCULAR; INTRAVENOUS; SUBCUTANEOUS
Status: DISCONTINUED | OUTPATIENT
Start: 2021-06-21 | End: 2021-06-21

## 2021-06-21 RX ORDER — NALOXONE HYDROCHLORIDE 0.4 MG/ML
0.4 INJECTION, SOLUTION INTRAMUSCULAR; INTRAVENOUS; SUBCUTANEOUS
Status: DISCONTINUED | OUTPATIENT
Start: 2021-06-21 | End: 2021-06-21

## 2021-06-21 RX ORDER — ONDANSETRON 4 MG/1
4 TABLET, ORALLY DISINTEGRATING ORAL EVERY 6 HOURS PRN
Status: DISCONTINUED | OUTPATIENT
Start: 2021-06-21 | End: 2021-06-21

## 2021-06-21 RX ORDER — LIDOCAINE 40 MG/G
CREAM TOPICAL
Status: DISCONTINUED | OUTPATIENT
Start: 2021-06-21 | End: 2021-06-21

## 2021-06-21 RX ORDER — HEPARIN SODIUM 1000 [USP'U]/ML
INJECTION, SOLUTION INTRAVENOUS; SUBCUTANEOUS
Status: DISCONTINUED | OUTPATIENT
Start: 2021-06-21 | End: 2021-06-21 | Stop reason: HOSPADM

## 2021-06-21 RX ORDER — OXYCODONE HYDROCHLORIDE 5 MG/1
10 TABLET ORAL EVERY 4 HOURS PRN
Status: DISCONTINUED | OUTPATIENT
Start: 2021-06-21 | End: 2021-06-23 | Stop reason: HOSPADM

## 2021-06-21 RX ORDER — NITROGLYCERIN 0.4 MG/1
TABLET SUBLINGUAL
Status: DISCONTINUED | OUTPATIENT
Start: 2021-06-21 | End: 2021-06-21 | Stop reason: HOSPADM

## 2021-06-21 RX ORDER — ONDANSETRON 2 MG/ML
4 INJECTION INTRAMUSCULAR; INTRAVENOUS EVERY 6 HOURS PRN
Status: DISCONTINUED | OUTPATIENT
Start: 2021-06-21 | End: 2021-06-21

## 2021-06-21 RX ORDER — ACETAMINOPHEN 325 MG/1
650 TABLET ORAL EVERY 4 HOURS PRN
Status: DISCONTINUED | OUTPATIENT
Start: 2021-06-21 | End: 2021-06-23 | Stop reason: HOSPADM

## 2021-06-21 RX ORDER — FENTANYL CITRATE 50 UG/ML
25-50 INJECTION, SOLUTION INTRAMUSCULAR; INTRAVENOUS
Status: ACTIVE | OUTPATIENT
Start: 2021-06-21 | End: 2021-06-21

## 2021-06-21 RX ORDER — SODIUM CHLORIDE 9 MG/ML
INJECTION, SOLUTION INTRAVENOUS CONTINUOUS
Status: DISCONTINUED | OUTPATIENT
Start: 2021-06-21 | End: 2021-06-21 | Stop reason: HOSPADM

## 2021-06-21 RX ORDER — NITROGLYCERIN 5 MG/ML
VIAL (ML) INTRAVENOUS
Status: DISCONTINUED | OUTPATIENT
Start: 2021-06-21 | End: 2021-06-21 | Stop reason: HOSPADM

## 2021-06-21 RX ORDER — NITROGLYCERIN 0.4 MG/1
0.4 TABLET SUBLINGUAL EVERY 5 MIN PRN
Status: DISCONTINUED | OUTPATIENT
Start: 2021-06-21 | End: 2021-06-21

## 2021-06-21 RX ORDER — FENTANYL CITRATE 50 UG/ML
INJECTION, SOLUTION INTRAMUSCULAR; INTRAVENOUS
Status: DISCONTINUED | OUTPATIENT
Start: 2021-06-21 | End: 2021-06-21 | Stop reason: HOSPADM

## 2021-06-21 RX ORDER — OXYCODONE HYDROCHLORIDE 5 MG/1
5 TABLET ORAL EVERY 4 HOURS PRN
Status: DISCONTINUED | OUTPATIENT
Start: 2021-06-21 | End: 2021-06-23 | Stop reason: HOSPADM

## 2021-06-21 RX ORDER — ASPIRIN 81 MG/1
81 TABLET ORAL DAILY
Status: DISCONTINUED | OUTPATIENT
Start: 2021-06-22 | End: 2021-06-23 | Stop reason: HOSPADM

## 2021-06-21 RX ORDER — HYDRALAZINE HYDROCHLORIDE 20 MG/ML
10 INJECTION INTRAMUSCULAR; INTRAVENOUS EVERY 4 HOURS PRN
Status: DISCONTINUED | OUTPATIENT
Start: 2021-06-21 | End: 2021-06-22

## 2021-06-21 RX ORDER — FLUMAZENIL 0.1 MG/ML
0.2 INJECTION, SOLUTION INTRAVENOUS
Status: ACTIVE | OUTPATIENT
Start: 2021-06-21 | End: 2021-06-22

## 2021-06-21 RX ORDER — SODIUM CHLORIDE 9 MG/ML
INJECTION, SOLUTION INTRAVENOUS CONTINUOUS
Status: ACTIVE | OUTPATIENT
Start: 2021-06-21 | End: 2021-06-21

## 2021-06-21 RX ORDER — METOPROLOL TARTRATE 1 MG/ML
5-10 INJECTION, SOLUTION INTRAVENOUS
Status: DISCONTINUED | OUTPATIENT
Start: 2021-06-21 | End: 2021-06-23 | Stop reason: HOSPADM

## 2021-06-21 RX ORDER — ATROPINE SULFATE 0.1 MG/ML
0.5 INJECTION INTRAVENOUS
Status: ACTIVE | OUTPATIENT
Start: 2021-06-21 | End: 2021-06-22

## 2021-06-21 RX ORDER — ASPIRIN 81 MG/1
81 TABLET, CHEWABLE ORAL ONCE
Status: DISCONTINUED | OUTPATIENT
Start: 2021-06-21 | End: 2021-06-21

## 2021-06-21 RX ADMIN — BUPROPION HYDROCHLORIDE 300 MG: 150 TABLET, FILM COATED, EXTENDED RELEASE ORAL at 08:35

## 2021-06-21 RX ADMIN — NITROGLYCERIN 20 MCG/MIN: 20 INJECTION INTRAVENOUS at 14:30

## 2021-06-21 RX ADMIN — ONDANSETRON 4 MG: 2 INJECTION INTRAMUSCULAR; INTRAVENOUS at 16:06

## 2021-06-21 RX ADMIN — METOPROLOL TARTRATE 12.5 MG: 25 TABLET, FILM COATED ORAL at 20:50

## 2021-06-21 RX ADMIN — SERTRALINE HYDROCHLORIDE 150 MG: 100 TABLET ORAL at 08:35

## 2021-06-21 RX ADMIN — METOPROLOL TARTRATE 12.5 MG: 25 TABLET, FILM COATED ORAL at 08:35

## 2021-06-21 RX ADMIN — ACETAMINOPHEN 650 MG: 325 TABLET, FILM COATED ORAL at 09:29

## 2021-06-21 RX ADMIN — POTASSIUM CHLORIDE 20 MEQ: 1500 TABLET, EXTENDED RELEASE ORAL at 08:36

## 2021-06-21 RX ADMIN — HEPARIN SODIUM 1000 UNITS/HR: 10000 INJECTION, SOLUTION INTRAVENOUS at 06:32

## 2021-06-21 RX ADMIN — NITROGLYCERIN 30 MCG/MIN: 20 INJECTION INTRAVENOUS at 03:47

## 2021-06-21 RX ADMIN — TRAZODONE HYDROCHLORIDE 150 MG: 100 TABLET ORAL at 21:59

## 2021-06-21 RX ADMIN — CLOPIDOGREL BISULFATE 75 MG: 75 TABLET ORAL at 08:35

## 2021-06-21 RX ADMIN — TOLTERODINE TARTRATE 2 MG: 2 CAPSULE, EXTENDED RELEASE ORAL at 20:50

## 2021-06-21 RX ADMIN — ATORVASTATIN CALCIUM 40 MG: 40 TABLET, FILM COATED ORAL at 20:50

## 2021-06-21 RX ADMIN — ACETAMINOPHEN 650 MG: 325 TABLET, FILM COATED ORAL at 15:47

## 2021-06-21 RX ADMIN — OMEPRAZOLE 40 MG: 20 CAPSULE, DELAYED RELEASE ORAL at 08:35

## 2021-06-21 RX ADMIN — ASPIRIN 325 MG: 325 TABLET, COATED ORAL at 08:35

## 2021-06-21 ASSESSMENT — ACTIVITIES OF DAILY LIVING (ADL)
ADLS_ACUITY_SCORE: 12

## 2021-06-21 NOTE — PROGRESS NOTES
[facility]  Cardiology Progress Note    Date of Service (when I saw the patient): 06/21/2021  Summary: John Reyes is a 56 year old male with history of dyslipidemia who was admitted on 6/18/2021 after an outpatient angiogram with unstable angina.  Interval History   He denies chest pain or shortness of breath overnight. He reports  A headache from the nitroglycerin drip. His R radial access site has some very mild bruising but no tenderness.   Assessment & Plan    1.  Unstable angina. Had stress test on 6/18 for progressive symptoms of SOB and chest tightness with exertion which was markedly abnormal. Coronary angiogram at [facility] on 6/18 showed severe proximal to mild LAD disease and severe OM2 lesion. He had moderate disease elsewhere. Resting echo on 6/18/21 showed normal LV function without WMAs.  -  S/p balloon angioplasty of LAD stenosis but unable to cross with cutting balloon or stent resulting in a small contained dissection without perforation in the LAD.   -  Transferred to [facility] for complex PCI with possible rotablator.  -  On nitrogylcerin gtt, heparin gtt, aspirin, plavix, statin, and low dose beta blocker.   2.  Dyslipidemia. Historically on Lipitor but recently switched to Crestor 20 mg daily as an outpatient prior to admission. Last LDL in March was 158, TGs 404, total cholesterol 255, HDL 35.  -  Lipitor 40 mg daily ordered here.      Plan:  1.  Continue current medications (aspirin, plavix, nitroglycerin, heparin, metoprolol.  2.  Coronary angiography with complex PCI today.     Kimmie Dangelo PA-C    Physical Exam   Temp: 97.8  F (36.6  C) Temp src: Oral BP: 94/47 Pulse: 64   Resp: 18 SpO2: 97 % O2 Device: None (Room air)    Vitals:    06/19/21 0521 06/21/21 0131   Weight: 104.8 kg (231 lb) 104.5 kg (230 lb 6.4 oz)     Vital Signs with Ranges  Temp:  [97.8  F (36.6  C)-98.2  F (36.8  C)] 97.8  F (36.6  C)  Pulse:  [56-64] 64  Resp:  [16-18] 18  BP:  ()/(41-78) 94/47  SpO2:  [94 %-97 %] 97 %  06/16 0700 - 06/21 0659  In: 2370 [P.O.:1720; I.V.:650]  Out: 550 [Urine:550]  Net: 1820  Constitutional: NAD.   Respiratory: CTAB.   Cardiovascular: RRR, s1s2, no murmur. R radial access site with mild ecchymosis. No hematoma, bruit, or tenderness.  GI: soft, BS+  Skin: warm, no rashes  Musculoskeletal: Moving all extremities  Neurologic: Alert, oriented x 3  Neuropsychiatric: Normal affect   Data   Results for orders placed or performed during the hospital encounter of 06/18/21 (from the past 24 hour(s))   EKG 12-lead, tracing only   Result Value Ref Range    Interpretation ECG Click View Image link to view waveform and result    Heparin Unfractionated Anti Xa Level   Result Value Ref Range    Heparin Unfractionated Anti Xa Level 0.34 IU/mL   Basic metabolic panel   Result Value Ref Range    Sodium 138 133 - 144 mmol/L    Potassium 3.8 3.4 - 5.3 mmol/L    Chloride 107 94 - 109 mmol/L    Carbon Dioxide 28 20 - 32 mmol/L    Anion Gap 3 3 - 14 mmol/L    Glucose 101 (H) 70 - 99 mg/dL    Urea Nitrogen 19 7 - 30 mg/dL    Creatinine 1.14 0.66 - 1.25 mg/dL    GFR Estimate 70 >60 mL/min/[1.73_m2]    GFR Estimate If Black 82 >60 mL/min/[1.73_m2]    Calcium 8.5 8.5 - 10.1 mg/dL   CBC with platelets   Result Value Ref Range    WBC 7.1 4.0 - 11.0 10e9/L    RBC Count 4.17 (L) 4.4 - 5.9 10e12/L    Hemoglobin 12.9 (L) 13.3 - 17.7 g/dL    Hematocrit 38.4 (L) 40.0 - 53.0 %    MCV 92 78 - 100 fl    MCH 30.9 26.5 - 33.0 pg    MCHC 33.6 31.5 - 36.5 g/dL    RDW 12.2 10.0 - 15.0 %    Platelet Count 114 (L) 150 - 450 10e9/L   Heparin Unfractionated Anti Xa Level   Result Value Ref Range    Heparin Unfractionated Anti Xa Level 0.36 IU/mL        Tele SR    Medications     heparin 1,000 Units/hr (06/21/21 0679)     - MEDICATION INSTRUCTIONS -       nitroGLYcerin 50 mg in D5W 250 mL 20 mcg/min (06/21/21 0350)     - MEDICATION INSTRUCTIONS -       - MEDICATION INSTRUCTIONS -       ACE/ARB/ARNI  NOT PRESCRIBED       sodium chloride       sodium chloride 100 mL/hr at 06/19/21 0000       aspirin  81 mg Oral Daily     aspirin  325 mg Oral Daily     atorvastatin  40 mg Oral Daily     buPROPion  300 mg Oral Daily     clopidogrel  75 mg Oral Daily     metoprolol tartrate  12.5 mg Oral BID     omeprazole  40 mg Oral Daily     sertraline  150 mg Oral Daily     sodium chloride (PF)  3 mL Intracatheter Q8H     tolterodine ER  2 mg Oral Daily     traZODone  150 mg Oral At Bedtime

## 2021-06-21 NOTE — PLAN OF CARE
Patient alert, SBA, Bedrest for now d/t earlier CP when up to bathroom. Denies CP during the night. Nitroglycerin drip at 30mcg down to 20mcg this am due to drop in BP but asymptomatic. Headache improved with dilaudid po, able to sleep longer intervals. Heparin drip. Uses own cpap. NPO for complex PCI today.

## 2021-06-21 NOTE — PLAN OF CARE
Pt. A&O. VSS. Right radial site ecchymotic otherwise WNL. X1 episode of 7/10 chest pain with use of urinal at bedside. Nitroglycerin gtt titrated up. Nausea due to headache. Resolved with IV zofran. Sent to cath lab. Family present.

## 2021-06-21 NOTE — PROGRESS NOTES
[facility]    Medicine Progress Note - Hospitalist Service       Date of Admission:  6/18/2021  Assessment & Plan       Mr. John Reyes is an extremely pleasant 56-year-old gentleman with a past medical history of dyslipidemia, depression/anxiety, gastroesophageal reflux disease, obstructive sleep apnea and attention deficit disorder, who was having worsening exertional chest pain and dyspnea on exertion for the last month.  He was found to have an abnormal stress test earlier today and was taken to cath lab and found to have 2-vessel coronary artery disease, and he underwent a balloon angioplasty to left anterior descending stenosis, and he was transferred to [facility] for a complex percutaneous coronary intervention with possible rotablator.     Unstable angina.  Two-vessel coronary artery disease.   The patient was experiencing exertional chest pain in the last month, associated with dyspnea on exertion that would get better with rest.  More recently, he started having chest pain at rest suggestive of unstable angina.  He had a stress echocardiogram earlier today at [facility], which was abnormal, and he had immediate cardiac catheterization at [facility], which showed a 2-vessel coronary artery disease with severe calcified stenosis of the proximal to mid LAD and severe stenosis of OM2.  He underwent balloon angioplasty of LAD stenosis, but unable to cross with cutting balloon or stent.  He had a small, subtle, contained dissection without perforation of the LAD.  He was transferred to [facility] for complex PCI with possible rotablation.  He was given aspirin and Plavix at [facility].  It was recommended him to be started on a heparin drip 6 hours after TR band will be removed, given concern for a small right wrist hematoma.  I saw the patient after he arrived in [facility].  He is resting comfortable.  Denies any chest pain.    - continue  telemetry monitoring  - ASA and heparin drip  - on nitroglycerin drip per cardiology  - Crestor changed to lipitor  - Metoprolol 12.5mg BID started.   - Cardiology following  - defer plavix to cardiology  - complex PCI planned for today - scheduled for 1pm    Dyslipidemia:  Again, his mfxwj-mh-pabztldsc Crestor was switched to Lipitor.    Gastroesophageal reflux disease:  We will resume his exfzp-ng-nxwjrqkls omeprazole.    Depression/anxiety:  We will resume his uiypt-ny-dkkzqukrh Wellbutrin 300 mg p.o. daily, Zoloft 150 mg p.o. daily and trazodone at bedtime.    Obstructive sleep apnea:  CPAP at bedtime had been ordered.       Diet: NPO per Anesthesia Guidelines for Procedure/Surgery Except for: Meds, Ice Chips  NPO for Medical/Clinical Reasons Except for: Meds    DVT Prophylaxis: heparin drip  Not present  Code Status: Full Code           Disposition Plan   Expected discharge: 2 - 3 days, recommended to prior living arrangement once cardiac procedure complete and pt recovered.  Entered: Janay Salgado MD 06/21/2021, 12:12 PM       The patient's care was discussed with the Bedside Nurse, Patient and Patient's Family.    Janay Salgado MD  Hospitalist Service  [facility]  Contact information available via [facility] Paging/Directory    ______________________________________________________________________    Interval History   Patient continued to have headaches on nitroglycerin infusion. These were resistant to all attempts at treatment with what we had available. Pt going to procedure today. Wife at bedside. No complaints other than the persistent nitroglycerin related HA.     Data reviewed today: I reviewed all medications, new labs and imaging results over the last 24 hours. I personally reviewed no images or EKG's today.    Physical Exam   Vital Signs: Temp: 97.8  F (36.6  C) Temp src: Oral BP: 106/71(self removing BP cuff. wife ER RN at [facility] in room. ) Pulse: 55   Resp: 18  SpO2: 97 % O2 Device: None (Room air)    Weight: 230 lbs 6.4 oz  Constitutional: Awake, alert, cooperative, no apparent distress  HEENT: neck supple, no LAD noted, moist mucous membranes  Respiratory: Clear to auscultation bilaterally, no crackles or wheezing  Cardiovascular: Regular rate and rhythm, normal S1 and S2, and no murmur noted  GI: Normal bowel sounds, soft, non-distended, non-tender  Skin/Integumen: No rashes, no cyanosis, no edema  Ext: no edema, moving all extremities  Neuro: CN 2-12 intact, non focal exam      Data   Recent Labs   Lab 06/21/21  0556 06/20/21  0342 06/19/21  0604 06/18/21  1756 06/18/21  1005   WBC 7.1 7.0  --  7.0 6.7   HGB 12.9* 13.3  --  13.5 14.7   MCV 92 92  --  93 93   * 122*  --  106* 118*   INR  --   --   --   --  1.03    141 139  --  138   POTASSIUM 3.8 4.1 4.3  --  4.0   CHLORIDE 107 107 109  --  108   CO2 28 29 27  --  25   BUN 19 13 16  --  19   CR 1.14 1.06 1.05  --  1.02   ANIONGAP 3 5 3  --  5   FRANCISCO 8.5 8.6 8.3*  --  8.9   * 100* 122*  --  112*     No results found for this or any previous visit (from the past 24 hour(s)).  Medications     heparin 1,000 Units/hr (06/21/21 0632)     - MEDICATION INSTRUCTIONS -       nitroGLYcerin 50 mg in D5W 250 mL 20 mcg/min (06/21/21 0350)     - MEDICATION INSTRUCTIONS -       - MEDICATION INSTRUCTIONS -       ACE/ARB/ARNI NOT PRESCRIBED       sodium chloride 150 mL/hr at 06/21/21 0900     sodium chloride 100 mL/hr at 06/19/21 0000       aspirin  81 mg Oral Daily     aspirin  325 mg Oral Daily     atorvastatin  40 mg Oral Daily     buPROPion  300 mg Oral Daily     clopidogrel  75 mg Oral Daily     metoprolol tartrate  12.5 mg Oral BID     omeprazole  40 mg Oral Daily     sertraline  150 mg Oral Daily     sodium chloride (PF)  3 mL Intracatheter Q8H     tolterodine ER  2 mg Oral Daily     traZODone  150 mg Oral At Bedtime

## 2021-06-22 ENCOUNTER — APPOINTMENT (OUTPATIENT)
Dept: PHYSICAL THERAPY | Facility: CLINIC | Age: 56
DRG: 247 | End: 2021-06-22
Attending: INTERNAL MEDICINE
Payer: COMMERCIAL

## 2021-06-22 LAB
ANION GAP SERPL CALCULATED.3IONS-SCNC: 5 MMOL/L (ref 3–14)
BUN SERPL-MCNC: 15 MG/DL (ref 7–30)
CALCIUM SERPL-MCNC: 9 MG/DL (ref 8.5–10.1)
CHLORIDE SERPL-SCNC: 106 MMOL/L (ref 94–109)
CO2 SERPL-SCNC: 26 MMOL/L (ref 20–32)
CREAT SERPL-MCNC: 1.03 MG/DL (ref 0.66–1.25)
ERYTHROCYTE [DISTWIDTH] IN BLOOD BY AUTOMATED COUNT: 12 % (ref 10–15)
GFR SERPL CREATININE-BSD FRML MDRD: 80 ML/MIN/{1.73_M2}
GLUCOSE SERPL-MCNC: 108 MG/DL (ref 70–99)
HCT VFR BLD AUTO: 40.1 % (ref 40–53)
HGB BLD-MCNC: 13.7 G/DL (ref 13.3–17.7)
MCH RBC QN AUTO: 31.1 PG (ref 26.5–33)
MCHC RBC AUTO-ENTMCNC: 34.2 G/DL (ref 31.5–36.5)
MCV RBC AUTO: 91 FL (ref 78–100)
PLATELET # BLD AUTO: 133 10E9/L (ref 150–450)
POTASSIUM SERPL-SCNC: 4.3 MMOL/L (ref 3.4–5.3)
RBC # BLD AUTO: 4.41 10E12/L (ref 4.4–5.9)
SODIUM SERPL-SCNC: 137 MMOL/L (ref 133–144)
UFH PPP CHRO-ACNC: <0.1 IU/ML
WBC # BLD AUTO: 8.2 10E9/L (ref 4–11)

## 2021-06-22 PROCEDURE — 99232 SBSQ HOSP IP/OBS MODERATE 35: CPT | Performed by: INTERNAL MEDICINE

## 2021-06-22 PROCEDURE — 85520 HEPARIN ASSAY: CPT | Performed by: HOSPITALIST

## 2021-06-22 PROCEDURE — 97161 PT EVAL LOW COMPLEX 20 MIN: CPT | Mod: GP | Performed by: PHYSICAL THERAPIST

## 2021-06-22 PROCEDURE — 210N000002 HC R&B HEART CARE

## 2021-06-22 PROCEDURE — 250N000013 HC RX MED GY IP 250 OP 250 PS 637: Performed by: HOSPITALIST

## 2021-06-22 PROCEDURE — 80048 BASIC METABOLIC PNL TOTAL CA: CPT | Performed by: HOSPITALIST

## 2021-06-22 PROCEDURE — 250N000013 HC RX MED GY IP 250 OP 250 PS 637: Performed by: INTERNAL MEDICINE

## 2021-06-22 PROCEDURE — 85027 COMPLETE CBC AUTOMATED: CPT | Performed by: HOSPITALIST

## 2021-06-22 PROCEDURE — 97530 THERAPEUTIC ACTIVITIES: CPT | Mod: GP | Performed by: PHYSICAL THERAPIST

## 2021-06-22 PROCEDURE — 250N000013 HC RX MED GY IP 250 OP 250 PS 637: Performed by: PHYSICIAN ASSISTANT

## 2021-06-22 PROCEDURE — 99232 SBSQ HOSP IP/OBS MODERATE 35: CPT | Performed by: HOSPITALIST

## 2021-06-22 PROCEDURE — 250N000013 HC RX MED GY IP 250 OP 250 PS 637: Performed by: STUDENT IN AN ORGANIZED HEALTH CARE EDUCATION/TRAINING PROGRAM

## 2021-06-22 PROCEDURE — 93005 ELECTROCARDIOGRAM TRACING: CPT

## 2021-06-22 PROCEDURE — 97110 THERAPEUTIC EXERCISES: CPT | Mod: GP | Performed by: PHYSICAL THERAPIST

## 2021-06-22 PROCEDURE — 36415 COLL VENOUS BLD VENIPUNCTURE: CPT | Performed by: HOSPITALIST

## 2021-06-22 RX ORDER — AMLODIPINE BESYLATE 2.5 MG/1
2.5 TABLET ORAL DAILY
Qty: 30 TABLET | Refills: 1 | Status: SHIPPED | OUTPATIENT
Start: 2021-06-23 | End: 2021-07-29

## 2021-06-22 RX ORDER — ATORVASTATIN CALCIUM 40 MG/1
40 TABLET, FILM COATED ORAL DAILY
Qty: 30 TABLET | Refills: 3 | Status: SHIPPED | OUTPATIENT
Start: 2021-06-22 | End: 2021-07-29

## 2021-06-22 RX ORDER — METOPROLOL TARTRATE 25 MG/1
12.5 TABLET, FILM COATED ORAL 2 TIMES DAILY
Qty: 60 TABLET | Refills: 1 | Status: SHIPPED | OUTPATIENT
Start: 2021-06-22 | End: 2021-10-19

## 2021-06-22 RX ORDER — BUTALBITAL, ACETAMINOPHEN AND CAFFEINE 50; 325; 40 MG/1; MG/1; MG/1
1 TABLET ORAL EVERY 4 HOURS PRN
Status: DISCONTINUED | OUTPATIENT
Start: 2021-06-22 | End: 2021-06-23 | Stop reason: HOSPADM

## 2021-06-22 RX ORDER — IBUPROFEN 600 MG/1
600 TABLET, FILM COATED ORAL ONCE
Status: COMPLETED | OUTPATIENT
Start: 2021-06-22 | End: 2021-06-22

## 2021-06-22 RX ORDER — AMLODIPINE BESYLATE 2.5 MG/1
2.5 TABLET ORAL DAILY
Status: DISCONTINUED | OUTPATIENT
Start: 2021-06-22 | End: 2021-06-23 | Stop reason: HOSPADM

## 2021-06-22 RX ORDER — NITROGLYCERIN 0.4 MG/1
TABLET SUBLINGUAL
Qty: 15 TABLET | Refills: 0 | Status: SHIPPED | OUTPATIENT
Start: 2021-06-22 | End: 2021-08-17

## 2021-06-22 RX ORDER — CLOPIDOGREL BISULFATE 75 MG/1
75 TABLET ORAL DAILY
Qty: 30 TABLET | Refills: 3 | Status: SHIPPED | OUTPATIENT
Start: 2021-06-23 | End: 2021-10-19

## 2021-06-22 RX ADMIN — METOPROLOL TARTRATE 12.5 MG: 25 TABLET, FILM COATED ORAL at 20:23

## 2021-06-22 RX ADMIN — CLOPIDOGREL BISULFATE 75 MG: 75 TABLET ORAL at 09:16

## 2021-06-22 RX ADMIN — METOPROLOL TARTRATE 12.5 MG: 25 TABLET, FILM COATED ORAL at 09:17

## 2021-06-22 RX ADMIN — BUTALBITAL, ACETAMINOPHEN AND CAFFEINE 1 TABLET: 50; 325; 40 TABLET ORAL at 14:30

## 2021-06-22 RX ADMIN — TOLTERODINE TARTRATE 2 MG: 2 CAPSULE, EXTENDED RELEASE ORAL at 20:23

## 2021-06-22 RX ADMIN — OMEPRAZOLE 40 MG: 20 CAPSULE, DELAYED RELEASE ORAL at 09:17

## 2021-06-22 RX ADMIN — IBUPROFEN 600 MG: 600 TABLET, FILM COATED ORAL at 13:50

## 2021-06-22 RX ADMIN — BUTALBITAL, ACETAMINOPHEN AND CAFFEINE 1 TABLET: 50; 325; 40 TABLET ORAL at 20:23

## 2021-06-22 RX ADMIN — ATORVASTATIN CALCIUM 40 MG: 40 TABLET, FILM COATED ORAL at 20:23

## 2021-06-22 RX ADMIN — ASPIRIN 81 MG: 81 TABLET, COATED ORAL at 09:17

## 2021-06-22 RX ADMIN — SERTRALINE HYDROCHLORIDE 150 MG: 100 TABLET ORAL at 09:16

## 2021-06-22 RX ADMIN — ACETAMINOPHEN 650 MG: 325 TABLET, FILM COATED ORAL at 10:28

## 2021-06-22 RX ADMIN — TRAZODONE HYDROCHLORIDE 150 MG: 100 TABLET ORAL at 20:26

## 2021-06-22 RX ADMIN — BUPROPION HYDROCHLORIDE 300 MG: 150 TABLET, FILM COATED, EXTENDED RELEASE ORAL at 09:17

## 2021-06-22 RX ADMIN — AMLODIPINE BESYLATE 2.5 MG: 2.5 TABLET ORAL at 13:51

## 2021-06-22 ASSESSMENT — ACTIVITIES OF DAILY LIVING (ADL)
ADLS_ACUITY_SCORE: 13
ADLS_ACUITY_SCORE: 12
ADLS_ACUITY_SCORE: 13
ADLS_ACUITY_SCORE: 12
ADLS_ACUITY_SCORE: 13
ADLS_ACUITY_SCORE: 12

## 2021-06-22 NOTE — PROGRESS NOTES
[facility]    Medicine Progress Note - Hospitalist Service       Date of Admission:  6/18/2021  Assessment & Plan       Mr. Jonh Reyes is an extremely pleasant 56-year-old gentleman with a past medical history of dyslipidemia, depression/anxiety, gastroesophageal reflux disease, obstructive sleep apnea and attention deficit disorder, who was having worsening exertional chest pain and dyspnea on exertion for the last month.  He was found to have an abnormal stress test earlier today and was taken to cath lab and found to have 2-vessel coronary artery disease, and he underwent a balloon angioplasty to left anterior descending stenosis, and he was transferred to [facility] for a complex percutaneous coronary intervention with possible rotablator.     Unstable angina.  Two-vessel coronary artery disease s/p PCI 6/21  The patient was experiencing exertional chest pain in the last month, associated with dyspnea on exertion that would get better with rest.  More recently, he started having chest pain at rest suggestive of unstable angina.  He had a stress echocardiogram earlier today at [facility], which was abnormal, and he had immediate cardiac catheterization at [facility], which showed a 2-vessel coronary artery disease with severe calcified stenosis of the proximal to mid LAD and severe stenosis of OM2.  He underwent balloon angioplasty of LAD stenosis, but unable to cross with cutting balloon or stent.  He had a small, subtle, contained dissection without perforation of the LAD.  He was transferred to [facility] for complex PCI with possible rotablation.  He was given aspirin and Plavix at [facility].  It was recommended him to be started on a heparin drip 6 hours after TR band will be removed, given concern for a small right wrist hematoma.  I saw the patient after he arrived in [facility].  He is resting comfortable.  Denies any chest pain.    -  "continue telemetry monitoring  - dual platelet therapy  - lipitor  - Metoprolol 12.5mg BID   - Cardiology following  - staged PCI to LAD planned as outpatient    Headache: patient has a HA that started 3 days ago with nitroglycerin infusion. Tylenol, oxycodone, dilaudid have not been helpful. HA is persistent again today despite now being off nitroglycerin. It is 7/10, bilateral temporal \"crushing.\" We attempted oxygen this morning without improvement.   - fioricet prn  - ibuprofen x 1 dose.  - continue oxygen for comfort  - page hospitalist if no improvement.    Dyslipidemia:  Again, his [id number] Crestor was switched to Lipitor.    Gastroesophageal reflux disease:  We will resume his swwkb-[id number] omeprazole.    Depression/anxiety:  We will resume his uwyaa-gr-svoadyliq Wellbutrin 300 mg p.o. daily, Zoloft 150 mg p.o. daily and trazodone at bedtime.    Obstructive sleep apnea:  CPAP at bedtime had been ordered.       Diet: Low Saturated Fat Na <2400 mg  Diet    DVT Prophylaxis: heparin drip  Not present  Code Status: Full Code           Disposition Plan   Expected discharge: Tomorrow, recommended to prior living arrangement once cleared by cardiology and HA improved..  Entered: Janay Salgado MD 06/22/2021, 1:02 PM       The patient's care was discussed with the Bedside Nurse, Patient and Patient's Family.    Janay Saglado MD  Hospitalist Service  [facility]  Contact information available via [facility] Paging/Directory    ______________________________________________________________________    Interval History   Patient continues to have headache. It improved slightly but came back when he was up washing after working with cardiac rehab. 7/10 and bilateral \"crushing\" type pain. He is light sensitive. Dilaudid has made it worse. Tylenol and oxycodone unhelpful. Cardiology has requested no further triptans, which makes sense. Attempting some other oral therapies " today.    Data reviewed today: I reviewed all medications, new labs and imaging results over the last 24 hours. I personally reviewed no images or EKG's today.    Physical Exam   Vital Signs: Temp: 98.3  F (36.8  C) Temp src: Oral BP: 131/79 Pulse: 76     SpO2: 97 % O2 Device: None (Room air)    Weight: 231 lbs 0 oz  Constitutional: Awake, alert, cooperative, no apparent distress  HEENT: neck supple, no LAD noted, moist mucous membranes  Respiratory: Clear to auscultation bilaterally, no crackles or wheezing  Cardiovascular: Regular rate and rhythm, normal S1 and S2, and no murmur noted  GI: Normal bowel sounds, soft, non-distended, non-tender  Skin/Integumen: No rashes, no cyanosis, no edema  Ext: no edema, moving all extremities  Neuro: CN 2-12 intact, non focal exam      Data   Recent Labs   Lab 06/22/21  0614 06/21/21  0556 06/20/21  0342 06/18/21  1005 06/18/21  1005   WBC 8.2 7.1 7.0   < > 6.7   HGB 13.7 12.9* 13.3   < > 14.7   MCV 91 92 92   < > 93   * 114* 122*   < > 118*   INR  --   --   --   --  1.03    138 141   < > 138   POTASSIUM 4.3 3.8 4.1   < > 4.0   CHLORIDE 106 107 107   < > 108   CO2 26 28 29   < > 25   BUN 15 19 13   < > 19   CR 1.03 1.14 1.06   < > 1.02   ANIONGAP 5 3 5   < > 5   FRANCISCO 9.0 8.5 8.6   < > 8.9   * 101* 100*   < > 112*    < > = values in this interval not displayed.     Recent Results (from the past 24 hour(s))   Cardiac Catheterization    Narrative      Prox LAD to Mid LAD lesion is 75% stenosed.    Dist LAD lesion is 40% stenosed.    1st Diag lesion is 60% stenosed.    2nd Mrg-1 lesion is 90% stenosed.    2nd Mrg-2 lesion is 60% stenosed.    Dist Cx lesion is 50% stenosed.    Prox RCA lesion is 50% stenosed.     1. Successful PCI to OM2 with 2 overlapping drug eluting stents (Synergy   2.75x24m proximal, 2.62i93xv distal)  2. Residual long calcified prox-mid LAD disease with MANAN III flow   distally. Atherectomy deferred today in setting of residual dissection    plane from balloon angioplasty intervention on 6/18. Plan to return in 1   month for staged PCI of LAD.       Medications     - MEDICATION INSTRUCTIONS -       - MEDICATION INSTRUCTIONS -       - MEDICATION INSTRUCTIONS -       - MEDICATION INSTRUCTIONS -       - MEDICATION INSTRUCTIONS -       Percutaneous Coronary Intervention orders placed (this is information for BPA alerting)       ACE/ARB/ARNI NOT PRESCRIBED       ACE/ARB/ARNI NOT PRESCRIBED         amLODIPine  2.5 mg Oral Daily     aspirin  81 mg Oral Daily     atorvastatin  40 mg Oral Daily     buPROPion  300 mg Oral Daily     clopidogrel  75 mg Oral Daily     ibuprofen  600 mg Oral Once     metoprolol tartrate  12.5 mg Oral BID     omeprazole  40 mg Oral Daily     sertraline  150 mg Oral Daily     tolterodine ER  2 mg Oral Daily     traZODone  150 mg Oral At Bedtime

## 2021-06-22 NOTE — PLAN OF CARE
Vitals: WNL  Lungs: Dyspnea on exertion with activity. Lung sounds clear and O2 stable. Monitoring.   CV: CP 3/10 with AM CR. Started on amlodipine. 0-1 chest discomfort with afternoon ambulation.   GI: WNL  Uro: WNL  CMS: WNL. Right groin ecchymotic and soft.   Neuro:  WNL  Skin: ecchymotic to right radial and right groin.   Psych:  WNL  MSK: Indp in room, SBA in .   Pain: CP 3/10 with AM CR. Resolved. Headache all day. No improvement with coffee, tylenol, ice, and dark room. X1 dose of ibuprofen and ESGIC from a 7/10 to 4/10.   Labs: no concerns noted.   Tests/Procedures: na   Other:  Patient opted to stay another night for monitoring due to CP and dyspnea today. CR planned for tomorrow morning. Wife present during shift.

## 2021-06-22 NOTE — PROGRESS NOTES
[facility]  Cardiology Progress Note    Date of Service (when I saw the patient): 06/22/2021  Summary: John Reyes is a 56 year old male with history of dyslipidemia who was admitted on 6/18/2021 after an outpatient angiogram with unstable angina.  Interval History   S/p Stenting of OM2 yesterday. LAD was not intervened on. He walked 7 minutes with cardiac rehab this morning. He had some mild chest tightness at the end of that which resolved with rest. His groin site is tender this morning. He continues to have a headache.   Assessment & Plan    1.  Unstable angina. Had stress test on 6/18 for progressive symptoms of SOB and chest tightness with exertion which was markedly abnormal. Coronary angiogram at [facility] on 6/18 showed severe proximal to mild LAD disease and severe OM2 lesion. He had moderate disease elsewhere. Resting echo on 6/18/21 showed normal LV function without WMAs.  -  S/p balloon angioplasty of LAD stenosis on 6/18 but unable to cross with cutting balloon or stent resulting in a small contained dissection without perforation in the LAD.   -  He underwent repeat angiogram on 6/21 with PCI of OM2 with 2 overlapping drug eluting stents. Atherectomy of the LAD was deferred in the setting of residual dissection plan from balloon angioplasty on 6/18. Will need staged intervention in 1 month.  -  Continue aspirin, plavix, statin, beta blocker. Heparin and nitroglycerin drips stopped.    2.  Dyslipidemia. Historically on Lipitor but recently switched to Crestor 20 mg daily as an outpatient prior to admission. Last LDL in March was 158, TGs 404, total cholesterol 255, HDL 35.  -  Lipitor 40 mg daily ordered here.      Plan:  1.  Continue DAPT with aspirin and plavix, high intensity statin and beta blocker. Needs aggressive risk factor modification.  Needs outpatient lipid profile and increased lipitor dose if not at goal.  2.  Will need staged PCI of the LAD in 4 weeks. This  can be arranged at his follow up.   3.  He had some mild angina with cardiac rehab this morning. Will see how he does today. If HRs allow could increase his low dose metoprolol although yesterday he was running in the 50 - 60s. Could start Imdur 30 mg daily but he continues to have a headache this morning. Will start low dose norvasc 2.5 mg daily.   4.  He should be discharged with a prescription for SL nitroglycerin.  5.  OP cardiac rehab following his LAD stenting.   6.  He has follow up arranged with Erika Shaw PA-C 7/2.   7.  Hopefully can discharge home later today if he does well with rehab and is ambulating without difficulty.     Kimmie Dangelo PA-C    Physical Exam   Temp: 98.3  F (36.8  C) Temp src: Oral BP: (!) 121/99 Pulse: 197     SpO2: 96 % O2 Device: None (Room air)    Vitals:    06/19/21 0521 06/21/21 0131 06/22/21 0051   Weight: 104.8 kg (231 lb) 104.5 kg (230 lb 6.4 oz) 104.8 kg (231 lb)     Vital Signs with Ranges  Temp:  [97.6  F (36.4  C)-98.3  F (36.8  C)] 98.3  F (36.8  C)  Pulse:  [] 197  BP: ()/() 121/99  SpO2:  [93 %-97 %] 96 %  06/17 0700 - 06/22 0659  In: 3870 [P.O.:2320; I.V.:1550]  Out: 900 [Urine:900]  Net: 2970  Constitutional: NAD.   Respiratory: CTAB.   Cardiovascular: RRR, s1s2, no murmur. R femoral access site with surrounding ecchymosis. It is tender but soft.    GI: soft, BS+  Skin: warm, no rashes  Musculoskeletal: Moving all extremities  Neurologic: Alert, oriented x 3  Neuropsychiatric: Normal affect   Data   Results for orders placed or performed during the hospital encounter of 06/18/21 (from the past 24 hour(s))   Activated clotting time POCT   Result Value Ref Range    Activated Clot Time 392 (H) 75 - 150 sec   Activated clotting time POCT   Result Value Ref Range    Activated Clot Time 324 (H) 75 - 150 sec   Cardiac Catheterization    Narrative      Prox LAD to Mid LAD lesion is 75% stenosed.    Dist LAD lesion is 40% stenosed.    1st Diag lesion  is 60% stenosed.    2nd Mrg-1 lesion is 90% stenosed.    2nd Mrg-2 lesion is 60% stenosed.    Dist Cx lesion is 50% stenosed.    Prox RCA lesion is 50% stenosed.     1. Successful PCI to OM2 with 2 overlapping drug eluting stents (Synergy   2.75x24m proximal, 2.48h16yw distal)  2. Residual long calcified prox-mid LAD disease with MANAN III flow   distally. Atherectomy deferred today in setting of residual dissection   plane from balloon angioplasty intervention on 6/18. Plan to return in 1   month for staged PCI of LAD.     EKG 12-lead, tracing only    Result Value Ref Range    Interpretation ECG Click View Image link to view waveform and result    Heparin Unfractionated Anti Xa Level   Result Value Ref Range    Heparin Unfractionated Anti Xa Level <0.10 IU/mL   CBC with platelets   Result Value Ref Range    WBC 8.2 4.0 - 11.0 10e9/L    RBC Count 4.41 4.4 - 5.9 10e12/L    Hemoglobin 13.7 13.3 - 17.7 g/dL    Hematocrit 40.1 40.0 - 53.0 %    MCV 91 78 - 100 fl    MCH 31.1 26.5 - 33.0 pg    MCHC 34.2 31.5 - 36.5 g/dL    RDW 12.0 10.0 - 15.0 %    Platelet Count 133 (L) 150 - 450 10e9/L   Basic metabolic panel   Result Value Ref Range    Sodium 137 133 - 144 mmol/L    Potassium 4.3 3.4 - 5.3 mmol/L    Chloride 106 94 - 109 mmol/L    Carbon Dioxide 26 20 - 32 mmol/L    Anion Gap 5 3 - 14 mmol/L    Glucose 108 (H) 70 - 99 mg/dL    Urea Nitrogen 15 7 - 30 mg/dL    Creatinine 1.03 0.66 - 1.25 mg/dL    GFR Estimate 80 >60 mL/min/[1.73_m2]    GFR Estimate If Black >90 >60 mL/min/[1.73_m2]    Calcium 9.0 8.5 - 10.1 mg/dL   EKG 12-lead, tracing only    Result Value Ref Range    Interpretation ECG Click View Image link to view waveform and result         Tele SR    Medications     - MEDICATION INSTRUCTIONS -       - MEDICATION INSTRUCTIONS -       - MEDICATION INSTRUCTIONS -       - MEDICATION INSTRUCTIONS -       - MEDICATION INSTRUCTIONS -       Percutaneous Coronary Intervention orders placed (this is information for BPA  alerting)       ACE/ARB/ARNI NOT PRESCRIBED       ACE/ARB/ARNI NOT PRESCRIBED       sodium chloride 100 mL/hr at 06/19/21 0000       aspirin  81 mg Oral Daily     atorvastatin  40 mg Oral Daily     buPROPion  300 mg Oral Daily     clopidogrel  75 mg Oral Daily     metoprolol tartrate  12.5 mg Oral BID     omeprazole  40 mg Oral Daily     sertraline  150 mg Oral Daily     tolterodine ER  2 mg Oral Daily     traZODone  150 mg Oral At Bedtime

## 2021-06-22 NOTE — PLAN OF CARE
Neuro: A&Ox4  Cardiac: Tele reads SR. No complaints of chest pain  Respiratory: Pt on RA. Lungs clear  Activity: SBA  GI/: Bowel sounds audible and active. Up to bathroom  Musculoskeletal: Generalized weakness  Skin: CDI. R groin bruised and soft, R radial site lightly bruised.   Pain: Denied throughout shift  Drips: None. Nitroglycerin weaned off  Drains/Tubes: PIV in R arm  Other/Plan: Plan to discharge in a few days. Repeat PCI in 1 month to LAD

## 2021-06-22 NOTE — PROGRESS NOTES
06/22/21 0857   Quick Adds   Type of Visit Initial PT Evaluation   Living Environment   People in home spouse   Current Living Arrangements house   Home Accessibility stairs within home;stairs to enter home   Transportation Anticipated car, drives self;family or friend will provide   Living Environment Comments Works full time on feet and comp   Self-Care   Usual Activity Tolerance good   Current Activity Tolerance moderate   Regular Exercise Yes   Activity/Exercise Type biking;other (see comments)  (yoga; 3x per wk)   Equipment Currently Used at Home none   Disability/Function   Wear Glasses or Blind yes   Vision Management reading glasses   Fall history within last six months no   Change in Functional Status Since Onset of Current Illness/Injury yes   General Information   Onset of Illness/Injury or Date of Surgery 06/18/21   Referring Physician Jose Hummel MD   Patient/Family Therapy Goals Statement (PT) Home tomorrow.   Pertinent History of Current Problem (include personal factors and/or comorbidities that impact the POC) 55 yo male with report of chest discomfort ~ 1 month with exertion; stress test revealed 2 vessle disease. Tx from [facility] to [facility] with unstable angina, for complex PCI 1. Successful PCI to OM2 with 2 overlapping drug eluting stents (Synergy 2.75x24m proximal, 2.12g07sy distal)Residual long calcified prox-mid LAD disease with MANAN III flow distally. Atherectomy deferred today in setting of residual dissection plane from balloon angioplasty intervention on 6/18. Plan to return in 1 month for staged PCI of LAD.   Existing Precautions/Restrictions cardiac   Heart Disease Risk Factors High blood pressure;Stress;Gender;Age   Cognition   Orientation Status (Cognition) oriented x 4   Affect/Mental Status (Cognition) WNL   Follows Commands (Cognition) WNL   Pain Assessment   Patient Currently in Pain Yes, see Vital Sign flowsheet   Integumentary/Edema   Integumentary/Edema Comments R arm angio site,  groin siste   Posture    Posture Forward head position   Range of Motion (ROM)   ROM Comment Hip extension limited secondary to groin discomfort.   Strength   Strength Comments Demonstrates antigravity strength wtih mobility.    Transfers   Transfer Safety Comments independent   Gait/Stairs (Locomotion)   Distance in Feet (Required for LE Total Joints) See Treatment flow sheet: 7.5 min slow pace; reports start of chest pressure ~ 4-5 minutes into the walk, rates at 3/10 pain.  No stairs secondary to onset of chest pain. Reduces to 2/10 chest pain/pressure with seated rest.   Comment (Gait/Stairs) independent   Clinical Impression   Criteria for Skilled Therapeutic Intervention yes, treatment indicated   PT Diagnosis (PT) Impaired functional activity tolerane   Influenced by the following impairments Generalized weakness, fatigue following prolonged bedrest over the last few days.   Functional limitations due to impairments Decreased functional indepenence with exercise/activity advancement   Clinical Presentation Stable/Uncomplicated   Clinical Presentation Rationale see MR   Clinical Decision Making (Complexity) low complexity   Therapy Frequency (PT) 2x/day   Predicted Duration of Therapy Intervention (days/wks) 2 days   Planned Therapy Interventions (PT) balance training;bed mobility training;gait training;home exercise program;neuromuscular re-education;patient/family education;ROM (range of motion);stair training;strengthening;stretching;transfer training   Risk & Benefits of therapy have been explained evaluation/treatment results reviewed;care plan/treatment goals reviewed;risks/benefits reviewed;current/potential barriers reviewed;participants voiced agreement with care plan;participants included;patient   PT Discharge Planning    PT Discharge Recommendation (DC Rec) home with outpatient cardiac rehab   PT Rationale for DC Rec Pt will benefit from continued OP CR phase II for ongoing monitoring and  progression of exercise and activity progression as well as continue education for optimal heart health.   PT Brief overview of current status  Independent with mobility. Ambulated 7.5 min in hallway, slow pace, stable CV response; however, pt with dizziness and chest pain/pressure at ~ 4.5 min of walking-rates 3/10, but felt he could continue. Walked back to room. No stairs secondary to onset of CP with ambulation. RN aware of pt's symptoms post activity.  See VSFS for VS.   Total Evaluation Time   Total Evaluation Time (Minutes) 10

## 2021-06-22 NOTE — CONSULTS
NUTRITION EDUCATION    REASON FOR ASSESSMENT:  Nutrition education on American Heart Association (AHA) Heart Healthy Diet.    NUTRITION HISTORY:  Information obtained from patient/family  - Diet information provided is familiar to Sathya and family, however he admits that he could follow guidelines a little better.     CURRENT DIET ORDER:  Low Sat Fat, Na <2400 mg    NUTRITION DIAGNOSIS:  Food- and nutrition-related knowledge deficit R/t heart healthy diet AEB pt states he does not actively follow recommendations.      INTERVENTIONS:  Nutrition Prescription:    Recommended AHA Heart Healthy Diet    Implementation:     Nutrition Education (Content):  a) reviewed Heart Healthy Diet guidelines  b) provided heart healthy diet handout    Nutrition Education (Application):  a) Discussed current eating habits and recommended alternative food choices    Anticipate good compliance    Diet Education - refer to Education flowsheet    Goals:    Patient verbalizes understanding of diet    All of the above goals met during education session    Follow Up/Monitoring:    Provided RD contact information for future questions      Trena Hyde RD, LD  [facility] [place], [address], 53, [place]   Pager: [phone]  Weekend Pager: [phone]

## 2021-06-22 NOTE — CONSULTS
Patient to discharge home with outpatient cardiac rehab likely tomorrow. Once discharge order placed for OP CR, CR will contact patient to schedule.    Cassy Fajardo RN  Care Coordinator  [facility]  [phone] (text or call)

## 2021-06-22 NOTE — PLAN OF CARE
Returned from cath lab shortly after 17:30. NSR. CMS intact. Right groin site WNL. Bedrest protocol in place. IV fluids infusing. Heparin stopped. Nitroglycerin gtt still infusing. Titrated down from 20 to 10. No chest pain. 4/10 Headache remains.

## 2021-06-23 ENCOUNTER — APPOINTMENT (OUTPATIENT)
Dept: PHYSICAL THERAPY | Facility: CLINIC | Age: 56
DRG: 247 | End: 2021-06-23
Attending: INTERNAL MEDICINE
Payer: COMMERCIAL

## 2021-06-23 VITALS
BODY MASS INDEX: 33.89 KG/M2 | RESPIRATION RATE: 18 BRPM | WEIGHT: 229.5 LBS | HEART RATE: 70 BPM | TEMPERATURE: 97.8 F | DIASTOLIC BLOOD PRESSURE: 73 MMHG | OXYGEN SATURATION: 96 % | SYSTOLIC BLOOD PRESSURE: 115 MMHG

## 2021-06-23 LAB
ANION GAP SERPL CALCULATED.3IONS-SCNC: 1 MMOL/L (ref 3–14)
BUN SERPL-MCNC: 18 MG/DL (ref 7–30)
CALCIUM SERPL-MCNC: 8.8 MG/DL (ref 8.5–10.1)
CHLORIDE SERPL-SCNC: 109 MMOL/L (ref 94–109)
CO2 SERPL-SCNC: 31 MMOL/L (ref 20–32)
CREAT SERPL-MCNC: 1 MG/DL (ref 0.66–1.25)
GFR SERPL CREATININE-BSD FRML MDRD: 82 ML/MIN/{1.73_M2}
GLUCOSE SERPL-MCNC: 117 MG/DL (ref 70–99)
POTASSIUM SERPL-SCNC: 4.1 MMOL/L (ref 3.4–5.3)
SODIUM SERPL-SCNC: 141 MMOL/L (ref 133–144)

## 2021-06-23 PROCEDURE — 250N000013 HC RX MED GY IP 250 OP 250 PS 637: Performed by: STUDENT IN AN ORGANIZED HEALTH CARE EDUCATION/TRAINING PROGRAM

## 2021-06-23 PROCEDURE — 97530 THERAPEUTIC ACTIVITIES: CPT | Mod: GP | Performed by: PHYSICAL THERAPIST

## 2021-06-23 PROCEDURE — 36415 COLL VENOUS BLD VENIPUNCTURE: CPT | Performed by: HOSPITALIST

## 2021-06-23 PROCEDURE — 250N000013 HC RX MED GY IP 250 OP 250 PS 637: Performed by: INTERNAL MEDICINE

## 2021-06-23 PROCEDURE — 80048 BASIC METABOLIC PNL TOTAL CA: CPT | Performed by: HOSPITALIST

## 2021-06-23 PROCEDURE — 250N000013 HC RX MED GY IP 250 OP 250 PS 637: Performed by: PHYSICIAN ASSISTANT

## 2021-06-23 PROCEDURE — 99231 SBSQ HOSP IP/OBS SF/LOW 25: CPT | Performed by: INTERNAL MEDICINE

## 2021-06-23 PROCEDURE — 99239 HOSP IP/OBS DSCHRG MGMT >30: CPT | Performed by: HOSPITALIST

## 2021-06-23 PROCEDURE — 97110 THERAPEUTIC EXERCISES: CPT | Mod: GP | Performed by: PHYSICAL THERAPIST

## 2021-06-23 RX ADMIN — BUPROPION HYDROCHLORIDE 300 MG: 150 TABLET, FILM COATED, EXTENDED RELEASE ORAL at 09:03

## 2021-06-23 RX ADMIN — SERTRALINE HYDROCHLORIDE 150 MG: 100 TABLET ORAL at 09:03

## 2021-06-23 RX ADMIN — AMLODIPINE BESYLATE 2.5 MG: 2.5 TABLET ORAL at 09:04

## 2021-06-23 RX ADMIN — CLOPIDOGREL BISULFATE 75 MG: 75 TABLET ORAL at 09:04

## 2021-06-23 RX ADMIN — METOPROLOL TARTRATE 12.5 MG: 25 TABLET, FILM COATED ORAL at 09:04

## 2021-06-23 RX ADMIN — OMEPRAZOLE 40 MG: 20 CAPSULE, DELAYED RELEASE ORAL at 09:03

## 2021-06-23 RX ADMIN — ASPIRIN 81 MG: 81 TABLET, COATED ORAL at 09:04

## 2021-06-23 ASSESSMENT — ACTIVITIES OF DAILY LIVING (ADL)
ADLS_ACUITY_SCORE: 13

## 2021-06-23 NOTE — PLAN OF CARE
Neuro- A&Ox4  Tele/Cardiac- SR  Resp- WDL  Activity- IND  Pain- denies  Drips- none  Drains/Tubes- PIV  Skin- ecchymotic R radial and R groin site  GI/- wdl  Aggression Color- green  COVID status- negative  Plan- Cardiac rehab, then potential discharge

## 2021-06-23 NOTE — PROGRESS NOTES
[facility]  Cardiology Progress Note    Date of Service (when I saw the patient): 06/23/2021  Summary: John Reyes is a 56 year old male with history of dyslipidemia who was admitted on 6/18/2021 after an outpatient angiogram with unstable angina.  Interval History   He is better this morning. Wife is at bedside. We reviewed his angiogram pictures. Headache is gone. He denies chest pain overnight. BPs stable with the addition of low dose amlodipine.   Assessment & Plan    1.  Unstable angina. Had stress test on 6/18 for progressive symptoms of SOB and chest tightness with exertion which was markedly abnormal. Coronary angiogram at [facility] on 6/18 showed severe proximal to mild LAD disease and severe OM2 lesion. He had moderate disease elsewhere. Resting echo on 6/18/21 showed normal LV function without WMAs.  -  S/p balloon angioplasty of LAD stenosis on 6/18 but unable to cross with cutting balloon or stent resulting in a small contained dissection without perforation in the LAD.   -  He underwent repeat angiogram on 6/21 with PCI of OM2 with 2 overlapping drug eluting stents. Atherectomy of the LAD was deferred in the setting of residual dissection plan from balloon angioplasty on 6/18. Will need staged intervention in 1 month.  -  Continue aspirin, plavix, statin, beta blocker. Heparin and nitroglycerin drips stopped.    2.  Dyslipidemia. Historically on Lipitor but recently switched to Crestor 20 mg daily as an outpatient prior to admission. Last LDL in March was 158, TGs 404, total cholesterol 255, HDL 35.  -  Lipitor 40 mg daily ordered here.      Plan:  1.  Continue DAPT with aspirin and plavix, high intensity statin and beta blocker. Needs aggressive risk factor modification.  Needs outpatient lipid profile and increased lipitor dose if not at goal.  2.  Staged PCI of the LAD in 4 weeks. This can be arranged at his follow up.   3.  Continue low dose amlodipine for angina. This  could be increased or now that his headache has resolved could consider the addition of low dose imdur if needed.     4.  He should be discharged with a prescription for SL nitroglycerin.  5.  OP cardiac rehab following his LAD stenting.   6.  He has follow up arranged with Erika Shaw PA-C 7/2. We discussed signs/symptoms to call the clinic for and when to seek evaluation in the meantime. I told him to take it easy the next few weeks prior to his stenting.   7.  Home today.     Kimmie Dangelo PA-C    Physical Exam   Temp: 97.8  F (36.6  C) Temp src: Oral BP: 104/84 Pulse: 91   Resp: 18 SpO2: 94 % O2 Device: None (Room air)    Vitals:    06/19/21 0521 06/21/21 0131 06/22/21 0051 06/23/21 0701   Weight: 104.8 kg (231 lb) 104.5 kg (230 lb 6.4 oz) 104.8 kg (231 lb) 104.1 kg (229 lb 8 oz)     Vital Signs with Ranges  Temp:  [97.5  F (36.4  C)-98  F (36.7  C)] 97.8  F (36.6  C)  Pulse:  [62-91] 91  Resp:  [18-22] 18  BP: (104-131)/(65-84) 104/84  SpO2:  [94 %-98 %] 94 %  06/18 0700 - 06/23 0659  In: 4890 [P.O.:3340; I.V.:1550]  Out: 900 [Urine:900]  Net: 3990  Constitutional: NAD.   Neuropsychiatric: Normal affect   Data   Results for orders placed or performed during the hospital encounter of 06/18/21 (from the past 24 hour(s))   Care Management / Social Work IP Consult    Narrative    Cassy Fajardo RN     6/22/2021  3:59 PM  Patient to discharge home with outpatient cardiac rehab likely   tomorrow. Once discharge order placed for OP CR, CR will contact   patient to schedule.    Cassy Fajardo RN  Care Coordinator  [facility]  [phone] (text or call)     Basic metabolic panel   Result Value Ref Range    Sodium 141 133 - 144 mmol/L    Potassium 4.1 3.4 - 5.3 mmol/L    Chloride 109 94 - 109 mmol/L    Carbon Dioxide 31 20 - 32 mmol/L    Anion Gap 1 (L) 3 - 14 mmol/L    Glucose 117 (H) 70 - 99 mg/dL    Urea Nitrogen 18 7 - 30 mg/dL    Creatinine 1.00 0.66 - 1.25 mg/dL    GFR  Estimate 82 >60 mL/min/[1.73_m2]    GFR Estimate If Black >90 >60 mL/min/[1.73_m2]    Calcium 8.8 8.5 - 10.1 mg/dL        Tele SR    Medications     - MEDICATION INSTRUCTIONS -       - MEDICATION INSTRUCTIONS -       - MEDICATION INSTRUCTIONS -       - MEDICATION INSTRUCTIONS -       - MEDICATION INSTRUCTIONS -       Percutaneous Coronary Intervention orders placed (this is information for BPA alerting)       ACE/ARB/ARNI NOT PRESCRIBED       ACE/ARB/ARNI NOT PRESCRIBED         amLODIPine  2.5 mg Oral Daily     aspirin  81 mg Oral Daily     atorvastatin  40 mg Oral Daily     buPROPion  300 mg Oral Daily     clopidogrel  75 mg Oral Daily     metoprolol tartrate  12.5 mg Oral BID     omeprazole  40 mg Oral Daily     sertraline  150 mg Oral Daily     tolterodine ER  2 mg Oral Daily     traZODone  150 mg Oral At Bedtime

## 2021-06-23 NOTE — PLAN OF CARE
A/Ox4, VSS. Denies chest pain and SOB. Cardiac rehab completed.  Right groin site ecchymotic, soft with CMS intact. Dressing removed. Educated given on new cardiac medications. Discharge instructions given to patient along with medications and pill splitter. Patient verbalized understanding. Tele SB.

## 2021-06-23 NOTE — DISCHARGE SUMMARY
[facility]  Hospitalist Discharge Summary      Date of Admission:  6/18/2021  Date of Discharge:  6/23/2021  Discharging Provider: Janay Salgado MD      Discharge Diagnoses   Unstable angina  Two-vessel coronary artery disease s/p PCI 6/21  Headache  Dyslipidemia  Gastroesophageal reflux disease  Depression/anxiety  Obstructive sleep apnea    Follow-ups Needed After Discharge   Follow-up Appointments     Follow-up and recommended labs and tests       Follow up with primary care provider, Remy Mcknight, within 7   days for hospital follow- up.  No follow up labs or test are needed.             Discharge Disposition   Discharged to home  Condition at discharge: Stable    Hospital Course         Mr. John Reyes is an extremely pleasant 56-year-old gentleman with a past medical history of dyslipidemia, depression/anxiety, gastroesophageal reflux disease, obstructive sleep apnea and attention deficit disorder, who was having worsening exertional chest pain and dyspnea on exertion for the last month.  He was found to have an abnormal stress test earlier today and was taken to cath lab and found to have 2-vessel coronary artery disease, and he underwent a balloon angioplasty to left anterior descending stenosis, and he was transferred to [facility] for a complex percutaneous coronary intervention with possible rotablator.     Unstable angina.  Two-vessel coronary artery disease s/p PCI 6/21  The patient was experiencing exertional chest pain in the last month, associated with dyspnea on exertion that would get better with rest.  More recently, he started having chest pain at rest suggestive of unstable angina.  He had a stress echocardiogram earlier today at [facility], which was abnormal, and he had immediate cardiac catheterization at [facility], which showed a 2-vessel coronary artery disease with severe calcified stenosis of the proximal to mid LAD and severe  stenosis of OM2.  He underwent balloon angioplasty of LAD stenosis, but unable to cross with cutting balloon or stent.  He had a small, subtle, contained dissection without perforation of the LAD.  He was transferred to [facility] for complex PCI with possible rotablation.  He was given aspirin and Plavix at [facility].  It was recommended him to be started on a heparin drip 6 hours after TR band will be removed, given concern for a small right wrist hematoma.  I saw the patient after he arrived in [facility].  He is resting comfortable.  Denies any chest pain.    - continue telemetry monitoring  - dual platelet therapy  - lipitor  - Metoprolol 12.5mg BID   - amlodipine added 6/22  - Cardiology following  - staged PCI to LAD planned as outpatient    Headache: associated with nitroglycerin infusion. Improved at time of discharge.    Dyslipidemia:  Again, his smwhp-ix-ewgrnmlnx Crestor was switched to Lipitor.    Gastroesophageal reflux disease:  We will resume his fauxw-gt-rxcppnkze omeprazole.    Depression/anxiety:  We will resume his dhizq-tm-baubhekcy Wellbutrin 300 mg p.o. daily, Zoloft 150 mg p.o. daily and trazodone at bedtime.    Obstructive sleep apnea:  CPAP at bedtime had been ordered.      Consultations This Hospital Stay   CARDIOLOGY IP CONSULT  NUTRITION SERVICES ADULT IP CONSULT  CARDIAC REHAB IP CONSULT  CARE MANAGEMENT / SOCIAL WORK IP CONSULT  SMOKING CESSATION PROGRAM IP CONSULT    Code Status   Full Code    Time Spent on this Encounter   I, Janay Salgado MD, personally saw the patient today and spent greater than 30 minutes discharging this patient.       Janay Salgado MD  [facility]  [address] [place] [address] [address]VE, [address]  [facility] [address]  Phone: [phone]  ______________________________________________________________________    Physical Exam   Vital Signs: Temp: 97.8  F (36.6  C) Temp src: Oral BP: 104/84 Pulse: 91   Resp: 18 SpO2: 94 % O2  Device: None (Room air)    Weight: 229 lbs 8 oz  Constitutional: Awake, alert, cooperative, no apparent distress  HEENT: neck supple, no LAD noted, moist mucous membranes  Respiratory: Clear to auscultation bilaterally, no crackles or wheezing  Cardiovascular: Regular rate and rhythm, normal S1 and S2, and no murmur noted  GI: Normal bowel sounds, soft, non-distended, non-tender  Skin/Integumen: No rashes, no cyanosis, no edema  Ext: no edema, moving all extremities  Neuro: CN 2-12 intact, non focal exam         Primary Care Physician   Remy Mcknight    Discharge Orders      CARDIAC REHAB REFERRAL      Reason for your hospital stay    NSTEMI s/p complex PCI (stents)     Follow-up and recommended labs and tests     Follow up with primary care provider, Remy Mcknight, within 7 days for hospital follow- up.  No follow up labs or test are needed.     Activity    Your activity upon discharge: activity as tolerated     Diet    Follow this diet upon discharge: Orders Placed This Encounter      Low Saturated Fat Na <2400 mg       Significant Results and Procedures   Most Recent 3 CBC's:  Recent Labs   Lab Test 06/22/21  0614 06/21/21  0556 06/20/21  0342   WBC 8.2 7.1 7.0   HGB 13.7 12.9* 13.3   MCV 91 92 92   * 114* 122*     Most Recent 3 BMP's:  Recent Labs   Lab Test 06/23/21  0559 06/22/21  0614 06/21/21  0556    137 138   POTASSIUM 4.1 4.3 3.8   CHLORIDE 109 106 107   CO2 31 26 28   BUN 18 15 19   CR 1.00 1.03 1.14   ANIONGAP 1* 5 3   FRANCISCO 8.8 9.0 8.5   * 108* 101*     Most Recent 3 Troponin's:No lab results found.,   Results for orders placed or performed during the hospital encounter of 06/18/21   Cardiac Catheterization    Narrative      Prox LAD to Mid LAD lesion is 75% stenosed.    Dist LAD lesion is 40% stenosed.    1st Diag lesion is 60% stenosed.    2nd Mrg-1 lesion is 90% stenosed.    2nd Mrg-2 lesion is 60% stenosed.    Dist Cx lesion is 50% stenosed.    Prox RCA lesion is  50% stenosed.     1. Successful PCI to OM2 with 2 overlapping drug eluting stents (Synergy   2.75x24m proximal, 2.50h17mn distal)  2. Residual long calcified prox-mid LAD disease with MANAN III flow   distally. Atherectomy deferred today in setting of residual dissection   plane from balloon angioplasty intervention on 6/18. Plan to return in 1   month for staged PCI of LAD.           Discharge Medications   Current Discharge Medication List      START taking these medications    Details   amLODIPine (NORVASC) 2.5 MG tablet Take 1 tablet (2.5 mg) by mouth daily  Qty: 30 tablet, Refills: 1    Associated Diagnoses: Unstable angina (H); Percutaneous transluminal coronary angioplasty status      aspirin (ASA) 81 MG EC tablet Take 1 tablet (81 mg) by mouth daily  Qty: 30 tablet, Refills: 3    Associated Diagnoses: Unstable angina (H)      atorvastatin (LIPITOR) 40 MG tablet Take 1 tablet (40 mg) by mouth daily  Qty: 30 tablet, Refills: 3    Associated Diagnoses: Unstable angina (H)      clopidogrel (PLAVIX) 75 MG tablet Take 1 tablet (75 mg) by mouth daily  Qty: 30 tablet, Refills: 3    Associated Diagnoses: Unstable angina (H)      metoprolol tartrate (LOPRESSOR) 25 MG tablet Take 0.5 tablets (12.5 mg) by mouth 2 times daily  Qty: 60 tablet, Refills: 1    Associated Diagnoses: Unstable angina (H)      nitroGLYcerin (NITROSTAT) 0.4 MG sublingual tablet For chest pain place 1 tablet under the tongue every 5 minutes for 3 doses. If symptoms persist 5 minutes after 1st dose call 911.  Qty: 15 tablet, Refills: 0    Associated Diagnoses: Unstable angina (H)         CONTINUE these medications which have NOT CHANGED    Details   albuterol (PROAIR HFA/PROVENTIL HFA/VENTOLIN HFA) 108 (90 Base) MCG/ACT inhaler Inhale 2 puffs into the lungs every 4 hours as needed for shortness of breath / dyspnea or wheezing  Qty: 18 g, Refills: 0    Comments: Pharmacy may dispense brand covered by insurance (Proair, or proventil or ventolin or  generic albuterol inhaler)  Associated Diagnoses: METZ (dyspnea on exertion)      buPROPion (WELLBUTRIN XL) 300 MG 24 hr tablet TAKE ONE TABLET BY MOUTH ONCE DAILY  Qty: 90 tablet, Refills: 0    Associated Diagnoses: Moderate major depression (H)      fluocinonide (LIDEX) 0.05 % solution Apply to scalp daily as needed  Qty: 60 mL, Refills: 1    Associated Diagnoses: Dandruff      fluticasone (FLONASE) 50 MCG/ACT nasal spray Spray 1 spray into both nostrils daily  Qty: 16 g, Refills: 11    Associated Diagnoses: Seasonal allergic rhinitis, unspecified trigger      sertraline (ZOLOFT) 100 MG tablet TAKE ONE AND ONE-HALF TABLETS BY MOUTH EVERY DAY  Qty: 135 tablet, Refills: 0    Associated Diagnoses: Moderate major depression (H)      sildenafil (REVATIO) 20 MG tablet Take one to three tablets one hour before sex  Qty: 30 tablet, Refills: 1    Associated Diagnoses: Erectile dysfunction due to diseases classified elsewhere      solifenacin (VESICARE) 5 MG tablet TAKE ONE TABLET BY MOUTH ONCE DAILY  Qty: 90 tablet, Refills: 2    Associated Diagnoses: Overactive bladder      traZODone (DESYREL) 50 MG tablet TAKE THREE TABLETS BY MOUTH EVERY NIGHT AT BEDTIME  Qty: 270 tablet, Refills: 0    Associated Diagnoses: Sleep disorder      omeprazole (PRILOSEC) 40 MG DR capsule Take 1 capsule (40 mg) by mouth daily  Qty: 90 capsule, Refills: 1    Associated Diagnoses: Gastroesophageal reflux disease with esophagitis without hemorrhage         STOP taking these medications       rosuvastatin (CRESTOR) 20 MG tablet Comments:   Reason for Stopping:             Allergies   Allergies   Allergen Reactions     Penicillins Itching, Swelling and Rash     Rash

## 2021-06-23 NOTE — PLAN OF CARE
Cardiac Rehab Discharge Summary    Reason for therapy discharge:    All goals and outcomes met, no further needs identified.    Progress towards therapy goal(s). See goals on Care Plan in Epic electronic health record for goal details.  Goals met    Therapy recommendation(s):    Continued therapy is recommended.  Rationale/Recommendations:  Pt will benefit from continued OP CR services for ongoing monitoring and progression of activity/exercise as well as continued education for optimal heart health.

## 2021-06-23 NOTE — PROVIDER NOTIFICATION
MD Notification    Notified Person: MD    Notified Person Name: Dr. Salgado    Notification Date/Time: 6/23/21 5157    Notification Interaction: MD web paged    Purpose of Notification: Patient has atorvastatin and rosuvastatin on his discharge med list. Do you want patient to take both?    Orders Received:    Comments:

## 2021-06-23 NOTE — DISCHARGE INSTRUCTIONS
Cardiac Angiogram Discharge Instructions - Femoral    After you go home:      Have an adult stay with you until tomorrow.    Drink extra fluids for 2 days.    You may resume your normal diet.    No smoking       For 24 hours - due to the sedation you received:    Relax and take it easy.    Do NOT make any important or legal decisions.    Do NOT drive or operate machines at home or at work.    Do NOT drink alcohol.    Care of Groin Puncture Site:      For the first 24 hrs - check the puncture site every 1-2 hours while awake.    For 2 days, when you cough, sneeze, laugh or move your bowels, hold your hand over the puncture site and press firmly.    Remove the bandaid after 24 hours. If there is minor oozing, apply another bandaid and remove it after 12 hours.    It is normal to have a small bruise or pea size lump at the site.    You may shower tomorrow. Do NOT take a bath, or use a hot tub or pool for at least 3 days. Do NOT scrub the site. Do not use lotion or powder near the puncture site.    Activity:            For 2 days:    No stooping or squatting    Do NOT do any heavy activity such as exercise, lifting, or straining.     No housework, yard work or any activity that make you sweat    Do NOT lift more than 10 pounds    Bleeding:      If you start bleeding from the site in your groin, lie down flat and press firmly on/above the site for 10 minutes.     Once bleeding stops, lay flat for 2 hours.     Call [facility] as soon as you can.       Call 911 right away if you have heavy bleeding or bleeding that does not stop.      Medicines:      If you are taking an antiplatelet medication such as Plavix, Brilinta or Effient, do not stop taking it until you talk to your cardiologist.      If you are on Metformin (Glucophage), do not restart it until you have blood tests (within 2 to 3 days after discharge).  After you have your blood drawn, you may restart the Metformin.     Take your medications, including blood  thinners, unless your provider tells you not to.      If you take Coumadin (Warfarin), have your INR checked by your provider in  3-5 days. Call your clinic to schedule this.    If you have stopped any medicines, check with your provider about when to restart them.    Follow Up Appointments:      Follow up with [facility] Nurse Practitioner at [facility] of patient preference in 7-10 days.    Call the clinic if:      You have increased pain or a large or growing hard lump around the site.    The site is red, swollen, hot or tender.    Blood or fluid is draining from the site.    You have chills or a fever greater than 101 F (38 C).    Your leg feels numb, cool or changes color.    You have hives, a rash or unusual itching.    New pain in the back or belly that you cannot control with Tylenol.    Any questions or concerns.          [facility] [place]:    [phone] [facility] (7 days a week)

## 2021-06-24 ENCOUNTER — TELEPHONE (OUTPATIENT)
Dept: CARDIOLOGY | Facility: CLINIC | Age: 56
End: 2021-06-24

## 2021-06-24 LAB
INTERPRETATION ECG - MUSE: NORMAL

## 2021-06-24 NOTE — TELEPHONE ENCOUNTER
Patient was evaluated by cardiology while inpatient for chest pain s/p cath ( S/p balloon angioplasty of LAD stenosis on 6/18 but unable to cross with cutting balloon or stent resulting in a small contained dissection without perforation in the LAD. He underwent repeat angiogram on 6/21 with PCI of OM2 with 2 overlapping drug eluting stents. Atherectomy of the LAD was deferred in the setting of residual dissection plan from balloon angioplasty on 6/18. Will need staged intervention in 1 month. Called patient and left  to discuss any post hospital d/c questions he may have, review medication changes, and confirm f/u appts. RN advised patient in  to call back asap if he did not receive his rx for Plavix. RN advised patient in  that he has an apt scheduled on [facility]2/[facility] and on 7/8/21 for OP cardiac rehab. Patient advised in  to call clinic with any cardiac related questions or concerns prior to this scheduled patricio[facility].             6/23/21 cardiology progress note  [facility]  Cardiology Progress Note     Date of Service (when I saw the patient): 06/23/2021  Summary: John Reyes is a 56 year old male with history of dyslipidemia who was admitted on 6/18/2021 after an outpatient angiogram with unstable angina.     Interval History     He is better this morning. Wife is at bedside. We reviewed his angiogram pictures. Headache is gone. He denies chest pain overnight. BPs stable with the addition of low dose amlodipine.         Assessment & Plan      1.  Unstable angina. Had stress test on 6/18 for progressive symptoms of SOB and chest tightness with exertion which was markedly abnormal. Coronary angiogram at [facility] on 6/18 showed severe proximal to mild LAD disease and severe OM2 lesion. He had moderate disease elsewhere. Resting echo on 6/18/21 showed normal LV function without WMAs.  -  S/p balloon angioplasty of LAD stenosis on 6/18 but unable to cross with cutting balloon  or stent resulting in a small contained dissection without perforation in the LAD.   -  He underwent repeat angiogram on 6/21 with PCI of OM2 with 2 overlapping drug eluting stents. Atherectomy of the LAD was deferred in the setting of residual dissection plan from balloon angioplasty on 6/18. Will need staged intervention in 1 month.  -  Continue aspirin, plavix, statin, beta blocker. Heparin and nitroglycerin drips stopped.    2.  Dyslipidemia. Historically on Lipitor but recently switched to Crestor 20 mg daily as an outpatient prior to admission. Last LDL in March was 158, TGs 404, total cholesterol 255, HDL 35.  -  Lipitor 40 mg daily ordered here.       Plan:  1.  Continue DAPT with aspirin and plavix, high intensity statin and beta blocker. Needs aggressive risk factor modification.  Needs outpatient lipid profile and increased lipitor dose if not at goal.  2.  Staged PCI of the LAD in 4 weeks. This can be arranged at his follow up.   3.  Continue low dose amlodipine for angina. This could be increased or now that his headache has resolved could consider the addition of low dose imdur if needed.     4.  He should be discharged with a prescription for SL nitroglycerin.  5.  OP cardiac rehab following his LAD stenting.   6.  He has follow up arranged with Erika Shaw PA-C 7/2. We discussed signs/symptoms to call the clinic for and when to seek evaluation in the meantime. I told him to take it easy the next few weeks prior to his stenting.   7.  Home today.      Kimmie Dangelo PA-C

## 2021-06-29 PROBLEM — I25.10 CAD (CORONARY ARTERY DISEASE): Status: ACTIVE | Noted: 2021-06-29

## 2021-07-02 ENCOUNTER — OFFICE VISIT (OUTPATIENT)
Dept: CARDIOLOGY | Facility: CLINIC | Age: 56
End: 2021-07-02
Payer: COMMERCIAL

## 2021-07-02 VITALS
WEIGHT: 230.6 LBS | HEIGHT: 68 IN | BODY MASS INDEX: 34.95 KG/M2 | DIASTOLIC BLOOD PRESSURE: 62 MMHG | SYSTOLIC BLOOD PRESSURE: 108 MMHG | OXYGEN SATURATION: 97 % | HEART RATE: 59 BPM

## 2021-07-02 DIAGNOSIS — R93.1 ABNORMAL FINDINGS ON DIAGNOSTIC IMAGING OF HEART AND CORONARY CIRCULATION: ICD-10-CM

## 2021-07-02 DIAGNOSIS — I25.10 CORONARY ARTERY DISEASE INVOLVING NATIVE CORONARY ARTERY OF NATIVE HEART WITHOUT ANGINA PECTORIS: ICD-10-CM

## 2021-07-02 DIAGNOSIS — Z98.61 PERCUTANEOUS TRANSLUMINAL CORONARY ANGIOPLASTY STATUS: Primary | ICD-10-CM

## 2021-07-02 DIAGNOSIS — Z11.59 ENCOUNTER FOR SCREENING FOR OTHER VIRAL DISEASES: ICD-10-CM

## 2021-07-02 PROCEDURE — 99214 OFFICE O/P EST MOD 30 MIN: CPT | Performed by: PHYSICIAN ASSISTANT

## 2021-07-02 ASSESSMENT — MIFFLIN-ST. JEOR: SCORE: 1840.49

## 2021-07-02 NOTE — LETTER
2021    Remy Mcknight MD  [id number]  [facility] [address]    RE: John Reyes       Dear Colleague,    I had the pleasure of seeing John Reyes in the [facility].        CARDIOLOGY CLINIC PROGRESS NOTE    DOS: 2021      John Reyes  : 1965, 56 year old  MRN: [id number]      History:  I am following up with John Reyes today.  He was recently admitted after an abnormal stress test.     John Reyes is a pleasant 56 year old man with history of dyslipidemia, CAD.     21 underwent outpatient stress test per PCP for chest pain, abnormal, scheduled for cardiac cath same day.      21 cardiac cath:  balloon angioplasty of LAD stenosis but unable to cross with cutting balloon or stent resulting in a small contained dissection without perforation in the LAD.     He was transferred to [facility] and admitted for complex PCI. He underwent repeat angiogram on 21 with PCI of OM2 with 2 overlapping drug eluting stents.  Atherectomy of the LAD was deferred in the setting of residual dissection from balloon angioplasty on 21.  Plan is for staged intervention in 1 month.   Discharged to home 21     He presents today with his wife.  She works at [employer].   He has had no chest pain.   He is tired and lacks energy.    We discussed thatthis could be from the LAD disease vs meds.  We reviewed meds and it is possible metoprolol could cause fatigue but he is on a very low dose, so we elected to continue until he has his staged procedure.   Groin site healing up.   No bleeding concerns on ASA and Plavix.         ROS:  Skin:  Negative     Eyes:  Positive for visual blurring  ENT:  Negative    Respiratory:  Positive for sleep apnea;CPAP  Cardiovascular:  Negative fatigue;Positive for  Gastroenterology: Positive for heartburn  Genitourinary:  not assessed    Musculoskeletal:  Positive for back  "pain  Neurologic:  Positive for headaches  Psychiatric:  Negative    Heme/Lymph/Imm:  Positive for allergies  Endocrine:  Negative      PAST MEDICAL HISTORY:  Past Medical History:   Diagnosis Date     Acute duodenal ulcer without mention of hemorrhage, perforation, or obstruction 1990    diagnosed on EGD     Allergic rhinitis, cause unspecified      Attention deficit disorder without mention of hyperactivity     \"all my life\"     CAD (coronary artery disease) 6/29/2021     Cataplexy and narcolepsy     had for many years, but diagnosed in sleep lab 6/03     Esophageal reflux 11/28/2007     Hyperlipidemia LDL goal <130 10/31/2010     Hypertriglyceridemia 8/10/2012     Mild major depression (H) 9/15/2011     OBESITY NOS 11/28/2007     PONV (postoperative nausea and vomiting)      Sleep apnea     CPAP     Sleep disorder 4/12/2011       PAST SURGICAL HISTORY:  Past Surgical History:   Procedure Laterality Date     ARTHROSCOPY SHOULDER, OPEN ROTATOR CUFF REPAIR, COMBINED Left 11/5/2014    Procedure: COMBINED ARTHROSCOPY SHOULDER, OPEN ROTATOR CUFF REPAIR;  Surgeon: Maykel Khalil MD;  Location: [facility] OR     C HAND/FINGER SURGERY UNLISTED  1983    ORIF right middle finger     COLONOSCOPY  9/9/2013    Procedure: COMBINED COLONOSCOPY, SINGLE BIOPSY/POLYPECTOMY BY BIOPSY;;  Surgeon: Hali Lema MD;  Location:  GI     CV HEART CATHETERIZATION WITH POSSIBLE INTERVENTION N/A 6/18/2021    Procedure: Heart Catheterization with Possible Intervention;  Surgeon: Roxie Schrader MD;  Location:  HEART CARDIAC CATH LAB     CV HEART CATHETERIZATION WITH POSSIBLE INTERVENTION N/A 6/21/2021    Procedure: Heart Catheterization with Possible Intervention;  Surgeon: Roxie Schrader MD;  Location:  HEART CARDIAC CATH LAB     CV LEFT HEART CATH N/A 6/18/2021    Procedure: Left Heart Cath;  Surgeon: Roxie Schrader MD;  Location:  HEART CARDIAC CATH LAB     CV PCI ANGIOPLASTY N/A 6/18/2021    Procedure: Percutaneous " Coronary Intervention Angioplasty;  Surgeon: Roxie Schrader MD;  Location:  HEART CARDIAC CATH LAB     CV PCI STENT DRUG ELUTING N/A 2021    Procedure: Percutaneous Coronary Intervention Stent Drug Eluting;  Surgeon: Roxie Schrader MD;  Location:  HEART CARDIAC CATH LAB     GI SURGERY      henmmriodectoomy     HC EXPLOR MAXILL SINUS,INTRANASAL  1981`     HC VASECTOMY UNILAT/BILAT W POSTOP SEMEN         SOCIAL HISTORY:  Social History     Socioeconomic History     Marital status:      Spouse name: None     Number of children: None     Years of education: None     Highest education level: Associate degree: occupational, technical, or vocational program   Occupational History     None   Social Needs     Financial resource strain: Somewhat hard     Food insecurity     Worry: Never true     Inability: None     Transportation needs     Medical: No     Non-medical: No   Tobacco Use     Smoking status: Former Smoker     Packs/day: 0.50     Types: Cigarettes, Cigars     Quit date: 2003     Years since quittin.5     Smokeless tobacco: Never Used   Substance and Sexual Activity     Alcohol use: Yes     Alcohol/week: 0.0 standard drinks     Frequency: 2-3 times a week     Drinks per session: 5 or 6     Binge frequency: Monthly     Comment: 3-4 beers/week     Drug use: No     Sexual activity: Yes     Partners: Female     Birth control/protection: Surgical   Lifestyle     Physical activity     Days per week: 3 days     Minutes per session: 50 min     Stress: To some extent   Relationships     Social connections     Talks on phone: Three times a week     Gets together: Once a week     Attends Orthodox service: More than 4 times per year     Active member of club or organization: Yes     Attends meetings of clubs or organizations: 1 to 4 times per year     Relationship status:      Intimate partner violence     Fear of current or ex partner: None     Emotionally abused: None     Physically  abused: None     Forced sexual activity: None   Other Topics Concern     Parent/sibling w/ CABG, MI or angioplasty before 65F 55M? Not Asked   Social History Narrative     None       FAMILY HISTORY:  Family History   Problem Relation Age of Onset     Lipids Father         On medication     Cancer - colorectal Maternal Uncle        MEDS: albuterol (PROAIR HFA/PROVENTIL HFA/VENTOLIN HFA) 108 (90 Base) MCG/ACT inhaler, Inhale 2 puffs into the lungs every 4 hours as needed for shortness of breath / dyspnea or wheezing  amLODIPine (NORVASC) 2.5 MG tablet, Take 1 tablet (2.5 mg) by mouth daily  aspirin (ASA) 81 MG EC tablet, Take 1 tablet (81 mg) by mouth daily  atorvastatin (LIPITOR) 40 MG tablet, Take 1 tablet (40 mg) by mouth daily  buPROPion (WELLBUTRIN XL) 300 MG 24 hr tablet, TAKE ONE TABLET BY MOUTH ONCE DAILY  clopidogrel (PLAVIX) 75 MG tablet, Take 1 tablet (75 mg) by mouth daily  fluocinonide (LIDEX) 0.05 % solution, Apply to scalp daily as needed  fluticasone (FLONASE) 50 MCG/ACT nasal spray, Spray 1 spray into both nostrils daily (Patient taking differently: Spray 1 spray into both nostrils daily as needed for rhinitis or allergies )  metoprolol tartrate (LOPRESSOR) 25 MG tablet, Take 0.5 tablets (12.5 mg) by mouth 2 times daily  omeprazole (PRILOSEC) 40 MG DR capsule, Take 1 capsule (40 mg) by mouth daily  sertraline (ZOLOFT) 100 MG tablet, TAKE ONE AND ONE-HALF TABLETS BY MOUTH EVERY DAY  sildenafil (REVATIO) 20 MG tablet, Take one to three tablets one hour before sex  solifenacin (VESICARE) 5 MG tablet, TAKE ONE TABLET BY MOUTH ONCE DAILY  traZODone (DESYREL) 50 MG tablet, TAKE THREE TABLETS BY MOUTH EVERY NIGHT AT BEDTIME  nitroGLYcerin (NITROSTAT) 0.4 MG sublingual tablet, For chest pain place 1 tablet under the tongue every 5 minutes for 3 doses. If symptoms persist 5 minutes after 1st dose call 911. (Patient not taking: Reported on 7/2/2021)    No current facility-administered medications on file prior  "to visit.       ALLERGIES:   Allergies   Allergen Reactions     Penicillins Itching, Swelling and Rash     Rash       PHYSICAL EXAM:  Vitals: /62 (BP Location: Right arm, Patient Position: Sitting, Cuff Size: Adult Large)   Pulse 59   Ht 1.727 m (5' 8\")   Wt 104.6 kg (230 lb 9.6 oz)   SpO2 97%   BMI 35.06 kg/m    Constitutional:  cooperative, alert and oriented, well developed, well nourished, in no acute distress        Skin:  warm and dry to the touch, no apparent skin lesions or masses noted        Head:  normocephalic        Eyes:  pupils equal and round;conjunctivae and lids unremarkable;sclera white        ENT:  no pallor or cyanosis        Neck:  JVP normal        Respiratory:  clear to auscultation        Cardiac: regular rhythm, normal S1/S2, no S3 or S4, apical impulse not displaced, no murmurs, gallops or rubs                  GI:  abdomen soft;BS normoactive obese      Vascular:                                   RFA site: ecchymotic, very small hematoma, no bruit    Extremities and Musculoskeletal:  no deformities, clubbing, cyanosis, erythema observed;no edema        Neurological:  no gross motor deficits;affect appropriate            LABS/DATA:  I reviewed the following:  Cardiac cath and echo from admission revewied    Last FLP 3/5/21 reviewed      ASSESSMENT/PLAN:  CAD  - Had stress test on 6/18 for progressive symptoms of SOB and chest tightness with exertion which was markedly abnormal.   - Same day coronary angiogram at [facility] showed severe proximal to mild LAD disease and severe OM2 lesion. He had moderate disease elsewhere.   - Resting echo on 6/18/21 showed normal LV function without WMAs.  - S/p balloon angioplasty of LAD stenosis on 6/18 but unable to cross with cutting balloon or stent resulting in a small contained dissection without perforation in the LAD.   -  He underwent repeat angiogram on 6/21 with PCI of OM2 with 2 overlapping drug eluting stents. Atherectomy of the LAD " was deferred in the setting of residual dissection plan from balloon angioplasty on 6/18.   - No chest pain  - Continue ASA 81 mg indefinitely, Plavix x 1 year min, metoprolol, amlodipine, statin, SL nitroglycerin prn  - Will need staged intervention in 1 month.   * At [facility]   * Needs atherectomy   * I will call and go over R and B with patient before procedure   - OP cardiac rehab will be moved out after his staged procedure    Dyslipidemia  - Historically on Lipitor but recently had recently switched to Crestor 20 mg daily as an outpatient (but had not started).   - Sent home back on atorvastatin 40 mg  - Last LDL in March was 158, TGs 404, total cholesterol 255, HDL 35.  - Will finish up Lipitor then switch to Crestor 20 mg.    - FLP/ALT about 4-6 weeks after starting Crestor       Follow up:  Staged PCI end of July  RAYRAY follow up after  Dr. Blair with FLP/ALT in about 3 months        ADDENDUM  During clinic visit we spent most of the visit reviewing his admission and plans for staged PCI.  We discussed the plan, but did not specifically do R and B.    Today, I called John Reyes and discussed that he is scheduled for his staged cath tomorrow at [facility].  We reviewed R and B.   He agrees to proceed.   All questions answered.   Erika Shaw PA-C 7/19/2021 2:29 PM          Erika Shaw PA-C      Thank you for allowing me to participate in the care of your patient.      Sincerely,     Erika Shwa PA-C     [facility]  cc:   No referring provider defined for this encounter.

## 2021-07-02 NOTE — PROGRESS NOTES
CARDIOLOGY CLINIC PROGRESS NOTE    DOS: 2021      John Reyes  : 1965, 56 year old  MRN: [id number]      History:  I am following up with John Reyes today.  He was recently admitted after an abnormal stress test.     John Reyes is a pleasant 56 year old man with history of dyslipidemia, CAD.     21 underwent outpatient stress test per PCP for chest pain, abnormal, scheduled for cardiac cath same day.      21 cardiac cath:  balloon angioplasty of LAD stenosis but unable to cross with cutting balloon or stent resulting in a small contained dissection without perforation in the LAD.     He was transferred to [facility] and admitted for complex PCI. He underwent repeat angiogram on 21 with PCI of OM2 with 2 overlapping drug eluting stents.  Atherectomy of the LAD was deferred in the setting of residual dissection from balloon angioplasty on 21.  Plan is for staged intervention in 1 month.   Discharged to home 21     He presents today with his wife.  She works at [employer].   He has had no chest pain.   He is tired and lacks energy.    We discussed thatthis could be from the LAD disease vs meds.  We reviewed meds and it is possible metoprolol could cause fatigue but he is on a very low dose, so we elected to continue until he has his staged procedure.   Groin site healing up.   No bleeding concerns on ASA and Plavix.         ROS:  Skin:  Negative     Eyes:  Positive for visual blurring  ENT:  Negative    Respiratory:  Positive for sleep apnea;CPAP  Cardiovascular:  Negative fatigue;Positive for  Gastroenterology: Positive for heartburn  Genitourinary:  not assessed    Musculoskeletal:  Positive for back pain  Neurologic:  Positive for headaches  Psychiatric:  Negative    Heme/Lymph/Imm:  Positive for allergies  Endocrine:  Negative      PAST MEDICAL HISTORY:  Past Medical History:   Diagnosis Date     Acute duodenal ulcer without mention of hemorrhage,  "perforation, or obstruction 1990    diagnosed on EGD     Allergic rhinitis, cause unspecified      Attention deficit disorder without mention of hyperactivity     \"all my life\"     CAD (coronary artery disease) 6/29/2021     Cataplexy and narcolepsy     had for many years, but diagnosed in sleep lab 6/03     Esophageal reflux 11/28/2007     Hyperlipidemia LDL goal <130 10/31/2010     Hypertriglyceridemia 8/10/2012     Mild major depression (H) 9/15/2011     OBESITY NOS 11/28/2007     PONV (postoperative nausea and vomiting)      Sleep apnea     CPAP     Sleep disorder 4/12/2011       PAST SURGICAL HISTORY:  Past Surgical History:   Procedure Laterality Date     ARTHROSCOPY SHOULDER, OPEN ROTATOR CUFF REPAIR, COMBINED Left 11/5/2014    Procedure: COMBINED ARTHROSCOPY SHOULDER, OPEN ROTATOR CUFF REPAIR;  Surgeon: Maykel Khalil MD;  Location: [facility] OR     C HAND/FINGER SURGERY UNLISTED  1983    ORIF right middle finger     COLONOSCOPY  9/9/2013    Procedure: COMBINED COLONOSCOPY, SINGLE BIOPSY/POLYPECTOMY BY BIOPSY;;  Surgeon: Hali Lema MD;  Location: [facility] GI     CV HEART CATHETERIZATION WITH POSSIBLE INTERVENTION N/A 6/18/2021    Procedure: Heart Catheterization with Possible Intervention;  Surgeon: Roxie Schrader MD;  Location:  HEART CARDIAC CATH LAB     CV HEART CATHETERIZATION WITH POSSIBLE INTERVENTION N/A 6/21/2021    Procedure: Heart Catheterization with Possible Intervention;  Surgeon: Roxie Schrader MD;  Location:  HEART CARDIAC CATH LAB     CV LEFT HEART CATH N/A 6/18/2021    Procedure: Left Heart Cath;  Surgeon: Roxie Schrader MD;  Location:  HEART CARDIAC CATH LAB     CV PCI ANGIOPLASTY N/A 6/18/2021    Procedure: Percutaneous Coronary Intervention Angioplasty;  Surgeon: Roxie Schrader MD;  Location:  HEART CARDIAC CATH LAB     CV PCI STENT DRUG ELUTING N/A 6/21/2021    Procedure: Percutaneous Coronary Intervention Stent Drug Eluting;  Surgeon: Roxie Schrader MD;  Location: " [facility] HEART CARDIAC CATH LAB     GI SURGERY      henmmriodectoomy     HC EXPLOR MAXILL SINUS,INTRANASAL       HC VASECTOMY UNILAT/BILAT W POSTOP SEMEN         SOCIAL HISTORY:  Social History     Socioeconomic History     Marital status:      Spouse name: None     Number of children: None     Years of education: None     Highest education level: Associate degree: occupational, technical, or vocational program   Occupational History     None   Social Needs     Financial resource strain: Somewhat hard     Food insecurity     Worry: Never true     Inability: None     Transportation needs     Medical: No     Non-medical: No   Tobacco Use     Smoking status: Former Smoker     Packs/day: 0.50     Types: Cigarettes, Cigars     Quit date: 2003     Years since quittin.5     Smokeless tobacco: Never Used   Substance and Sexual Activity     Alcohol use: Yes     Alcohol/week: 0.0 standard drinks     Frequency: 2-3 times a week     Drinks per session: 5 or 6     Binge frequency: Monthly     Comment: 3-4 beers/week     Drug use: No     Sexual activity: Yes     Partners: Female     Birth control/protection: Surgical   Lifestyle     Physical activity     Days per week: 3 days     Minutes per session: 50 min     Stress: To some extent   Relationships     Social connections     Talks on phone: Three times a week     Gets together: Once a week     Attends Gnosticist service: More than 4 times per year     Active member of club or organization: Yes     Attends meetings of clubs or organizations: 1 to 4 times per year     Relationship status:      Intimate partner violence     Fear of current or ex partner: None     Emotionally abused: None     Physically abused: None     Forced sexual activity: None   Other Topics Concern     Parent/sibling w/ CABG, MI or angioplasty before 65F 55M? Not Asked   Social History Narrative     None       FAMILY HISTORY:  Family History   Problem Relation Age of Onset     Lipids  Father         On medication     Cancer - colorectal Maternal Uncle        MEDS: albuterol (PROAIR HFA/PROVENTIL HFA/VENTOLIN HFA) 108 (90 Base) MCG/ACT inhaler, Inhale 2 puffs into the lungs every 4 hours as needed for shortness of breath / dyspnea or wheezing  amLODIPine (NORVASC) 2.5 MG tablet, Take 1 tablet (2.5 mg) by mouth daily  aspirin (ASA) 81 MG EC tablet, Take 1 tablet (81 mg) by mouth daily  atorvastatin (LIPITOR) 40 MG tablet, Take 1 tablet (40 mg) by mouth daily  buPROPion (WELLBUTRIN XL) 300 MG 24 hr tablet, TAKE ONE TABLET BY MOUTH ONCE DAILY  clopidogrel (PLAVIX) 75 MG tablet, Take 1 tablet (75 mg) by mouth daily  fluocinonide (LIDEX) 0.05 % solution, Apply to scalp daily as needed  fluticasone (FLONASE) 50 MCG/ACT nasal spray, Spray 1 spray into both nostrils daily (Patient taking differently: Spray 1 spray into both nostrils daily as needed for rhinitis or allergies )  metoprolol tartrate (LOPRESSOR) 25 MG tablet, Take 0.5 tablets (12.5 mg) by mouth 2 times daily  omeprazole (PRILOSEC) 40 MG DR capsule, Take 1 capsule (40 mg) by mouth daily  sertraline (ZOLOFT) 100 MG tablet, TAKE ONE AND ONE-HALF TABLETS BY MOUTH EVERY DAY  sildenafil (REVATIO) 20 MG tablet, Take one to three tablets one hour before sex  solifenacin (VESICARE) 5 MG tablet, TAKE ONE TABLET BY MOUTH ONCE DAILY  traZODone (DESYREL) 50 MG tablet, TAKE THREE TABLETS BY MOUTH EVERY NIGHT AT BEDTIME  nitroGLYcerin (NITROSTAT) 0.4 MG sublingual tablet, For chest pain place 1 tablet under the tongue every 5 minutes for 3 doses. If symptoms persist 5 minutes after 1st dose call 911. (Patient not taking: Reported on 7/2/2021)    No current facility-administered medications on file prior to visit.       ALLERGIES:   Allergies   Allergen Reactions     Penicillins Itching, Swelling and Rash     Rash       PHYSICAL EXAM:  Vitals: /62 (BP Location: Right arm, Patient Position: Sitting, Cuff Size: Adult Large)   Pulse 59   Ht 1.727 m (5'  "8\")   Wt 104.6 kg (230 lb 9.6 oz)   SpO2 97%   BMI 35.06 kg/m    Constitutional:  cooperative, alert and oriented, well developed, well nourished, in no acute distress        Skin:  warm and dry to the touch, no apparent skin lesions or masses noted        Head:  normocephalic        Eyes:  pupils equal and round;conjunctivae and lids unremarkable;sclera white        ENT:  no pallor or cyanosis        Neck:  JVP normal        Respiratory:  clear to auscultation        Cardiac: regular rhythm, normal S1/S2, no S3 or S4, apical impulse not displaced, no murmurs, gallops or rubs                  GI:  abdomen soft;BS normoactive obese      Vascular:                                   RFA site: ecchymotic, very small hematoma, no bruit    Extremities and Musculoskeletal:  no deformities, clubbing, cyanosis, erythema observed;no edema        Neurological:  no gross motor deficits;affect appropriate            LABS/DATA:  I reviewed the following:  Cardiac cath and echo from admission revewied    Last FLP 3/5/21 reviewed      ASSESSMENT/PLAN:  CAD  - Had stress test on 6/18 for progressive symptoms of SOB and chest tightness with exertion which was markedly abnormal.   - Same day coronary angiogram at [facility] showed severe proximal to mild LAD disease and severe OM2 lesion. He had moderate disease elsewhere.   - Resting echo on 6/18/21 showed normal LV function without WMAs.  - S/p balloon angioplasty of LAD stenosis on 6/18 but unable to cross with cutting balloon or stent resulting in a small contained dissection without perforation in the LAD.   -  He underwent repeat angiogram on 6/21 with PCI of OM2 with 2 overlapping drug eluting stents. Atherectomy of the LAD was deferred in the setting of residual dissection plan from balloon angioplasty on 6/18.   - No chest pain  - Continue ASA 81 mg indefinitely, Plavix x 1 year min, metoprolol, amlodipine, statin, SL nitroglycerin prn  - Will need staged intervention in 1 " month.   * At [facility]   * Needs atherectomy   * I will call and go over R and B with patient before procedure   - OP cardiac rehab will be moved out after his staged procedure    Dyslipidemia  - Historically on Lipitor but recently had recently switched to Crestor 20 mg daily as an outpatient (but had not started).   - Sent home back on atorvastatin 40 mg  - Last LDL in March was 158, TGs 404, total cholesterol 255, HDL 35.  - Will finish up Lipitor then switch to Crestor 20 mg.    - FLP/ALT about 4-6 weeks after starting Crestor       Follow up:  Staged PCI end of July  RAYRAY follow up after  Dr. Blair with FLP/ALT in about 3 months        ADDENDUM  During clinic visit we spent most of the visit reviewing his admission and plans for staged PCI.  We discussed the plan, but did not specifically do R and B.    Today, I called John VERDUZCO Eric and discussed that he is scheduled for his staged cath tomorrow at [facility].  We reviewed R and B.   He agrees to proceed.   All questions answered.   Erika Shaw PA-C 7/19/2021 2:29 PM          Erika Shaw PA-C

## 2021-07-02 NOTE — PATIENT INSTRUCTIONS
Finish up your atorvastatin and then switch to the Crestor (rosuvastatin) that Dr. Mcknight prescribed.  After you start this, we will recheck cholesterol labs and a liver test in about 4-6 weeks.     We will get you scheduled for the staged procedure at [facility] at the mid/end of July.     About 1-2 weeks after this, you will be seen back here in clinic.      Go ahead and move out the cardiac rehab until after your staged procedure.    In Oct you will see Dr. Blair with cholesterol lab and the liver test.

## 2021-07-16 ENCOUNTER — CARE COORDINATION (OUTPATIENT)
Dept: CARDIOLOGY | Facility: CLINIC | Age: 56
End: 2021-07-16

## 2021-07-16 DIAGNOSIS — I25.10 CORONARY ARTERY DISEASE INVOLVING NATIVE CORONARY ARTERY OF NATIVE HEART WITHOUT ANGINA PECTORIS: Primary | ICD-10-CM

## 2021-07-16 RX ORDER — SODIUM CHLORIDE 9 MG/ML
INJECTION, SOLUTION INTRAVENOUS CONTINUOUS
Status: CANCELLED | OUTPATIENT
Start: 2021-07-16

## 2021-07-16 RX ORDER — POTASSIUM CHLORIDE 750 MG/1
20 TABLET, EXTENDED RELEASE ORAL
Status: CANCELLED | OUTPATIENT
Start: 2021-07-16

## 2021-07-16 RX ORDER — LIDOCAINE 40 MG/G
CREAM TOPICAL
Status: CANCELLED | OUTPATIENT
Start: 2021-07-16

## 2021-07-16 RX ORDER — ASPIRIN 81 MG/1
81 TABLET ORAL DAILY
Status: CANCELLED | OUTPATIENT
Start: 2021-07-16 | End: 2021-07-18

## 2021-07-16 NOTE — PROGRESS NOTES
Called pt to review prep instructions to LHC/staged PCI on 7/20/21. No answer, LVM for call back. Placed procedural orders. Of note, 7/2/21 RAYRAY visit says RAYRAY will call pt to review risks and benefits. Messaged MANDY Rivero to confirm this.     Alycia Staley RN, BSN  07/16/21 at 3:10 PM

## 2021-07-17 ENCOUNTER — LAB (OUTPATIENT)
Dept: LAB | Facility: CLINIC | Age: 56
End: 2021-07-17
Payer: COMMERCIAL

## 2021-07-17 DIAGNOSIS — Z11.59 ENCOUNTER FOR SCREENING FOR OTHER VIRAL DISEASES: ICD-10-CM

## 2021-07-17 PROCEDURE — U0005 INFEC AGEN DETEC AMPLI PROBE: HCPCS

## 2021-07-18 LAB — SARS-COV-2 RNA RESP QL NAA+PROBE: NEGATIVE

## 2021-07-19 NOTE — PROGRESS NOTES
HEART CATHETERIZATION PREP INSTRUCTIONS  Procedure is scheduled for staged PCI at [facility], on 7/20/21.  Patient to be NPO after midnight on 7/20/21.   Patient is not on a diuretic.   Patient is not diabetic/is not on diabetic medications.   Patient is not on an anticoagulant.   Patient is on aspirin 81mg and will continue 81mg daily. Patient is on Plavix and will continue and take it the am of procedure.  Patient can take his other meds prior to procedure with small sips of water.  Patient denies any contrast allergy.  Patient will need someone available to drive him to the hospital and to drive him home.  Patient should have someone available to stay with him for at least 24 hours after the procedure.   Check in time is at 6:30 am and procedure time is at 8:30 am.  Nursing orders signed and held? YES       Pt denied any questions or concerns.   MANDY Rivero did call and leave message Friday to discuss risks and benefits, but pt did not call back. MANDY Rivero off today. Discussed with clinic manager. Will send FYI to Dr. Schrader, procedural doc with FYI that official risks and benefits not done, to be done via phone, but pt did not return call when provider called. Asked provider to do prior to procedure in am.     Alycia Staley, RN, BSN  07/19/21 at 12:06 PM

## 2021-07-20 ENCOUNTER — HOSPITAL ENCOUNTER (OUTPATIENT)
Facility: CLINIC | Age: 56
Discharge: HOME | End: 2021-07-20
Admitting: INTERNAL MEDICINE
Payer: COMMERCIAL

## 2021-07-20 VITALS
BODY MASS INDEX: 34.71 KG/M2 | RESPIRATION RATE: 18 BRPM | OXYGEN SATURATION: 98 % | HEIGHT: 68 IN | WEIGHT: 229 LBS | HEART RATE: 68 BPM | DIASTOLIC BLOOD PRESSURE: 72 MMHG | TEMPERATURE: 97 F | SYSTOLIC BLOOD PRESSURE: 134 MMHG

## 2021-07-20 DIAGNOSIS — R93.1 ABNORMAL FINDINGS ON DIAGNOSTIC IMAGING OF HEART AND CORONARY CIRCULATION: ICD-10-CM

## 2021-07-20 DIAGNOSIS — I25.10 CORONARY ARTERY DISEASE INVOLVING NATIVE CORONARY ARTERY OF NATIVE HEART WITHOUT ANGINA PECTORIS: ICD-10-CM

## 2021-07-20 DIAGNOSIS — I25.110 CORONARY ARTERY DISEASE INVOLVING NATIVE CORONARY ARTERY OF NATIVE HEART WITH UNSTABLE ANGINA PECTORIS (H): Primary | ICD-10-CM

## 2021-07-20 PROBLEM — Z98.890 STATUS POST CORONARY ANGIOGRAM: Status: ACTIVE | Noted: 2021-07-20

## 2021-07-20 LAB
ACT BLD: 300 SECONDS (ref 74–150)
ACT BLD: 300 SECONDS (ref 74–150)
ACT BLD: 332 SECONDS (ref 74–150)
ACT BLD: 380 SECONDS (ref 74–150)
ANION GAP SERPL CALCULATED.3IONS-SCNC: 7 MMOL/L (ref 3–14)
APTT PPP: 28 SECONDS (ref 22–38)
BUN SERPL-MCNC: 16 MG/DL (ref 7–30)
CALCIUM SERPL-MCNC: 8.5 MG/DL (ref 8.5–10.1)
CHLORIDE BLD-SCNC: 105 MMOL/L (ref 94–109)
CHOLEST SERPL-MCNC: 165 MG/DL
CO2 SERPL-SCNC: 28 MMOL/L (ref 20–32)
CREAT SERPL-MCNC: 1.08 MG/DL (ref 0.66–1.25)
ERYTHROCYTE [DISTWIDTH] IN BLOOD BY AUTOMATED COUNT: 11.9 % (ref 10–15)
GFR SERPL CREATININE-BSD FRML MDRD: 75 ML/MIN/1.73M2
GLUCOSE BLD-MCNC: 107 MG/DL (ref 70–99)
HCT VFR BLD AUTO: 39.7 % (ref 40–53)
HDLC SERPL-MCNC: 31 MG/DL
HGB BLD-MCNC: 13.5 G/DL (ref 13.3–17.7)
INR PPP: 1.04 (ref 0.85–1.15)
LDLC SERPL CALC-MCNC: ABNORMAL MG/DL
MCH RBC QN AUTO: 30.6 PG (ref 26.5–33)
MCHC RBC AUTO-ENTMCNC: 34 G/DL (ref 31.5–36.5)
MCV RBC AUTO: 90 FL (ref 78–100)
NONHDLC SERPL-MCNC: 134 MG/DL
PLATELET # BLD AUTO: 111 10E3/UL (ref 150–450)
POTASSIUM BLD-SCNC: 4.1 MMOL/L (ref 3.4–5.3)
RBC # BLD AUTO: 4.41 10E6/UL (ref 4.4–5.9)
SODIUM SERPL-SCNC: 140 MMOL/L (ref 133–144)
TRIGL SERPL-MCNC: 464 MG/DL
WBC # BLD AUTO: 6.5 10E3/UL (ref 4–11)

## 2021-07-20 PROCEDURE — 99153 MOD SED SAME PHYS/QHP EA: CPT | Performed by: INTERNAL MEDICINE

## 2021-07-20 PROCEDURE — 250N000011 HC RX IP 250 OP 636: Performed by: INTERNAL MEDICINE

## 2021-07-20 PROCEDURE — 93571 IV DOP VEL&/PRESS C FLO 1ST: CPT | Mod: 26 | Performed by: INTERNAL MEDICINE

## 2021-07-20 PROCEDURE — 99152 MOD SED SAME PHYS/QHP 5/>YRS: CPT | Mod: 59 | Performed by: INTERNAL MEDICINE

## 2021-07-20 PROCEDURE — 92928 PRQ TCAT PLMT NTRAC ST 1 LES: CPT | Mod: LD | Performed by: INTERNAL MEDICINE

## 2021-07-20 PROCEDURE — 85027 COMPLETE CBC AUTOMATED: CPT | Performed by: INTERNAL MEDICINE

## 2021-07-20 PROCEDURE — 999N000184 HC STATISTIC TELEMETRY

## 2021-07-20 PROCEDURE — 85730 THROMBOPLASTIN TIME PARTIAL: CPT | Performed by: INTERNAL MEDICINE

## 2021-07-20 PROCEDURE — 80061 LIPID PANEL: CPT | Performed by: INTERNAL MEDICINE

## 2021-07-20 PROCEDURE — 36592 COLLECT BLOOD FROM PICC: CPT | Performed by: INTERNAL MEDICINE

## 2021-07-20 PROCEDURE — C1874 STENT, COATED/COV W/DEL SYS: HCPCS | Performed by: INTERNAL MEDICINE

## 2021-07-20 PROCEDURE — C1887 CATHETER, GUIDING: HCPCS | Performed by: INTERNAL MEDICINE

## 2021-07-20 PROCEDURE — 80048 BASIC METABOLIC PNL TOTAL CA: CPT | Performed by: INTERNAL MEDICINE

## 2021-07-20 PROCEDURE — 93005 ELECTROCARDIOGRAM TRACING: CPT

## 2021-07-20 PROCEDURE — 85347 COAGULATION TIME ACTIVATED: CPT

## 2021-07-20 PROCEDURE — 36415 COLL VENOUS BLD VENIPUNCTURE: CPT | Performed by: INTERNAL MEDICINE

## 2021-07-20 PROCEDURE — C1725 CATH, TRANSLUMIN NON-LASER: HCPCS | Performed by: INTERNAL MEDICINE

## 2021-07-20 PROCEDURE — 99152 MOD SED SAME PHYS/QHP 5/>YRS: CPT | Performed by: INTERNAL MEDICINE

## 2021-07-20 PROCEDURE — 272N000001 HC OR GENERAL SUPPLY STERILE: Performed by: INTERNAL MEDICINE

## 2021-07-20 PROCEDURE — 250N000009 HC RX 250: Performed by: INTERNAL MEDICINE

## 2021-07-20 PROCEDURE — 258N000003 HC RX IP 258 OP 636: Performed by: INTERNAL MEDICINE

## 2021-07-20 PROCEDURE — C1769 GUIDE WIRE: HCPCS | Performed by: INTERNAL MEDICINE

## 2021-07-20 PROCEDURE — 999N000071 HC STATISTIC HEART CATH LAB OR EP LAB

## 2021-07-20 PROCEDURE — 85610 PROTHROMBIN TIME: CPT | Performed by: INTERNAL MEDICINE

## 2021-07-20 PROCEDURE — C1894 INTRO/SHEATH, NON-LASER: HCPCS | Performed by: INTERNAL MEDICINE

## 2021-07-20 PROCEDURE — C9600 PERC DRUG-EL COR STENT SING: HCPCS | Performed by: INTERNAL MEDICINE

## 2021-07-20 DEVICE — STENT SYNERGY DRUG ELUTING 3.00X38MM  H7493926038300: Type: IMPLANTABLE DEVICE | Status: FUNCTIONAL

## 2021-07-20 DEVICE — STENT SYNERGY DRUG ELUTING 4.00X38MM H7493926038400: Type: IMPLANTABLE DEVICE | Status: FUNCTIONAL

## 2021-07-20 RX ORDER — NALOXONE HYDROCHLORIDE 0.4 MG/ML
0.2 INJECTION, SOLUTION INTRAMUSCULAR; INTRAVENOUS; SUBCUTANEOUS
Status: DISCONTINUED | OUTPATIENT
Start: 2021-07-20 | End: 2021-07-20 | Stop reason: HOSPADM

## 2021-07-20 RX ORDER — FENTANYL CITRATE 50 UG/ML
25 INJECTION, SOLUTION INTRAMUSCULAR; INTRAVENOUS
Status: DISCONTINUED | OUTPATIENT
Start: 2021-07-20 | End: 2021-07-21 | Stop reason: HOSPADM

## 2021-07-20 RX ORDER — NALOXONE HYDROCHLORIDE 0.4 MG/ML
0.4 INJECTION, SOLUTION INTRAMUSCULAR; INTRAVENOUS; SUBCUTANEOUS
Status: DISCONTINUED | OUTPATIENT
Start: 2021-07-20 | End: 2021-07-21 | Stop reason: HOSPADM

## 2021-07-20 RX ORDER — POTASSIUM CHLORIDE 1500 MG/1
20 TABLET, EXTENDED RELEASE ORAL
Status: DISCONTINUED | OUTPATIENT
Start: 2021-07-20 | End: 2021-07-20 | Stop reason: HOSPADM

## 2021-07-20 RX ORDER — METOPROLOL TARTRATE 1 MG/ML
5-10 INJECTION, SOLUTION INTRAVENOUS
Status: DISCONTINUED | OUTPATIENT
Start: 2021-07-20 | End: 2021-07-21 | Stop reason: HOSPADM

## 2021-07-20 RX ORDER — ONDANSETRON 4 MG/1
4 TABLET, ORALLY DISINTEGRATING ORAL EVERY 6 HOURS PRN
Status: DISCONTINUED | OUTPATIENT
Start: 2021-07-20 | End: 2021-07-21 | Stop reason: HOSPADM

## 2021-07-20 RX ORDER — ATROPINE SULFATE 0.1 MG/ML
0.5 INJECTION INTRAVENOUS
Status: DISCONTINUED | OUTPATIENT
Start: 2021-07-20 | End: 2021-07-20 | Stop reason: HOSPADM

## 2021-07-20 RX ORDER — FLUMAZENIL 0.1 MG/ML
0.2 INJECTION, SOLUTION INTRAVENOUS
Status: DISCONTINUED | OUTPATIENT
Start: 2021-07-20 | End: 2021-07-20 | Stop reason: HOSPADM

## 2021-07-20 RX ORDER — HYDRALAZINE HYDROCHLORIDE 20 MG/ML
10 INJECTION INTRAMUSCULAR; INTRAVENOUS EVERY 4 HOURS PRN
Status: DISCONTINUED | OUTPATIENT
Start: 2021-07-20 | End: 2021-07-21 | Stop reason: HOSPADM

## 2021-07-20 RX ORDER — NALOXONE HYDROCHLORIDE 0.4 MG/ML
0.4 INJECTION, SOLUTION INTRAMUSCULAR; INTRAVENOUS; SUBCUTANEOUS
Status: DISCONTINUED | OUTPATIENT
Start: 2021-07-20 | End: 2021-07-20 | Stop reason: HOSPADM

## 2021-07-20 RX ORDER — NITROGLYCERIN 5 MG/ML
VIAL (ML) INTRAVENOUS
Status: DISCONTINUED | OUTPATIENT
Start: 2021-07-20 | End: 2021-07-20 | Stop reason: HOSPADM

## 2021-07-20 RX ORDER — NALOXONE HYDROCHLORIDE 0.4 MG/ML
0.2 INJECTION, SOLUTION INTRAMUSCULAR; INTRAVENOUS; SUBCUTANEOUS
Status: DISCONTINUED | OUTPATIENT
Start: 2021-07-20 | End: 2021-07-21 | Stop reason: HOSPADM

## 2021-07-20 RX ORDER — FENTANYL CITRATE 50 UG/ML
INJECTION, SOLUTION INTRAMUSCULAR; INTRAVENOUS
Status: DISCONTINUED | OUTPATIENT
Start: 2021-07-20 | End: 2021-07-20 | Stop reason: HOSPADM

## 2021-07-20 RX ORDER — ASPIRIN 81 MG/1
81 TABLET, CHEWABLE ORAL ONCE
Status: DISCONTINUED | OUTPATIENT
Start: 2021-07-20 | End: 2021-07-21 | Stop reason: HOSPADM

## 2021-07-20 RX ORDER — IOPAMIDOL 755 MG/ML
INJECTION, SOLUTION INTRAVASCULAR
Status: DISCONTINUED | OUTPATIENT
Start: 2021-07-20 | End: 2021-07-20 | Stop reason: HOSPADM

## 2021-07-20 RX ORDER — OXYCODONE HYDROCHLORIDE 5 MG/1
10 TABLET ORAL EVERY 4 HOURS PRN
Status: DISCONTINUED | OUTPATIENT
Start: 2021-07-20 | End: 2021-07-21 | Stop reason: HOSPADM

## 2021-07-20 RX ORDER — NITROGLYCERIN 0.4 MG/1
0.4 TABLET SUBLINGUAL EVERY 5 MIN PRN
Status: DISCONTINUED | OUTPATIENT
Start: 2021-07-20 | End: 2021-07-21 | Stop reason: HOSPADM

## 2021-07-20 RX ORDER — ASPIRIN 81 MG/1
81 TABLET ORAL DAILY
Status: DISCONTINUED | OUTPATIENT
Start: 2021-07-20 | End: 2021-07-20 | Stop reason: HOSPADM

## 2021-07-20 RX ORDER — VERAPAMIL HYDROCHLORIDE 2.5 MG/ML
INJECTION, SOLUTION INTRAVENOUS
Status: DISCONTINUED | OUTPATIENT
Start: 2021-07-20 | End: 2021-07-20 | Stop reason: HOSPADM

## 2021-07-20 RX ORDER — OXYCODONE HYDROCHLORIDE 5 MG/1
5 TABLET ORAL EVERY 4 HOURS PRN
Status: DISCONTINUED | OUTPATIENT
Start: 2021-07-20 | End: 2021-07-21 | Stop reason: HOSPADM

## 2021-07-20 RX ORDER — SODIUM CHLORIDE 9 MG/ML
INJECTION, SOLUTION INTRAVENOUS CONTINUOUS
Status: ACTIVE | OUTPATIENT
Start: 2021-07-20 | End: 2021-07-20

## 2021-07-20 RX ORDER — ACETAMINOPHEN 325 MG/1
650 TABLET ORAL EVERY 4 HOURS PRN
Status: DISCONTINUED | OUTPATIENT
Start: 2021-07-20 | End: 2021-07-21 | Stop reason: HOSPADM

## 2021-07-20 RX ORDER — SODIUM CHLORIDE 9 MG/ML
INJECTION, SOLUTION INTRAVENOUS CONTINUOUS
Status: DISCONTINUED | OUTPATIENT
Start: 2021-07-20 | End: 2021-07-20 | Stop reason: HOSPADM

## 2021-07-20 RX ORDER — ASPIRIN 81 MG/1
81 TABLET ORAL DAILY
Status: DISCONTINUED | OUTPATIENT
Start: 2021-07-21 | End: 2021-07-21 | Stop reason: HOSPADM

## 2021-07-20 RX ORDER — HEPARIN SODIUM 1000 [USP'U]/ML
INJECTION, SOLUTION INTRAVENOUS; SUBCUTANEOUS
Status: DISCONTINUED | OUTPATIENT
Start: 2021-07-20 | End: 2021-07-20 | Stop reason: HOSPADM

## 2021-07-20 RX ORDER — LIDOCAINE 40 MG/G
CREAM TOPICAL
Status: DISCONTINUED | OUTPATIENT
Start: 2021-07-20 | End: 2021-07-20 | Stop reason: HOSPADM

## 2021-07-20 RX ORDER — ONDANSETRON 2 MG/ML
4 INJECTION INTRAMUSCULAR; INTRAVENOUS EVERY 6 HOURS PRN
Status: DISCONTINUED | OUTPATIENT
Start: 2021-07-20 | End: 2021-07-21 | Stop reason: HOSPADM

## 2021-07-20 RX ADMIN — SODIUM CHLORIDE: 9 INJECTION, SOLUTION INTRAVENOUS at 07:15

## 2021-07-20 ASSESSMENT — MIFFLIN-ST. JEOR: SCORE: 1833.24

## 2021-07-20 NOTE — Clinical Note
The first balloon was inserted into the left anterior descending and middle left anterior descending.Max pressure = 6 shira. Total duration = 17 seconds.     Max pressure = 8 shira. Total duration = 20 seconds.    Balloon reinflated a second time: Max pressure = 8 shira. Total duration = 20 seconds. Max pressure = 11 shira. Total duration = 25 seconds.  Balloon reinflated a fourth time: Max pressure = 10 shira. Total duration = 16 seconds.

## 2021-07-20 NOTE — Clinical Note
Stent deployed in the middle left anterior descending. Max pressure = 11 shira. Total duration = 16 seconds. Balloon reinflated a second time: Max pressure = 14 shira. Total duration = 10 seconds.

## 2021-07-20 NOTE — Clinical Note
The first balloon was inserted into the left anterior descending.Max pressure = 6 shira. Total duration = 9 seconds.     Max pressure = 12 shira. Total duration = 30 seconds.    Balloon reinflated a second time: Max pressure = 12 shira. Total duration = 30 seconds.  Balloon reinflated a third time: Max pressure = 16 shira. Total duration = 10 seconds.  Balloon reinflated a fourth time: Max pressure = 16 shira. Total duration = 10 seconds.

## 2021-07-20 NOTE — Clinical Note
The first balloon was inserted into the left anterior descending and middle left anterior descending.Max pressure = 6 shira. Total duration = 14 seconds.

## 2021-07-20 NOTE — PROGRESS NOTES
Care Suites Admission Nursing Note    Patient Information  Name: John Reyes  Age: 56 year old  Reason for admission: [facility]  Care Suites arrival time: 0640    Visitor Information  Name: Tracy  Informed of visitor restrictions: Yes  1 visitor allowed per patient   Visitor must screen negative for COVID symptoms   Visitor must wear a mask  Waiting rooms closed to visitors    Patient Admission/Assessment   Pre-procedure assessment complete: Yes  If abnormal assessment/labs, provider notified: N/A  NPO: Yes  Medications held per instructions/orders: Yes  Consent: pending  If applicable, pregnancy test status: na  Patient oriented to room: Yes  Education/questions answered: Yes  Plan/other: proceed    Discharge Planning  Discharge name/phone number: Tracy   Overnight post sedation caregiver: same  Discharge location: home    Roosevelt Stoddard RN

## 2021-07-20 NOTE — Clinical Note
The first balloon was inserted into the left anterior descending and middle left anterior descending.Max pressure = 6 shira. Total duration = 36 seconds.     Max pressure = 10 shira. Total duration = 30 seconds.    Balloon reinflated a second time: Max pressure = 10 shira. Total duration = 30 seconds.  Balloon reinflated a third time: Max pressure = 10 shira. Total duration = 23 seconds.

## 2021-07-20 NOTE — DISCHARGE INSTRUCTIONS
Cardiac Angioplasty/Stent Discharge Instructions - Radial    After you go home:      Have an adult stay with you until tomorrow.    Drink extra fluids for 2 days.    You may resume your normal diet.    No smoking       For 24 hours - due to the sedation you received:    Relax and take it easy.    Do NOT make any important or legal decisions.    Do NOT drive or operate machines at home or at work.    Do NOT drink alcohol.    Care of Wrist Puncture Site:      For the first 24 hrs - check the puncture site every 1-2 hours while awake.    It is normal to have soreness at the puncture site and mild tingling in your hand for up to 3 days.    Remove the bandaid after 24 hours. If there is minor oozing, apply another bandaid and remove it after 12 hours.    You may shower tomorrow.  Do NOT take a bath, or use a hot tub or pool for at least 3 days. Do NOT scrub the site. Do not use lotion or powder near the puncture site.           Activity:          For 2 days:     do not use your hand or arm to support your weight (such as rising from a chair)     do not bend your wrist (such as lifting a garage door).    do not lift more than 5 pounds or exercise your arm (such as tennis, golf or bowling).    Do NOT do any heavy activity such as exercise, lifting, or straining.     Bleeding:      If you start bleeding from the site in your wrist, sit down and press firmly on/above the site for 10 minutes.     Once bleeding stops, keep arm still for 2 hours.     Call [facility] as soon as you can.       Call 911 right away if you have heavy bleeding or bleeding that does not stop.      Medicines:      If you are taking an antiplatelet medication such as Plavix,  do not stop taking it until you talk to your cardiologist.        Take your medications, including blood thinners, unless your provider tells you not to.      If you have stopped any medicines, check with your provider about when to restart them.    Follow Up  Appointments:      Follow up with [facility] Nurse Practitioner at [facility] of patient preference in 7-10 days.    Cardiac Rehab will contact you for follow up care.    Call the clinic if:      You have a large or growing hard lump around the site.    The site is red, swollen, hot or tender.    Blood or fluid is draining from the site.    You have chills or a fever greater than 101 F (38 C).    Your arm feels numb, cool or changes color.    You have hives, a rash or unusual itching.    Any questions or concerns.    Other Instructions:      If you received a stent - carry your stent card with you at all times.      [facility] [place]:    [phone] [facility] (7 days a week)

## 2021-07-20 NOTE — PRE-PROCEDURE
GENERAL PRE-PROCEDURE:   Procedure:  Cor angio possible PCI  Date/Time:  7/20/2021 8:45 AM    Written consent obtained?: Yes    Risks and benefits: Risks, benefits and alternatives were discussed    Consent given by:  Patient  Patient states understanding of procedure being performed: Yes    Patient's understanding of procedure matches consent: Yes    Procedure consent matches procedure scheduled: Yes    Expected level of sedation:  Moderate  Appropriately NPO:  Yes  ASA Class:  Class 3- Severe systemic disease, definite functional limitations  Lungs:  Lungs clear with good breath sounds bilaterally  Heart:  Normal heart sounds and rate  History & Physical reviewed:  History and physical reviewed and no updates needed  Statement of review:  I have reviewed the lab findings, diagnostic data, medications, and the plan for sedation

## 2021-07-20 NOTE — PROGRESS NOTES
1345 Attempted air removal from 6-3cc then bleed. Air returned from 3-6cc. Will attempt again at 1415.

## 2021-07-20 NOTE — PROGRESS NOTES
Care Suites Discharge Nursing Note    Patient Information  Name: John Reyes  Age: 56 year old    Discharge Education:  Discharge instructions reviewed: Yes  Additional education/resources provided: AVS. Stent cards.  Patient/patient representative verbalizes understanding: Yes  Patient discharging on new medications: No  Medication education completed: N/A    Discharge Plans:   Discharge location: home  Discharge ride contacted: Yes  Approximate discharge time: 1400    Discharge Criteria:  Discharge criteria met and vital signs stable: Yes    Patient Belongs:  Patient belongings returned to patient: Yes    Roosevelt Stoddard RN

## 2021-07-20 NOTE — Clinical Note
The first balloon was inserted into the left anterior descending and middle left anterior descending.Max pressure = 6 shira. Total duration = 6 seconds.     Max pressure = 6 shira. Total duration = 20 seconds.    Balloon reinflated a second time: Max pressure = 6 shira. Total duration = 20 seconds.

## 2021-07-20 NOTE — Clinical Note
The first balloon was inserted into the left anterior descending.Max pressure = 7 shira. Total duration = 25 seconds.     Max pressure = 12 shira. Total duration = 25 seconds.    Balloon reinflated a second time: Max pressure = 12 shira. Total duration = 25 seconds.  Balloon reinflated a third time: Max pressure = 15 shira. Total duration = 25 seconds.  Balloon reinflated a fourth time: Max pressure = 12 shira. Total duration = 20 seconds.  Balloon reinflated a fourth time: Max pressure = 14 shira. Total duration = 12 seconds.

## 2021-07-20 NOTE — Clinical Note
The first balloon was inserted into the left anterior descending.Max pressure = 15 shira. Total duration = 17 seconds.

## 2021-07-20 NOTE — Clinical Note
The first balloon was inserted into the left anterior descending and middle left anterior descending.Max pressure = 10 shira. Total duration = 15 seconds.

## 2021-07-20 NOTE — PROGRESS NOTES
PATIENT/VISITOR WELLNESS SCREENING    Step 1 Patient Screening    1. In the last month, have you been in contact with someone who was confirmed or suspected to have Coronavirus/COVID-19? No    2. Do you have the following symptoms?  Fever/Chills? No   Cough? No   Shortness of breath? No   New loss of taste or smell? No  Sore throat? No  Muscle or body aches? No  Headaches? No  Fatigue? No  Vomiting or diarrhea? No    Step 2 Visitor Screening    1. Name of Visitor (1 visitor per patient): Tracy     2. In the last month, have you been in contact with someone who was confirmed or suspected to have Coronavirus/COVID-19? No    3. Do you have the following symptoms?  Fever/Chills? No   Cough? No   Shortness of breath? No   Skin rash? No   Loss of taste or smell? No  Sore throat? No  Runny or stuffy nose? No  Muscle or body aches? No  Headaches? No  Fatigue? No  Vomiting or diarrhea? No    If the visitor has positive symptoms, notify supervisor/manger  Per policy, the visitor will need to leave the facility     Step 3 Refer to logic grid below for actions    NO SYMPTOM(S)    ACTIONS:  1. Standard rooming process  2. Provider to assess per normal protocol  3. Implement precautions as needed and per guidelines     POSITIVE SYMPTOM(S)  If positive for ANY of the following symptoms: fever, cough, shortness of breath, rash    ACTION:  1. Continue to have the patient wear a mask   2. Room patient as soon as possible  3. Don appropriate PPE when entering room  4. Provider evaluation

## 2021-07-20 NOTE — PROGRESS NOTES
Care Suites Post Procedure Note    Patient Information  Name: John Reyes  Age: 56 year old    Post Procedure  Time patient returned to Care Suites: 1115  Concerns/abnormal assessment: no  If abnormal assessment, provider notified: N/A  Plan/Other: monitor.    Roosevelt Stoddard RN

## 2021-07-21 ENCOUNTER — TELEPHONE (OUTPATIENT)
Dept: CARDIOLOGY | Facility: CLINIC | Age: 56
End: 2021-07-21

## 2021-07-21 NOTE — TELEPHONE ENCOUNTER
Attempted to reach with patient x 2 post discharge from care suites after coronary angiography and left messages to call back if there are questions or concerning symptoms.     Intervention: Staged PCI to proximal and mid LAD with patent OM stents from 6/18/21    Access Site: Right radial artery     Heart Follow Up: Erika Shaw PA-C 7/28 in [place]    LORI Carter, CNP  7/21/2021

## 2021-07-29 ENCOUNTER — OFFICE VISIT (OUTPATIENT)
Dept: CARDIOLOGY | Facility: CLINIC | Age: 56
End: 2021-07-29
Payer: COMMERCIAL

## 2021-07-29 VITALS
DIASTOLIC BLOOD PRESSURE: 64 MMHG | HEIGHT: 69 IN | WEIGHT: 227 LBS | BODY MASS INDEX: 33.62 KG/M2 | SYSTOLIC BLOOD PRESSURE: 104 MMHG | HEART RATE: 74 BPM

## 2021-07-29 DIAGNOSIS — I25.10 CORONARY ARTERY DISEASE INVOLVING NATIVE CORONARY ARTERY OF NATIVE HEART WITHOUT ANGINA PECTORIS: Primary | ICD-10-CM

## 2021-07-29 PROCEDURE — 99214 OFFICE O/P EST MOD 30 MIN: CPT | Performed by: PHYSICIAN ASSISTANT

## 2021-07-29 RX ORDER — ROSUVASTATIN CALCIUM 20 MG/1
20 TABLET, COATED ORAL DAILY
Qty: 90 TABLET | Refills: 3 | Status: SHIPPED | OUTPATIENT
Start: 2021-07-29 | End: 2021-10-01

## 2021-07-29 ASSESSMENT — MIFFLIN-ST. JEOR: SCORE: 1840.05

## 2021-07-29 NOTE — LETTER
2021    [place] Mcknight [place]  [id number] [facility] [address]    RE: John Reyes       Dear Colleague,    I had the pleasure of seeing John Reyes in the [facility].        CARDIOLOGY CLINIC PROGRESS NOTE    DOS: 2021      John Reyes  : 1965, 56 year old  MRN: [id number]      History:  I am following up with John Reyes today.     John Reyes is a pleasant 56 year old man with history of dyslipidemia, CAD.     21 underwent outpatient stress test per PCP for chest pain, abnormal, scheduled for cardiac cath same day.      21 cardiac cath:  balloon angioplasty of LAD stenosis but unable to cross with cutting balloon or stent resulting in a small contained dissection without perforation in the LAD.     He was transferred to [facility] and admitted for complex PCI. He underwent repeat angiogram on 21 with PCI of OM2 with 2 overlapping drug eluting stents.  Atherectomy of the LAD was deferred in the setting of residual dissection from balloon angioplasty on 21.  Plan is for staged intervention in 1 month.   Discharged to home 21 Cath: Successful complex staged PCI of proximal-mid LAD with overlapping Synergy stents 4.0 x 38 mm and Synergy stent 3.0 x 38 mm; Previously placed OM stents from 21 remain patent.       He presents today with his wife.  She works at [employer].   He has had no chest pain.   He is still tired and lacks energy.    His BP is running lower than normal.    No bleeding concerns on ASA and Plavix.   Start cardiac rehab next week.       ROS:  Skin:  Negative     Eyes:  Positive for visual blurring  ENT:  Negative    Respiratory:  Positive for sleep apnea;CPAP  Cardiovascular:    fatigue;lightheadedness;Positive for  Gastroenterology: Positive for nausea  Genitourinary:  not assessed    Musculoskeletal:  Positive for back pain  Neurologic:  Positive  "for headaches  Psychiatric:  Negative    Heme/Lymph/Imm:  Positive for allergies  Endocrine:  Negative      PAST MEDICAL HISTORY:  Past Medical History:   Diagnosis Date     Acute duodenal ulcer without mention of hemorrhage, perforation, or obstruction 1990    diagnosed on EGD     Allergic rhinitis, cause unspecified      Attention deficit disorder without mention of hyperactivity     \"all my life\"     CAD (coronary artery disease) 6/29/2021     Cataplexy and narcolepsy     had for many years, but diagnosed in sleep lab 6/03     Esophageal reflux 11/28/2007     Hyperlipidemia LDL goal <130 10/31/2010     Hypertriglyceridemia 8/10/2012     Mild major depression (H) 9/15/2011     OBESITY NOS 11/28/2007     PONV (postoperative nausea and vomiting)      Sleep apnea     CPAP     Sleep disorder 4/12/2011       PAST SURGICAL HISTORY:  Past Surgical History:   Procedure Laterality Date     ARTHROSCOPY SHOULDER, OPEN ROTATOR CUFF REPAIR, COMBINED Left 11/5/2014    Procedure: COMBINED ARTHROSCOPY SHOULDER, OPEN ROTATOR CUFF REPAIR;  Surgeon: Maykel Khalil MD;  Location: [facility] OR     C HAND/FINGER SURGERY UNLISTED  1983    ORIF right middle finger     COLONOSCOPY  9/9/2013    Procedure: COMBINED COLONOSCOPY, SINGLE BIOPSY/POLYPECTOMY BY BIOPSY;;  Surgeon: Hali Lema MD;  Location:  GI     CV CORONARY ANGIOGRAM N/A 7/20/2021    Procedure: Coronary Angiogram /CHP interventionalist;  Surgeon: Roxie Schrader MD;  Location:  HEART CARDIAC CATH LAB     CV HEART CATHETERIZATION WITH POSSIBLE INTERVENTION N/A 6/18/2021    Procedure: Heart Catheterization with Possible Intervention;  Surgeon: Roxie Schrader MD;  Location:  HEART CARDIAC CATH LAB     CV HEART CATHETERIZATION WITH POSSIBLE INTERVENTION N/A 6/21/2021    Procedure: Heart Catheterization with Possible Intervention;  Surgeon: Roxie Schrader MD;  Location:  HEART CARDIAC CATH LAB     CV INSTANTANEOUS WAVE-FREE RATIO N/A 7/20/2021    Procedure: " Instantaneous Wave-Free Ratio;  Surgeon: Roxie Schrader MD;  Location:  HEART CARDIAC CATH LAB     CV LEFT HEART CATH N/A 2021    Procedure: Left Heart Cath;  Surgeon: Roxie Schrader MD;  Location:  HEART CARDIAC CATH LAB     CV PCI ANGIOPLASTY N/A 2021    Procedure: Percutaneous Coronary Intervention Angioplasty;  Surgeon: Roxie Schrader MD;  Location:  HEART CARDIAC CATH LAB     CV PCI STENT DRUG ELUTING N/A 2021    Procedure: Percutaneous Coronary Intervention Stent Drug Eluting;  Surgeon: Roxie Schrader MD;  Location:  HEART CARDIAC CATH LAB     CV PCI STENT DRUG ELUTING N/A 2021    Procedure: Percutaneous Coronary Intervention Stent Drug Eluting;  Surgeon: Roxie Schrader MD;  Location:  HEART CARDIAC CATH LAB     GI SURGERY      [email or web address]     HC EXPLOR MAXILL SINUS,INTRANASAL  1981`     HC VASECTOMY UNILAT/BILAT W POSTOP SEMEN         SOCIAL HISTORY:  Social History     Socioeconomic History     Marital status:      Spouse name: None     Number of children: None     Years of education: None     Highest education level: Associate degree: occupational, technical, or vocational program   Occupational History     None   Social Needs     Financial resource strain: Somewhat hard     Food insecurity     Worry: Never true     Inability: None     Transportation needs     Medical: No     Non-medical: No   Tobacco Use     Smoking status: Former Smoker     Packs/day: 0.50     Types: Cigarettes, Cigars     Quit date: 2003     Years since quittin.5     Smokeless tobacco: Never Used   Substance and Sexual Activity     Alcohol use: Yes     Alcohol/week: 0.0 standard drinks     Frequency: 2-3 times a week     Drinks per session: 5 or 6     Binge frequency: Monthly     Comment: 3-4 beers/week     Drug use: No     Sexual activity: Yes     Partners: Female     Birth control/protection: Surgical   Lifestyle     Physical activity     Days per week: 3 days      Minutes per session: 50 min     Stress: To some extent   Relationships     Social connections     Talks on phone: Three times a week     Gets together: Once a week     Attends Yarsanism service: More than 4 times per year     Active member of club or organization: Yes     Attends meetings of clubs or organizations: 1 to 4 times per year     Relationship status:      Intimate partner violence     Fear of current or ex partner: None     Emotionally abused: None     Physically abused: None     Forced sexual activity: None   Other Topics Concern     Parent/sibling w/ CABG, MI or angioplasty before 65F 55M? Not Asked   Social History Narrative     None       FAMILY HISTORY:  Family History   Problem Relation Age of Onset     Lipids Father         On medication     Cancer - colorectal Maternal Uncle        MEDS: aspirin (ASA) 81 MG EC tablet, Take 1 tablet (81 mg) by mouth daily  buPROPion (WELLBUTRIN XL) 300 MG 24 hr tablet, TAKE ONE TABLET BY MOUTH ONCE DAILY  clopidogrel (PLAVIX) 75 MG tablet, Take 1 tablet (75 mg) by mouth daily  fluocinonide (LIDEX) 0.05 % solution, Apply to scalp daily as needed  fluticasone (FLONASE) 50 MCG/ACT nasal spray, Spray 1 spray into both nostrils daily (Patient taking differently: Spray 1 spray into both nostrils daily as needed for rhinitis or allergies )  metoprolol tartrate (LOPRESSOR) 25 MG tablet, Take 0.5 tablets (12.5 mg) by mouth 2 times daily  nitroGLYcerin (NITROSTAT) 0.4 MG sublingual tablet, For chest pain place 1 tablet under the tongue every 5 minutes for 3 doses. If symptoms persist 5 minutes after 1st dose call 911.  omeprazole (PRILOSEC) 40 MG DR capsule, Take 1 capsule (40 mg) by mouth daily  sertraline (ZOLOFT) 100 MG tablet, TAKE ONE AND ONE-HALF TABLETS BY MOUTH EVERY DAY (Patient taking differently: 2 tabs)  sildenafil (REVATIO) 20 MG tablet, Take one to three tablets one hour before sex  solifenacin (VESICARE) 5 MG tablet, TAKE ONE TABLET BY MOUTH ONCE  "DAILY  traZODone (DESYREL) 50 MG tablet, TAKE THREE TABLETS BY MOUTH EVERY NIGHT AT BEDTIME  albuterol (PROAIR HFA/PROVENTIL HFA/VENTOLIN HFA) 108 (90 Base) MCG/ACT inhaler, Inhale 2 puffs into the lungs every 4 hours as needed for shortness of breath / dyspnea or wheezing (Patient not taking: Reported on 7/29/2021)    No current facility-administered medications on file prior to visit.      ALLERGIES:   Allergies   Allergen Reactions     Penicillins Itching, Swelling and Rash     Rash       PHYSICAL EXAM:  Vitals: /64   Pulse 74   Ht 1.753 m (5' 9\")   Wt 103 kg (227 lb)   BMI 33.52 kg/m    Constitutional:  cooperative, alert and oriented, well developed, well nourished, in no acute distress        Skin:  warm and dry to the touch, no apparent skin lesions or masses noted        Head:  normocephalic        Eyes:  pupils equal and round;conjunctivae and lids unremarkable;sclera white        ENT:  no pallor or cyanosis        Neck:  JVP normal        Respiratory:  clear to auscultation        Cardiac: regular rhythm, normal S1/S2, no S3 or S4, apical impulse not displaced, no murmurs, gallops or rubs                  GI:  abdomen soft;BS normoactive obese      Vascular:                                   RRA site: mild healing ecchymosis, normal Salomón test    Extremities and Musculoskeletal:  no deformities, clubbing, cyanosis, erythema observed;no edema        Neurological:  no gross motor deficits;affect appropriate            LABS/DATA:  I reviewed the following:  Cardiac cath 7/20/21 reviewed        ASSESSMENT/PLAN:  CAD  - Had stress test on 6/18 for progressive symptoms of SOB and chest tightness with exertion which was markedly abnormal.   - Same day coronary angiogram at [facility] showed severe proximal to mild LAD disease and severe OM2 lesion. He had moderate disease elsewhere.   - Resting echo on 6/18/21 showed normal LV function without WMAs.  - S/p balloon angioplasty of LAD stenosis on 6/18 but " unable to cross with cutting balloon or stent resulting in a small contained dissection without perforation in the LAD.   -  He underwent repeat angiogram on 6/21 with PCI of OM2 with 2 overlapping drug eluting stents. Atherectomy of the LAD was deferred in the setting of residual dissection plan from balloon angioplasty on 6/18.   - 7/20/21 staged PCI: successful complex PCI of prox-mid LAD with 2 overlapping Synergy stents  - No chest pain  - Continue ASA 81 mg indefinitely, Plavix x 1 year min, metoprolol, statin, SL nitroglycerin prn.  Stopping amlodipine due to lower BPs and some fatigue.  May trial holding metoprolol if sxs don't improve, but discussed that with his CAD, would ideally continue  - OP cardiac rehab starts next week    Dyslipidemia  - Historically on Lipitor but recently had recently switched to Crestor 20 mg daily as an outpatient (but had not started).   - Sent home back on atorvastatin 40 mg  - Last LDL in March was 158, TGs 404, total cholesterol 255, HDL 35.  - Just finished up Lipitor, will switch to Crestor 20 mg  - FLP/ALT about 6 weeks after starting Crestor       Follow up:  Patient originally seen by Dr. Blair but has had multiple caths with Dr. Schrader and would like to follow with her.   Dr. Schrader with FLP/ALT in early Oct       Erika Shaw PA-C      Thank you for allowing me to participate in the care of your patient.      Sincerely,     Erika Shaw PA-C     [facility]  cc:   No referring provider defined for this encounter.

## 2021-07-29 NOTE — PATIENT INSTRUCTIONS
Due to your fatigue and lower blood pressures, stop the amlodipine.     If you are not feeling a difference in the next couple weeks, please give us a call.     Go ahead and stop atorvastatin (Lipitor).  Replace it with rosuvastatin 20 mg (Crestor) once daily.   We will repeat labs when you see Dr. Schrader.

## 2021-07-29 NOTE — PROGRESS NOTES
CARDIOLOGY CLINIC PROGRESS NOTE    DOS: 2021      John Reyes  : 1965, 56 year old  MRN: [id number]      History:  I am following up with John Reyes today.     John Reyes is a pleasant 56 year old man with history of dyslipidemia, CAD.     21 underwent outpatient stress test per PCP for chest pain, abnormal, scheduled for cardiac cath same day.      21 cardiac cath:  balloon angioplasty of LAD stenosis but unable to cross with cutting balloon or stent resulting in a small contained dissection without perforation in the LAD.     He was transferred to [facility] and admitted for complex PCI. He underwent repeat angiogram on 21 with PCI of OM2 with 2 overlapping drug eluting stents.  Atherectomy of the LAD was deferred in the setting of residual dissection from balloon angioplasty on 21.  Plan is for staged intervention in 1 month.   Discharged to home 21 Cath: Successful complex staged PCI of proximal-mid LAD with overlapping Synergy stents 4.0 x 38 mm and Synergy stent 3.0 x 38 mm; Previously placed OM stents from 21 remain patent.       He presents today with his wife.  She works at [employer].   He has had no chest pain.   He is still tired and lacks energy.    His BP is running lower than normal.    No bleeding concerns on ASA and Plavix.   Start cardiac rehab next week.       ROS:  Skin:  Negative     Eyes:  Positive for visual blurring  ENT:  Negative    Respiratory:  Positive for sleep apnea;CPAP  Cardiovascular:    fatigue;lightheadedness;Positive for  Gastroenterology: Positive for nausea  Genitourinary:  not assessed    Musculoskeletal:  Positive for back pain  Neurologic:  Positive for headaches  Psychiatric:  Negative    Heme/Lymph/Imm:  Positive for allergies  Endocrine:  Negative      PAST MEDICAL HISTORY:  Past Medical History:   Diagnosis Date     Acute duodenal ulcer without mention of hemorrhage, perforation, or obstruction  "1990    diagnosed on EGD     Allergic rhinitis, cause unspecified      Attention deficit disorder without mention of hyperactivity     \"all my life\"     CAD (coronary artery disease) 6/29/2021     Cataplexy and narcolepsy     had for many years, but diagnosed in sleep lab 6/03     Esophageal reflux 11/28/2007     Hyperlipidemia LDL goal <130 10/31/2010     Hypertriglyceridemia 8/10/2012     Mild major depression (H) 9/15/2011     OBESITY NOS 11/28/2007     PONV (postoperative nausea and vomiting)      Sleep apnea     CPAP     Sleep disorder 4/12/2011       PAST SURGICAL HISTORY:  Past Surgical History:   Procedure Laterality Date     ARTHROSCOPY SHOULDER, OPEN ROTATOR CUFF REPAIR, COMBINED Left 11/5/2014    Procedure: COMBINED ARTHROSCOPY SHOULDER, OPEN ROTATOR CUFF REPAIR;  Surgeon: Maykel Khalil MD;  Location: [facility] OR     C HAND/FINGER SURGERY UNLISTED  1983    ORIF right middle finger     COLONOSCOPY  9/9/2013    Procedure: COMBINED COLONOSCOPY, SINGLE BIOPSY/POLYPECTOMY BY BIOPSY;;  Surgeon: Hali Lema MD;  Location:  GI     CV CORONARY ANGIOGRAM N/A 7/20/2021    Procedure: Coronary Angiogram /CHP interventionalist;  Surgeon: Roxie Schrader MD;  Location:  HEART CARDIAC CATH LAB     CV HEART CATHETERIZATION WITH POSSIBLE INTERVENTION N/A 6/18/2021    Procedure: Heart Catheterization with Possible Intervention;  Surgeon: Roxie Schrader MD;  Location:  HEART CARDIAC CATH LAB     CV HEART CATHETERIZATION WITH POSSIBLE INTERVENTION N/A 6/21/2021    Procedure: Heart Catheterization with Possible Intervention;  Surgeon: Roxie Schrader MD;  Location:  HEART CARDIAC CATH LAB     CV INSTANTANEOUS WAVE-FREE RATIO N/A 7/20/2021    Procedure: Instantaneous Wave-Free Ratio;  Surgeon: Roxie Schrader MD;  Location:  HEART CARDIAC CATH LAB     CV LEFT HEART CATH N/A 6/18/2021    Procedure: Left Heart Cath;  Surgeon: Roxie Schrader MD;  Location:  HEART CARDIAC CATH LAB     CV PCI " ANGIOPLASTY N/A 2021    Procedure: Percutaneous Coronary Intervention Angioplasty;  Surgeon: Roxie Schrader MD;  Location:  HEART CARDIAC CATH LAB     CV PCI STENT DRUG ELUTING N/A 2021    Procedure: Percutaneous Coronary Intervention Stent Drug Eluting;  Surgeon: Roxie Schrader MD;  Location:  HEART CARDIAC CATH LAB     CV PCI STENT DRUG ELUTING N/A 2021    Procedure: Percutaneous Coronary Intervention Stent Drug Eluting;  Surgeon: Roxie Schrader MD;  Location:  HEART CARDIAC CATH LAB     GI SURGERY      henmmriodectoomy     HC EXPLOR MAXILL SINUS,INTRANASAL  `     HC VASECTOMY UNILAT/BILAT W POSTOP SEMEN         SOCIAL HISTORY:  Social History     Socioeconomic History     Marital status:      Spouse name: None     Number of children: None     Years of education: None     Highest education level: Associate degree: occupational, technical, or vocational program   Occupational History     None   Social Needs     Financial resource strain: Somewhat hard     Food insecurity     Worry: Never true     Inability: None     Transportation needs     Medical: No     Non-medical: No   Tobacco Use     Smoking status: Former Smoker     Packs/day: 0.50     Types: Cigarettes, Cigars     Quit date: 2003     Years since quittin.5     Smokeless tobacco: Never Used   Substance and Sexual Activity     Alcohol use: Yes     Alcohol/week: 0.0 standard drinks     Frequency: 2-3 times a week     Drinks per session: 5 or 6     Binge frequency: Monthly     Comment: 3-4 beers/week     Drug use: No     Sexual activity: Yes     Partners: Female     Birth control/protection: Surgical   Lifestyle     Physical activity     Days per week: 3 days     Minutes per session: 50 min     Stress: To some extent   Relationships     Social connections     Talks on phone: Three times a week     Gets together: Once a week     Attends Rastafari service: More than 4 times per year     Active member of club or  organization: Yes     Attends meetings of clubs or organizations: 1 to 4 times per year     Relationship status:      Intimate partner violence     Fear of current or ex partner: None     Emotionally abused: None     Physically abused: None     Forced sexual activity: None   Other Topics Concern     Parent/sibling w/ CABG, MI or angioplasty before 65F 55M? Not Asked   Social History Narrative     None       FAMILY HISTORY:  Family History   Problem Relation Age of Onset     Lipids Father         On medication     Cancer - colorectal Maternal Uncle        MEDS: aspirin (ASA) 81 MG EC tablet, Take 1 tablet (81 mg) by mouth daily  buPROPion (WELLBUTRIN XL) 300 MG 24 hr tablet, TAKE ONE TABLET BY MOUTH ONCE DAILY  clopidogrel (PLAVIX) 75 MG tablet, Take 1 tablet (75 mg) by mouth daily  fluocinonide (LIDEX) 0.05 % solution, Apply to scalp daily as needed  fluticasone (FLONASE) 50 MCG/ACT nasal spray, Spray 1 spray into both nostrils daily (Patient taking differently: Spray 1 spray into both nostrils daily as needed for rhinitis or allergies )  metoprolol tartrate (LOPRESSOR) 25 MG tablet, Take 0.5 tablets (12.5 mg) by mouth 2 times daily  nitroGLYcerin (NITROSTAT) 0.4 MG sublingual tablet, For chest pain place 1 tablet under the tongue every 5 minutes for 3 doses. If symptoms persist 5 minutes after 1st dose call 911.  omeprazole (PRILOSEC) 40 MG DR capsule, Take 1 capsule (40 mg) by mouth daily  sertraline (ZOLOFT) 100 MG tablet, TAKE ONE AND ONE-HALF TABLETS BY MOUTH EVERY DAY (Patient taking differently: 2 tabs)  sildenafil (REVATIO) 20 MG tablet, Take one to three tablets one hour before sex  solifenacin (VESICARE) 5 MG tablet, TAKE ONE TABLET BY MOUTH ONCE DAILY  traZODone (DESYREL) 50 MG tablet, TAKE THREE TABLETS BY MOUTH EVERY NIGHT AT BEDTIME  albuterol (PROAIR HFA/PROVENTIL HFA/VENTOLIN HFA) 108 (90 Base) MCG/ACT inhaler, Inhale 2 puffs into the lungs every 4 hours as needed for shortness of breath /  "dyspnea or wheezing (Patient not taking: Reported on 7/29/2021)    No current facility-administered medications on file prior to visit.      ALLERGIES:   Allergies   Allergen Reactions     Penicillins Itching, Swelling and Rash     Rash       PHYSICAL EXAM:  Vitals: /64   Pulse 74   Ht 1.753 m (5' 9\")   Wt 103 kg (227 lb)   BMI 33.52 kg/m    Constitutional:  cooperative, alert and oriented, well developed, well nourished, in no acute distress        Skin:  warm and dry to the touch, no apparent skin lesions or masses noted        Head:  normocephalic        Eyes:  pupils equal and round;conjunctivae and lids unremarkable;sclera white        ENT:  no pallor or cyanosis        Neck:  JVP normal        Respiratory:  clear to auscultation        Cardiac: regular rhythm, normal S1/S2, no S3 or S4, apical impulse not displaced, no murmurs, gallops or rubs                  GI:  abdomen soft;BS normoactive obese      Vascular:                                   RRA site: mild healing ecchymosis, normal Salomón test    Extremities and Musculoskeletal:  no deformities, clubbing, cyanosis, erythema observed;no edema        Neurological:  no gross motor deficits;affect appropriate            LABS/DATA:  I reviewed the following:  Cardiac cath 7/20/21 reviewed        ASSESSMENT/PLAN:  CAD  - Had stress test on 6/18 for progressive symptoms of SOB and chest tightness with exertion which was markedly abnormal.   - Same day coronary angiogram at [facility] showed severe proximal to mild LAD disease and severe OM2 lesion. He had moderate disease elsewhere.   - Resting echo on 6/18/21 showed normal LV function without WMAs.  - S/p balloon angioplasty of LAD stenosis on 6/18 but unable to cross with cutting balloon or stent resulting in a small contained dissection without perforation in the LAD.   -  He underwent repeat angiogram on 6/21 with PCI of OM2 with 2 overlapping drug eluting stents. Atherectomy of the LAD was deferred in " the setting of residual dissection plan from balloon angioplasty on 6/18.   - 7/20/21 staged PCI: successful complex PCI of prox-mid LAD with 2 overlapping Synergy stents  - No chest pain  - Continue ASA 81 mg indefinitely, Plavix x 1 year min, metoprolol, statin, SL nitroglycerin prn.  Stopping amlodipine due to lower BPs and some fatigue.  May trial holding metoprolol if sxs don't improve, but discussed that with his CAD, would ideally continue  - OP cardiac rehab starts next week    Dyslipidemia  - Historically on Lipitor but recently had recently switched to Crestor 20 mg daily as an outpatient (but had not started).   - Sent home back on atorvastatin 40 mg  - Last LDL in March was 158, TGs 404, total cholesterol 255, HDL 35.  - Just finished up Lipitor, will switch to Crestor 20 mg  - FLP/ALT about 6 weeks after starting Crestor       Follow up:  Patient originally seen by Dr. Blair but has had multiple caths with Dr. Schrader and would like to follow with her.   Dr. Schrader with FLP/ALT in early Oct       Erika Shaw PA-C

## 2021-07-30 LAB
ATRIAL RATE - MUSE: 60 BPM
DIASTOLIC BLOOD PRESSURE - MUSE: NORMAL MMHG
INTERPRETATION ECG - MUSE: NORMAL
P AXIS - MUSE: 41 DEGREES
PR INTERVAL - MUSE: 138 MS
QRS DURATION - MUSE: 86 MS
QT - MUSE: 434 MS
QTC - MUSE: 434 MS
R AXIS - MUSE: 40 DEGREES
SYSTOLIC BLOOD PRESSURE - MUSE: NORMAL MMHG
T AXIS - MUSE: 57 DEGREES
VENTRICULAR RATE- MUSE: 60 BPM

## 2021-08-02 LAB
ATRIAL RATE - MUSE: 61 BPM
DIASTOLIC BLOOD PRESSURE - MUSE: NORMAL MMHG
INTERPRETATION ECG - MUSE: NORMAL
P AXIS - MUSE: 25 DEGREES
PR INTERVAL - MUSE: 152 MS
QRS DURATION - MUSE: 84 MS
QT - MUSE: 446 MS
QTC - MUSE: 448 MS
R AXIS - MUSE: 10 DEGREES
SYSTOLIC BLOOD PRESSURE - MUSE: NORMAL MMHG
T AXIS - MUSE: 27 DEGREES
VENTRICULAR RATE- MUSE: 61 BPM

## 2021-08-06 ENCOUNTER — HOSPITAL ENCOUNTER (OUTPATIENT)
Dept: CARDIAC REHAB | Facility: CLINIC | Age: 56
End: 2021-08-06
Attending: STUDENT IN AN ORGANIZED HEALTH CARE EDUCATION/TRAINING PROGRAM
Payer: COMMERCIAL

## 2021-08-06 DIAGNOSIS — I25.110 CORONARY ARTERY DISEASE INVOLVING NATIVE CORONARY ARTERY OF NATIVE HEART WITH UNSTABLE ANGINA PECTORIS (H): ICD-10-CM

## 2021-08-06 PROCEDURE — 93798 PHYS/QHP OP CAR RHAB W/ECG: CPT

## 2021-08-11 ENCOUNTER — MYC REFILL (OUTPATIENT)
Dept: FAMILY MEDICINE | Facility: CLINIC | Age: 56
End: 2021-08-11

## 2021-08-11 DIAGNOSIS — F32.1 MODERATE MAJOR DEPRESSION (H): ICD-10-CM

## 2021-08-11 DIAGNOSIS — G47.9 SLEEP DISORDER: ICD-10-CM

## 2021-08-13 ENCOUNTER — HOSPITAL ENCOUNTER (OUTPATIENT)
Dept: CARDIAC REHAB | Facility: CLINIC | Age: 56
End: 2021-08-13
Attending: STUDENT IN AN ORGANIZED HEALTH CARE EDUCATION/TRAINING PROGRAM
Payer: COMMERCIAL

## 2021-08-13 DIAGNOSIS — I20.0 UNSTABLE ANGINA (H): ICD-10-CM

## 2021-08-13 DIAGNOSIS — F32.1 MODERATE MAJOR DEPRESSION (H): ICD-10-CM

## 2021-08-13 DIAGNOSIS — G47.9 SLEEP DISORDER: ICD-10-CM

## 2021-08-13 DIAGNOSIS — I25.10 CAD (CORONARY ARTERY DISEASE): Primary | ICD-10-CM

## 2021-08-13 PROCEDURE — 93798 PHYS/QHP OP CAR RHAB W/ECG: CPT

## 2021-08-13 NOTE — TELEPHONE ENCOUNTER
Pending Prescriptions:                       Disp   Refills    nitroGLYcerin (NITROSTAT) 0.4 MG sublingu*15 tab*0            Sig: For chest pain place 1 tablet under the tongue           every 5 minutes for 3 doses. If symptoms persist           5 minutes after 1st dose call 911.    Last filled 6-22-21 for 25 tablets    Patient also needs a refill on Amlodipine 2.5mg #30 tablets   Takes one tablet by mouth once daily  Previously prescribed by Janay La      Thank you,  Argenis Cabrera [facility]  [phone]

## 2021-08-16 RX ORDER — TRAZODONE HYDROCHLORIDE 50 MG/1
TABLET, FILM COATED ORAL
Qty: 270 TABLET | Refills: 0 | OUTPATIENT
Start: 2021-08-16

## 2021-08-16 RX ORDER — SERTRALINE HYDROCHLORIDE 100 MG/1
TABLET, FILM COATED ORAL
Qty: 135 TABLET | Refills: 0 | OUTPATIENT
Start: 2021-08-16

## 2021-08-16 RX ORDER — BUPROPION HYDROCHLORIDE 300 MG/1
TABLET ORAL
Qty: 90 TABLET | Refills: 0 | OUTPATIENT
Start: 2021-08-16

## 2021-08-16 RX ORDER — TRAZODONE HYDROCHLORIDE 50 MG/1
TABLET, FILM COATED ORAL
Qty: 270 TABLET | Refills: 0 | Status: SHIPPED | OUTPATIENT
Start: 2021-08-16 | End: 2021-11-04

## 2021-08-16 RX ORDER — BUPROPION HYDROCHLORIDE 300 MG/1
TABLET ORAL
Qty: 90 TABLET | Refills: 0 | Status: SHIPPED | OUTPATIENT
Start: 2021-08-16 | End: 2022-01-12

## 2021-08-16 RX ORDER — SERTRALINE HYDROCHLORIDE 100 MG/1
TABLET, FILM COATED ORAL
Qty: 135 TABLET | Refills: 0 | Status: SHIPPED | OUTPATIENT
Start: 2021-08-16 | End: 2021-11-04

## 2021-08-16 NOTE — TELEPHONE ENCOUNTER
pcp ooo. However, actively seeing cardiology with recent procedure. Please send request to cardiology.     -neno starks, pac   all other ROS negative except as per HPI

## 2021-08-16 NOTE — TELEPHONE ENCOUNTER
Routing refill request to provider for review/approval because:  Labs out of range:  PHQ9 score    GISELLE ToddN, RN  [facility]

## 2021-08-16 NOTE — TELEPHONE ENCOUNTER
Routing refill request to provider for review/approval because:  Medication is reported/historical  Confirm you are prescribing, see cardiology notes  Christa Madden RN, BSN  Message handled by CLINIC NURSE.

## 2021-08-17 ENCOUNTER — HOSPITAL ENCOUNTER (OUTPATIENT)
Dept: CARDIAC REHAB | Facility: CLINIC | Age: 56
End: 2021-08-17
Attending: STUDENT IN AN ORGANIZED HEALTH CARE EDUCATION/TRAINING PROGRAM
Payer: COMMERCIAL

## 2021-08-17 PROCEDURE — 93798 PHYS/QHP OP CAR RHAB W/ECG: CPT

## 2021-08-17 RX ORDER — NITROGLYCERIN 0.4 MG/1
TABLET SUBLINGUAL
Qty: 25 TABLET | Refills: 1 | Status: SHIPPED | OUTPATIENT
Start: 2021-08-17 | End: 2022-03-25

## 2021-08-19 ENCOUNTER — HOSPITAL ENCOUNTER (OUTPATIENT)
Dept: CARDIAC REHAB | Facility: CLINIC | Age: 56
End: 2021-08-19
Attending: STUDENT IN AN ORGANIZED HEALTH CARE EDUCATION/TRAINING PROGRAM
Payer: COMMERCIAL

## 2021-08-19 PROCEDURE — 93798 PHYS/QHP OP CAR RHAB W/ECG: CPT

## 2021-08-24 ENCOUNTER — HOSPITAL ENCOUNTER (OUTPATIENT)
Dept: CARDIAC REHAB | Facility: CLINIC | Age: 56
End: 2021-08-24
Attending: STUDENT IN AN ORGANIZED HEALTH CARE EDUCATION/TRAINING PROGRAM
Payer: COMMERCIAL

## 2021-08-24 PROCEDURE — 93798 PHYS/QHP OP CAR RHAB W/ECG: CPT | Performed by: OCCUPATIONAL THERAPIST

## 2021-08-30 ENCOUNTER — HOSPITAL ENCOUNTER (OUTPATIENT)
Dept: CARDIAC REHAB | Facility: CLINIC | Age: 56
End: 2021-08-30
Attending: STUDENT IN AN ORGANIZED HEALTH CARE EDUCATION/TRAINING PROGRAM
Payer: COMMERCIAL

## 2021-08-30 PROCEDURE — 93797 PHYS/QHP OP CAR RHAB WO ECG: CPT | Mod: XU

## 2021-08-30 PROCEDURE — 93798 PHYS/QHP OP CAR RHAB W/ECG: CPT

## 2021-09-08 ENCOUNTER — HOSPITAL ENCOUNTER (OUTPATIENT)
Dept: CARDIAC REHAB | Facility: CLINIC | Age: 56
End: 2021-09-08
Attending: STUDENT IN AN ORGANIZED HEALTH CARE EDUCATION/TRAINING PROGRAM
Payer: COMMERCIAL

## 2021-09-08 PROCEDURE — 93798 PHYS/QHP OP CAR RHAB W/ECG: CPT

## 2021-09-10 ENCOUNTER — HOSPITAL ENCOUNTER (OUTPATIENT)
Dept: CARDIAC REHAB | Facility: CLINIC | Age: 56
End: 2021-09-10
Attending: STUDENT IN AN ORGANIZED HEALTH CARE EDUCATION/TRAINING PROGRAM
Payer: COMMERCIAL

## 2021-09-10 PROCEDURE — 93798 PHYS/QHP OP CAR RHAB W/ECG: CPT

## 2021-09-14 ENCOUNTER — HOSPITAL ENCOUNTER (OUTPATIENT)
Dept: CARDIAC REHAB | Facility: CLINIC | Age: 56
End: 2021-09-14
Attending: STUDENT IN AN ORGANIZED HEALTH CARE EDUCATION/TRAINING PROGRAM
Payer: COMMERCIAL

## 2021-09-14 PROCEDURE — 93798 PHYS/QHP OP CAR RHAB W/ECG: CPT | Performed by: REHABILITATION PRACTITIONER

## 2021-09-17 ENCOUNTER — HOSPITAL ENCOUNTER (OUTPATIENT)
Dept: CARDIAC REHAB | Facility: CLINIC | Age: 56
End: 2021-09-17
Attending: STUDENT IN AN ORGANIZED HEALTH CARE EDUCATION/TRAINING PROGRAM
Payer: COMMERCIAL

## 2021-09-17 PROCEDURE — 93798 PHYS/QHP OP CAR RHAB W/ECG: CPT

## 2021-09-21 ENCOUNTER — HOSPITAL ENCOUNTER (OUTPATIENT)
Dept: CARDIAC REHAB | Facility: CLINIC | Age: 56
End: 2021-09-21
Attending: STUDENT IN AN ORGANIZED HEALTH CARE EDUCATION/TRAINING PROGRAM
Payer: COMMERCIAL

## 2021-09-21 PROCEDURE — 93798 PHYS/QHP OP CAR RHAB W/ECG: CPT | Performed by: REHABILITATION PRACTITIONER

## 2021-09-23 ENCOUNTER — HOSPITAL ENCOUNTER (OUTPATIENT)
Dept: CARDIAC REHAB | Facility: CLINIC | Age: 56
End: 2021-09-23
Attending: STUDENT IN AN ORGANIZED HEALTH CARE EDUCATION/TRAINING PROGRAM
Payer: COMMERCIAL

## 2021-09-23 PROCEDURE — 93798 PHYS/QHP OP CAR RHAB W/ECG: CPT

## 2021-09-28 ENCOUNTER — HOSPITAL ENCOUNTER (OUTPATIENT)
Dept: CARDIAC REHAB | Facility: CLINIC | Age: 56
End: 2021-09-28
Attending: STUDENT IN AN ORGANIZED HEALTH CARE EDUCATION/TRAINING PROGRAM
Payer: COMMERCIAL

## 2021-09-28 PROCEDURE — 93798 PHYS/QHP OP CAR RHAB W/ECG: CPT

## 2021-09-29 ENCOUNTER — LAB (OUTPATIENT)
Dept: LAB | Facility: CLINIC | Age: 56
End: 2021-09-29
Payer: COMMERCIAL

## 2021-09-29 DIAGNOSIS — Z98.61 PERCUTANEOUS TRANSLUMINAL CORONARY ANGIOPLASTY STATUS: ICD-10-CM

## 2021-09-29 DIAGNOSIS — I25.10 CORONARY ARTERY DISEASE INVOLVING NATIVE CORONARY ARTERY OF NATIVE HEART WITHOUT ANGINA PECTORIS: ICD-10-CM

## 2021-09-29 LAB
ALT SERPL W P-5'-P-CCNC: 39 U/L (ref 0–70)
CHOLEST SERPL-MCNC: 191 MG/DL
FASTING STATUS PATIENT QL REPORTED: YES
HDLC SERPL-MCNC: 38 MG/DL
LDLC SERPL CALC-MCNC: 90 MG/DL
NONHDLC SERPL-MCNC: 153 MG/DL
TRIGL SERPL-MCNC: 316 MG/DL

## 2021-09-29 PROCEDURE — 80061 LIPID PANEL: CPT | Performed by: PHYSICIAN ASSISTANT

## 2021-09-29 PROCEDURE — 84460 ALANINE AMINO (ALT) (SGPT): CPT | Performed by: PHYSICIAN ASSISTANT

## 2021-09-29 PROCEDURE — 36415 COLL VENOUS BLD VENIPUNCTURE: CPT | Performed by: PHYSICIAN ASSISTANT

## 2021-10-01 ENCOUNTER — OFFICE VISIT (OUTPATIENT)
Dept: CARDIOLOGY | Facility: CLINIC | Age: 56
End: 2021-10-01
Payer: COMMERCIAL

## 2021-10-01 ENCOUNTER — HOSPITAL ENCOUNTER (OUTPATIENT)
Dept: CARDIAC REHAB | Facility: CLINIC | Age: 56
End: 2021-10-01
Attending: STUDENT IN AN ORGANIZED HEALTH CARE EDUCATION/TRAINING PROGRAM
Payer: COMMERCIAL

## 2021-10-01 VITALS
SYSTOLIC BLOOD PRESSURE: 114 MMHG | WEIGHT: 225 LBS | OXYGEN SATURATION: 96 % | HEIGHT: 68 IN | DIASTOLIC BLOOD PRESSURE: 58 MMHG | BODY MASS INDEX: 34.1 KG/M2 | HEART RATE: 65 BPM

## 2021-10-01 DIAGNOSIS — R53.82 CHRONIC FATIGUE: ICD-10-CM

## 2021-10-01 DIAGNOSIS — I25.10 CORONARY ARTERY DISEASE INVOLVING NATIVE CORONARY ARTERY OF NATIVE HEART WITHOUT ANGINA PECTORIS: ICD-10-CM

## 2021-10-01 DIAGNOSIS — E78.1 HYPERTRIGLYCERIDEMIA: Primary | ICD-10-CM

## 2021-10-01 DIAGNOSIS — Z98.61 PERCUTANEOUS TRANSLUMINAL CORONARY ANGIOPLASTY STATUS: ICD-10-CM

## 2021-10-01 DIAGNOSIS — R94.39 ABNORMAL STRESS TEST: ICD-10-CM

## 2021-10-01 PROCEDURE — 99214 OFFICE O/P EST MOD 30 MIN: CPT | Performed by: INTERNAL MEDICINE

## 2021-10-01 PROCEDURE — 93798 PHYS/QHP OP CAR RHAB W/ECG: CPT

## 2021-10-01 RX ORDER — MULTIVITAMIN,THERAPEUTIC
1 TABLET ORAL DAILY
COMMUNITY

## 2021-10-01 RX ORDER — ROSUVASTATIN CALCIUM 20 MG/1
40 TABLET, COATED ORAL DAILY
Qty: 90 TABLET | Refills: 3 | Status: SHIPPED | OUTPATIENT
Start: 2021-10-01 | End: 2022-01-25

## 2021-10-01 ASSESSMENT — MIFFLIN-ST. JEOR: SCORE: 1815.09

## 2021-10-01 NOTE — PROGRESS NOTES
[facility] Progress note    Assessment:  1. Coronary artery disease  a. Abnormal stress test 6/18/2021 followed by cath the same day showed severe stenosis mid LAD and OM 2.  Cath done at [facility] with small mid LAD dissection so he was transferred to [facility] and had PCI of the OM and staged PCI of the LAD 1 month later.  2. Dyslipidemia, elevated LDL and triglycerides, low HDL.  Improving.  He was transition from atorvastatin and is now on Crestor 20 mg daily.  Total cholesterol 191, LDL 90, HDL 38, triglycerides 316  3. Hypertension, well-controlled  4. Fatigue relatively low resting and exertional heart rate.  Fatigue possibly worse due to beta-blocker side effect.   5. Normal baseline ejection fraction on stress echo from June 2021      Plan:  1.  Stop metoprolol for 2+ weeks.  If no change in fatigue, resume metoprolol.  2.  Schedule and echo.  3.  Increase rosuvastatin to 40mg by mouth daily.  4.  Repeat lipids in 2-3 months.  5.  Follow up with me in 3-4 months.   6.  Continue dual antiplatelet therapy until at least July 2022, would consider clopidogrel monotherapy after that given relatively complex CAD, multiple stents.      HPI:   John Reyes is a very nice 56-year-old gentleman who is here today with his wife Tracy, who is an RN in our emergency department to follow-up on coronary artery disease and stents.  In June of this year he was experiencing angina and profound fatigue and had a stress test which was markedly abnormal and led to a coronary angiogram later that day which showed severe stenosis of the LAD and an obtuse marginal.  It is angiogram at [facility] and balloon angioplasty of his LAD resulted in a small dissection therefore he was transferred to [facility] and had PCI of the OM and his LAD had good flow with a stable dissection and so PCI of this lesion was pursued 1 month later when he received 2 drug-eluting stents to the LAD.    He is still very fatigued.  He is  gradually feeling better with regular exercise at cardiac rehab and he also does spin classes.  He does not have chest pain.  He becomes winded more easily than he would like to, but his endurance is improving.  I reviewed his cardiac rehab records from today and his peak heart rate was 115 with exercise although on his watch it does show that his heart rate can get up to 155.          Encounter Diagnoses   Name Primary?     Percutaneous transluminal coronary angioplasty status      Coronary artery disease involving native coronary artery of native heart without angina pectoris        CURRENT MEDICATIONS:  Current Outpatient Medications   Medication Sig Dispense Refill     aspirin (ASA) 81 MG EC tablet Take 1 tablet (81 mg) by mouth daily 30 tablet 3     buPROPion (WELLBUTRIN XL) 300 MG 24 hr tablet TAKE ONE TABLET BY MOUTH ONCE DAILY 90 tablet 0     clopidogrel (PLAVIX) 75 MG tablet Take 1 tablet (75 mg) by mouth daily 30 tablet 3     fluocinonide (LIDEX) 0.05 % solution Apply to scalp daily as needed 60 mL 1     fluticasone (FLONASE) 50 MCG/ACT nasal spray Spray 1 spray into both nostrils daily (Patient taking differently: Spray 1 spray into both nostrils daily as needed for rhinitis or allergies ) 16 g 11     metoprolol tartrate (LOPRESSOR) 25 MG tablet Take 0.5 tablets (12.5 mg) by mouth 2 times daily 60 tablet 1     multivitamin, therapeutic (THERA-VIT) TABS tablet Take 1 tablet by mouth daily       nitroGLYcerin (NITROSTAT) 0.4 MG sublingual tablet For chest pain place 1 tablet under the tongue every 5 minutes for 3 doses. If symptoms persist 5 minutes after 1st dose call 911. 25 tablet 1     omeprazole (PRILOSEC) 40 MG DR capsule Take 1 capsule (40 mg) by mouth daily 90 capsule 1     rosuvastatin (CRESTOR) 20 MG tablet Take 1 tablet (20 mg) by mouth daily 90 tablet 3     sertraline (ZOLOFT) 100 MG tablet TAKE ONE AND ONE-HALF TABLETS BY MOUTH EVERY  tablet 0     sildenafil (REVATIO) 20 MG tablet Take  one to three tablets one hour before sex 30 tablet 1     solifenacin (VESICARE) 5 MG tablet TAKE ONE TABLET BY MOUTH ONCE DAILY 90 tablet 2     traZODone (DESYREL) 50 MG tablet TAKE THREE TABLETS BY MOUTH EVERY NIGHT AT BEDTIME 270 tablet 0     albuterol (PROAIR HFA/PROVENTIL HFA/VENTOLIN HFA) 108 (90 Base) MCG/ACT inhaler Inhale 2 puffs into the lungs every 4 hours as needed for shortness of breath / dyspnea or wheezing (Patient not taking: Reported on 7/29/2021) 18 g 0       ALLERGIES     Allergies   Allergen Reactions     Penicillins Itching, Swelling and Rash     Rash       PAST MEDICAL, SURGICAL, FAMILY, SOCIAL HISTORY:  History was reviewed and updated as needed, see medical record.    REVIEW OF SYSTEMS:  Skin:  Negative     Eyes:  Positive for visual blurring  ENT:  Negative    Respiratory:  Positive for sleep apnea;CPAP  Cardiovascular:    fatigue;Positive for  Gastroenterology: Negative    Genitourinary:  not assessed    Musculoskeletal:  Positive for back pain  Neurologic:  Positive for headaches  Psychiatric:  Negative    Heme/Lymph/Imm:  Positive for allergies  Endocrine:  Negative      PHYSICAL EXAM:      BP: 114/58 Pulse: 65     SpO2: 96 %      Vital Signs with Ranges  Pulse:  [65] 65  BP: (114)/(58) 114/58  SpO2:  [96 %] 96 %  225 lbs 0 oz    Constitutional: awake, alert, no distress  Eyes: sclera nonicteric  ENT: trachea midline  Respiratory: Clear to auscultation bilaterally  Cardiovascular: Regular rate and rhythm no murmur  GI: nondistended, nontender, bowel sounds present  Skin: dry, no rash no edema  Musculoskeletal: grossly normal muscle bulk and tone  Neurologic: no focal deficits  Neuropsychiatric: appropriate affect    Recent Lab Results:    Coronary angiogram 7/2/2021  1. Successful complex but uneventful staged PCI of proximal-mid LAD with overlapping Synergy stents 4.0 x 38 mm and Synergy stent 3.0 x 38 mm.   2. Previously placed OM stents from 6/18/21 remain patent.  3.  RCA was not  injected.      LIPID RESULTS:  Lab Results   Component Value Date    CHOL 191 09/29/2021    CHOL 255 (H) 03/05/2021    HDL 38 (L) 09/29/2021    HDL 35 (L) 03/05/2021    LDL 90 09/29/2021    LDL  03/05/2021     Cannot estimate LDL when triglyceride exceeds 400 mg/dL     (H) 03/05/2021    TRIG 316 (H) 09/29/2021    TRIG 404 (H) 03/05/2021    CHOLHDLRATIO 4.8 10/30/2015       LIVER ENZYME RESULTS:  Lab Results   Component Value Date    AST 30 03/05/2021    ALT 39 09/29/2021    ALT 65 03/05/2021       CBC RESULTS:  Lab Results   Component Value Date    WBC 6.5 07/20/2021    WBC 8.2 06/22/2021    RBC 4.41 07/20/2021    RBC 4.41 06/22/2021    HGB 13.5 07/20/2021    HGB 13.7 06/22/2021    HCT 39.7 (L) 07/20/2021    HCT 40.1 06/22/2021    MCV 90 07/20/2021    MCV 91 06/22/2021    MCH 30.6 07/20/2021    MCH 31.1 06/22/2021    MCHC 34.0 07/20/2021    MCHC 34.2 06/22/2021    RDW 11.9 07/20/2021    RDW 12.0 06/22/2021     (L) 07/20/2021     (L) 06/22/2021       BMP RESULTS:  Lab Results   Component Value Date     07/20/2021     06/23/2021    POTASSIUM 4.1 07/20/2021    POTASSIUM 4.1 06/23/2021    CHLORIDE 105 07/20/2021    CHLORIDE 109 06/23/2021    CO2 28 07/20/2021    CO2 31 06/23/2021    ANIONGAP 7 07/20/2021    ANIONGAP 1 (L) 06/23/2021     (H) 07/20/2021     (H) 06/23/2021    BUN 16 07/20/2021    BUN 18 06/23/2021    CR 1.08 07/20/2021    CR 1.00 06/23/2021    GFRESTIMATED 75 07/20/2021    GFRESTIMATED 82 06/23/2021    GFRESTBLACK >90 06/23/2021    FRANCISCO 8.5 07/20/2021    FRANCISCO 8.8 06/23/2021        A1C RESULTS:  Lab Results   Component Value Date    A1C 5.1 06/18/2021       INR RESULTS:  Lab Results   Component Value Date    INR 1.04 07/20/2021    INR 1.03 06/18/2021    INR 1.00 12/13/2007           CC  Erika Shaw PA-C  [id number] [place] [address]

## 2021-10-01 NOTE — LETTER
10/1/2021    Remy Mcknight MD  [id number]  [facility] [address]    RE: John Reyes       Dear Colleague,    I had the pleasure of seeing John Reyes in the [facility].    [facility] Progress note    Assessment:  1. Coronary artery disease  a. Abnormal stress test 6/18/2021 followed by cath the same day showed severe stenosis mid LAD and OM 2.  Cath done at [facility] with small mid LAD dissection so he was transferred to [facility] and had PCI of the OM and staged PCI of the LAD 1 month later.  2. Dyslipidemia, elevated LDL and triglycerides, low HDL.  Improving.  He was transition from atorvastatin and is now on Crestor 20 mg daily.  Total cholesterol 191, LDL 90, HDL 38, triglycerides 316  3. Hypertension, well-controlled  4. Fatigue relatively low resting and exertional heart rate.  Fatigue possibly worse due to beta-blocker side effect.   5. Normal baseline ejection fraction on stress echo from June 2021      Plan:  1.  Stop metoprolol for 2+ weeks.  If no change in fatigue, resume metoprolol.  2.  Schedule and echo.  3.  Increase rosuvastatin to 40mg by mouth daily.  4.  Repeat lipids in 2-3 months.  5.  Follow up with me in 3-4 months.   6.  Continue dual antiplatelet therapy until at least July 2022, would consider clopidogrel monotherapy after that given relatively complex CAD, multiple stents.      HPI:   John Reyes is a very nice 56-year-old gentleman who is here today with his wife Tracy, who is an RN in our emergency department to follow-up on coronary artery disease and stents.  In June of this year he was experiencing angina and profound fatigue and had a stress test which was markedly abnormal and led to a coronary angiogram later that day which showed severe stenosis of the LAD and an obtuse marginal.  It is angiogram at [facility] and balloon angioplasty of his LAD resulted in a small dissection therefore he  was transferred to [facility] and had PCI of the OM and his LAD had good flow with a stable dissection and so PCI of this lesion was pursued 1 month later when he received 2 drug-eluting stents to the LAD.    He is still very fatigued.  He is gradually feeling better with regular exercise at cardiac rehab and he also does spin classes.  He does not have chest pain.  He becomes winded more easily than he would like to, but his endurance is improving.  I reviewed his cardiac rehab records from today and his peak heart rate was 115 with exercise although on his watch it does show that his heart rate can get up to 155.          Encounter Diagnoses   Name Primary?     Percutaneous transluminal coronary angioplasty status      Coronary artery disease involving native coronary artery of native heart without angina pectoris        CURRENT MEDICATIONS:  Current Outpatient Medications   Medication Sig Dispense Refill     aspirin (ASA) 81 MG EC tablet Take 1 tablet (81 mg) by mouth daily 30 tablet 3     buPROPion (WELLBUTRIN XL) 300 MG 24 hr tablet TAKE ONE TABLET BY MOUTH ONCE DAILY 90 tablet 0     clopidogrel (PLAVIX) 75 MG tablet Take 1 tablet (75 mg) by mouth daily 30 tablet 3     fluocinonide (LIDEX) 0.05 % solution Apply to scalp daily as needed 60 mL 1     fluticasone (FLONASE) 50 MCG/ACT nasal spray Spray 1 spray into both nostrils daily (Patient taking differently: Spray 1 spray into both nostrils daily as needed for rhinitis or allergies ) 16 g 11     metoprolol tartrate (LOPRESSOR) 25 MG tablet Take 0.5 tablets (12.5 mg) by mouth 2 times daily 60 tablet 1     multivitamin, therapeutic (THERA-VIT) TABS tablet Take 1 tablet by mouth daily       nitroGLYcerin (NITROSTAT) 0.4 MG sublingual tablet For chest pain place 1 tablet under the tongue every 5 minutes for 3 doses. If symptoms persist 5 minutes after 1st dose call 911. 25 tablet 1     omeprazole (PRILOSEC) 40 MG DR capsule Take 1 capsule (40 mg) by mouth  daily 90 capsule 1     rosuvastatin (CRESTOR) 20 MG tablet Take 1 tablet (20 mg) by mouth daily 90 tablet 3     sertraline (ZOLOFT) 100 MG tablet TAKE ONE AND ONE-HALF TABLETS BY MOUTH EVERY  tablet 0     sildenafil (REVATIO) 20 MG tablet Take one to three tablets one hour before sex 30 tablet 1     solifenacin (VESICARE) 5 MG tablet TAKE ONE TABLET BY MOUTH ONCE DAILY 90 tablet 2     traZODone (DESYREL) 50 MG tablet TAKE THREE TABLETS BY MOUTH EVERY NIGHT AT BEDTIME 270 tablet 0     albuterol (PROAIR HFA/PROVENTIL HFA/VENTOLIN HFA) 108 (90 Base) MCG/ACT inhaler Inhale 2 puffs into the lungs every 4 hours as needed for shortness of breath / dyspnea or wheezing (Patient not taking: Reported on 7/29/2021) 18 g 0       ALLERGIES     Allergies   Allergen Reactions     Penicillins Itching, Swelling and Rash     Rash       PAST MEDICAL, SURGICAL, FAMILY, SOCIAL HISTORY:  History was reviewed and updated as needed, see medical record.    REVIEW OF SYSTEMS:  Skin:  Negative     Eyes:  Positive for visual blurring  ENT:  Negative    Respiratory:  Positive for sleep apnea;CPAP  Cardiovascular:    fatigue;Positive for  Gastroenterology: Negative    Genitourinary:  not assessed    Musculoskeletal:  Positive for back pain  Neurologic:  Positive for headaches  Psychiatric:  Negative    Heme/Lymph/Imm:  Positive for allergies  Endocrine:  Negative      PHYSICAL EXAM:      BP: 114/58 Pulse: 65     SpO2: 96 %      Vital Signs with Ranges  Pulse:  [65] 65  BP: (114)/(58) 114/58  SpO2:  [96 %] 96 %  225 lbs 0 oz    Constitutional: awake, alert, no distress  Eyes: sclera nonicteric  ENT: trachea midline  Respiratory: Clear to auscultation bilaterally  Cardiovascular: Regular rate and rhythm no murmur  GI: nondistended, nontender, bowel sounds present  Skin: dry, no rash no edema  Musculoskeletal: grossly normal muscle bulk and tone  Neurologic: no focal deficits  Neuropsychiatric: appropriate affect    Recent Lab  Results:    Coronary angiogram 7/2/2021  1. Successful complex but uneventful staged PCI of proximal-mid LAD with overlapping Synergy stents 4.0 x 38 mm and Synergy stent 3.0 x 38 mm.   2. Previously placed OM stents from 6/18/21 remain patent.  3.  RCA was not injected.      LIPID RESULTS:  Lab Results   Component Value Date    CHOL 191 09/29/2021    CHOL 255 (H) 03/05/2021    HDL 38 (L) 09/29/2021    HDL 35 (L) 03/05/2021    LDL 90 09/29/2021    LDL  03/05/2021     Cannot estimate LDL when triglyceride exceeds 400 mg/dL     (H) 03/05/2021    TRIG 316 (H) 09/29/2021    TRIG 404 (H) 03/05/2021    CHOLHDLRATIO 4.8 10/30/2015       LIVER ENZYME RESULTS:  Lab Results   Component Value Date    AST 30 03/05/2021    ALT 39 09/29/2021    ALT 65 03/05/2021       CBC RESULTS:  Lab Results   Component Value Date    WBC 6.5 07/20/2021    WBC 8.2 06/22/2021    RBC 4.41 07/20/2021    RBC 4.41 06/22/2021    HGB 13.5 07/20/2021    HGB 13.7 06/22/2021    HCT 39.7 (L) 07/20/2021    HCT 40.1 06/22/2021    MCV 90 07/20/2021    MCV 91 06/22/2021    MCH 30.6 07/20/2021    MCH 31.1 06/22/2021    MCHC 34.0 07/20/2021    MCHC 34.2 06/22/2021    RDW 11.9 07/20/2021    RDW 12.0 06/22/2021     (L) 07/20/2021     (L) 06/22/2021       BMP RESULTS:  Lab Results   Component Value Date     07/20/2021     06/23/2021    POTASSIUM 4.1 07/20/2021    POTASSIUM 4.1 06/23/2021    CHLORIDE 105 07/20/2021    CHLORIDE 109 06/23/2021    CO2 28 07/20/2021    CO2 31 06/23/2021    ANIONGAP 7 07/20/2021    ANIONGAP 1 (L) 06/23/2021     (H) 07/20/2021     (H) 06/23/2021    BUN 16 07/20/2021    BUN 18 06/23/2021    CR 1.08 07/20/2021    CR 1.00 06/23/2021    GFRESTIMATED 75 07/20/2021    GFRESTIMATED 82 06/23/2021    GFRESTBLACK >90 06/23/2021    FRANCISCO 8.5 07/20/2021    FRANCISCO 8.8 06/23/2021        A1C RESULTS:  Lab Results   Component Value Date    A1C 5.1 06/18/2021       INR RESULTS:  Lab Results   Component Value Date     INR 1.04 07/20/2021    INR 1.03 06/18/2021    INR 1.00 12/13/2007           CC  Erika Shaw PA-C  [address] [address]    Thank you for allowing me to participate in the care of your patient.      Sincerely,     Roxie Schrader MD     [facility]  cc:   Erika Shaw PA-C  [address] [address]

## 2021-10-01 NOTE — PATIENT INSTRUCTIONS
1.  Stop metoprolol for 2+ weeks.  If no change in fatigue, resume metoprolol.  2.  Schedule and echo.  3.  Increase rosuvastatin to 40mg by mouth daily.  4.  Repeat lipids in 2-3 months.  5.  Follow up with me in 3-4 months.

## 2021-10-07 ENCOUNTER — HOSPITAL ENCOUNTER (OUTPATIENT)
Dept: CARDIAC REHAB | Facility: CLINIC | Age: 56
End: 2021-10-07
Attending: STUDENT IN AN ORGANIZED HEALTH CARE EDUCATION/TRAINING PROGRAM
Payer: COMMERCIAL

## 2021-10-07 PROCEDURE — 93798 PHYS/QHP OP CAR RHAB W/ECG: CPT | Performed by: OCCUPATIONAL THERAPIST

## 2021-10-12 ENCOUNTER — HOSPITAL ENCOUNTER (OUTPATIENT)
Dept: CARDIAC REHAB | Facility: CLINIC | Age: 56
End: 2021-10-12
Attending: STUDENT IN AN ORGANIZED HEALTH CARE EDUCATION/TRAINING PROGRAM
Payer: COMMERCIAL

## 2021-10-12 PROCEDURE — 93798 PHYS/QHP OP CAR RHAB W/ECG: CPT | Performed by: REHABILITATION PRACTITIONER

## 2021-10-18 ENCOUNTER — HOSPITAL ENCOUNTER (OUTPATIENT)
Dept: CARDIAC REHAB | Facility: CLINIC | Age: 56
End: 2021-10-18
Attending: STUDENT IN AN ORGANIZED HEALTH CARE EDUCATION/TRAINING PROGRAM
Payer: COMMERCIAL

## 2021-10-18 PROCEDURE — 93798 PHYS/QHP OP CAR RHAB W/ECG: CPT

## 2021-10-19 ENCOUNTER — HOSPITAL ENCOUNTER (OUTPATIENT)
Dept: CARDIAC REHAB | Facility: CLINIC | Age: 56
End: 2021-10-19
Attending: STUDENT IN AN ORGANIZED HEALTH CARE EDUCATION/TRAINING PROGRAM
Payer: COMMERCIAL

## 2021-10-19 ENCOUNTER — TELEPHONE (OUTPATIENT)
Dept: CARDIOLOGY | Facility: CLINIC | Age: 56
End: 2021-10-19

## 2021-10-19 DIAGNOSIS — I20.0 UNSTABLE ANGINA (H): ICD-10-CM

## 2021-10-19 PROCEDURE — 93798 PHYS/QHP OP CAR RHAB W/ECG: CPT

## 2021-10-19 RX ORDER — CLOPIDOGREL BISULFATE 75 MG/1
75 TABLET ORAL DAILY
Qty: 90 TABLET | Refills: 2 | Status: SHIPPED | OUTPATIENT
Start: 2021-10-19 | End: 2022-04-22

## 2021-10-19 RX ORDER — METOPROLOL TARTRATE 25 MG/1
12.5 TABLET, FILM COATED ORAL 2 TIMES DAILY
Qty: 90 TABLET | Refills: 3 | Status: SHIPPED | OUTPATIENT
Start: 2021-10-19 | End: 2023-04-17

## 2021-10-19 NOTE — TELEPHONE ENCOUNTER
Received refill request for:  Clopidogrel and Metoprolol Tartrate  Last OV was: 10/1/21 with Dr. Schrader  Labs/EKG: na  F/U scheduled: orders in Epic for 2/2022  New script sent to: [facility]    Called pt to review metoprolol tartrate refill.  Per previous note this was on hold for 2 weeks d/t fatigue  Pt states his symptoms are unchanged so he would like to restart the metoprolol tartrate at this time.  New script sent.    KMortimer RN

## 2021-10-23 ENCOUNTER — HEALTH MAINTENANCE LETTER (OUTPATIENT)
Age: 56
End: 2021-10-23

## 2021-11-01 ENCOUNTER — HOSPITAL ENCOUNTER (OUTPATIENT)
Dept: CARDIAC REHAB | Facility: CLINIC | Age: 56
End: 2021-11-01
Attending: STUDENT IN AN ORGANIZED HEALTH CARE EDUCATION/TRAINING PROGRAM
Payer: COMMERCIAL

## 2021-11-01 PROCEDURE — 93798 PHYS/QHP OP CAR RHAB W/ECG: CPT

## 2021-11-03 ENCOUNTER — HOSPITAL ENCOUNTER (OUTPATIENT)
Dept: CARDIAC REHAB | Facility: CLINIC | Age: 56
End: 2021-11-03
Attending: STUDENT IN AN ORGANIZED HEALTH CARE EDUCATION/TRAINING PROGRAM
Payer: COMMERCIAL

## 2021-11-03 PROCEDURE — 93798 PHYS/QHP OP CAR RHAB W/ECG: CPT

## 2021-11-08 ENCOUNTER — HOSPITAL ENCOUNTER (OUTPATIENT)
Dept: CARDIAC REHAB | Facility: CLINIC | Age: 56
End: 2021-11-08
Attending: STUDENT IN AN ORGANIZED HEALTH CARE EDUCATION/TRAINING PROGRAM
Payer: COMMERCIAL

## 2021-11-08 ENCOUNTER — HOSPITAL ENCOUNTER (OUTPATIENT)
Dept: CARDIOLOGY | Facility: CLINIC | Age: 56
Discharge: HOME | End: 2021-11-08
Attending: INTERNAL MEDICINE | Admitting: INTERNAL MEDICINE
Payer: COMMERCIAL

## 2021-11-08 DIAGNOSIS — I25.10 CORONARY ARTERY DISEASE INVOLVING NATIVE CORONARY ARTERY OF NATIVE HEART WITHOUT ANGINA PECTORIS: ICD-10-CM

## 2021-11-08 DIAGNOSIS — Z98.61 PERCUTANEOUS TRANSLUMINAL CORONARY ANGIOPLASTY STATUS: ICD-10-CM

## 2021-11-08 DIAGNOSIS — R53.82 CHRONIC FATIGUE: ICD-10-CM

## 2021-11-08 LAB — LVEF ECHO: NORMAL

## 2021-11-08 PROCEDURE — 93306 TTE W/DOPPLER COMPLETE: CPT | Mod: 26 | Performed by: INTERNAL MEDICINE

## 2021-11-08 PROCEDURE — 93798 PHYS/QHP OP CAR RHAB W/ECG: CPT

## 2021-11-08 PROCEDURE — 999N000208 ECHOCARDIOGRAM COMPLETE

## 2021-11-08 PROCEDURE — 93797 PHYS/QHP OP CAR RHAB WO ECG: CPT | Mod: XU

## 2021-11-08 PROCEDURE — 255N000002 HC RX 255 OP 636: Performed by: INTERNAL MEDICINE

## 2021-11-08 PROCEDURE — C8929 TTE W OR WO FOL WCON,DOPPLER: HCPCS

## 2021-11-08 RX ADMIN — HUMAN ALBUMIN MICROSPHERES AND PERFLUTREN 3 ML: 10; .22 INJECTION, SOLUTION INTRAVENOUS at 13:56

## 2021-11-10 ENCOUNTER — HOSPITAL ENCOUNTER (OUTPATIENT)
Dept: CARDIAC REHAB | Facility: CLINIC | Age: 56
End: 2021-11-10
Attending: STUDENT IN AN ORGANIZED HEALTH CARE EDUCATION/TRAINING PROGRAM
Payer: COMMERCIAL

## 2021-11-10 PROCEDURE — 93797 PHYS/QHP OP CAR RHAB WO ECG: CPT | Mod: XU | Performed by: REHABILITATION PRACTITIONER

## 2021-11-10 PROCEDURE — 93798 PHYS/QHP OP CAR RHAB W/ECG: CPT | Performed by: REHABILITATION PRACTITIONER

## 2021-11-11 ENCOUNTER — HOSPITAL ENCOUNTER (OUTPATIENT)
Dept: CARDIAC REHAB | Facility: CLINIC | Age: 56
End: 2021-11-11
Attending: STUDENT IN AN ORGANIZED HEALTH CARE EDUCATION/TRAINING PROGRAM
Payer: COMMERCIAL

## 2021-11-11 PROCEDURE — 999N000108 HC STATISTIC OP CARDIAC VISIT #2: Performed by: OCCUPATIONAL THERAPIST

## 2021-11-11 PROCEDURE — 999N000109 HC STATISTIC OP CR VISIT: Performed by: OCCUPATIONAL THERAPIST

## 2021-11-11 PROCEDURE — 93797 PHYS/QHP OP CAR RHAB WO ECG: CPT | Mod: XU | Performed by: OCCUPATIONAL THERAPIST

## 2021-11-11 PROCEDURE — 93798 PHYS/QHP OP CAR RHAB W/ECG: CPT | Performed by: OCCUPATIONAL THERAPIST

## 2022-01-06 ENCOUNTER — TELEPHONE (OUTPATIENT)
Dept: CARDIOLOGY | Facility: CLINIC | Age: 57
End: 2022-01-06
Payer: COMMERCIAL

## 2022-01-06 NOTE — TELEPHONE ENCOUNTER
Received VM from pt's wife advising pt has tested positive for COVID and is requesting information about monoclonal antibodies. Called back to pt, no answer. Left VM advising to refer to [place] Dept of Health website, left call back number for Team 1 if any further questions.

## 2022-01-06 NOTE — Clinical Note
Prep instructions mailed to patient.    Name: Li Morris MRN: [id number]       [facility] [id number]   Date: 1/10/2022 Status: Sheila   Appt Time: 10:15 AM Length: 15   Visit Type: LAB VISIT [101] Copay: $0.00   Provider: [facility] Department:  AC[facility] LAB   :         Video:       Bill Area:      Referral Number:   Referral Status:     Notes: Presurgical COVID test/CARA DOS 1/12   Made On: 1/6/2022 9:55 AM By: TREASURE HI [[id number]] (ES)        Stent deployed in the proximal left anterior descending. Max pressure = 13 shira. Total duration = 34 seconds. Balloon reinflated a second time: Max pressure = 13 shira. Total duration = 15 seconds.

## 2022-01-10 DIAGNOSIS — F32.1 MODERATE MAJOR DEPRESSION (H): ICD-10-CM

## 2022-01-10 DIAGNOSIS — K21.00 GASTROESOPHAGEAL REFLUX DISEASE WITH ESOPHAGITIS WITHOUT HEMORRHAGE: ICD-10-CM

## 2022-01-11 RX ORDER — OMEPRAZOLE 40 MG/1
40 CAPSULE, DELAYED RELEASE ORAL DAILY
Qty: 90 CAPSULE | Refills: 1 | OUTPATIENT
Start: 2022-01-11

## 2022-01-11 RX ORDER — SERTRALINE HYDROCHLORIDE 100 MG/1
TABLET, FILM COATED ORAL
Qty: 135 TABLET | Refills: 0 | OUTPATIENT
Start: 2022-01-11

## 2022-01-12 RX ORDER — SERTRALINE HYDROCHLORIDE 100 MG/1
TABLET, FILM COATED ORAL
Qty: 135 TABLET | Refills: 0 | Status: SHIPPED | OUTPATIENT
Start: 2022-01-12 | End: 2022-03-28

## 2022-01-12 RX ORDER — BUPROPION HYDROCHLORIDE 300 MG/1
TABLET ORAL
Qty: 90 TABLET | Refills: 0 | Status: SHIPPED | OUTPATIENT
Start: 2022-01-12 | End: 2022-04-20

## 2022-01-12 RX ORDER — OMEPRAZOLE 40 MG/1
40 CAPSULE, DELAYED RELEASE ORAL DAILY
Qty: 90 CAPSULE | Refills: 1 | Status: SHIPPED | OUTPATIENT
Start: 2022-01-12 | End: 2023-04-17

## 2022-01-12 NOTE — TELEPHONE ENCOUNTER
Routing refill request to provider for review/approval because:  Patient needs to be seen because:  needs PHQ and OV  Shira DUARTE RN, BSN

## 2022-01-25 ENCOUNTER — OFFICE VISIT (OUTPATIENT)
Dept: CARDIOLOGY | Facility: CLINIC | Age: 57
End: 2022-01-25
Payer: COMMERCIAL

## 2022-01-25 ENCOUNTER — LAB (OUTPATIENT)
Dept: LAB | Facility: CLINIC | Age: 57
End: 2022-01-25
Payer: COMMERCIAL

## 2022-01-25 VITALS
SYSTOLIC BLOOD PRESSURE: 116 MMHG | DIASTOLIC BLOOD PRESSURE: 78 MMHG | OXYGEN SATURATION: 98 % | WEIGHT: 219.2 LBS | BODY MASS INDEX: 32.47 KG/M2 | HEIGHT: 69 IN | HEART RATE: 67 BPM

## 2022-01-25 DIAGNOSIS — I25.10 CORONARY ARTERY DISEASE INVOLVING NATIVE CORONARY ARTERY OF NATIVE HEART WITHOUT ANGINA PECTORIS: ICD-10-CM

## 2022-01-25 DIAGNOSIS — E78.1 HYPERTRIGLYCERIDEMIA: ICD-10-CM

## 2022-01-25 DIAGNOSIS — Z98.61 PERCUTANEOUS TRANSLUMINAL CORONARY ANGIOPLASTY STATUS: ICD-10-CM

## 2022-01-25 LAB
CHOLEST SERPL-MCNC: 168 MG/DL
FASTING STATUS PATIENT QL REPORTED: YES
HDLC SERPL-MCNC: 41 MG/DL
LDLC SERPL CALC-MCNC: 68 MG/DL
NONHDLC SERPL-MCNC: 127 MG/DL
TRIGL SERPL-MCNC: 293 MG/DL

## 2022-01-25 PROCEDURE — 80061 LIPID PANEL: CPT | Performed by: INTERNAL MEDICINE

## 2022-01-25 PROCEDURE — 36415 COLL VENOUS BLD VENIPUNCTURE: CPT | Performed by: INTERNAL MEDICINE

## 2022-01-25 PROCEDURE — 99213 OFFICE O/P EST LOW 20 MIN: CPT | Performed by: INTERNAL MEDICINE

## 2022-01-25 RX ORDER — ROSUVASTATIN CALCIUM 40 MG/1
40 TABLET, COATED ORAL DAILY
Qty: 90 TABLET | Refills: 3 | Status: SHIPPED | OUTPATIENT
Start: 2022-01-25 | End: 2022-10-27

## 2022-01-25 ASSESSMENT — MIFFLIN-ST. JEOR: SCORE: 1796.72

## 2022-01-25 NOTE — PROGRESS NOTES
[facility] Progress note    Assessment:  1. CAD  2. HTN  3. Dyslipidemia: improved: lipid panel today w/LDL <70 and HDL >40 on 40 rosuvastatin. TGs remain mildly elevated.  4. Obstructive sleep apnea, uses CPAP. Interested in Inspire for VENKAT.   5. Headaches, possible migraines    Plan:  1. Continue current medications  2. Consider seeing a headache specialist  3. Follow up in June 2022 which will be one year post his abnormal stress test and PCI. Consider transition to clopidogrel monotherapy at that visit given relatively complex CAD and young age.     HPI:     John Reyes is a very nice 56 year old gentleman with history of multivessel CAD s/p PCI of the LAD and OM over the summer last year 2021.     He is doing spin classes 2-3x / week or doing cardio on a treadmill for an hour and does yoga on Thursdays.  His lipid panel reflects his lifestyle changes with a modest improvement in HDL today and LDL is at goal with his rosuvastatin and exercise. He is working on a heart-healthy diet.      He is not having any chest pain or dyspnea. He does have a little dizziness, but symptoms are orthostatic. He continues to have very bad frontal headaches. The pain is actually described as crippling. He has never seen a headache specialist and these headaches are relatively new for him. He is interested in Inspire for obstructive sleep apnea.         Pertinent PMH  1. Coronary artery disease  a. Abnormal stress test 6/18/2021 followed by cath the same day showed severe stenosis mid LAD and OM 2.  Cath done at [facility] with small mid LAD dissection so he was transferred to [facility] and had PCI of the OM and staged PCI of the LAD 1 month later.  2.  Dyslipidemia  3.  Hypertension  4.  Normal baseline ejection fraction on stress echo from June 2021  5.  Obstructive sleep apnea    Last echo  Left ventricular systolic function is normal.The visual ejection fraction is 55-60%.  The right ventricular systolic function is  normal.  Aortic valve sclerosis noted.  The inferior vena cava was normal in size with preserved respiratory  Variability.      Encounter Diagnoses   Name Primary?     Percutaneous transluminal coronary angioplasty status      Coronary artery disease involving native coronary artery of native heart without angina pectoris        CURRENT MEDICATIONS:  Current Outpatient Medications   Medication Sig Dispense Refill     aspirin (ASA) 81 MG EC tablet Take 1 tablet (81 mg) by mouth daily 30 tablet 3     buPROPion (WELLBUTRIN XL) 300 MG 24 hr tablet TAKE ONE TABLET BY MOUTH ONCE DAILY 90 tablet 0     clopidogrel (PLAVIX) 75 MG tablet Take 1 tablet (75 mg) by mouth daily 90 tablet 2     fluocinonide (LIDEX) 0.05 % solution Apply to scalp daily as needed 60 mL 1     fluticasone (FLONASE) 50 MCG/ACT nasal spray Spray 1 spray into both nostrils daily (Patient taking differently: Spray 1 spray into both nostrils daily as needed for rhinitis or allergies ) 16 g 11     metoprolol tartrate (LOPRESSOR) 25 MG tablet Take 0.5 tablets (12.5 mg) by mouth 2 times daily 90 tablet 3     multivitamin, therapeutic (THERA-VIT) TABS tablet Take 1 tablet by mouth daily       nitroGLYcerin (NITROSTAT) 0.4 MG sublingual tablet For chest pain place 1 tablet under the tongue every 5 minutes for 3 doses. If symptoms persist 5 minutes after 1st dose call 911. 25 tablet 1     omeprazole (PRILOSEC) 40 MG DR capsule Take 1 capsule (40 mg) by mouth daily 90 capsule 1     rosuvastatin (CRESTOR) 20 MG tablet Take 2 tablets (40 mg) by mouth daily 90 tablet 3     sertraline (ZOLOFT) 100 MG tablet Take one and one half tablet daily  SEE PROVIDER FOR FOLLOW  tablet 0     sildenafil (REVATIO) 20 MG tablet Take one to three tablets one hour before sex 30 tablet 1     solifenacin (VESICARE) 5 MG tablet TAKE ONE TABLET BY MOUTH ONCE DAILY 90 tablet 2     traZODone (DESYREL) 50 MG tablet TAKE THREE TABLETS BY MOUTH EVERY NIGHT AT BEDTIME 270 tablet 1      albuterol (PROAIR HFA/PROVENTIL HFA/VENTOLIN HFA) 108 (90 Base) MCG/ACT inhaler Inhale 2 puffs into the lungs every 4 hours as needed for shortness of breath / dyspnea or wheezing (Patient not taking: Reported on 7/29/2021) 18 g 0       ALLERGIES     Allergies   Allergen Reactions     Penicillins Itching, Swelling and Rash     Rash       PAST MEDICAL, SURGICAL, FAMILY, SOCIAL HISTORY:  History was reviewed and updated as needed, see medical record.    REVIEW OF SYSTEMS:  Skin:  Positive for bruising   Eyes:  Positive for double vision;visual blurring  ENT:  Negative    Respiratory:  Negative    Cardiovascular:    Positive for;lightheadedness;dizziness;fatigue  Gastroenterology: Negative    Genitourinary:  not assessed    Musculoskeletal:  Negative    Neurologic:  Positive for headaches  Psychiatric:  Positive for excessive stress  Heme/Lymph/Imm:  Positive for easy bruising  Endocrine:  Negative      PHYSICAL EXAM:      BP: 116/78 Pulse: 67     SpO2: 98 %      Vital Signs with Ranges  Pulse:  [67] 67  BP: (116)/(78) 116/78  SpO2:  [98 %] 98 %  219 lbs 3.2 oz    Constitutional: awake, alert, no distress  Eyes: sclera nonicteric  ENT: trachea midline  Respiratory: clear bilat  Cardiovascular: regular rate and rhythm no murmur  GI: nondistended, nontender, bowel sounds present  Skin: dry, no rash no edema  Musculoskeletal: grossly normal muscle bulk and tone  Neurologic: no focal deficits  Neuropsychiatric: appropriate affect    Recent Lab Results:  LIPID RESULTS:  Lab Results   Component Value Date    CHOL 168 01/25/2022    CHOL 255 (H) 03/05/2021    HDL 41 01/25/2022    HDL 35 (L) 03/05/2021    LDL 68 01/25/2022    LDL  03/05/2021     Cannot estimate LDL when triglyceride exceeds 400 mg/dL     (H) 03/05/2021    TRIG 293 (H) 01/25/2022    TRIG 404 (H) 03/05/2021    CHOLHDLRATIO 4.8 10/30/2015       LIVER ENZYME RESULTS:  Lab Results   Component Value Date    AST 30 03/05/2021    ALT 39 09/29/2021    ALT 65  03/05/2021       CBC RESULTS:  Lab Results   Component Value Date    WBC 6.5 07/20/2021    WBC 8.2 06/22/2021    RBC 4.41 07/20/2021    RBC 4.41 06/22/2021    HGB 13.5 07/20/2021    HGB 13.7 06/22/2021    HCT 39.7 (L) 07/20/2021    HCT 40.1 06/22/2021    MCV 90 07/20/2021    MCV 91 06/22/2021    MCH 30.6 07/20/2021    MCH 31.1 06/22/2021    MCHC 34.0 07/20/2021    MCHC 34.2 06/22/2021    RDW 11.9 07/20/2021    RDW 12.0 06/22/2021     (L) 07/20/2021     (L) 06/22/2021       BMP RESULTS:  Lab Results   Component Value Date     07/20/2021     06/23/2021    POTASSIUM 4.1 07/20/2021    POTASSIUM 4.1 06/23/2021    CHLORIDE 105 07/20/2021    CHLORIDE 109 06/23/2021    CO2 28 07/20/2021    CO2 31 06/23/2021    ANIONGAP 7 07/20/2021    ANIONGAP 1 (L) 06/23/2021     (H) 07/20/2021     (H) 06/23/2021    BUN 16 07/20/2021    BUN 18 06/23/2021    CR 1.08 07/20/2021    CR 1.00 06/23/2021    GFRESTIMATED 75 07/20/2021    GFRESTIMATED 82 06/23/2021    GFRESTBLACK >90 06/23/2021    FRANCISCO 8.5 07/20/2021    FRANCISCO 8.8 06/23/2021        A1C RESULTS:  Lab Results   Component Value Date    A1C 5.1 06/18/2021       INR RESULTS:  Lab Results   Component Value Date    INR 1.04 07/20/2021    INR 1.03 06/18/2021    INR 1.00 12/13/2007           CC  Roxie Schrader MD  [address] [id number]

## 2022-01-25 NOTE — LETTER
1/25/2022    Remy Mcknight MD  [id number]  [facility] [address]    RE: John Reyes       Dear Colleague,     I had the pleasure of seeing John Reyes in the [facility].  [facility] Progress note    Assessment:  1. CAD  2. HTN  3. Dyslipidemia: improved: lipid panel today w/LDL <70 and HDL >40 on 40 rosuvastatin. TGs remain mildly elevated.  4. Obstructive sleep apnea, uses CPAP. Interested in Inspire for VENKAT.   5. Headaches, possible migraines    Plan:  1. Continue current medications  2. Consider seeing a headache specialist  3. Follow up in June 2022 which will be one year post his abnormal stress test and PCI. Consider transition to clopidogrel monotherapy at that visit given relatively complex CAD and young age.     HPI:     John Reyes is a very nice 56 year old gentleman with history of multivessel CAD s/p PCI of the LAD and OM over the summer last year 2021.     He is doing spin classes 2-3x / week or doing cardio on a treadmill for an hour and does yoga on Thursdays.  His lipid panel reflects his lifestyle changes with a modest improvement in HDL today and LDL is at goal with his rosuvastatin and exercise. He is working on a heart-healthy diet.      He is not having any chest pain or dyspnea. He does have a little dizziness, but symptoms are orthostatic. He continues to have very bad frontal headaches. The pain is actually described as crippling. He has never seen a headache specialist and these headaches are relatively new for him. He is interested in Inspire for obstructive sleep apnea.         Pertinent PMH  1. Coronary artery disease  a. Abnormal stress test 6/18/2021 followed by cath the same day showed severe stenosis mid LAD and OM 2.  Cath done at [facility] with small mid LAD dissection so he was transferred to [facility] and had PCI of the OM and staged PCI of the LAD 1 month later.  2.  Dyslipidemia  3.  Hypertension  4.  Normal baseline  ejection fraction on stress echo from June 2021  5.  Obstructive sleep apnea    Last echo  Left ventricular systolic function is normal.The visual ejection fraction is 55-60%.  The right ventricular systolic function is normal.  Aortic valve sclerosis noted.  The inferior vena cava was normal in size with preserved respiratory  Variability.      Encounter Diagnoses   Name Primary?     Percutaneous transluminal coronary angioplasty status      Coronary artery disease involving native coronary artery of native heart without angina pectoris        CURRENT MEDICATIONS:  Current Outpatient Medications   Medication Sig Dispense Refill     aspirin (ASA) 81 MG EC tablet Take 1 tablet (81 mg) by mouth daily 30 tablet 3     buPROPion (WELLBUTRIN XL) 300 MG 24 hr tablet TAKE ONE TABLET BY MOUTH ONCE DAILY 90 tablet 0     clopidogrel (PLAVIX) 75 MG tablet Take 1 tablet (75 mg) by mouth daily 90 tablet 2     fluocinonide (LIDEX) 0.05 % solution Apply to scalp daily as needed 60 mL 1     fluticasone (FLONASE) 50 MCG/ACT nasal spray Spray 1 spray into both nostrils daily (Patient taking differently: Spray 1 spray into both nostrils daily as needed for rhinitis or allergies ) 16 g 11     metoprolol tartrate (LOPRESSOR) 25 MG tablet Take 0.5 tablets (12.5 mg) by mouth 2 times daily 90 tablet 3     multivitamin, therapeutic (THERA-VIT) TABS tablet Take 1 tablet by mouth daily       nitroGLYcerin (NITROSTAT) 0.4 MG sublingual tablet For chest pain place 1 tablet under the tongue every 5 minutes for 3 doses. If symptoms persist 5 minutes after 1st dose call 911. 25 tablet 1     omeprazole (PRILOSEC) 40 MG DR capsule Take 1 capsule (40 mg) by mouth daily 90 capsule 1     rosuvastatin (CRESTOR) 20 MG tablet Take 2 tablets (40 mg) by mouth daily 90 tablet 3     sertraline (ZOLOFT) 100 MG tablet Take one and one half tablet daily  SEE PROVIDER FOR FOLLOW  tablet 0     sildenafil (REVATIO) 20 MG tablet Take one to three tablets one  hour before sex 30 tablet 1     solifenacin (VESICARE) 5 MG tablet TAKE ONE TABLET BY MOUTH ONCE DAILY 90 tablet 2     traZODone (DESYREL) 50 MG tablet TAKE THREE TABLETS BY MOUTH EVERY NIGHT AT BEDTIME 270 tablet 1     albuterol (PROAIR HFA/PROVENTIL HFA/VENTOLIN HFA) 108 (90 Base) MCG/ACT inhaler Inhale 2 puffs into the lungs every 4 hours as needed for shortness of breath / dyspnea or wheezing (Patient not taking: Reported on 7/29/2021) 18 g 0       ALLERGIES     Allergies   Allergen Reactions     Penicillins Itching, Swelling and Rash     Rash       PAST MEDICAL, SURGICAL, FAMILY, SOCIAL HISTORY:  History was reviewed and updated as needed, see medical record.    REVIEW OF SYSTEMS:  Skin:  Positive for bruising   Eyes:  Positive for double vision;visual blurring  ENT:  Negative    Respiratory:  Negative    Cardiovascular:    Positive for;lightheadedness;dizziness;fatigue  Gastroenterology: Negative    Genitourinary:  not assessed    Musculoskeletal:  Negative    Neurologic:  Positive for headaches  Psychiatric:  Positive for excessive stress  Heme/Lymph/Imm:  Positive for easy bruising  Endocrine:  Negative      PHYSICAL EXAM:      BP: 116/78 Pulse: 67     SpO2: 98 %      Vital Signs with Ranges  Pulse:  [67] 67  BP: (116)/(78) 116/78  SpO2:  [98 %] 98 %  219 lbs 3.2 oz    Constitutional: awake, alert, no distress  Eyes: sclera nonicteric  ENT: trachea midline  Respiratory: clear bilat  Cardiovascular: regular rate and rhythm no murmur  GI: nondistended, nontender, bowel sounds present  Skin: dry, no rash no edema  Musculoskeletal: grossly normal muscle bulk and tone  Neurologic: no focal deficits  Neuropsychiatric: appropriate affect    Recent Lab Results:  LIPID RESULTS:  Lab Results   Component Value Date    CHOL 168 01/25/2022    CHOL 255 (H) 03/05/2021    HDL 41 01/25/2022    HDL 35 (L) 03/05/2021    LDL 68 01/25/2022    LDL  03/05/2021     Cannot estimate LDL when triglyceride exceeds 400 mg/dL      (H) 03/05/2021    TRIG 293 (H) 01/25/2022    TRIG 404 (H) 03/05/2021    CHOLHDLRATIO 4.8 10/30/2015       LIVER ENZYME RESULTS:  Lab Results   Component Value Date    AST 30 03/05/2021    ALT 39 09/29/2021    ALT 65 03/05/2021       CBC RESULTS:  Lab Results   Component Value Date    WBC 6.5 07/20/2021    WBC 8.2 06/22/2021    RBC 4.41 07/20/2021    RBC 4.41 06/22/2021    HGB 13.5 07/20/2021    HGB 13.7 06/22/2021    HCT 39.7 (L) 07/20/2021    HCT 40.1 06/22/2021    MCV 90 07/20/2021    MCV 91 06/22/2021    MCH 30.6 07/20/2021    MCH 31.1 06/22/2021    MCHC 34.0 07/20/2021    MCHC 34.2 06/22/2021    RDW 11.9 07/20/2021    RDW 12.0 06/22/2021     (L) 07/20/2021     (L) 06/22/2021       BMP RESULTS:  Lab Results   Component Value Date     07/20/2021     06/23/2021    POTASSIUM 4.1 07/20/2021    POTASSIUM 4.1 06/23/2021    CHLORIDE 105 07/20/2021    CHLORIDE 109 06/23/2021    CO2 28 07/20/2021    CO2 31 06/23/2021    ANIONGAP 7 07/20/2021    ANIONGAP 1 (L) 06/23/2021     (H) 07/20/2021     (H) 06/23/2021    BUN 16 07/20/2021    BUN 18 06/23/2021    CR 1.08 07/20/2021    CR 1.00 06/23/2021    GFRESTIMATED 75 07/20/2021    GFRESTIMATED 82 06/23/2021    GFRESTBLACK >90 06/23/2021    FRANCISCO 8.5 07/20/2021    FRANCISCO 8.8 06/23/2021        A1C RESULTS:  Lab Results   Component Value Date    A1C 5.1 06/18/2021       INR RESULTS:  Lab Results   Component Value Date    INR 1.04 07/20/2021    INR 1.03 06/18/2021    INR 1.00 12/13/2007           Roxie Schrader MD   [facility]

## 2022-01-31 ENCOUNTER — MEDICAL CORRESPONDENCE (OUTPATIENT)
Dept: HEALTH INFORMATION MANAGEMENT | Facility: CLINIC | Age: 57
End: 2022-01-31
Payer: COMMERCIAL

## 2022-01-31 ENCOUNTER — TELEPHONE (OUTPATIENT)
Dept: FAMILY MEDICINE | Facility: CLINIC | Age: 57
End: 2022-01-31
Payer: COMMERCIAL

## 2022-01-31 NOTE — TELEPHONE ENCOUNTER
Dr. Mcknight     Please see form in basket regarding order for CPAP supplies     Please give to TC to fax when completed     Thank you,   Clarita Burkett, Registered Nurse, PAL (Patient Advocate Liason)   [facility]   [phone]

## 2022-01-31 NOTE — TELEPHONE ENCOUNTER
Signed order received. Faxed to [phone]. Abstracted.    Kimberly PATEL  [facility]   [facility]

## 2022-03-24 DIAGNOSIS — F32.1 MODERATE MAJOR DEPRESSION (H): ICD-10-CM

## 2022-03-25 DIAGNOSIS — I20.0 UNSTABLE ANGINA (H): ICD-10-CM

## 2022-03-25 DIAGNOSIS — I25.10 CAD (CORONARY ARTERY DISEASE): ICD-10-CM

## 2022-03-25 RX ORDER — NITROGLYCERIN 0.4 MG/1
TABLET SUBLINGUAL
Qty: 25 TABLET | Refills: 3 | Status: SHIPPED | OUTPATIENT
Start: 2022-03-25 | End: 2023-04-17

## 2022-03-25 NOTE — TELEPHONE ENCOUNTER
[facility] Refill Guideline reviewed.  Medication meets criteria for refill.    Received refill request for:  NTG  Last OV was: 1/25/2022 with Dr. Schrader  Labs/EKG: na  F/U scheduled: orders in Epic for 6/2022  New script sent to: [facility]    .

## 2022-03-25 NOTE — TELEPHONE ENCOUNTER
Routing refill request to provider for review/approval because:  Patient needs to be seen because:  3/31/22 will be one year  Failing PHQ9    Khloe Núñez RN

## 2022-03-28 RX ORDER — SERTRALINE HYDROCHLORIDE 100 MG/1
TABLET, FILM COATED ORAL
Qty: 45 TABLET | Refills: 0 | Status: SHIPPED | OUTPATIENT
Start: 2022-03-28 | End: 2022-04-22

## 2022-03-29 NOTE — TELEPHONE ENCOUNTER
Called patient, he states he will call us back to schedule an appointment, he is not sure what provider he would like to see. Ruth Behrens.

## 2022-04-09 ENCOUNTER — HEALTH MAINTENANCE LETTER (OUTPATIENT)
Age: 57
End: 2022-04-09

## 2022-04-19 DIAGNOSIS — F32.1 MODERATE MAJOR DEPRESSION (H): ICD-10-CM

## 2022-04-20 ENCOUNTER — TELEPHONE (OUTPATIENT)
Dept: FAMILY MEDICINE | Facility: CLINIC | Age: 57
End: 2022-04-20
Payer: COMMERCIAL

## 2022-04-20 DIAGNOSIS — G47.9 SLEEP DISORDER: ICD-10-CM

## 2022-04-20 DIAGNOSIS — F32.1 MODERATE MAJOR DEPRESSION (H): ICD-10-CM

## 2022-04-20 RX ORDER — TRAZODONE HYDROCHLORIDE 50 MG/1
150 TABLET, FILM COATED ORAL AT BEDTIME
Qty: 90 TABLET | Refills: 0 | Status: SHIPPED | OUTPATIENT
Start: 2022-04-20 | End: 2022-04-22

## 2022-04-20 RX ORDER — SERTRALINE HYDROCHLORIDE 100 MG/1
TABLET, FILM COATED ORAL
Qty: 45 TABLET | Refills: 0 | OUTPATIENT
Start: 2022-04-20

## 2022-04-20 RX ORDER — BUPROPION HYDROCHLORIDE 300 MG/1
300 TABLET ORAL DAILY
Qty: 30 TABLET | Refills: 0 | Status: SHIPPED | OUTPATIENT
Start: 2022-04-20 | End: 2022-04-22

## 2022-04-20 NOTE — TELEPHONE ENCOUNTER
Medication Question or Refill    Who is calling: John    What medication are you calling about (include dose and sig)?: ALL    Controlled Substance Agreement on file:     Who prescribed the medication?: Roxanna    Do you need a refill? Yes: Patient out of meds for 2 days next available with Ronni Lopez 06/23/22 (Appointment scheduled) Scheduled a med review with Ellen Nathan on Friday 04/22/22. Patient worried about being out of medications for 3-4 days, spoke with triage nurse, suggested patient ask pharmacy to give 3 or 4 pills to get to visit date. Patient states pharmacy says no they are not able to. Patient wants a call from a nurse to advise on happenings of stopping meds for 3 days. Please call patient.      When did you use the medication last?     Patient offered an appointment? Yes: Friday 04/22/22 with Ellen Nathan, Thursday 06/23/22 with Ronni Lopez    Do you have any questions or concerns?  Yes:     [facility] to leave a detailed message?: Yes at Cell number on file:    Telephone Information:   Mobile [phone]

## 2022-04-22 ENCOUNTER — OFFICE VISIT (OUTPATIENT)
Dept: FAMILY MEDICINE | Facility: CLINIC | Age: 57
End: 2022-04-22
Payer: COMMERCIAL

## 2022-04-22 VITALS
BODY MASS INDEX: 34.84 KG/M2 | SYSTOLIC BLOOD PRESSURE: 133 MMHG | HEIGHT: 67 IN | RESPIRATION RATE: 16 BRPM | TEMPERATURE: 97.6 F | OXYGEN SATURATION: 95 % | DIASTOLIC BLOOD PRESSURE: 82 MMHG | WEIGHT: 222 LBS | HEART RATE: 79 BPM

## 2022-04-22 DIAGNOSIS — E66.811 CLASS 1 OBESITY WITH SERIOUS COMORBIDITY AND BODY MASS INDEX (BMI) OF 34.0 TO 34.9 IN ADULT, UNSPECIFIED OBESITY TYPE: ICD-10-CM

## 2022-04-22 DIAGNOSIS — I25.10 CORONARY ARTERY DISEASE INVOLVING NATIVE CORONARY ARTERY OF NATIVE HEART WITHOUT ANGINA PECTORIS: ICD-10-CM

## 2022-04-22 DIAGNOSIS — R09.81 NASAL CONGESTION: ICD-10-CM

## 2022-04-22 DIAGNOSIS — N52.1 ERECTILE DYSFUNCTION DUE TO DISEASES CLASSIFIED ELSEWHERE: ICD-10-CM

## 2022-04-22 DIAGNOSIS — G47.9 SLEEP DISORDER: ICD-10-CM

## 2022-04-22 DIAGNOSIS — F32.1 MODERATE MAJOR DEPRESSION (H): Primary | ICD-10-CM

## 2022-04-22 DIAGNOSIS — N32.81 OVERACTIVE BLADDER: ICD-10-CM

## 2022-04-22 PROBLEM — E66.01 MORBID OBESITY (H): Status: RESOLVED | Noted: 2019-10-17 | Resolved: 2022-04-22

## 2022-04-22 PROCEDURE — U0003 INFECTIOUS AGENT DETECTION BY NUCLEIC ACID (DNA OR RNA); SEVERE ACUTE RESPIRATORY SYNDROME CORONAVIRUS 2 (SARS-COV-2) (CORONAVIRUS DISEASE [COVID-19]), AMPLIFIED PROBE TECHNIQUE, MAKING USE OF HIGH THROUGHPUT TECHNOLOGIES AS DESCRIBED BY CMS-2020-01-R: HCPCS | Performed by: PHYSICIAN ASSISTANT

## 2022-04-22 PROCEDURE — U0005 INFEC AGEN DETEC AMPLI PROBE: HCPCS | Performed by: PHYSICIAN ASSISTANT

## 2022-04-22 PROCEDURE — 99214 OFFICE O/P EST MOD 30 MIN: CPT | Performed by: PHYSICIAN ASSISTANT

## 2022-04-22 RX ORDER — SOLIFENACIN SUCCINATE 5 MG/1
5 TABLET, FILM COATED ORAL DAILY
Qty: 90 TABLET | Refills: 3 | Status: SHIPPED | OUTPATIENT
Start: 2022-04-22 | End: 2022-07-07

## 2022-04-22 RX ORDER — BUPROPION HYDROCHLORIDE 300 MG/1
300 TABLET ORAL DAILY
Qty: 90 TABLET | Refills: 3 | Status: SHIPPED | OUTPATIENT
Start: 2022-04-22 | End: 2023-04-17

## 2022-04-22 RX ORDER — TRAZODONE HYDROCHLORIDE 50 MG/1
150 TABLET, FILM COATED ORAL AT BEDTIME
Qty: 270 TABLET | Refills: 3 | Status: SHIPPED | OUTPATIENT
Start: 2022-04-22 | End: 2023-03-22

## 2022-04-22 RX ORDER — SERTRALINE HYDROCHLORIDE 100 MG/1
150 TABLET, FILM COATED ORAL DAILY
Qty: 135 TABLET | Refills: 3 | Status: SHIPPED | OUTPATIENT
Start: 2022-04-22 | End: 2023-04-17

## 2022-04-22 RX ORDER — SILDENAFIL CITRATE 20 MG/1
TABLET ORAL
Qty: 30 TABLET | Refills: 1 | Status: SHIPPED | OUTPATIENT
Start: 2022-04-22 | End: 2023-04-17

## 2022-04-22 RX ORDER — CLOPIDOGREL BISULFATE 75 MG/1
75 TABLET ORAL DAILY
Qty: 90 TABLET | Refills: 3 | Status: SHIPPED | OUTPATIENT
Start: 2022-04-22 | End: 2022-08-08

## 2022-04-22 ASSESSMENT — ANXIETY QUESTIONNAIRES
GAD7 TOTAL SCORE: 6
7. FEELING AFRAID AS IF SOMETHING AWFUL MIGHT HAPPEN: NOT AT ALL
5. BEING SO RESTLESS THAT IT IS HARD TO SIT STILL: SEVERAL DAYS
GAD7 TOTAL SCORE: 6
4. TROUBLE RELAXING: SEVERAL DAYS
2. NOT BEING ABLE TO STOP OR CONTROL WORRYING: SEVERAL DAYS
7. FEELING AFRAID AS IF SOMETHING AWFUL MIGHT HAPPEN: NOT AT ALL
1. FEELING NERVOUS, ANXIOUS, OR ON EDGE: SEVERAL DAYS
GAD7 TOTAL SCORE: 6
3. WORRYING TOO MUCH ABOUT DIFFERENT THINGS: SEVERAL DAYS
6. BECOMING EASILY ANNOYED OR IRRITABLE: SEVERAL DAYS

## 2022-04-22 ASSESSMENT — PATIENT HEALTH QUESTIONNAIRE - PHQ9
SUM OF ALL RESPONSES TO PHQ QUESTIONS 1-9: 10
10. IF YOU CHECKED OFF ANY PROBLEMS, HOW DIFFICULT HAVE THESE PROBLEMS MADE IT FOR YOU TO DO YOUR WORK, TAKE CARE OF THINGS AT HOME, OR GET ALONG WITH OTHER PEOPLE: NOT DIFFICULT AT ALL
SUM OF ALL RESPONSES TO PHQ QUESTIONS 1-9: 10

## 2022-04-22 NOTE — PROGRESS NOTES
Assessment & Plan     Moderate major depression (H)  Doing well. Refilled medications for patient.   - buPROPion (WELLBUTRIN XL) 300 MG 24 hr tablet; Take 1 tablet (300 mg) by mouth daily  - sertraline (ZOLOFT) 100 MG tablet; Take 1.5 tablets (150 mg) by mouth daily    Erectile dysfunction due to diseases classified elsewhere  Refilled for patient.  - sildenafil (REVATIO) 20 MG tablet; Take one to three tablets one hour before sex    Sleep disorder  Stable. Refilled as noted below.  - traZODone (DESYREL) 50 MG tablet; Take 3 tablets (150 mg) by mouth At Bedtime TAKE THREE TABLETS BY MOUTH EVERY NIGHT AT BEDTIME    Overactive bladder  Stable. Refilled as noted below.  - solifenacin (VESICARE) 5 MG tablet; Take 1 tablet (5 mg) by mouth daily    Nasal congestion  COVID testing obtained. Recommended symptomatic treatments at home. He is out of range to get any treatment options as an outpatient at this time.  - Symptomatic; Yes; 4/15/2022 COVID-19 Virus (Coronavirus) by PCR Nose    Class 1 obesity with serious comorbidity and body mass index (BMI) of 34.0 to 34.9 in adult, unspecified obesity type  Patient has lost some weight, no longer has morbid obesity. He is active doing indoor cycling at home 3 times weekly and yoga a couple times a week. He also walks. Continues to work at weight loss.    Coronary artery disease involving native coronary artery of native heart without angina pectoris  Follows cardiology. Refilled clopidogrel. June of 2022 he will be one year since his PCI. Per cardiology's last note could transition to clopidogrel monotherapy at that time.  - clopidogrel (PLAVIX) 75 MG tablet; Take 1 tablet (75 mg) by mouth daily    Review of external notes as documented elsewhere in note  Prescription drug management  30 minutes spent on the date of the encounter doing chart review, history and exam, documentation and further activities per the note      Return as planned to establish care.    Ellen CARREON  "TONE Nathan [facility] ZACHERY Lopez is a 57 year old who presents for the following health issues     History of Present Illness       Mental Health Follow-up:  Patient presents to follow-up on Anxiety.    Patient's anxiety since last visit has been:  Good  The patient is not having other symptoms associated with anxiety.  Any significant life events: financial concerns  Patient is feeling anxious or having panic attacks.  Patient has no concerns about alcohol or drug use.       Today's PHQ-9         PHQ-9 Total Score: 10  PHQ-9 Q9 Thoughts of better off dead/self-harm past 2 weeks :   (P) Not at all    How difficult have these problems made it for you to do your work, take care of things at home, or get along with other people: Not difficult at all    Today's CARLOS-7 Score: 6        Patient has had cold like symptoms for the past week or more. He is feeling congested in the head and chest. Decreased sense of taste and feeling fatigued.    Review of Systems   GENERAL:  No fevers  RESP:  No shortness of breath        Objective    /82 (BP Location: Right arm, Patient Position: Sitting, Cuff Size: Adult Large)   Pulse 79   Temp 97.6  F (36.4  C) (Oral)   Resp 16   Ht 1.702 m (5' 7\")   Wt 100.7 kg (222 lb)   SpO2 95%   BMI 34.77 kg/m    Body mass index is 34.77 kg/m .  Physical Exam   GENERAL: No acute distress  HEENT: Normocephalic, PERRL, Canals patent, bilateral TM's non-erythematous and non-bulging. Turbinate on the right enlarged in appearance, left turbinate normal in appearance. Posterior oropharynx non-erythematous and without exudate.  CARDIAC: Regular rate and rhythm. No murmurs.  PULMONARY: Lungs are clear to auscultation bilaterally. No wheezes, rhonchi or crackles.  NEURO: Alert and non-focal          "

## 2022-04-23 LAB — SARS-COV-2 RNA RESP QL NAA+PROBE: NEGATIVE

## 2022-04-23 ASSESSMENT — PATIENT HEALTH QUESTIONNAIRE - PHQ9: SUM OF ALL RESPONSES TO PHQ QUESTIONS 1-9: 10

## 2022-04-23 ASSESSMENT — ANXIETY QUESTIONNAIRES: GAD7 TOTAL SCORE: 6

## 2022-05-19 DIAGNOSIS — G47.9 SLEEP DISORDER: ICD-10-CM

## 2022-05-19 DIAGNOSIS — F32.1 MODERATE MAJOR DEPRESSION (H): ICD-10-CM

## 2022-05-19 RX ORDER — TRAZODONE HYDROCHLORIDE 50 MG/1
150 TABLET, FILM COATED ORAL AT BEDTIME
Qty: 90 TABLET | Refills: 0 | OUTPATIENT
Start: 2022-05-19

## 2022-05-19 RX ORDER — BUPROPION HYDROCHLORIDE 300 MG/1
300 TABLET ORAL DAILY
Qty: 30 TABLET | Refills: 0 | OUTPATIENT
Start: 2022-05-19

## 2022-05-19 NOTE — TELEPHONE ENCOUNTER
Patient given a year supply of medication on 4/22/22.     Refusing refill request and closing encounter.     Blanche Rudolph RN on 5/19/2022 at 2:52 PM

## 2022-07-05 ASSESSMENT — PATIENT HEALTH QUESTIONNAIRE - PHQ9
SUM OF ALL RESPONSES TO PHQ QUESTIONS 1-9: 7
SUM OF ALL RESPONSES TO PHQ QUESTIONS 1-9: 7
10. IF YOU CHECKED OFF ANY PROBLEMS, HOW DIFFICULT HAVE THESE PROBLEMS MADE IT FOR YOU TO DO YOUR WORK, TAKE CARE OF THINGS AT HOME, OR GET ALONG WITH OTHER PEOPLE: SOMEWHAT DIFFICULT

## 2022-07-07 ENCOUNTER — OFFICE VISIT (OUTPATIENT)
Dept: FAMILY MEDICINE | Facility: CLINIC | Age: 57
End: 2022-07-07
Payer: COMMERCIAL

## 2022-07-07 VITALS
WEIGHT: 223 LBS | DIASTOLIC BLOOD PRESSURE: 72 MMHG | OXYGEN SATURATION: 95 % | SYSTOLIC BLOOD PRESSURE: 130 MMHG | RESPIRATION RATE: 12 BRPM | BODY MASS INDEX: 34.93 KG/M2 | TEMPERATURE: 98.5 F | HEART RATE: 83 BPM

## 2022-07-07 DIAGNOSIS — E78.5 HYPERLIPIDEMIA LDL GOAL <130: ICD-10-CM

## 2022-07-07 DIAGNOSIS — R14.0 BLOATING: ICD-10-CM

## 2022-07-07 DIAGNOSIS — I25.10 CORONARY ARTERY DISEASE INVOLVING NATIVE CORONARY ARTERY OF NATIVE HEART WITHOUT ANGINA PECTORIS: Primary | ICD-10-CM

## 2022-07-07 DIAGNOSIS — F32.1 MODERATE MAJOR DEPRESSION (H): ICD-10-CM

## 2022-07-07 DIAGNOSIS — N32.81 OVERACTIVE BLADDER: ICD-10-CM

## 2022-07-07 DIAGNOSIS — Z23 NEED FOR PNEUMOCOCCAL VACCINE: ICD-10-CM

## 2022-07-07 DIAGNOSIS — K13.70 ORAL LESION: ICD-10-CM

## 2022-07-07 PROCEDURE — 90471 IMMUNIZATION ADMIN: CPT | Performed by: PHYSICIAN ASSISTANT

## 2022-07-07 PROCEDURE — 99214 OFFICE O/P EST MOD 30 MIN: CPT | Mod: 25 | Performed by: PHYSICIAN ASSISTANT

## 2022-07-07 PROCEDURE — 0054A COVID-19,PF,PFIZER (12+ YRS): CPT | Performed by: PHYSICIAN ASSISTANT

## 2022-07-07 PROCEDURE — 90677 PCV20 VACCINE IM: CPT | Performed by: PHYSICIAN ASSISTANT

## 2022-07-07 PROCEDURE — 91305 COVID-19,PF,PFIZER (12+ YRS): CPT | Performed by: PHYSICIAN ASSISTANT

## 2022-07-07 RX ORDER — DEXAMETHASONE 0.5 MG/5ML
0.5 ELIXIR ORAL 2 TIMES DAILY PRN
Qty: 237 ML | Refills: 0 | Status: SHIPPED | OUTPATIENT
Start: 2022-07-07 | End: 2023-04-17

## 2022-07-07 RX ORDER — SOLIFENACIN SUCCINATE 10 MG/1
10 TABLET, FILM COATED ORAL DAILY
Qty: 90 TABLET | Refills: 3 | Status: SHIPPED | OUTPATIENT
Start: 2022-07-07 | End: 2023-04-17

## 2022-07-07 ASSESSMENT — PATIENT HEALTH QUESTIONNAIRE - PHQ9
SUM OF ALL RESPONSES TO PHQ QUESTIONS 1-9: 7
10. IF YOU CHECKED OFF ANY PROBLEMS, HOW DIFFICULT HAVE THESE PROBLEMS MADE IT FOR YOU TO DO YOUR WORK, TAKE CARE OF THINGS AT HOME, OR GET ALONG WITH OTHER PEOPLE: SOMEWHAT DIFFICULT

## 2022-07-07 NOTE — Clinical Note
Dr. Schrader.   I saw John today and is doing well. However, I see you wanted to see him again this summer and he has yet to schedule this. I told him I would reach out to you per his request to have your schedulers call him to schedule a follow up visit. Let me know if you have any questions.   -neno starks, pac

## 2022-07-07 NOTE — PROGRESS NOTES
"  Assessment & Plan     Overactive bladder  Slightly worsening. Will increase dose. If no improvement with this, will have see urology.   - solifenacin (VESICARE) 10 MG tablet; Take 1 tablet (10 mg) by mouth daily    Coronary artery disease involving native coronary artery of native heart without angina pectoris  Stable. Followed by cardiology. However, overdue for follow up. Routing note to cardiologist to likely have his team reach out to patient to schedule. Will obtain future fasting labs prior to this visit with them. Continues plavix and asa  - Lipid panel reflex to direct LDL Fasting; Future    Moderate major depression (H)  Stable on current regimen.     Bloating  Ongoing. Discussed otc trial of probiotics if not improved ,will have see gi.     Hyperlipidemia LDL goal <130    - Lipid panel reflex to direct LDL Fasting; Future    Need for pneumococcal vaccine    - PNEUMOCOCCAL 20 VALENT CONJUGATE (PREVNAR 20)    Oral lesion  Noted on exam. Decadron elixir to aid in ulceration but underlying lesion needs consult. Possible mucocele though quite firm. Will have see ent for evaluation and likely removal/biopsy.   - Adult ENT  Referral; Future  - dexamethasone (DECADRON) 0.5 MG/5ML elixir; Take 5 mLs (0.5 mg) by mouth 2 times daily as needed (canker sores)  Medication use and side effects discussed with the patient. Patient is in complete understanding and agreement with plan.        BMI:   Estimated body mass index is 34.93 kg/m  as calculated from the following:    Height as of 4/22/22: 1.702 m (5' 7\").    Weight as of this encounter: 101.2 kg (223 lb).   Weight management plan: Discussed healthy diet and exercise guidelines      Return in about 1 month (around 8/7/2022) for Lab Work.    TONE Castan[facility]    Gamaliel Lopez is a 57 year old{ presenting for the following health issues:  Recheck Medication, Establish Care, and Urinary Problem (Wants to " increase the medication vesicare is possible getting up about 3 x/night)      History of Present Illness       Back Pain:  He presents for follow up of back pain. Patient's back pain is a recurring problem.  Location of back pain:  Left lower back and right shoulder  Description of back pain: gnawing  Back pain spreads: right side of neck    Since patient first noticed back pain, pain is: always present, but gets better and worse  Does back pain interfere with his job:  Yes      Reason for visit:  Med check    He eats 2-3 servings of fruits and vegetables daily.He consumes 1 sweetened beverage(s) daily.He exercises with enough effort to increase his heart rate 30 to 60 minutes per day.  He exercises with enough effort to increase his heart rate 5 days per week.   He is taking medications regularly.    Today's PHQ-9         PHQ-9 Total Score: 7    PHQ-9 Q9 Thoughts of better off dead/self-harm past 2 weeks :   Not at all    How difficult have these problems made it for you to do your work, take care of things at home, or get along with other people: Somewhat difficult       Hyperlipidemia Follow-Up      Are you regularly taking any medication or supplement to lower your cholesterol?   Yes- Crestor    Are you having muscle aches or other side effects that you think could be caused by your cholesterol lowering medication?  Yes- dry mouth at night    Depression and Anxiety Follow-Up    How are you doing with your depression since your last visit? No change    How are you doing with your anxiety since your last visit?  No change    Are you having other symptoms that might be associated with depression or anxiety? No    Have you had a significant life event? No     Do you have any concerns with your use of alcohol or other drugs? No    Social History     Tobacco Use     Smoking status: Former Smoker     Packs/day: 0.50     Types: Cigarettes, Cigars     Quit date: 2003     Years since quittin.5     Smokeless  tobacco: Never Used   Vaping Use     Vaping Use: Never used   Substance Use Topics     Alcohol use: Yes     Alcohol/week: 0.0 standard drinks     Comment: 3-4 beers/week     Drug use: No     PHQ 2/10/2021 4/22/2022 7/5/2022   PHQ-9 Total Score 16 10 7   Q9: Thoughts of better off dead/self-harm past 2 weeks Not at all Not at all Not at all     CARLOS-7 SCORE 11/30/2020 2/10/2021 4/22/2022   Total Score - - -   Total Score - 11 (moderate anxiety) 6 (mild anxiety)   Total Score 3 11 6       Concerns for canker sores that are recurrent. otc salt water gargles attempted without improvement. Current one present for weeks.     States his vesicare does not seem to be aiding in his overactive bladder as well as in past. No side effects.     History of PCI over 1 year ago. Was due for follow up in June with cardiology. Has yet to schedule or hear from them.     Review of Systems   Constitutional, HEENT, cardiovascular, pulmonary, GI, , musculoskeletal, neuro, skin, endocrine and psych systems are negative, except as otherwise noted.      Objective    /72 (BP Location: Right arm, Patient Position: Chair, Cuff Size: Adult Regular)   Pulse 83   Temp 98.5  F (36.9  C) (Oral)   Resp 12   Wt 101.2 kg (223 lb)   SpO2 95%   BMI 34.93 kg/m    Body mass index is 34.93 kg/m .  Physical Exam   GENERAL: healthy, alert and no distress  HENT: normal cephalic/atraumatic and non mobile mass noted on left lower gums. Ulceration present.   RESP: lungs clear to auscultation - no rales, rhonchi or wheezes  CV: regular rates and rhythm, normal S1 S2, no S3 or S4 and no murmur, click or rub  MS: no gross musculoskeletal defects noted, no edema  PSYCH: mentation appears normal, affect normal/bright                .  ..

## 2022-08-01 DIAGNOSIS — K21.00 GASTROESOPHAGEAL REFLUX DISEASE WITH ESOPHAGITIS WITHOUT HEMORRHAGE: ICD-10-CM

## 2022-08-03 NOTE — TELEPHONE ENCOUNTER
Please reach out to patient. Was previously refilled by previous pcp. Proton pump inhibitors can decrease the effectiveness of plavix making a patient higher risk of a future coronary artery disease complication.     Has he ever been on pepcid for heart burn in the past? This does not result the interaction risk so would be a better option for him. However, it may not work as well for his stomach acid.     -neno starks, pac

## 2022-08-03 NOTE — TELEPHONE ENCOUNTER
Routing refill request to provider for review/approval because:  PPI Protocol Failed 08/01/2022 12:09 PM   Protocol Details  Not on Clopidogrel (unless Pantoprazole ordered)     Khloe Núñez RN

## 2022-08-04 RX ORDER — OMEPRAZOLE 40 MG/1
40 CAPSULE, DELAYED RELEASE ORAL DAILY
Qty: 90 CAPSULE | Refills: 1 | OUTPATIENT
Start: 2022-08-04

## 2022-08-04 NOTE — TELEPHONE ENCOUNTER
Message left for patient to return call to triage nurse     Clarita Burkett, Registered Nurse  [facility]

## 2022-08-04 NOTE — TELEPHONE ENCOUNTER
"Pt returns call. Pt informs that his cardiologist had prescribed the omeprazole previously and he has an appointment in 3 weeks with her.     Pt informs \"I would like to take Ronni Lopez's suggestion and try the Pepcid over the counter for the time being and see what my heart doctor says about it in a few weeks\".    Refused medication refill request per pt request.      Laurel Cummings RN    "

## 2022-08-06 DIAGNOSIS — I25.10 CORONARY ARTERY DISEASE INVOLVING NATIVE CORONARY ARTERY OF NATIVE HEART WITHOUT ANGINA PECTORIS: ICD-10-CM

## 2022-08-08 RX ORDER — CLOPIDOGREL BISULFATE 75 MG/1
75 TABLET ORAL DAILY
Qty: 90 TABLET | Refills: 3 | Status: SHIPPED | OUTPATIENT
Start: 2022-08-08 | End: 2023-08-22

## 2022-08-09 ENCOUNTER — LAB (OUTPATIENT)
Dept: LAB | Facility: CLINIC | Age: 57
End: 2022-08-09
Payer: COMMERCIAL

## 2022-08-09 DIAGNOSIS — E78.5 HYPERLIPIDEMIA LDL GOAL <130: ICD-10-CM

## 2022-08-09 DIAGNOSIS — I25.10 CORONARY ARTERY DISEASE INVOLVING NATIVE CORONARY ARTERY OF NATIVE HEART WITHOUT ANGINA PECTORIS: ICD-10-CM

## 2022-08-09 LAB
CHOLEST SERPL-MCNC: 209 MG/DL
FASTING STATUS PATIENT QL REPORTED: YES
HDLC SERPL-MCNC: 38 MG/DL
LDLC SERPL CALC-MCNC: 96 MG/DL
LDLC SERPL CALC-MCNC: ABNORMAL MG/DL
NONHDLC SERPL-MCNC: 171 MG/DL
TRIGL SERPL-MCNC: 501 MG/DL

## 2022-08-09 PROCEDURE — 80061 LIPID PANEL: CPT

## 2022-08-09 PROCEDURE — 36415 COLL VENOUS BLD VENIPUNCTURE: CPT

## 2022-08-09 PROCEDURE — 83721 ASSAY OF BLOOD LIPOPROTEIN: CPT | Mod: 59

## 2022-08-11 ENCOUNTER — MYC MEDICAL ADVICE (OUTPATIENT)
Dept: FAMILY MEDICINE | Facility: CLINIC | Age: 57
End: 2022-08-11

## 2022-08-11 DIAGNOSIS — I25.10 CORONARY ARTERY DISEASE INVOLVING NATIVE CORONARY ARTERY OF NATIVE HEART WITHOUT ANGINA PECTORIS: Primary | ICD-10-CM

## 2022-08-11 NOTE — TELEPHONE ENCOUNTER
Confirming Mychart message, will monitor and proceed after pt replies  Christa Madden RN, BSN  [facility]

## 2022-08-22 NOTE — TELEPHONE ENCOUNTER
Recommending rechecking after on medication for 1 month and needs fasting for 9 hours. Also overdue for cardiology follow up. Please have him schedule.     -neno starks, pac

## 2022-08-22 NOTE — TELEPHONE ENCOUNTER
Ronni Lopez PAC     Please review my chart message - patient is asking for an order to repeat cholesterol test     Was off crestor for 4 days prior to last lab (ran out of meds) is currently now taking medications per my chart message     Clarita Burkett, Registered Nurse  [facility]

## 2022-09-14 ENCOUNTER — LAB (OUTPATIENT)
Dept: LAB | Facility: CLINIC | Age: 57
End: 2022-09-14
Payer: COMMERCIAL

## 2022-09-14 DIAGNOSIS — I25.10 CORONARY ARTERY DISEASE INVOLVING NATIVE CORONARY ARTERY OF NATIVE HEART WITHOUT ANGINA PECTORIS: ICD-10-CM

## 2022-09-14 PROCEDURE — 80061 LIPID PANEL: CPT

## 2022-09-14 PROCEDURE — 36415 COLL VENOUS BLD VENIPUNCTURE: CPT

## 2022-09-15 ENCOUNTER — OFFICE VISIT (OUTPATIENT)
Dept: OTOLARYNGOLOGY | Facility: CLINIC | Age: 57
End: 2022-09-15
Attending: PHYSICIAN ASSISTANT
Payer: COMMERCIAL

## 2022-09-15 DIAGNOSIS — G47.33 OBSTRUCTIVE SLEEP APNEA: Primary | ICD-10-CM

## 2022-09-15 DIAGNOSIS — M27.0 TORUS MANDIBULARIS: ICD-10-CM

## 2022-09-15 LAB
CHOLEST SERPL-MCNC: 184 MG/DL
FASTING STATUS PATIENT QL REPORTED: ABNORMAL
HDLC SERPL-MCNC: 39 MG/DL
LDLC SERPL CALC-MCNC: 72 MG/DL
NONHDLC SERPL-MCNC: 145 MG/DL
TRIGL SERPL-MCNC: 365 MG/DL

## 2022-09-15 PROCEDURE — 99203 OFFICE O/P NEW LOW 30 MIN: CPT | Performed by: OTOLARYNGOLOGY

## 2022-09-15 RX ORDER — DEXAMETHASONE 0.5 MG/5ML
SOLUTION ORAL
COMMUNITY
Start: 2022-07-07

## 2022-09-15 RX ORDER — ROSUVASTATIN CALCIUM 20 MG/1
TABLET, COATED ORAL
COMMUNITY
Start: 2022-04-27 | End: 2022-10-27 | Stop reason: DRUGHIGH

## 2022-09-15 RX ORDER — SOLIFENACIN SUCCINATE 5 MG/1
TABLET, FILM COATED ORAL
COMMUNITY
Start: 2022-07-07 | End: 2022-10-27 | Stop reason: DRUGHIGH

## 2022-09-15 NOTE — LETTER
9/15/2022         RE: John Reyes  [address]  [place] [address]        Dear Colleague,    Thank you for referring your patient, John Reyes, to the [facility]. Please see a copy of my visit note below.    HPI: This patient is a 56yo M who presents to clinic for evaluation of a mouth mass at the request of his dentist via Cleveland Lopez PA-C. He has been feeling some hard lumps on the inside of his jaw bone with his tongue for quite some time. Not really causing any issues. He also has VENKAT and is using CPAP. He was seen by someone at [facility] in [facility] who recommended he be seen in [facility] for [facility]. He has not had a PSG in many years.    Past medical history, surgical history, social history, family history, medications, and allergies have been reviewed with the patient and are documented above.    Review of Systems: a 10-system review was performed. Pertinent positives are noted in the HPI and on a separate scanned document in the chart.    PHYSICAL EXAMINATION:  GEN: no acute distress, normocephalic. BMI 35.   EYES: extraocular movements are intact, pupils are equal and round. Sclera clear.   EARS: auricles are normally formed. The external auditory canals are clear with minimal to no cerumen. Tympanic membranes are intact bilaterally with no signs of infection, effusion, retractions, or perforations.  NOSE: anterior nares are patent. There are no masses or lesions. The septum is non-obstructing.  OC/OP: clear, dentition is in good repair. The tongue and palate are fully mobile and symmetric. No masses or lesions. There are senait mandibulari along the bilateral lingual surface of the mandible, slightly larger on the left than the right.   NECK: soft and supple. No lymphadenopathy or masses. Airway is midline.  NEURO: CN VII and XII symmetric. alert and oriented. No spontaneous nystagmus. Gait is normal.  PULM: breathing comfortably on room air, normal chest  expansion with respiration  CARDS: no cyanosis or clubbing, normal carotid pulses    MEDICAL DECISION-MAKING: This patient is a 56yo M with senait mandibulari of no clinical concern. Reassured him that these are a variant of normal. As for the Inspire, he will need an updated PSG and will need to meet with Dr. Gomez to discuss next steps to identify if he is a candidate, which will include a sleep endoscopy. Will place a referral for the updated sleep test and he has been provided the contact information for Dr. Gomez to make an appointment after his sleep study.        Again, thank you for allowing me to participate in the care of your patient.        Sincerely,        Aneta Burgos MD

## 2022-10-10 ENCOUNTER — HEALTH MAINTENANCE LETTER (OUTPATIENT)
Age: 57
End: 2022-10-10

## 2022-10-27 ENCOUNTER — OFFICE VISIT (OUTPATIENT)
Dept: CARDIOLOGY | Facility: CLINIC | Age: 57
End: 2022-10-27
Attending: INTERNAL MEDICINE
Payer: COMMERCIAL

## 2022-10-27 VITALS
HEIGHT: 67 IN | SYSTOLIC BLOOD PRESSURE: 134 MMHG | OXYGEN SATURATION: 97 % | WEIGHT: 218.3 LBS | DIASTOLIC BLOOD PRESSURE: 82 MMHG | BODY MASS INDEX: 34.26 KG/M2 | HEART RATE: 67 BPM

## 2022-10-27 DIAGNOSIS — E78.2 MIXED DYSLIPIDEMIA: ICD-10-CM

## 2022-10-27 DIAGNOSIS — I25.10 CORONARY ARTERY DISEASE INVOLVING NATIVE CORONARY ARTERY OF NATIVE HEART WITHOUT ANGINA PECTORIS: ICD-10-CM

## 2022-10-27 DIAGNOSIS — Z98.61 PERCUTANEOUS TRANSLUMINAL CORONARY ANGIOPLASTY STATUS: ICD-10-CM

## 2022-10-27 DIAGNOSIS — I10 ESSENTIAL HYPERTENSION: Primary | ICD-10-CM

## 2022-10-27 PROCEDURE — 99214 OFFICE O/P EST MOD 30 MIN: CPT | Performed by: INTERNAL MEDICINE

## 2022-10-27 RX ORDER — ROSUVASTATIN CALCIUM 40 MG/1
20 TABLET, COATED ORAL DAILY
Qty: 45 TABLET | Refills: 3 | Status: SHIPPED | OUTPATIENT
Start: 2022-10-27 | End: 2023-04-17

## 2022-10-27 RX ORDER — EZETIMIBE 10 MG/1
10 TABLET ORAL DAILY
Qty: 90 TABLET | Refills: 3 | Status: SHIPPED | OUTPATIENT
Start: 2022-10-27 | End: 2023-10-11

## 2022-10-27 NOTE — PROGRESS NOTES
[facility] Progress note    Assessment:  1. CAD 2021 PCI OM and staged complex PCI LAD  2. HTN  3. Dyslipidemia: improved: lipid panel today w/LDL <72 and HDL <40 T 365 9/2022 on 40 rosuvastatin.   4. myalgias  5. Obstructive sleep apnea, uses CPAP.    6. Headaches, possible migraines    Plan:  1. Will adjust to clopidogrel monotherapy. Stop aspirin. Next yr consider transition to just asa. He has more complex disease, so will stay with stronger antiplatelet thrapy for now.  2. Due to myalgias will decrease rosuvastatin to 20mg and add zetia.  3. Recheck lipids in 2 months. If not at target consider PCSK-9 inhibitor.   4. Follow up one year with EKG.     HPI:     John Reyes is a very nice 57 year old gentleman with history of multivessel CAD s/p PCI of the LAD and OM over the summer of 2021.     He is doing spin classes 2-3x / week and has added tabata classes and walking regularly doing cardio.  He is working on a heart-healthy diet. Overall he feels quite good though does note some myalgias and mildly blurred vision which he attributes to his rosuvastatin. No chest pain, pressure, dyspnea, palps edema. He does still have headaches and has not seen a headache specialist    Due to a mistake with prescription refills his statin was not refilled but he is back on it now. Still having headaches. Has not yet seen a headache specialist.         Pertinent PMH  1. Coronary artery disease  a. Abnormal stress test 6/18/2021 followed by cath the same day showed severe stenosis mid LAD and OM 2.  Cath done at [facility] with small mid LAD dissection so he was transferred to [facility] and had PCI of the OM and staged PCI of the LAD 1 month later.  2.  Dyslipidemia with some myalgias with rosuvastatin  3.  Hypertension  4.  Normal baseline ejection fraction on stress echo from June 2021  5.  Obstructive sleep apnea    Last echo  Left ventricular systolic function is normal.The visual ejection fraction is 55-60%.  The  right ventricular systolic function is normal.  Aortic valve sclerosis noted.  The inferior vena cava was normal in size with preserved respiratory  Variability.      Encounter Diagnoses   Name Primary?     Percutaneous transluminal coronary angioplasty status      Coronary artery disease involving native coronary artery of native heart without angina pectoris        CURRENT MEDICATIONS:  Current Outpatient Medications   Medication Sig Dispense Refill     albuterol (PROAIR HFA/PROVENTIL HFA/VENTOLIN HFA) 108 (90 Base) MCG/ACT inhaler Inhale 2 puffs into the lungs every 4 hours as needed for shortness of breath / dyspnea or wheezing 18 g 0     aspirin (ASA) 81 MG EC tablet Take 1 tablet (81 mg) by mouth daily 30 tablet 3     buPROPion (WELLBUTRIN XL) 300 MG 24 hr tablet Take 1 tablet (300 mg) by mouth daily 90 tablet 3     clopidogrel (PLAVIX) 75 MG tablet Take 1 tablet (75 mg) by mouth daily 90 tablet 3     dexamethasone (DECADRON) 0.5 MG/5ML elixir Take 5 mLs (0.5 mg) by mouth 2 times daily as needed (canker sores) 237 mL 0     dexamethasone alcohol-free (DECADRON) 0.1 MG/ML solution        fluocinonide (LIDEX) 0.05 % solution Apply to scalp daily as needed 60 mL 1     metoprolol tartrate (LOPRESSOR) 25 MG tablet Take 0.5 tablets (12.5 mg) by mouth 2 times daily 90 tablet 3     multivitamin, therapeutic (THERA-VIT) TABS tablet Take 1 tablet by mouth daily       nitroGLYcerin (NITROSTAT) 0.4 MG sublingual tablet For chest pain place 1 tablet under the tongue every 5 minutes for 3 doses. If symptoms persist 5 minutes after 1st dose call 911. 25 tablet 3     omeprazole (PRILOSEC) 40 MG DR capsule Take 1 capsule (40 mg) by mouth daily 90 capsule 1     rosuvastatin (CRESTOR) 40 MG tablet Take 1 tablet (40 mg) by mouth daily 90 tablet 3     sertraline (ZOLOFT) 100 MG tablet Take 1.5 tablets (150 mg) by mouth daily 135 tablet 3     sildenafil (REVATIO) 20 MG tablet Take one to three tablets one hour before sex 30 tablet 1      solifenacin (VESICARE) 10 MG tablet Take 1 tablet (10 mg) by mouth daily 90 tablet 3     traZODone (DESYREL) 50 MG tablet Take 3 tablets (150 mg) by mouth At Bedtime TAKE THREE TABLETS BY MOUTH EVERY NIGHT AT BEDTIME 270 tablet 3       ALLERGIES     Allergies   Allergen Reactions     Penicillins Itching, Swelling and Rash     Rash       PAST MEDICAL, SURGICAL, FAMILY, SOCIAL HISTORY:  History was reviewed and updated as needed, see medical record.    REVIEW OF SYSTEMS:  Skin:  Negative     Eyes:  Positive for glasses  ENT:  Negative    Respiratory:  Positive for sleep apnea;CPAP  Cardiovascular:    fatigue;Positive for  Gastroenterology: Positive for heartburn  Genitourinary:  Positive for nocturia  Musculoskeletal:  Negative    Neurologic:  Negative    Psychiatric:  Positive for anxiety;depression  Heme/Lymph/Imm:  Negative    Endocrine:  Negative      PHYSICAL EXAM:      BP: 134/82 Pulse: 67     SpO2: 97 %      Vital Signs with Ranges  Pulse:  [67] 67  BP: (134)/(82) 134/82  SpO2:  [97 %] 97 %  218 lbs 4.8 oz    Constitutional: awake, alert, no distress  Eyes: sclera nonicteric  ENT: trachea midline  Respiratory: clear bilat  Cardiovascular: regular rate and rhythm no murmur  GI: nondistended, nontender, bowel sounds present  Skin: dry, no rash no edema  Musculoskeletal: grossly normal muscle bulk and tone. Actually clearly more muscle bulk since last visit due to frequent exercise  Neurologic: no gross focal deficits  Neuropsychiatric: normal affect    Recent Lab Results:  LIPID RESULTS:  Lab Results   Component Value Date    CHOL 184 09/14/2022    CHOL 255 (H) 03/05/2021    HDL 39 (L) 09/14/2022    HDL 35 (L) 03/05/2021    LDL 72 09/14/2022    LDL 96 08/09/2022    LDL  03/05/2021     Cannot estimate LDL when triglyceride exceeds 400 mg/dL     (H) 03/05/2021    TRIG 365 (H) 09/14/2022    TRIG 404 (H) 03/05/2021    CHOLHDLRATIO 4.8 10/30/2015       LIVER ENZYME RESULTS:  Lab Results   Component Value  Date    AST 30 03/05/2021    ALT 39 09/29/2021    ALT 65 03/05/2021       CBC RESULTS:  Lab Results   Component Value Date    WBC 6.5 07/20/2021    WBC 8.2 06/22/2021    RBC 4.41 07/20/2021    RBC 4.41 06/22/2021    HGB 13.5 07/20/2021    HGB 13.7 06/22/2021    HCT 39.7 (L) 07/20/2021    HCT 40.1 06/22/2021    MCV 90 07/20/2021    MCV 91 06/22/2021    MCH 30.6 07/20/2021    MCH 31.1 06/22/2021    MCHC 34.0 07/20/2021    MCHC 34.2 06/22/2021    RDW 11.9 07/20/2021    RDW 12.0 06/22/2021     (L) 07/20/2021     (L) 06/22/2021       BMP RESULTS:  Lab Results   Component Value Date     07/20/2021     06/23/2021    POTASSIUM 4.1 07/20/2021    POTASSIUM 4.1 06/23/2021    CHLORIDE 105 07/20/2021    CHLORIDE 109 06/23/2021    CO2 28 07/20/2021    CO2 31 06/23/2021    ANIONGAP 7 07/20/2021    ANIONGAP 1 (L) 06/23/2021     (H) 07/20/2021     (H) 06/23/2021    BUN 16 07/20/2021    BUN 18 06/23/2021    CR 1.08 07/20/2021    CR 1.00 06/23/2021    GFRESTIMATED 75 07/20/2021    GFRESTIMATED 82 06/23/2021    GFRESTBLACK >90 06/23/2021    FRANCISCO 8.5 07/20/2021    FRANCISCO 8.8 06/23/2021        A1C RESULTS:  Lab Results   Component Value Date    A1C 5.1 06/18/2021       INR RESULTS:  Lab Results   Component Value Date    INR 1.04 07/20/2021    INR 1.03 06/18/2021    INR 1.00 12/13/2007

## 2022-10-27 NOTE — LETTER
10/27/2022    Cleveland Lopez PA-C  [id number]  [facility] [address]    RE: John Reyes       Dear Colleague,     I had the pleasure of seeing John Reyes in the [facility].  [facility] Progress note    Assessment:  1. CAD 2021 PCI OM and staged complex PCI LAD  2. HTN  3. Dyslipidemia: improved: lipid panel today w/LDL <72 and HDL <40 T 365 9/2022 on 40 rosuvastatin.   4. myalgias  5. Obstructive sleep apnea, uses CPAP.    6. Headaches, possible migraines    Plan:  1. Will adjust to clopidogrel monotherapy. Stop aspirin. Next yr consider transition to just asa. He has more complex disease, so will stay with stronger antiplatelet thrapy for now.  2. Due to myalgias will decrease rosuvastatin to 20mg and add zetia.  3. Recheck lipids in 2 months. If not at target consider PCSK-9 inhibitor.   4. Follow up one year with EKG.     HPI:     John Reyes is a very nice 57 year old gentleman with history of multivessel CAD s/p PCI of the LAD and OM over the summer of 2021.     He is doing spin classes 2-3x / week and has added tabata classes and walking regularly doing cardio.  He is working on a heart-healthy diet. Overall he feels quite good though does note some myalgias and mildly blurred vision which he attributes to his rosuvastatin. No chest pain, pressure, dyspnea, palps edema. He does still have headaches and has not seen a headache specialist    Due to a mistake with prescription refills his statin was not refilled but he is back on it now. Still having headaches. Has not yet seen a headache specialist.         Pertinent PMH  1. Coronary artery disease  a. Abnormal stress test 6/18/2021 followed by cath the same day showed severe stenosis mid LAD and OM 2.  Cath done at [facility] with small mid LAD dissection so he was transferred to [facility] and had PCI of the OM and staged PCI of the LAD 1 month later.  2.  Dyslipidemia with some myalgias with  rosuvastatin  3.  Hypertension  4.  Normal baseline ejection fraction on stress echo from June 2021  5.  Obstructive sleep apnea    Last echo  Left ventricular systolic function is normal.The visual ejection fraction is 55-60%.  The right ventricular systolic function is normal.  Aortic valve sclerosis noted.  The inferior vena cava was normal in size with preserved respiratory  Variability.      Encounter Diagnoses   Name Primary?     Percutaneous transluminal coronary angioplasty status      Coronary artery disease involving native coronary artery of native heart without angina pectoris        CURRENT MEDICATIONS:  Current Outpatient Medications   Medication Sig Dispense Refill     albuterol (PROAIR HFA/PROVENTIL HFA/VENTOLIN HFA) 108 (90 Base) MCG/ACT inhaler Inhale 2 puffs into the lungs every 4 hours as needed for shortness of breath / dyspnea or wheezing 18 g 0     aspirin (ASA) 81 MG EC tablet Take 1 tablet (81 mg) by mouth daily 30 tablet 3     buPROPion (WELLBUTRIN XL) 300 MG 24 hr tablet Take 1 tablet (300 mg) by mouth daily 90 tablet 3     clopidogrel (PLAVIX) 75 MG tablet Take 1 tablet (75 mg) by mouth daily 90 tablet 3     dexamethasone (DECADRON) 0.5 MG/5ML elixir Take 5 mLs (0.5 mg) by mouth 2 times daily as needed (canker sores) 237 mL 0     dexamethasone alcohol-free (DECADRON) 0.1 MG/ML solution        fluocinonide (LIDEX) 0.05 % solution Apply to scalp daily as needed 60 mL 1     metoprolol tartrate (LOPRESSOR) 25 MG tablet Take 0.5 tablets (12.5 mg) by mouth 2 times daily 90 tablet 3     multivitamin, therapeutic (THERA-VIT) TABS tablet Take 1 tablet by mouth daily       nitroGLYcerin (NITROSTAT) 0.4 MG sublingual tablet For chest pain place 1 tablet under the tongue every 5 minutes for 3 doses. If symptoms persist 5 minutes after 1st dose call 911. 25 tablet 3     omeprazole (PRILOSEC) 40 MG DR capsule Take 1 capsule (40 mg) by mouth daily 90 capsule 1     rosuvastatin (CRESTOR) 40 MG tablet  Take 1 tablet (40 mg) by mouth daily 90 tablet 3     sertraline (ZOLOFT) 100 MG tablet Take 1.5 tablets (150 mg) by mouth daily 135 tablet 3     sildenafil (REVATIO) 20 MG tablet Take one to three tablets one hour before sex 30 tablet 1     solifenacin (VESICARE) 10 MG tablet Take 1 tablet (10 mg) by mouth daily 90 tablet 3     traZODone (DESYREL) 50 MG tablet Take 3 tablets (150 mg) by mouth At Bedtime TAKE THREE TABLETS BY MOUTH EVERY NIGHT AT BEDTIME 270 tablet 3       ALLERGIES     Allergies   Allergen Reactions     Penicillins Itching, Swelling and Rash     Rash       PAST MEDICAL, SURGICAL, FAMILY, SOCIAL HISTORY:  History was reviewed and updated as needed, see medical record.    REVIEW OF SYSTEMS:  Skin:  Negative     Eyes:  Positive for glasses  ENT:  Negative    Respiratory:  Positive for sleep apnea;CPAP  Cardiovascular:    fatigue;Positive for  Gastroenterology: Positive for heartburn  Genitourinary:  Positive for nocturia  Musculoskeletal:  Negative    Neurologic:  Negative    Psychiatric:  Positive for anxiety;depression  Heme/Lymph/Imm:  Negative    Endocrine:  Negative      PHYSICAL EXAM:      BP: 134/82 Pulse: 67     SpO2: 97 %      Vital Signs with Ranges  Pulse:  [67] 67  BP: (134)/(82) 134/82  SpO2:  [97 %] 97 %  218 lbs 4.8 oz    Constitutional: awake, alert, no distress  Eyes: sclera nonicteric  ENT: trachea midline  Respiratory: clear bilat  Cardiovascular: regular rate and rhythm no murmur  GI: nondistended, nontender, bowel sounds present  Skin: dry, no rash no edema  Musculoskeletal: grossly normal muscle bulk and tone. Actually clearly more muscle bulk since last visit due to frequent exercise  Neurologic: no gross focal deficits  Neuropsychiatric: normal affect    Recent Lab Results:  LIPID RESULTS:  Lab Results   Component Value Date    CHOL 184 09/14/2022    CHOL 255 (H) 03/05/2021    HDL 39 (L) 09/14/2022    HDL 35 (L) 03/05/2021    LDL 72 09/14/2022    LDL 96 08/09/2022    LDL   03/05/2021     Cannot estimate LDL when triglyceride exceeds 400 mg/dL     (H) 03/05/2021    TRIG 365 (H) 09/14/2022    TRIG 404 (H) 03/05/2021    CHOLHDLRATIO 4.8 10/30/2015       LIVER ENZYME RESULTS:  Lab Results   Component Value Date    AST 30 03/05/2021    ALT 39 09/29/2021    ALT 65 03/05/2021       CBC RESULTS:  Lab Results   Component Value Date    WBC 6.5 07/20/2021    WBC 8.2 06/22/2021    RBC 4.41 07/20/2021    RBC 4.41 06/22/2021    HGB 13.5 07/20/2021    HGB 13.7 06/22/2021    HCT 39.7 (L) 07/20/2021    HCT 40.1 06/22/2021    MCV 90 07/20/2021    MCV 91 06/22/2021    MCH 30.6 07/20/2021    MCH 31.1 06/22/2021    MCHC 34.0 07/20/2021    MCHC 34.2 06/22/2021    RDW 11.9 07/20/2021    RDW 12.0 06/22/2021     (L) 07/20/2021     (L) 06/22/2021       BMP RESULTS:  Lab Results   Component Value Date     07/20/2021     06/23/2021    POTASSIUM 4.1 07/20/2021    POTASSIUM 4.1 06/23/2021    CHLORIDE 105 07/20/2021    CHLORIDE 109 06/23/2021    CO2 28 07/20/2021    CO2 31 06/23/2021    ANIONGAP 7 07/20/2021    ANIONGAP 1 (L) 06/23/2021     (H) 07/20/2021     (H) 06/23/2021    BUN 16 07/20/2021    BUN 18 06/23/2021    CR 1.08 07/20/2021    CR 1.00 06/23/2021    GFRESTIMATED 75 07/20/2021    GFRESTIMATED 82 06/23/2021    GFRESTBLACK >90 06/23/2021    FRANCISCO 8.5 07/20/2021    FRANCISCO 8.8 06/23/2021        A1C RESULTS:  Lab Results   Component Value Date    A1C 5.1 06/18/2021       INR RESULTS:  Lab Results   Component Value Date    INR 1.04 07/20/2021    INR 1.03 06/18/2021    INR 1.00 12/13/2007       Thank you for allowing me to participate in the care of your patient.      Sincerely,     Roxie Schrader MD     [facility]  cc:   Roxie Schrader MD  [id number] [id number]

## 2023-01-09 ENCOUNTER — TELEPHONE (OUTPATIENT)
Dept: SLEEP MEDICINE | Facility: CLINIC | Age: 58
End: 2023-01-09

## 2023-01-09 NOTE — TELEPHONE ENCOUNTER
Reason for Call: Request for an order or referral:    Order or referral being requested: Order for sleep study and sleep study labs    Date needed: as soon as possible    Has the patient been seen by the PCP for this problem? YES    Additional comments: Patient doesn't have orders for either his sleep study or the labs to do the study. His consult was in October.    Phone number Patient can be reached at:  Home number on file [phone] (home)    Best Time:  Anytime    Can we leave a detailed message on this number?  YES    Call taken on 1/9/2023 at 3:44 PM by Naz Tim

## 2023-02-08 ENCOUNTER — LAB (OUTPATIENT)
Dept: LAB | Facility: CLINIC | Age: 58
End: 2023-02-08
Payer: COMMERCIAL

## 2023-02-08 DIAGNOSIS — E78.2 MIXED DYSLIPIDEMIA: ICD-10-CM

## 2023-02-08 DIAGNOSIS — G25.81 RESTLESS LEGS SYNDROME (RLS): Primary | ICD-10-CM

## 2023-02-08 DIAGNOSIS — I25.10 CORONARY ARTERY DISEASE INVOLVING NATIVE CORONARY ARTERY OF NATIVE HEART WITHOUT ANGINA PECTORIS: ICD-10-CM

## 2023-02-08 LAB
CHOLEST SERPL-MCNC: 192 MG/DL
FERRITIN SERPL-MCNC: 461 NG/ML (ref 31–409)
HDLC SERPL-MCNC: 55 MG/DL
IRON BINDING CAPACITY (ROCHE): 317 UG/DL (ref 240–430)
IRON SATN MFR SERPL: 25 % (ref 15–46)
IRON SERPL-MCNC: 78 UG/DL (ref 61–157)
LDLC SERPL CALC-MCNC: 93 MG/DL
NONHDLC SERPL-MCNC: 137 MG/DL
TRIGL SERPL-MCNC: 219 MG/DL

## 2023-02-08 PROCEDURE — 83550 IRON BINDING TEST: CPT

## 2023-02-08 PROCEDURE — 36415 COLL VENOUS BLD VENIPUNCTURE: CPT

## 2023-02-08 PROCEDURE — 82728 ASSAY OF FERRITIN: CPT

## 2023-02-08 PROCEDURE — 80061 LIPID PANEL: CPT

## 2023-02-08 PROCEDURE — 83540 ASSAY OF IRON: CPT

## 2023-03-20 DIAGNOSIS — G47.9 SLEEP DISORDER: ICD-10-CM

## 2023-03-22 RX ORDER — TRAZODONE HYDROCHLORIDE 50 MG/1
TABLET, FILM COATED ORAL
Qty: 90 TABLET | Refills: 0 | Status: SHIPPED | OUTPATIENT
Start: 2023-03-22 | End: 2023-04-17

## 2023-03-22 NOTE — TELEPHONE ENCOUNTER
Routing refill request to provider for review/approval because:  Drug interaction warning    Shoshana ABRAMS RN, BSN, PHN  [facility]  [phone]

## 2023-04-17 ENCOUNTER — OFFICE VISIT (OUTPATIENT)
Dept: FAMILY MEDICINE | Facility: CLINIC | Age: 58
End: 2023-04-17
Payer: COMMERCIAL

## 2023-04-17 VITALS
OXYGEN SATURATION: 97 % | TEMPERATURE: 98 F | BODY MASS INDEX: 31.35 KG/M2 | RESPIRATION RATE: 15 BRPM | DIASTOLIC BLOOD PRESSURE: 80 MMHG | HEART RATE: 68 BPM | HEIGHT: 70 IN | WEIGHT: 219 LBS | SYSTOLIC BLOOD PRESSURE: 138 MMHG

## 2023-04-17 DIAGNOSIS — E78.2 MIXED DYSLIPIDEMIA: ICD-10-CM

## 2023-04-17 DIAGNOSIS — Z00.00 ROUTINE GENERAL MEDICAL EXAMINATION AT A HEALTH CARE FACILITY: ICD-10-CM

## 2023-04-17 DIAGNOSIS — I20.0 UNSTABLE ANGINA (H): ICD-10-CM

## 2023-04-17 DIAGNOSIS — N52.1 ERECTILE DYSFUNCTION DUE TO DISEASES CLASSIFIED ELSEWHERE: ICD-10-CM

## 2023-04-17 DIAGNOSIS — G47.9 SLEEP DISORDER: ICD-10-CM

## 2023-04-17 DIAGNOSIS — N32.81 OVERACTIVE BLADDER: ICD-10-CM

## 2023-04-17 DIAGNOSIS — I25.10 CORONARY ARTERY DISEASE INVOLVING NATIVE CORONARY ARTERY OF NATIVE HEART WITHOUT ANGINA PECTORIS: ICD-10-CM

## 2023-04-17 DIAGNOSIS — Z98.61 PERCUTANEOUS TRANSLUMINAL CORONARY ANGIOPLASTY STATUS: ICD-10-CM

## 2023-04-17 DIAGNOSIS — L21.0 DANDRUFF: ICD-10-CM

## 2023-04-17 DIAGNOSIS — J01.90 ACUTE NON-RECURRENT SINUSITIS, UNSPECIFIED LOCATION: Primary | ICD-10-CM

## 2023-04-17 DIAGNOSIS — K13.70 ORAL LESION: ICD-10-CM

## 2023-04-17 DIAGNOSIS — F33.1 MODERATE EPISODE OF RECURRENT MAJOR DEPRESSIVE DISORDER (H): ICD-10-CM

## 2023-04-17 DIAGNOSIS — K21.00 GASTROESOPHAGEAL REFLUX DISEASE WITH ESOPHAGITIS WITHOUT HEMORRHAGE: ICD-10-CM

## 2023-04-17 LAB
ALT SERPL W P-5'-P-CCNC: 39 U/L (ref 10–50)
CHOLEST SERPL-MCNC: 152 MG/DL
FASTING STATUS PATIENT QL REPORTED: YES
GLUCOSE SERPL-MCNC: 111 MG/DL (ref 70–99)
HDLC SERPL-MCNC: 38 MG/DL
HOLD SPECIMEN: NORMAL
LDLC SERPL CALC-MCNC: 63 MG/DL
NONHDLC SERPL-MCNC: 114 MG/DL
TRIGL SERPL-MCNC: 253 MG/DL

## 2023-04-17 PROCEDURE — 82947 ASSAY GLUCOSE BLOOD QUANT: CPT | Performed by: PHYSICIAN ASSISTANT

## 2023-04-17 PROCEDURE — 99213 OFFICE O/P EST LOW 20 MIN: CPT | Mod: 25 | Performed by: PHYSICIAN ASSISTANT

## 2023-04-17 PROCEDURE — 36415 COLL VENOUS BLD VENIPUNCTURE: CPT | Performed by: PHYSICIAN ASSISTANT

## 2023-04-17 PROCEDURE — 84460 ALANINE AMINO (ALT) (SGPT): CPT | Performed by: PHYSICIAN ASSISTANT

## 2023-04-17 PROCEDURE — 99396 PREV VISIT EST AGE 40-64: CPT | Performed by: PHYSICIAN ASSISTANT

## 2023-04-17 PROCEDURE — 80061 LIPID PANEL: CPT | Performed by: PHYSICIAN ASSISTANT

## 2023-04-17 PROCEDURE — 96127 BRIEF EMOTIONAL/BEHAV ASSMT: CPT | Performed by: PHYSICIAN ASSISTANT

## 2023-04-17 RX ORDER — DOXYCYCLINE 100 MG/1
100 CAPSULE ORAL 2 TIMES DAILY
Qty: 14 CAPSULE | Refills: 0 | Status: SHIPPED | OUTPATIENT
Start: 2023-04-17 | End: 2023-04-24

## 2023-04-17 RX ORDER — NITROGLYCERIN 0.4 MG/1
TABLET SUBLINGUAL
Qty: 25 TABLET | Refills: 3 | Status: SHIPPED | OUTPATIENT
Start: 2023-04-17

## 2023-04-17 RX ORDER — SOLIFENACIN SUCCINATE 10 MG/1
10 TABLET, FILM COATED ORAL DAILY
Qty: 90 TABLET | Refills: 3 | Status: SHIPPED | OUTPATIENT
Start: 2023-04-17 | End: 2024-04-26

## 2023-04-17 RX ORDER — FLUTICASONE PROPIONATE 50 MCG
1 SPRAY, SUSPENSION (ML) NASAL DAILY
Qty: 16 G | Refills: 3 | Status: SHIPPED | OUTPATIENT
Start: 2023-04-17 | End: 2024-06-05

## 2023-04-17 RX ORDER — TRAZODONE HYDROCHLORIDE 50 MG/1
150 TABLET, FILM COATED ORAL AT BEDTIME
Qty: 270 TABLET | Refills: 3 | Status: SHIPPED | OUTPATIENT
Start: 2023-04-17 | End: 2024-04-26

## 2023-04-17 RX ORDER — CLOPIDOGREL BISULFATE 75 MG/1
75 TABLET ORAL DAILY
Qty: 90 TABLET | Refills: 3 | Status: CANCELLED | OUTPATIENT
Start: 2023-04-17

## 2023-04-17 RX ORDER — DEXAMETHASONE 0.5 MG/5ML
0.5 ELIXIR ORAL 2 TIMES DAILY PRN
Qty: 237 ML | Refills: 0 | Status: SHIPPED | OUTPATIENT
Start: 2023-04-17 | End: 2024-06-05

## 2023-04-17 RX ORDER — ALBUTEROL SULFATE 90 UG/1
2 AEROSOL, METERED RESPIRATORY (INHALATION) EVERY 4 HOURS PRN
Qty: 18 G | Refills: 0 | Status: SHIPPED | OUTPATIENT
Start: 2023-04-17 | End: 2024-06-05

## 2023-04-17 RX ORDER — FLUOCINONIDE TOPICAL SOLUTION USP, 0.05% 0.5 MG/ML
SOLUTION TOPICAL
Qty: 60 ML | Refills: 1 | Status: SHIPPED | OUTPATIENT
Start: 2023-04-17 | End: 2024-04-26

## 2023-04-17 RX ORDER — BUPROPION HYDROCHLORIDE 300 MG/1
300 TABLET ORAL DAILY
Qty: 90 TABLET | Refills: 3 | Status: SHIPPED | OUTPATIENT
Start: 2023-04-17 | End: 2024-06-05

## 2023-04-17 RX ORDER — SERTRALINE HYDROCHLORIDE 100 MG/1
150 TABLET, FILM COATED ORAL DAILY
Qty: 135 TABLET | Refills: 3 | Status: SHIPPED | OUTPATIENT
Start: 2023-04-17 | End: 2024-06-05

## 2023-04-17 RX ORDER — ROSUVASTATIN CALCIUM 40 MG/1
20 TABLET, COATED ORAL DAILY
Qty: 45 TABLET | Refills: 3 | Status: SHIPPED | OUTPATIENT
Start: 2023-04-17 | End: 2023-12-07

## 2023-04-17 RX ORDER — SILDENAFIL 100 MG/1
100 TABLET, FILM COATED ORAL DAILY PRN
Qty: 30 TABLET | Refills: 11 | Status: SHIPPED | OUTPATIENT
Start: 2023-04-17 | End: 2024-04-26

## 2023-04-17 RX ORDER — EZETIMIBE 10 MG/1
10 TABLET ORAL DAILY
Qty: 90 TABLET | Refills: 3 | Status: CANCELLED | OUTPATIENT
Start: 2023-04-17

## 2023-04-17 RX ORDER — OMEPRAZOLE 40 MG/1
40 CAPSULE, DELAYED RELEASE ORAL DAILY
Qty: 90 CAPSULE | Refills: 1 | Status: CANCELLED | OUTPATIENT
Start: 2023-04-17

## 2023-04-17 RX ORDER — METOPROLOL TARTRATE 25 MG/1
12.5 TABLET, FILM COATED ORAL 2 TIMES DAILY
Qty: 90 TABLET | Refills: 3 | Status: SHIPPED | OUTPATIENT
Start: 2023-04-17 | End: 2023-12-07

## 2023-04-17 RX ORDER — TRAZODONE HYDROCHLORIDE 50 MG/1
150 TABLET, FILM COATED ORAL AT BEDTIME
Qty: 90 TABLET | Refills: 0 | Status: CANCELLED | OUTPATIENT
Start: 2023-04-17

## 2023-04-17 SDOH — ECONOMIC STABILITY: FOOD INSECURITY: WITHIN THE PAST 12 MONTHS, THE FOOD YOU BOUGHT JUST DIDN'T LAST AND YOU DIDN'T HAVE MONEY TO GET MORE.: NEVER TRUE

## 2023-04-17 SDOH — ECONOMIC STABILITY: INCOME INSECURITY: IN THE LAST 12 MONTHS, WAS THERE A TIME WHEN YOU WERE NOT ABLE TO PAY THE MORTGAGE OR RENT ON TIME?: PATIENT REFUSED

## 2023-04-17 SDOH — HEALTH STABILITY: PHYSICAL HEALTH: ON AVERAGE, HOW MANY MINUTES DO YOU ENGAGE IN EXERCISE AT THIS LEVEL?: 60 MIN

## 2023-04-17 SDOH — ECONOMIC STABILITY: FOOD INSECURITY: WITHIN THE PAST 12 MONTHS, YOU WORRIED THAT YOUR FOOD WOULD RUN OUT BEFORE YOU GOT MONEY TO BUY MORE.: NEVER TRUE

## 2023-04-17 SDOH — ECONOMIC STABILITY: INCOME INSECURITY: HOW HARD IS IT FOR YOU TO PAY FOR THE VERY BASICS LIKE FOOD, HOUSING, MEDICAL CARE, AND HEATING?: NOT VERY HARD

## 2023-04-17 SDOH — HEALTH STABILITY: PHYSICAL HEALTH: ON AVERAGE, HOW MANY DAYS PER WEEK DO YOU ENGAGE IN MODERATE TO STRENUOUS EXERCISE (LIKE A BRISK WALK)?: 4 DAYS

## 2023-04-17 ASSESSMENT — ANXIETY QUESTIONNAIRES
7. FEELING AFRAID AS IF SOMETHING AWFUL MIGHT HAPPEN: SEVERAL DAYS
6. BECOMING EASILY ANNOYED OR IRRITABLE: MORE THAN HALF THE DAYS
5. BEING SO RESTLESS THAT IT IS HARD TO SIT STILL: MORE THAN HALF THE DAYS
2. NOT BEING ABLE TO STOP OR CONTROL WORRYING: MORE THAN HALF THE DAYS
7. FEELING AFRAID AS IF SOMETHING AWFUL MIGHT HAPPEN: SEVERAL DAYS
GAD7 TOTAL SCORE: 13
3. WORRYING TOO MUCH ABOUT DIFFERENT THINGS: MORE THAN HALF THE DAYS
IF YOU CHECKED OFF ANY PROBLEMS ON THIS QUESTIONNAIRE, HOW DIFFICULT HAVE THESE PROBLEMS MADE IT FOR YOU TO DO YOUR WORK, TAKE CARE OF THINGS AT HOME, OR GET ALONG WITH OTHER PEOPLE: SOMEWHAT DIFFICULT
GAD7 TOTAL SCORE: 13
1. FEELING NERVOUS, ANXIOUS, OR ON EDGE: MORE THAN HALF THE DAYS
GAD7 TOTAL SCORE: 13
4. TROUBLE RELAXING: MORE THAN HALF THE DAYS
8. IF YOU CHECKED OFF ANY PROBLEMS, HOW DIFFICULT HAVE THESE MADE IT FOR YOU TO DO YOUR WORK, TAKE CARE OF THINGS AT HOME, OR GET ALONG WITH OTHER PEOPLE?: SOMEWHAT DIFFICULT

## 2023-04-17 ASSESSMENT — ENCOUNTER SYMPTOMS
COUGH: 1
DIARRHEA: 0
SORE THROAT: 1
ARTHRALGIAS: 1
HEADACHES: 1
WEAKNESS: 1
SHORTNESS OF BREATH: 0
DIZZINESS: 0
PALPITATIONS: 0
NERVOUS/ANXIOUS: 0
NAUSEA: 1
HEARTBURN: 1
CONSTIPATION: 0
HEMATOCHEZIA: 0
FEVER: 0
ABDOMINAL PAIN: 0
EYE PAIN: 0
HEMATURIA: 0
JOINT SWELLING: 1
FREQUENCY: 1
MYALGIAS: 1
CHILLS: 0
PARESTHESIAS: 0
DYSURIA: 0

## 2023-04-17 ASSESSMENT — LIFESTYLE VARIABLES
SKIP TO QUESTIONS 9-10: 0
AUDIT-C TOTAL SCORE: 3
HOW OFTEN DO YOU HAVE A DRINK CONTAINING ALCOHOL: 2-4 TIMES A MONTH
HOW OFTEN DO YOU HAVE SIX OR MORE DRINKS ON ONE OCCASION: LESS THAN MONTHLY
HOW MANY STANDARD DRINKS CONTAINING ALCOHOL DO YOU HAVE ON A TYPICAL DAY: 1 OR 2

## 2023-04-17 ASSESSMENT — SOCIAL DETERMINANTS OF HEALTH (SDOH)
HOW OFTEN DO YOU ATTEND CHURCH OR RELIGIOUS SERVICES?: 1 TO 4 TIMES PER YEAR
DO YOU BELONG TO ANY CLUBS OR ORGANIZATIONS SUCH AS CHURCH GROUPS UNIONS, FRATERNAL OR ATHLETIC GROUPS, OR SCHOOL GROUPS?: YES
IN A TYPICAL WEEK, HOW MANY TIMES DO YOU TALK ON THE PHONE WITH FAMILY, FRIENDS, OR NEIGHBORS?: ONCE A WEEK
HOW OFTEN DO YOU GET TOGETHER WITH FRIENDS OR RELATIVES?: ONCE A WEEK

## 2023-04-17 ASSESSMENT — PATIENT HEALTH QUESTIONNAIRE - PHQ9
SUM OF ALL RESPONSES TO PHQ QUESTIONS 1-9: 14
10. IF YOU CHECKED OFF ANY PROBLEMS, HOW DIFFICULT HAVE THESE PROBLEMS MADE IT FOR YOU TO DO YOUR WORK, TAKE CARE OF THINGS AT HOME, OR GET ALONG WITH OTHER PEOPLE: SOMEWHAT DIFFICULT
SUM OF ALL RESPONSES TO PHQ QUESTIONS 1-9: 14

## 2023-04-17 NOTE — PROGRESS NOTES
SUBJECTIVE:   CC: Sathya is an 58 year old who presents for preventative health visit.        View : No data to display.            Patient has been advised of split billing requirements and indicates understanding: Yes  Healthy Habits:     Getting at least 3 servings of Calcium per day:  Yes    Bi-annual eye exam:  Yes    Dental care twice a year:  Yes    Sleep apnea or symptoms of sleep apnea:  Excessive snoring    Diet:  Low salt and Low fat/cholesterol    Frequency of exercise:  2-3 days/week    Duration of exercise:  45-60 minutes    Taking medications regularly:  Yes    Medication side effects:  Muscle aches and Lightheadedness    PHQ-2 Total Score: 2    Additional concerns today:  No        History of CAD. Followed by cardiology. Past unstable angina. No anginal symptoms since stent placement. Needing refills of nitroglycerin and metoprolol today if possible. Past history of gerd. Stable without medication. ppi stopped with plavix use.     History of dandruff. Rare topical fluocinonide used. Not daily. Refill needed.     1 month of sinus congestion, cough, sore throat and headache. otc cold and cough medications without aid. No other treatments tried. No fever or chills. No shortness of breath or chest pains.       Depression Followup    How are you doing with your depression since your last visit? No change    Are you having other symptoms that might be associated with depression? No    Have you had a significant life event?  No     Are you feeling anxious or having panic attacks?   No    Do you have any concerns with your use of alcohol or other drugs? No    Social History     Tobacco Use     Smoking status: Former     Packs/day: 0.50     Types: Cigarettes, Cigars     Quit date: 2003     Years since quittin.3     Smokeless tobacco: Never   Vaping Use     Vaping status: Never Used   Substance Use Topics     Alcohol use: Yes     Alcohol/week: 0.0 standard drinks of alcohol     Comment: 3-4 beers/week      Drug use: No         2023     4:36 PM 2023     4:38 PM 2023    10:51 AM   PHQ   PHQ-9 Total Score 5 4 14   Q9: Thoughts of better off dead/self-harm past 2 weeks Not at all Not at all Not at all         2022    10:31 AM 2023     4:39 PM 2023    10:52 AM   CARLOS-7 SCORE   Total Score 6 (mild anxiety) 7 (mild anxiety) 13 (moderate anxiety)   Total Score 6 7 13         2023    10:51 AM   Last PHQ-9   1.  Little interest or pleasure in doing things 1   2.  Feeling down, depressed, or hopeless 1   3.  Trouble falling or staying asleep, or sleeping too much 1   4.  Feeling tired or having little energy 3   5.  Poor appetite or overeating 3   6.  Feeling bad about yourself 0   7.  Trouble concentrating 3   8.  Moving slowly or restless 2   Q9: Thoughts of better off dead/self-harm past 2 weeks 0   PHQ-9 Total Score 14         2023    10:52 AM   CARLOS-7    1. Feeling nervous, anxious, or on edge 2   2. Not being able to stop or control worrying 2   3. Worrying too much about different things 2   4. Trouble relaxing 2   5. Being so restless that it is hard to sit still 2   6. Becoming easily annoyed or irritable 2   7. Feeling afraid, as if something awful might happen 1   CARLOS-7 Total Score 13   If you checked any problems, how difficult have they made it for you to do your work, take care of things at home, or get along with other people? Somewhat difficult                    Today's PHQ-2 Score:       2023    10:57 AM   PHQ-2 (  [employer])   Q1: Little interest or pleasure in doing things 1   Q2: Feeling down, depressed or hopeless 1   PHQ-2 Score 2   Q1: Little interest or pleasure in doing things Several days   Q2: Feeling down, depressed or hopeless Several days   PHQ-2 Score 2           Social History     Tobacco Use     Smoking status: Former     Packs/day: 0.50     Types: Cigarettes, Cigars     Quit date: 2003     Years since quittin.3     Smokeless tobacco: Never    Vaping Use     Vaping status: Never Used   Substance Use Topics     Alcohol use: Yes     Alcohol/week: 0.0 standard drinks of alcohol     Comment: 3-4 beers/week             4/17/2023    10:57 AM   Alcohol Use   Prescreen: >3 drinks/day or >7 drinks/week? No          View : No data to display.                Last PSA:   PSA   Date Value Ref Range Status   03/05/2021 0.63 0 - 4 ug/L Final     Comment:     Assay Method:  Chemiluminescence using Siemens Vista analyzer       Reviewed orders with patient. Reviewed health maintenance and updated orders accordingly - Yes  BP Readings from Last 3 Encounters:   04/17/23 138/80   10/27/22 134/82   07/07/22 130/72    Wt Readings from Last 3 Encounters:   04/17/23 99.3 kg (219 lb)   10/27/22 99 kg (218 lb 4.8 oz)   07/07/22 101.2 kg (223 lb)                  Patient Active Problem List   Diagnosis     Attention deficit disorder     Esophageal reflux     HYPERLIPIDEMIA LDL GOAL <130     Sleep disorder     Mild major depression (H)     Hypertriglyceridemia     Anxiety     Testosterone deficiency     Impaired fasting glucose     Hypogonadism male     Elevated ferritin     Unstable angina (H)     Abnormal stress test     Percutaneous transluminal coronary angioplasty status     CAD (coronary artery disease)     Coronary artery disease involving native coronary artery of native heart without angina pectoris     Abnormal findings on diagnostic imaging of heart and coronary circulation     Status post coronary angiogram     Moderate episode of recurrent major depressive disorder (H)     Overactive bladder     Erectile dysfunction due to diseases classified elsewhere     Mixed dyslipidemia     Past Surgical History:   Procedure Laterality Date     ARTHROSCOPY SHOULDER, OPEN ROTATOR CUFF REPAIR, COMBINED Left 11/5/2014    Procedure: COMBINED ARTHROSCOPY SHOULDER, OPEN ROTATOR CUFF REPAIR;  Surgeon: Maykel Khalil MD;  Location: [facility] OR     COLONOSCOPY  9/9/2013    Procedure: COMBINED  COLONOSCOPY, SINGLE BIOPSY/POLYPECTOMY BY BIOPSY;;  Surgeon: Hali Lema MD;  Location:  GI     CV CORONARY ANGIOGRAM N/A 2021    Procedure: Coronary Angiogram /CHP interventionalist;  Surgeon: Roxie Schrader MD;  Location:  HEART CARDIAC CATH LAB     CV HEART CATHETERIZATION WITH POSSIBLE INTERVENTION N/A 2021    Procedure: Heart Catheterization with Possible Intervention;  Surgeon: Roxie Schrader MD;  Location:  HEART CARDIAC CATH LAB     CV HEART CATHETERIZATION WITH POSSIBLE INTERVENTION N/A 2021    Procedure: Heart Catheterization with Possible Intervention;  Surgeon: Roxie Schrader MD;  Location:  HEART CARDIAC CATH LAB     CV INSTANTANEOUS WAVE-FREE RATIO N/A 2021    Procedure: Instantaneous Wave-Free Ratio;  Surgeon: Roxie Schrader MD;  Location:  HEART CARDIAC CATH LAB     CV LEFT HEART CATH N/A 2021    Procedure: Left Heart Cath;  Surgeon: Roxie Schrader MD;  Location:  HEART CARDIAC CATH LAB     CV PCI ANGIOPLASTY N/A 2021    Procedure: Percutaneous Coronary Intervention Angioplasty;  Surgeon: Roxie Schrader MD;  Location:  HEART CARDIAC CATH LAB     CV PCI STENT DRUG ELUTING N/A 2021    Procedure: Percutaneous Coronary Intervention Stent Drug Eluting;  Surgeon: Roxie Schrader MD;  Location:  HEART CARDIAC CATH LAB     CV PCI STENT DRUG ELUTING N/A 2021    Procedure: Percutaneous Coronary Intervention Stent Drug Eluting;  Surgeon: Roxie Schrader MD;  Location:  HEART CARDIAC CATH LAB     GI SURGERY      henmmriodectoomy     HC EXPLOR MAXILL SINUS,INTRANASAL  `     HC VASECTOMY UNILAT/BILAT W POSTOP SEMEN       [facility] HAND/FINGER SURGERY UNLISTED      ORIF right middle finger       Social History     Tobacco Use     Smoking status: Former     Packs/day: 0.50     Types: Cigarettes, Cigars     Quit date: 2003     Years since quittin.3     Smokeless tobacco: Never   Vaping Use     Vaping status:  Never Used   Substance Use Topics     Alcohol use: Yes     Alcohol/week: 0.0 standard drinks of alcohol     Comment: 3-4 beers/week     Family History   Problem Relation Age of Onset     Lipids Father         On medication     Cancer - colorectal Maternal Uncle          Current Outpatient Medications   Medication Sig Dispense Refill     albuterol (PROAIR HFA/PROVENTIL HFA/VENTOLIN HFA) 108 (90 Base) MCG/ACT inhaler Inhale 2 puffs into the lungs every 4 hours as needed for shortness of breath or wheezing 18 g 0     buPROPion (WELLBUTRIN XL) 300 MG 24 hr tablet Take 1 tablet (300 mg) by mouth daily 90 tablet 3     clopidogrel (PLAVIX) 75 MG tablet Take 1 tablet (75 mg) by mouth daily 90 tablet 3     dexamethasone (DECADRON) 0.5 MG/5ML elixir Take 5 mLs (0.5 mg) by mouth 2 times daily as needed (canker sores) 237 mL 0     dexamethasone alcohol-free (DECADRON) 0.1 MG/ML solution        doxycycline monohydrate (MONODOX) 100 MG capsule Take 1 capsule (100 mg) by mouth 2 times daily for 7 days 14 capsule 0     ezetimibe (ZETIA) 10 MG tablet Take 1 tablet (10 mg) by mouth daily 90 tablet 3     fluocinonide (LIDEX) 0.05 % external solution Apply to scalp daily as needed 60 mL 1     fluticasone (FLONASE) 50 MCG/ACT nasal spray Spray 1 spray into both nostrils daily 16 g 3     metoprolol tartrate (LOPRESSOR) 25 MG tablet Take 0.5 tablets (12.5 mg) by mouth 2 times daily 90 tablet 3     multivitamin, therapeutic (THERA-VIT) TABS tablet Take 1 tablet by mouth daily       nitroGLYcerin (NITROSTAT) 0.4 MG sublingual tablet For chest pain place 1 tablet under the tongue every 5 minutes for 3 doses. If symptoms persist 5 minutes after 1st dose call 911. 25 tablet 3     rosuvastatin (CRESTOR) 40 MG tablet Take 0.5 tablets (20 mg) by mouth daily 45 tablet 3     sertraline (ZOLOFT) 100 MG tablet Take 1.5 tablets (150 mg) by mouth daily 135 tablet 3     sildenafil (VIAGRA) 100 MG tablet Take 1 tablet (100 mg) by mouth daily as needed  (FOR ed. DO NOT TAKE SAME DAY AS NITRO) 30 tablet 11     solifenacin (VESICARE) 10 MG tablet Take 1 tablet (10 mg) by mouth daily 90 tablet 3     traZODone (DESYREL) 50 MG tablet Take 3 tablets (150 mg) by mouth At Bedtime 270 tablet 3     Allergies   Allergen Reactions     Penicillins Itching, Swelling and Rash     Rash     Recent Labs   Lab Test 02/08/23  0819 09/14/22  0914 08/09/22  0807 01/25/22  0812 09/29/21  0807 07/20/21  0712 07/20/21  0712 06/23/21  0559 06/22/21  0614 06/19/21  0604 06/18/21  1005 03/05/21  1124 12/18/19  0921 12/26/18  1040 10/30/15  1100 09/04/15  1228   A1C  --   --   --   --   --   --   --   --   --   --  5.1  --   --   --   --  5.3   LDL 93 72 96   < > 90  --   --   --   --   --   --  Cannot estimate LDL when triglyceride exceeds 400 mg/dL  158* Cannot estimate LDL when triglyceride exceeds 400 mg/dL  84 88   < >  --    HDL 55 39* 38*   < > 38*   < > 31*  --   --   --   --  35* 33* 36*   < >  --    TRIG 219* 365* 501*   < > 316*   < > 464*  --   --   --   --  404* 452* 388*   < >  --    ALT  --   --   --   --  39  --   --   --   --   --   --  65 44 41   < > 48   CR  --   --   --   --   --   --  1.08 1.00 1.03   < > 1.02 1.10 1.09 1.06   < > 1.05   GFRESTIMATED  --   --   --   --   --   --  75 82 80   < > 81 74 75 78   < > 74   GFRESTBLACK  --   --   --   --   --   --   --  >90 >90   < > >90 85 87 >90   < > 90   POTASSIUM  --   --   --   --   --   --  4.1 4.1 4.3   < > 4.0 4.4 4.2 4.7   < > 4.4   TSH  --   --   --   --   --   --   --   --   --   --   --   --   --  2.93  --  3.90    < > = values in this interval not displayed.        Reviewed and updated as needed this visit by clinical staff   Tobacco  Allergies  Meds  Problems  Med Hx  Surg Hx  Fam Hx          Reviewed and updated as needed this visit by Provider   Tobacco  Allergies  Meds  Problems  Med Hx  Surg Hx  Fam Hx         Past Medical History:   Diagnosis Date     Acute duodenal ulcer without mention of  "hemorrhage, perforation, or obstruction 1990    diagnosed on EGD     Allergic rhinitis, cause unspecified      Attention deficit disorder without mention of hyperactivity     \"all my life\"     CAD (coronary artery disease) 6/29/2021     Cataplexy and narcolepsy     had for many years, but diagnosed in sleep lab 6/03     Esophageal reflux 11/28/2007     Hyperlipidemia LDL goal <130 10/31/2010     Hypertriglyceridemia 8/10/2012     Mild major depression (H) 9/15/2011     OBESITY NOS 11/28/2007     PONV (postoperative nausea and vomiting)      Sleep apnea     CPAP     Sleep disorder 4/12/2011      Past Surgical History:   Procedure Laterality Date     ARTHROSCOPY SHOULDER, OPEN ROTATOR CUFF REPAIR, COMBINED Left 11/5/2014    Procedure: COMBINED ARTHROSCOPY SHOULDER, OPEN ROTATOR CUFF REPAIR;  Surgeon: Maykel Khalil MD;  Location: [facility] OR     COLONOSCOPY  9/9/2013    Procedure: COMBINED COLONOSCOPY, SINGLE BIOPSY/POLYPECTOMY BY BIOPSY;;  Surgeon: Hali Lema MD;  Location:  GI     CV CORONARY ANGIOGRAM N/A 7/20/2021    Procedure: Coronary Angiogram /CHP interventionalist;  Surgeon: Roxie Schrader MD;  Location:  HEART CARDIAC CATH LAB     CV HEART CATHETERIZATION WITH POSSIBLE INTERVENTION N/A 6/18/2021    Procedure: Heart Catheterization with Possible Intervention;  Surgeon: Roxie Schrader MD;  Location:  HEART CARDIAC CATH LAB     CV HEART CATHETERIZATION WITH POSSIBLE INTERVENTION N/A 6/21/2021    Procedure: Heart Catheterization with Possible Intervention;  Surgeon: Roxie Schrader MD;  Location:  HEART CARDIAC CATH LAB     CV INSTANTANEOUS WAVE-FREE RATIO N/A 7/20/2021    Procedure: Instantaneous Wave-Free Ratio;  Surgeon: Roxie Schrader MD;  Location:  HEART CARDIAC CATH LAB     CV LEFT HEART CATH N/A 6/18/2021    Procedure: Left Heart Cath;  Surgeon: Roxie Schrader MD;  Location:  HEART CARDIAC CATH LAB     CV PCI ANGIOPLASTY N/A 6/18/2021    Procedure: Percutaneous " "Coronary Intervention Angioplasty;  Surgeon: Roxie Schrader MD;  Location: [facility] CARDIAC CATH LAB     CV PCI STENT DRUG ELUTING N/A 6/21/2021    Procedure: Percutaneous Coronary Intervention Stent Drug Eluting;  Surgeon: Roxie Schrader MD;  Location: [facility] CARDIAC CATH LAB     CV PCI STENT DRUG ELUTING N/A 7/20/2021    Procedure: Percutaneous Coronary Intervention Stent Drug Eluting;  Surgeon: Roxie Schrader MD;  Location: [facility] CARDIAC CATH LAB     GI SURGERY      henmmriodectoomy     HC EXPLOR MAXILL SINUS,INTRANASAL  1981`     HC VASECTOMY UNILAT/BILAT W POSTOP SEMEN  4/03     [facility] HAND/FINGER SURGERY UNLISTED  1983    ORIF right middle finger       Review of Systems   Constitutional: Negative for chills and fever.   HENT: Positive for congestion and sore throat. Negative for ear pain and hearing loss.    Eyes: Negative for pain and visual disturbance.   Respiratory: Positive for cough. Negative for shortness of breath.    Cardiovascular: Negative for chest pain, palpitations and peripheral edema.   Gastrointestinal: Positive for heartburn and nausea. Negative for abdominal pain, constipation, diarrhea and hematochezia.   Genitourinary: Positive for frequency, impotence and urgency. Negative for dysuria, genital sores, hematuria and penile discharge.   Musculoskeletal: Positive for arthralgias, joint swelling and myalgias.   Neurological: Positive for weakness and headaches. Negative for dizziness and paresthesias.   Psychiatric/Behavioral: Negative for mood changes. The patient is not nervous/anxious.          OBJECTIVE:   /80 (BP Location: Right arm, Patient Position: Chair, Cuff Size: Adult Large)   Pulse 68   Temp 98  F (36.7  C) (Oral)   Resp 15   Ht 1.778 m (5' 10\")   Wt 99.3 kg (219 lb)   SpO2 97%   BMI 31.42 kg/m      Physical Exam  GENERAL: alert, no distress and over weight  EYES: Eyes grossly normal to inspection, PERRL and conjunctivae and sclerae normal  HENT: normal " cephalic/atraumatic, ear canals and TM's normal, nasal mucosa edematous , oropharynx clear, oral mucous membranes moist and sinuses: not tender  NECK: no adenopathy, no asymmetry, masses, or scars and thyroid normal to palpation  RESP: lungs clear to auscultation - no rales, rhonchi or wheezes  CV: regular rate and rhythm, normal S1 S2, no S3 or S4, no murmur, click or rub, no peripheral edema and peripheral pulses strong  ABDOMEN: soft, nontender, no hepatosplenomegaly, no masses and bowel sounds normal  MS: no gross musculoskeletal defects noted, no edema  SKIN: no suspicious lesions or rashes  NEURO: Normal strength and tone, mentation intact and speech normal  PSYCH: mentation appears normal, affect normal/bright    Diagnostic Test Results:  none     ASSESSMENT/PLAN:   (J01.90) Acute non-recurrent sinusitis, unspecified location  (primary encounter diagnosis)  Comment: present on history and exam. Will tx with abx and flonase. If improved, follow up prn. If no improvement in 1 week, to call. Of note, if quickly returns, likely allergies and may need flonase daily chronically. Refills given in case this is the case  Plan: fluticasone (FLONASE) 50 MCG/ACT nasal spray,         doxycycline monohydrate (MONODOX) 100 MG         capsule        Medication use and side effects discussed with the patient. Patient is in complete understanding and agreement with plan.       (I25.10) Coronary artery disease involving native coronary artery of native heart without angina pectoris  Comment: followed by cardiology.   Plan: rosuvastatin (CRESTOR) 40 MG tablet            (E78.2) Mixed dyslipidemia  Comment: as above   Plan:     (F33.1) Moderate episode of recurrent major depressive disorder (H)  Comment: patient reports stability. phq 9 elevated. Will recheck this in ~4 months. If returns to baseline, recheck in 1 year. Otherwise, virtual visit to discuss further.   Plan: buPROPion (WELLBUTRIN XL) 300 MG 24 hr tablet,          sertraline (ZOLOFT) 100 MG tablet        Medication use and side effects discussed with the patient. Patient is in complete understanding and agreement with plan.     (K13.70) Oral lesion  Comment: refill needed.   Plan: dexamethasone (DECADRON) 0.5 MG/5ML elixir            (Z98.61) Percutaneous transluminal coronary angioplasty status  Comment:   Plan: rosuvastatin (CRESTOR) 40 MG tablet            (L21.0) Dandruff  Comment: refill needed. Rare use.   Plan: fluocinonide (LIDEX) 0.05 % external solution            (I20.0) Unstable angina (H)  Comment: will refill medications in place of cardiology given stability.   Plan: metoprolol tartrate (LOPRESSOR) 25 MG tablet,         nitroGLYcerin (NITROSTAT) 0.4 MG sublingual         tablet        Medication use and side effects discussed with the patient. Patient is in complete understanding and agreement with plan.       (K21.00) Gastroesophageal reflux disease with esophagitis without hemorrhage  Comment: controlled without medication.   Plan:     (N52.1) Erectile dysfunction due to diseases classified elsewhere  Comment:   Plan: sildenafil (VIAGRA) 100 MG tablet            (N32.81) Overactive bladder  Comment: stable.   Plan: solifenacin (VESICARE) 10 MG tablet        Medication use and side effects discussed with the patient. Patient is in complete understanding and agreement with plan.       (G47.9) Sleep disorder  Comment: stable.   Plan: traZODone (DESYREL) 50 MG tablet        Medication use and side effects discussed with the patient. Patient is in complete understanding and agreement with plan.       (Z00.00) Routine general medical examination at a health care facility  Comment: stable wellness other than chronic/ongoing concerns. Glucose pending.   Plan: Glucose              Patient has been advised of split billing requirements and indicates understanding: Yes      COUNSELING:   Reviewed preventive health counseling, as reflected in patient instructions        "Regular exercise       Healthy diet/nutrition      BMI:   Estimated body mass index is 31.42 kg/m  as calculated from the following:    Height as of this encounter: 1.778 m (5' 10\").    Weight as of this encounter: 99.3 kg (219 lb).   Weight management plan: Discussed healthy diet and exercise guidelines patient to attempt weight loss [place]. consider future wegovy if needed given bmi and CAD      He reports that he quit smoking about 20 years ago. His smoking use included cigarettes and cigars. He smoked an average of .5 packs per day. He has never used smokeless tobacco.        Cleveland Lopez PA-C  [facility]  Answers for HPI/ROS submitted by the patient on 4/17/2023  If you checked off any problems, how difficult have these problems made it for you to do your work, take care of things at home, or get along with other people?: Somewhat difficult  PHQ9 TOTAL SCORE: 14  CARLOS 7 TOTAL SCORE: 13      "

## 2023-07-03 ENCOUNTER — LAB (OUTPATIENT)
Dept: LAB | Facility: CLINIC | Age: 58
End: 2023-07-03
Payer: COMMERCIAL

## 2023-07-03 ENCOUNTER — DOCUMENTATION ONLY (OUTPATIENT)
Dept: LAB | Facility: CLINIC | Age: 58
End: 2023-07-03

## 2023-07-03 DIAGNOSIS — I25.10 CORONARY ARTERY DISEASE INVOLVING NATIVE CORONARY ARTERY OF NATIVE HEART WITHOUT ANGINA PECTORIS: Primary | ICD-10-CM

## 2023-07-03 DIAGNOSIS — I25.10 CORONARY ARTERY DISEASE INVOLVING NATIVE CORONARY ARTERY OF NATIVE HEART WITHOUT ANGINA PECTORIS: ICD-10-CM

## 2023-07-03 DIAGNOSIS — E78.2 MIXED DYSLIPIDEMIA: ICD-10-CM

## 2023-07-03 LAB
ALT SERPL W P-5'-P-CCNC: 32 U/L (ref 0–70)
CHOLEST SERPL-MCNC: 124 MG/DL
HDLC SERPL-MCNC: 38 MG/DL
LDLC SERPL CALC-MCNC: 47 MG/DL
NONHDLC SERPL-MCNC: 86 MG/DL
TRIGL SERPL-MCNC: 194 MG/DL

## 2023-07-03 PROCEDURE — 80061 LIPID PANEL: CPT

## 2023-07-03 PROCEDURE — 36415 COLL VENOUS BLD VENIPUNCTURE: CPT

## 2023-07-03 PROCEDURE — 84460 ALANINE AMINO (ALT) (SGPT): CPT

## 2023-07-03 NOTE — PROGRESS NOTES
Pt has a lab only today 7/3/23 and currently no labs. Please review and place future orders.    If nothing is needed at this time, please have your care team contact pt.    Thank You,  Pauline BURLESON III

## 2023-08-15 DIAGNOSIS — I25.10 CORONARY ARTERY DISEASE INVOLVING NATIVE CORONARY ARTERY OF NATIVE HEART WITHOUT ANGINA PECTORIS: ICD-10-CM

## 2023-08-17 RX ORDER — CLOPIDOGREL BISULFATE 75 MG/1
75 TABLET ORAL DAILY
Qty: 90 TABLET | Refills: 3 | OUTPATIENT
Start: 2023-08-17

## 2023-08-17 NOTE — TELEPHONE ENCOUNTER
Routing refill request to provider for review/approval because:  Labs not current:    Hemoglobin   Date Value Ref Range Status   07/20/2021 13.5 13.3 - 17.7 g/dL Final   06/22/2021 13.7 13.3 - 17.7 g/dL Final

## 2023-08-22 ENCOUNTER — TELEPHONE (OUTPATIENT)
Dept: CARDIOLOGY | Facility: CLINIC | Age: 58
End: 2023-08-22
Payer: COMMERCIAL

## 2023-08-22 DIAGNOSIS — I25.10 CORONARY ARTERY DISEASE INVOLVING NATIVE CORONARY ARTERY OF NATIVE HEART WITHOUT ANGINA PECTORIS: ICD-10-CM

## 2023-08-22 RX ORDER — CLOPIDOGREL BISULFATE 75 MG/1
75 TABLET ORAL DAILY
Qty: 30 TABLET | Refills: 0 | Status: SHIPPED | OUTPATIENT
Start: 2023-08-22 | End: 2023-10-11

## 2023-08-22 NOTE — TELEPHONE ENCOUNTER
This patient is looking for refills on his clopidogrel.  If appropriate could you send new rx to [facility]?  Thanks so much    Varsha Hart [facility]   on behalf of [facility]

## 2023-08-22 NOTE — TELEPHONE ENCOUNTER
Per Dr. Schrader:     Roxie Schrader MD  [facility] Heart Team 1  Caller: Unspecified (Today,  1:31 PM)  Please let John know that I recommend that he can transition to aspirin 81mg PO daily at this time and stop clopidogrel.    If he is having chest pain or abnormal dyspnea or other symptoms which may be stable angina then recommend we given him a clopidogrel refill and schedule cardiology follow up.    Roxie Schrader MD on 8/22/2023 at 4:22 PM    Called and spoke with pt. Pt stated he is for the most part feeling well but he has been feeling a little more short of breath than usual when he is exerting himself, he has noticed this in the past couple of weeks. Denied any chest pain. Pt stated he would like to transition to baby aspirin if possible. Held appointment for pt with Dr. Schrader at the [facility] on 9/8/23 at 10:30 am and messaged scheduling. Will send in 30 day supply of Plavix to pharmacy in the meantime and pt can discuss with Dr Schrader at visit. Pt verbalized understanding and agreement with plan.

## 2023-09-29 DIAGNOSIS — I25.10 CORONARY ARTERY DISEASE INVOLVING NATIVE CORONARY ARTERY OF NATIVE HEART WITHOUT ANGINA PECTORIS: ICD-10-CM

## 2023-09-29 RX ORDER — CLOPIDOGREL BISULFATE 75 MG/1
75 TABLET ORAL DAILY
Qty: 30 TABLET | Refills: 0 | Status: CANCELLED | OUTPATIENT
Start: 2023-09-29

## 2023-10-11 DIAGNOSIS — Z98.61 PERCUTANEOUS TRANSLUMINAL CORONARY ANGIOPLASTY STATUS: ICD-10-CM

## 2023-10-11 DIAGNOSIS — I25.10 CORONARY ARTERY DISEASE INVOLVING NATIVE CORONARY ARTERY OF NATIVE HEART WITHOUT ANGINA PECTORIS: ICD-10-CM

## 2023-10-11 RX ORDER — CLOPIDOGREL BISULFATE 75 MG/1
75 TABLET ORAL DAILY
Qty: 90 TABLET | Refills: 0 | Status: SHIPPED | OUTPATIENT
Start: 2023-10-11 | End: 2023-12-08

## 2023-10-11 RX ORDER — EZETIMIBE 10 MG/1
10 TABLET ORAL DAILY
Qty: 90 TABLET | Refills: 0 | Status: SHIPPED | OUTPATIENT
Start: 2023-10-11 | End: 2024-03-22

## 2023-10-11 NOTE — TELEPHONE ENCOUNTER
Team 1 notified by  that a pharmacist stopped by the clinic to ask about refills for this pt. Called and spoke with pharmacy staff who advised they have refills pending for Zetia 10 mg daily and clopidogrel 75 mg daily. Plan for pt to discuss if clopidogrel should be continued at upcoming visit with Dr. Schrader, this has been rescheduled to 12/7/23. [facility] Cardiology Refill Guideline reviewed.  Medication meets criteria for refill. Prescriptions sent to pharmacy for 90 day supply and 0 refills.

## 2023-12-07 ENCOUNTER — OFFICE VISIT (OUTPATIENT)
Dept: CARDIOLOGY | Facility: CLINIC | Age: 58
End: 2023-12-07
Payer: COMMERCIAL

## 2023-12-07 VITALS
OXYGEN SATURATION: 97 % | DIASTOLIC BLOOD PRESSURE: 76 MMHG | BODY MASS INDEX: 30.94 KG/M2 | WEIGHT: 216.1 LBS | HEART RATE: 75 BPM | SYSTOLIC BLOOD PRESSURE: 128 MMHG | HEIGHT: 70 IN

## 2023-12-07 DIAGNOSIS — I25.118 CORONARY ARTERY DISEASE OF NATIVE ARTERY OF NATIVE HEART WITH STABLE ANGINA PECTORIS (H): Primary | ICD-10-CM

## 2023-12-07 DIAGNOSIS — I25.10 CORONARY ARTERY DISEASE INVOLVING NATIVE CORONARY ARTERY OF NATIVE HEART WITHOUT ANGINA PECTORIS: ICD-10-CM

## 2023-12-07 PROCEDURE — 99214 OFFICE O/P EST MOD 30 MIN: CPT | Performed by: INTERNAL MEDICINE

## 2023-12-07 PROCEDURE — 93000 ELECTROCARDIOGRAM COMPLETE: CPT | Performed by: INTERNAL MEDICINE

## 2023-12-07 RX ORDER — METOPROLOL SUCCINATE 25 MG/1
25 TABLET, EXTENDED RELEASE ORAL DAILY
Qty: 90 TABLET | Refills: 3 | Status: SHIPPED | OUTPATIENT
Start: 2023-12-07

## 2023-12-07 RX ORDER — ROSUVASTATIN CALCIUM 20 MG/1
20 TABLET, COATED ORAL DAILY
Qty: 90 TABLET | Refills: 3 | Status: SHIPPED | OUTPATIENT
Start: 2023-12-07

## 2023-12-07 NOTE — PROGRESS NOTES
[facility] Progress note    Assessment:  CAD 2021 PCI OM and staged complex PCI LAD.  Stable very mild angina at high level exertion  HTN  Dyslipidemia: well controlled with rosuvastatin 20 mg p.o. daily and ezetimibe.  Myalgias, resolved with lower dose rosuvastatin  Erectile dysfunction  Obstructive sleep apnea, uses CPAP.        Plan:  Stop metoprolol tartrate and start metoprolol succinate 25 mg p.o. daily for once daily administration.  Continue rosuvastatin 20 mg p.o. daily but I have changed his prescription to 1 tablet instead of a half a tablet.  Continue clopidogrel 75 mg p.o. daily monotherapy antiplatelet agent for chronic coronary disease.  See discussion below.  Follow-up in 1 year.    HPI:     John Reyes is a very nice 58-year-old gentleman who I been following for several years for coronary artery disease as well as dyslipidemia hypertension.  He had PCI to an OM in 2021 and complex staged PCI to his LAD subsequently.  He is on clopidogrel monotherapy.  We discussed changing to aspirin monotherapy today but he has no side effects from his clopidogrel and knowing that it is a slightly more potent antiplatelet agent he would prefer to continue this which is reasonable.    He is looking forward to goingon a cruise with his family over the holidays.    He is feeling good. No chest pain, dyspnea, palpitations, abnormal shortness of breath. Still working out regularly.  He gets a little bit of very mild chest discomfort when he works out at a higher level and it resolves with rest he never takes nitroglycerin.  He has no side effects from his current medication regimen and erectile dysfunction is well-managed and he does not feel he needs to discontinue his metoprolol.      CURRENT MEDICATIONS:  Current Outpatient Medications   Medication Sig Dispense Refill    albuterol (PROAIR HFA/PROVENTIL HFA/VENTOLIN HFA) 108 (90 Base) MCG/ACT inhaler Inhale 2 puffs into the lungs every 4 hours as needed  for shortness of breath or wheezing 18 g 0    buPROPion (WELLBUTRIN XL) 300 MG 24 hr tablet Take 1 tablet (300 mg) by mouth daily 90 tablet 3    clopidogrel (PLAVIX) 75 MG tablet Take 1 tablet (75 mg) by mouth daily 90 tablet 0    dexamethasone (DECADRON) 0.5 MG/5ML elixir Take 5 mLs (0.5 mg) by mouth 2 times daily as needed (canker sores) 237 mL 0    dexamethasone alcohol-free (DECADRON) 0.1 MG/ML solution       ezetimibe (ZETIA) 10 MG tablet Take 1 tablet (10 mg) by mouth daily 90 tablet 0    fluocinonide (LIDEX) 0.05 % external solution Apply to scalp daily as needed 60 mL 1    fluticasone (FLONASE) 50 MCG/ACT nasal spray Spray 1 spray into both nostrils daily 16 g 3    metoprolol tartrate (LOPRESSOR) 25 MG tablet Take 0.5 tablets (12.5 mg) by mouth 2 times daily 90 tablet 3    multivitamin, therapeutic (THERA-VIT) TABS tablet Take 1 tablet by mouth daily      nitroGLYcerin (NITROSTAT) 0.4 MG sublingual tablet For chest pain place 1 tablet under the tongue every 5 minutes for 3 doses. If symptoms persist 5 minutes after 1st dose call 911. 25 tablet 3    rosuvastatin (CRESTOR) 40 MG tablet Take 0.5 tablets (20 mg) by mouth daily 45 tablet 3    sertraline (ZOLOFT) 100 MG tablet Take 1.5 tablets (150 mg) by mouth daily 135 tablet 3    sildenafil (VIAGRA) 100 MG tablet Take 1 tablet (100 mg) by mouth daily as needed (FOR ed. DO NOT TAKE SAME DAY AS NITRO) 30 tablet 11    solifenacin (VESICARE) 10 MG tablet Take 1 tablet (10 mg) by mouth daily 90 tablet 3    traZODone (DESYREL) 50 MG tablet Take 3 tablets (150 mg) by mouth At Bedtime 270 tablet 3       ALLERGIES     Allergies   Allergen Reactions    Penicillins Itching, Swelling and Rash     Rash       PAST MEDICAL, SURGICAL, FAMILY, SOCIAL HISTORY:  History was reviewed and updated as needed, see medical record.    REVIEW OF SYSTEMS:  Skin:  Negative bruising   Eyes:  Positive for glasses  ENT:  Negative    Respiratory:  Positive for sleep apnea;CPAP;dyspnea on  exertion;shortness of breath  Cardiovascular:  Negative for;palpitations;chest pain;lightheadedness;dizziness;edema;syncope or near-syncope fatigue;Positive for  Gastroenterology: Positive for heartburn  Genitourinary:  Positive for nocturia  Musculoskeletal:  Negative back pain  Neurologic:  Negative headaches  Psychiatric:  Positive for anxiety;depression  Heme/Lymph/Imm:  Negative easy bruising  Endocrine:  Negative      PHYSICAL EXAM:      BP: 128/76 Pulse: 75     SpO2: 97 %      Vital Signs with Ranges  Pulse:  [75] 75  BP: (128)/(76) 128/76  SpO2:  [97 %] 97 %  216 lbs 1.6 oz    Constitutional: awake, alert, no distress  Eyes: sclera nonicteric  ENT: trachea midline  Respiratory: Clear to auscultation bilaterally   Cardiovascular: regular rate and rhythm no murmur rub or gallop  GI: nondistended, nontender, bowel sounds present  Skin: dry, no rash no edema  Musculoskeletal: grossly normal muscle bulk and tone looks quite physically fit  Neurologic: no  gross focal deficits  Neuropsychiatric: normal affect    Recent Lab Results:  LIPID RESULTS:  Lab Results   Component Value Date    CHOL 124 07/03/2023    CHOL 255 (H) 03/05/2021    HDL 38 (L) 07/03/2023    HDL 35 (L) 03/05/2021    LDL 47 07/03/2023    LDL 96 08/09/2022    LDL  03/05/2021     Cannot estimate LDL when triglyceride exceeds 400 mg/dL     (H) 03/05/2021    TRIG 194 (H) 07/03/2023    TRIG 404 (H) 03/05/2021    CHOLHDLRATIO 4.8 10/30/2015       LIVER ENZYME RESULTS:  Lab Results   Component Value Date    AST 30 03/05/2021    ALT 32 07/03/2023    ALT 65 03/05/2021       CBC RESULTS:  Lab Results   Component Value Date    WBC 6.5 07/20/2021    WBC 8.2 06/22/2021    RBC 4.41 07/20/2021    RBC 4.41 06/22/2021    HGB 13.5 07/20/2021    HGB 13.7 06/22/2021    HCT 39.7 (L) 07/20/2021    HCT 40.1 06/22/2021    MCV 90 07/20/2021    MCV 91 06/22/2021    MCH 30.6 07/20/2021    MCH 31.1 06/22/2021    MCHC 34.0 07/20/2021    MCHC 34.2 06/22/2021    RDW 11.9  07/20/2021    RDW 12.0 06/22/2021     (L) 07/20/2021     (L) 06/22/2021       BMP RESULTS:  Lab Results   Component Value Date     07/20/2021     06/23/2021    POTASSIUM 4.1 07/20/2021    POTASSIUM 4.1 06/23/2021    CHLORIDE 105 07/20/2021    CHLORIDE 109 06/23/2021    CO2 28 07/20/2021    CO2 31 06/23/2021    ANIONGAP 7 07/20/2021    ANIONGAP 1 (L) 06/23/2021     (H) 04/17/2023     (H) 07/20/2021     (H) 06/23/2021    BUN 16 07/20/2021    BUN 18 06/23/2021    CR 1.08 07/20/2021    CR 1.00 06/23/2021    GFRESTIMATED 75 07/20/2021    GFRESTIMATED 82 06/23/2021    GFRESTBLACK >90 06/23/2021    FRANCISCO 8.5 07/20/2021    FRANCISCO 8.8 06/23/2021        A1C RESULTS:  Lab Results   Component Value Date    A1C 5.1 06/18/2021       INR RESULTS:  Lab Results   Component Value Date    INR 1.04 07/20/2021    INR 1.03 06/18/2021    INR 1.00 12/13/2007

## 2023-12-07 NOTE — PATIENT INSTRUCTIONS
Stop taking metoprolol tartate.  START taking metoprolol succinate 25mg by mouth daily.  Continue rosuvastatin, but I have changed your tablets to 20mg tablets so you do not have to cut in half.  Follow up in one year.

## 2023-12-07 NOTE — LETTER
12/7/2023    Cleveland Lopez PA-C  [id number]  [facility] [id number]    RE: John Reyes       Dear Colleague,     I had the pleasure of seeing John Reyes in the [facility].  [facility] [[email or web address]] [facility] Clinic Progress note    Assessment:  CAD 2021 PCI OM and staged complex PCI LAD.  Stable very mild angina at high level exertion  HTN  Dyslipidemia: well controlled with rosuvastatin 20 mg p.o. daily and ezetimibe.  Myalgias, resolved with lower dose rosuvastatin  Erectile dysfunction  Obstructive sleep apnea, uses CPAP.        Plan:  Stop metoprolol tartrate and start metoprolol succinate 25 mg p.o. daily for once daily administration.  Continue rosuvastatin 20 mg p.o. daily but I have changed his prescription to 1 tablet instead of a half a tablet.  Continue clopidogrel 75 mg p.o. daily monotherapy antiplatelet agent for chronic coronary disease.  See discussion below.  Follow-up in 1 year.    HPI:     John Reyes is a very nice 58-year-old gentleman who I been following for several years for coronary artery disease as well as dyslipidemia hypertension.  He had PCI to an OM in 2021 and complex staged PCI to his LAD subsequently.  He is on clopidogrel monotherapy.  We discussed changing to aspirin monotherapy today but he has no side effects from his clopidogrel and knowing that it is a slightly more potent antiplatelet agent he would prefer to continue this which is reasonable.    He is looking forward to goingon a cruise with his family over the holidays.    He is feeling good. No chest pain, dyspnea, palpitations, abnormal shortness of breath. Still working out regularly.  He gets a little bit of very mild chest discomfort when he works out at a higher level and it resolves with rest he never takes nitroglycerin.  He has no side effects from his current medication regimen and erectile dysfunction is well-managed and he does not feel he needs to discontinue his  metoprolol.      CURRENT MEDICATIONS:  Current Outpatient Medications   Medication Sig Dispense Refill    albuterol (PROAIR HFA/PROVENTIL HFA/VENTOLIN HFA) 108 (90 Base) MCG/ACT inhaler Inhale 2 puffs into the lungs every 4 hours as needed for shortness of breath or wheezing 18 g 0    buPROPion (WELLBUTRIN XL) 300 MG 24 hr tablet Take 1 tablet (300 mg) by mouth daily 90 tablet 3    clopidogrel (PLAVIX) 75 MG tablet Take 1 tablet (75 mg) by mouth daily 90 tablet 0    dexamethasone (DECADRON) 0.5 MG/5ML elixir Take 5 mLs (0.5 mg) by mouth 2 times daily as needed (canker sores) 237 mL 0    dexamethasone alcohol-free (DECADRON) 0.1 MG/ML solution       ezetimibe (ZETIA) 10 MG tablet Take 1 tablet (10 mg) by mouth daily 90 tablet 0    fluocinonide (LIDEX) 0.05 % external solution Apply to scalp daily as needed 60 mL 1    fluticasone (FLONASE) 50 MCG/ACT nasal spray Spray 1 spray into both nostrils daily 16 g 3    metoprolol tartrate (LOPRESSOR) 25 MG tablet Take 0.5 tablets (12.5 mg) by mouth 2 times daily 90 tablet 3    multivitamin, therapeutic (THERA-VIT) TABS tablet Take 1 tablet by mouth daily      nitroGLYcerin (NITROSTAT) 0.4 MG sublingual tablet For chest pain place 1 tablet under the tongue every 5 minutes for 3 doses. If symptoms persist 5 minutes after 1st dose call 911. 25 tablet 3    rosuvastatin (CRESTOR) 40 MG tablet Take 0.5 tablets (20 mg) by mouth daily 45 tablet 3    sertraline (ZOLOFT) 100 MG tablet Take 1.5 tablets (150 mg) by mouth daily 135 tablet 3    sildenafil (VIAGRA) 100 MG tablet Take 1 tablet (100 mg) by mouth daily as needed (FOR ed. DO NOT TAKE SAME DAY AS NITRO) 30 tablet 11    solifenacin (VESICARE) 10 MG tablet Take 1 tablet (10 mg) by mouth daily 90 tablet 3    traZODone (DESYREL) 50 MG tablet Take 3 tablets (150 mg) by mouth At Bedtime 270 tablet 3       ALLERGIES     Allergies   Allergen Reactions    Penicillins Itching, Swelling and Rash     Rash       PAST MEDICAL, SURGICAL,  FAMILY, SOCIAL HISTORY:  History was reviewed and updated as needed, see medical record.    REVIEW OF SYSTEMS:  Skin:  Negative bruising   Eyes:  Positive for glasses  ENT:  Negative    Respiratory:  Positive for sleep apnea;CPAP;dyspnea on exertion;shortness of breath  Cardiovascular:  Negative for;palpitations;chest pain;lightheadedness;dizziness;edema;syncope or near-syncope fatigue;Positive for  Gastroenterology: Positive for heartburn  Genitourinary:  Positive for nocturia  Musculoskeletal:  Negative back pain  Neurologic:  Negative headaches  Psychiatric:  Positive for anxiety;depression  Heme/Lymph/Imm:  Negative easy bruising  Endocrine:  Negative      PHYSICAL EXAM:      BP: 128/76 Pulse: 75     SpO2: 97 %      Vital Signs with Ranges  Pulse:  [75] 75  BP: (128)/(76) 128/76  SpO2:  [97 %] 97 %  216 lbs 1.6 oz    Constitutional: awake, alert, no distress  Eyes: sclera nonicteric  ENT: trachea midline  Respiratory: Clear to auscultation bilaterally   Cardiovascular: regular rate and rhythm no murmur rub or gallop  GI: nondistended, nontender, bowel sounds present  Skin: dry, no rash no edema  Musculoskeletal: grossly normal muscle bulk and tone looks quite physically fit  Neurologic: no  gross focal deficits  Neuropsychiatric: normal affect    Recent Lab Results:  LIPID RESULTS:  Lab Results   Component Value Date    CHOL 124 07/03/2023    CHOL 255 (H) 03/05/2021    HDL 38 (L) 07/03/2023    HDL 35 (L) 03/05/2021    LDL 47 07/03/2023    LDL 96 08/09/2022    LDL  03/05/2021     Cannot estimate LDL when triglyceride exceeds 400 mg/dL     (H) 03/05/2021    TRIG 194 (H) 07/03/2023    TRIG 404 (H) 03/05/2021    CHOLHDLRATIO 4.8 10/30/2015       LIVER ENZYME RESULTS:  Lab Results   Component Value Date    AST 30 03/05/2021    ALT 32 07/03/2023    ALT 65 03/05/2021       CBC RESULTS:  Lab Results   Component Value Date    WBC 6.5 07/20/2021    WBC 8.2 06/22/2021    RBC 4.41 07/20/2021    RBC 4.41 06/22/2021     HGB 13.5 07/20/2021    HGB 13.7 06/22/2021    HCT 39.7 (L) 07/20/2021    HCT 40.1 06/22/2021    MCV 90 07/20/2021    MCV 91 06/22/2021    MCH 30.6 07/20/2021    MCH 31.1 06/22/2021    MCHC 34.0 07/20/2021    MCHC 34.2 06/22/2021    RDW 11.9 07/20/2021    RDW 12.0 06/22/2021     (L) 07/20/2021     (L) 06/22/2021       BMP RESULTS:  Lab Results   Component Value Date     07/20/2021     06/23/2021    POTASSIUM 4.1 07/20/2021    POTASSIUM 4.1 06/23/2021    CHLORIDE 105 07/20/2021    CHLORIDE 109 06/23/2021    CO2 28 07/20/2021    CO2 31 06/23/2021    ANIONGAP 7 07/20/2021    ANIONGAP 1 (L) 06/23/2021     (H) 04/17/2023     (H) 07/20/2021     (H) 06/23/2021    BUN 16 07/20/2021    BUN 18 06/23/2021    CR 1.08 07/20/2021    CR 1.00 06/23/2021    GFRESTIMATED 75 07/20/2021    GFRESTIMATED 82 06/23/2021    GFRESTBLACK >90 06/23/2021    FRANCISCO 8.5 07/20/2021    FRANCISCO 8.8 06/23/2021        A1C RESULTS:  Lab Results   Component Value Date    A1C 5.1 06/18/2021       INR RESULTS:  Lab Results   Component Value Date    INR 1.04 07/20/2021    INR 1.03 06/18/2021    INR 1.00 12/13/2007       Thank you for allowing me to participate in the care of your patient.      Sincerely,     Roxie Schrader MD     [facility]  cc:   Roxie Schrader MD  [address] [id number]

## 2023-12-08 DIAGNOSIS — I25.118 CORONARY ARTERY DISEASE OF NATIVE ARTERY OF NATIVE HEART WITH STABLE ANGINA PECTORIS (H): Primary | ICD-10-CM

## 2023-12-08 DIAGNOSIS — I25.10 CORONARY ARTERY DISEASE INVOLVING NATIVE CORONARY ARTERY OF NATIVE HEART WITHOUT ANGINA PECTORIS: ICD-10-CM

## 2023-12-18 RX ORDER — CLOPIDOGREL BISULFATE 75 MG/1
75 TABLET ORAL DAILY
Qty: 90 TABLET | Refills: 3 | Status: SHIPPED | OUTPATIENT
Start: 2023-12-18

## 2024-03-13 ENCOUNTER — MYC MEDICAL ADVICE (OUTPATIENT)
Dept: FAMILY MEDICINE | Facility: CLINIC | Age: 59
End: 2024-03-13
Payer: COMMERCIAL

## 2024-03-18 ENCOUNTER — PATIENT OUTREACH (OUTPATIENT)
Dept: CARE COORDINATION | Facility: CLINIC | Age: 59
End: 2024-03-18
Payer: COMMERCIAL

## 2024-03-22 DIAGNOSIS — I25.10 CORONARY ARTERY DISEASE INVOLVING NATIVE CORONARY ARTERY OF NATIVE HEART WITHOUT ANGINA PECTORIS: ICD-10-CM

## 2024-03-22 DIAGNOSIS — Z98.61 PERCUTANEOUS TRANSLUMINAL CORONARY ANGIOPLASTY STATUS: ICD-10-CM

## 2024-03-22 RX ORDER — EZETIMIBE 10 MG/1
10 TABLET ORAL DAILY
Qty: 90 TABLET | Refills: 3 | Status: SHIPPED | OUTPATIENT
Start: 2024-03-22

## 2024-04-01 ENCOUNTER — PATIENT OUTREACH (OUTPATIENT)
Dept: CARE COORDINATION | Facility: CLINIC | Age: 59
End: 2024-04-01
Payer: COMMERCIAL

## 2024-04-01 ENCOUNTER — TELEPHONE (OUTPATIENT)
Dept: SLEEP MEDICINE | Facility: CLINIC | Age: 59
End: 2024-04-01
Payer: COMMERCIAL

## 2024-04-26 DIAGNOSIS — N52.1 ERECTILE DYSFUNCTION DUE TO DISEASES CLASSIFIED ELSEWHERE: ICD-10-CM

## 2024-04-26 RX ORDER — SILDENAFIL 100 MG/1
TABLET, FILM COATED ORAL
Qty: 30 TABLET | Refills: 0 | Status: SHIPPED | OUTPATIENT
Start: 2024-04-26

## 2024-05-24 ENCOUNTER — OFFICE VISIT (OUTPATIENT)
Dept: AUDIOLOGY | Facility: CLINIC | Age: 59
End: 2024-05-24
Payer: COMMERCIAL

## 2024-05-24 DIAGNOSIS — H90.42 SENSORINEURAL HEARING LOSS (SNHL) OF LEFT EAR WITH UNRESTRICTED HEARING OF RIGHT EAR: Primary | ICD-10-CM

## 2024-05-24 PROCEDURE — 92550 TYMPANOMETRY & REFLEX THRESH: CPT | Performed by: AUDIOLOGIST

## 2024-05-24 PROCEDURE — 92557 COMPREHENSIVE HEARING TEST: CPT | Performed by: AUDIOLOGIST

## 2024-05-24 NOTE — PROGRESS NOTES
AUDIOLOGY REPORT    SUBJECTIVE:  John Reyes is a 59 year old male who was seen in the Audiology Clinic at the [facility] [place] for audiologic evaluation.  Patient reports he had a hearing test done at [facility] and they told him to come here as the found the ears were not hearing the same.He reports left ear is worst. Denies pain, drainage, and aural fullness. Occasional mild imbalance and occasional bilateral tinnitus. No history of ear surgeries or infections. Significant for noise exposure; [place]. Significant for firearm use and patient is right handed.    OBJECTIVE  Abuse Screening:  Do you feel unsafe at home or work/school? No  Do you feel threatened by someone? No  Does anyone try to keep you from having contact with others, or doing things outside of your home? No  Physical signs of abuse present? No     Fall Risk Screen:  1. Have you fallen two or more times in the past year? No  2. Have you fallen and had an injury in the past year? No    Otoscopic exam indicates ears are clear of cerumen bilaterally     Pure Tone Thresholds assessed using conventional audiometry with good  reliability from 250-8000 Hz bilaterally using insert earphones and circumaural headphones     RIGHT:  hearing is within normal limits    LEFT:    normal sloping to severe, rising to moderate sensorineural hearing loss      Tympanogram:    RIGHT: normal eardrum mobility    LEFT:   normal eardrum mobility    Reflexes (reported by stimulus ear):  RIGHT: Ipsilateral is present at normal levels  RIGHT: Contralateral is elevated  LEFT:   Ipsilateral is elevated  LEFT:   Contralateral is present at normal levels      Speech Reception Threshold:    RIGHT: 20 dB HL    LEFT:   40 dB HL  Word Recognition Score:     RIGHT: 100% at 60 dB HL using NU-6 recorded word list.    LEFT:   100% at 80 dB HL using NU-6 recorded word list.      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 Today s results were discussed with the patient in detail.     PLAN:  Patient was counseled regarding hearing loss and impact on communication. Patient is a good candidate for amplification at this time.in the left ear. Encouraged to call insurance to find out benefits. It is recommended that the patient return for monitoring of hearing annually, sooner a change in hearing or symptoms occur.  Please call this clinic with questions regarding these results or recommendations.        Randi Ham, [facility]  Licensed Audiologist  [place] #[id number]

## 2024-05-25 ENCOUNTER — HEALTH MAINTENANCE LETTER (OUTPATIENT)
Age: 59
End: 2024-05-25

## 2024-05-31 SDOH — HEALTH STABILITY: PHYSICAL HEALTH: ON AVERAGE, HOW MANY DAYS PER WEEK DO YOU ENGAGE IN MODERATE TO STRENUOUS EXERCISE (LIKE A BRISK WALK)?: 4 DAYS

## 2024-05-31 SDOH — HEALTH STABILITY: PHYSICAL HEALTH: ON AVERAGE, HOW MANY MINUTES DO YOU ENGAGE IN EXERCISE AT THIS LEVEL?: 40 MIN

## 2024-05-31 ASSESSMENT — SOCIAL DETERMINANTS OF HEALTH (SDOH)
IN A TYPICAL WEEK, HOW MANY TIMES DO YOU TALK ON THE PHONE WITH FAMILY, FRIENDS, OR NEIGHBORS?: ONCE A WEEK
HOW OFTEN DO YOU GET TOGETHER WITH FRIENDS OR RELATIVES?: ONCE A WEEK
DO YOU BELONG TO ANY CLUBS OR ORGANIZATIONS SUCH AS CHURCH GROUPS UNIONS, FRATERNAL OR ATHLETIC GROUPS, OR SCHOOL GROUPS?: NO
HOW OFTEN DO YOU ATTEND CHURCH OR RELIGIOUS SERVICES?: 1 TO 4 TIMES PER YEAR
HOW OFTEN DO YOU GET TOGETHER WITH FRIENDS OR RELATIVES?: ONCE A WEEK
HOW OFTEN DO YOU ATTENT MEETINGS OF THE CLUB OR ORGANIZATION YOU BELONG TO?: 1 TO 4 TIMES PER YEAR

## 2024-05-31 ASSESSMENT — LIFESTYLE VARIABLES
HOW MANY STANDARD DRINKS CONTAINING ALCOHOL DO YOU HAVE ON A TYPICAL DAY: 3 OR 4
AUDIT-C TOTAL SCORE: 4
HOW OFTEN DO YOU HAVE SIX OR MORE DRINKS ON ONE OCCASION: LESS THAN MONTHLY
SKIP TO QUESTIONS 9-10: 0
HOW OFTEN DO YOU HAVE A DRINK CONTAINING ALCOHOL: 2-4 TIMES A MONTH

## 2024-05-31 NOTE — COMMUNITY RESOURCES LIST (ENGLISH)
May 31, 2024           YOUR PERSONALIZED LIST OF SERVICES & PROGRAMS           & SHELTER    Housing      [facility] Moving Forward  [address] [place] [address] (Distance: 13.7 miles)  Language: English  Fee: Free      [facility]  [address] [place] [address] (Distance: 10.3 miles)  Language: English, Irish  Fee: Free      Home Health Care [facility] - [facility] Care [facility]  Phone: [phone]  Website: [email or web address]://[email or web address]/  Language: English, Hmong, Oromo, Bermudian, Irish  Hours: Mon 9:00 AM - 5:00 PM Tue 9:00 AM - 5:00 PM Wed 9:00 AM - 5:00 PM Thu 9:00 AM - 5:00 PM Fri 9:00 AM - 5:00 PM  Fee: Insurance  Accessibility: Blind accommodation, Deaf or hard of hearing, Translation services  Transportation Options: Free transportation    Case Management      [facility] - Homeownership Program  [address] [place] [address] (Distance: 17.1 miles)  Phone: [phone]  Language: English  Fee: Self pay  Accessibility: Ada accessible      International - Community Health Worker [facility]  [address] [address] (Distance: 16.1 miles)  Phone: [phone]) [phone]  Email: [email or web address]  Website: [email or web address]/  Language: English, Bermudian, Faroese  Fee: Free, Insurance      [employer] - Housing Stabilization Services  Phone: [phone]) [phone]  Website: https://homebasemn.com/  Language: English  Hours: Mon 8:00 AM - 4:00 PM Tue 8:00 AM - 4:00 PM Wed 8:00 AM - 4:00 PM Thu 8:00 AM - 4:00 PM Fri 8:00 AM - 4:00 PM  Fee: Free  Accessibility: Blind accommodation, Deaf or hard of hearing  Transportation Options: Free transportation    Drop-In Services      [facility] - Warming or cooling center [facility] [employer]misty  [address] [place] [address] (Distance: 5.9  miles)  Language: English, Turkmen, Bahraini  Fee: [facility] - Warming or cooling center - [facility] - [facility]  [address] [place] [address] (Distance: 1.9 miles)  Phone: [phone]  Language: English, Turkmen, Scottish  Fee: Free  Accessibility: Translation services, [facility] POSTAL SERVICE - MAIL SERVICE FOR THE HOMELESS  Phone: [phone]) [phone]  Website: https://www.[employer]               IMPORTANT NUMBERS & WEBSITES        Emergency Services  911  .   United Way  211 http://211unitedway.org  .   Poison Control  (470) 149-5993 http://[place]poison.org http://wis[place]poison.org  .     Suicide and Crisis Lifeline  988 http://988CleanSlateline.org  .   Childhelp [place] Child Abuse Hotline  770.480.6561 http://Childhelphotline.org   .   [place] Sexual Assault Hotline  (790) 635-7515 (HOPE) http://Rock N Roll Gamesn.org   .     [place] Runaway Safeline  (156) 327-2791 (RUNAWAY) http://YellowKorner.[employer] Data[employer]ision  .   Pregnancy & Postpartum Support  Call/text 749-295-1506  [place]: http://ppsupportmn.org  [place]: http://ALTHIA.com/wi  .   Substance Abuse National Helpline ([facility])  674-748-HELP (2332) http://Findtreatment.gov   .                DISCLAIMER: These resources have been generated via the [employer]. [employer]  does not endorse any service providers mentioned in this resource list. [employer] does not guarantee that the services mentioned in this resource list will be available to you or will improve your health or wellness.    [facility]

## 2024-06-05 ENCOUNTER — OFFICE VISIT (OUTPATIENT)
Dept: FAMILY MEDICINE | Facility: CLINIC | Age: 59
End: 2024-06-05
Payer: COMMERCIAL

## 2024-06-05 VITALS
HEART RATE: 70 BPM | OXYGEN SATURATION: 96 % | TEMPERATURE: 98.1 F | HEIGHT: 68 IN | RESPIRATION RATE: 16 BRPM | DIASTOLIC BLOOD PRESSURE: 67 MMHG | SYSTOLIC BLOOD PRESSURE: 110 MMHG | BODY MASS INDEX: 34.45 KG/M2 | WEIGHT: 227.3 LBS

## 2024-06-05 DIAGNOSIS — I25.10 CORONARY ARTERY DISEASE INVOLVING NATIVE CORONARY ARTERY OF NATIVE HEART WITHOUT ANGINA PECTORIS: ICD-10-CM

## 2024-06-05 DIAGNOSIS — N52.1 ERECTILE DYSFUNCTION DUE TO DISEASES CLASSIFIED ELSEWHERE: ICD-10-CM

## 2024-06-05 DIAGNOSIS — Z00.00 ROUTINE GENERAL MEDICAL EXAMINATION AT A HEALTH CARE FACILITY: Primary | ICD-10-CM

## 2024-06-05 DIAGNOSIS — I25.118 CORONARY ARTERY DISEASE OF NATIVE ARTERY OF NATIVE HEART WITH STABLE ANGINA PECTORIS (H): ICD-10-CM

## 2024-06-05 DIAGNOSIS — J45.21 MILD INTERMITTENT ASTHMA WITH EXACERBATION: ICD-10-CM

## 2024-06-05 DIAGNOSIS — F33.1 MODERATE EPISODE OF RECURRENT MAJOR DEPRESSIVE DISORDER (H): ICD-10-CM

## 2024-06-05 DIAGNOSIS — J01.90 ACUTE NON-RECURRENT SINUSITIS, UNSPECIFIED LOCATION: ICD-10-CM

## 2024-06-05 DIAGNOSIS — G47.9 SLEEP DISORDER: ICD-10-CM

## 2024-06-05 DIAGNOSIS — K13.70 ORAL LESION: ICD-10-CM

## 2024-06-05 DIAGNOSIS — N32.81 OVERACTIVE BLADDER: ICD-10-CM

## 2024-06-05 LAB
ERYTHROCYTE [DISTWIDTH] IN BLOOD BY AUTOMATED COUNT: 11.4 % (ref 10–15)
HCT VFR BLD AUTO: 43.4 % (ref 40–53)
HGB BLD-MCNC: 15 G/DL (ref 13.3–17.7)
MCH RBC QN AUTO: 31.6 PG (ref 26.5–33)
MCHC RBC AUTO-ENTMCNC: 34.6 G/DL (ref 31.5–36.5)
MCV RBC AUTO: 91 FL (ref 78–100)
PLATELET # BLD AUTO: 148 10E3/UL (ref 150–450)
RBC # BLD AUTO: 4.75 10E6/UL (ref 4.4–5.9)
WBC # BLD AUTO: 6.5 10E3/UL (ref 4–11)

## 2024-06-05 PROCEDURE — 80061 LIPID PANEL: CPT

## 2024-06-05 PROCEDURE — 80053 COMPREHEN METABOLIC PANEL: CPT

## 2024-06-05 PROCEDURE — 84443 ASSAY THYROID STIM HORMONE: CPT

## 2024-06-05 PROCEDURE — 99396 PREV VISIT EST AGE 40-64: CPT

## 2024-06-05 PROCEDURE — 84439 ASSAY OF FREE THYROXINE: CPT

## 2024-06-05 PROCEDURE — 85027 COMPLETE CBC AUTOMATED: CPT

## 2024-06-05 PROCEDURE — 36415 COLL VENOUS BLD VENIPUNCTURE: CPT

## 2024-06-05 PROCEDURE — 99214 OFFICE O/P EST MOD 30 MIN: CPT | Mod: 25

## 2024-06-05 RX ORDER — SOLIFENACIN SUCCINATE 10 MG/1
10 TABLET, FILM COATED ORAL DAILY
Qty: 90 TABLET | Refills: 3 | Status: SHIPPED | OUTPATIENT
Start: 2024-06-05

## 2024-06-05 RX ORDER — TRAZODONE HYDROCHLORIDE 50 MG/1
150 TABLET, FILM COATED ORAL AT BEDTIME
Qty: 270 TABLET | Refills: 3 | Status: SHIPPED | OUTPATIENT
Start: 2024-06-05

## 2024-06-05 RX ORDER — DEXAMETHASONE 0.5 MG/5ML
0.5 ELIXIR ORAL 2 TIMES DAILY PRN
Qty: 237 ML | Refills: 1 | Status: SHIPPED | OUTPATIENT
Start: 2024-06-05

## 2024-06-05 RX ORDER — ALBUTEROL SULFATE 90 UG/1
2 AEROSOL, METERED RESPIRATORY (INHALATION) EVERY 4 HOURS PRN
Qty: 18 G | Refills: 0 | Status: SHIPPED | OUTPATIENT
Start: 2024-06-05

## 2024-06-05 RX ORDER — SERTRALINE HYDROCHLORIDE 100 MG/1
100 TABLET, FILM COATED ORAL DAILY
Qty: 90 TABLET | Refills: 3 | Status: SHIPPED | OUTPATIENT
Start: 2024-06-05

## 2024-06-05 RX ORDER — PREDNISONE 20 MG/1
40 TABLET ORAL DAILY
Qty: 10 TABLET | Refills: 0 | Status: SHIPPED | OUTPATIENT
Start: 2024-06-05 | End: 2024-06-10

## 2024-06-05 RX ORDER — FLUTICASONE PROPIONATE 50 MCG
1 SPRAY, SUSPENSION (ML) NASAL DAILY
Qty: 16 G | Refills: 3 | Status: SHIPPED | OUTPATIENT
Start: 2024-06-05

## 2024-06-05 RX ORDER — AZITHROMYCIN 250 MG/1
TABLET, FILM COATED ORAL
Qty: 6 TABLET | Refills: 0 | Status: SHIPPED | OUTPATIENT
Start: 2024-06-05 | End: 2024-06-10

## 2024-06-05 RX ORDER — SILDENAFIL 100 MG/1
TABLET, FILM COATED ORAL
Qty: 30 TABLET | Refills: 0 | Status: CANCELLED | OUTPATIENT
Start: 2024-06-05

## 2024-06-05 RX ORDER — BUPROPION HYDROCHLORIDE 300 MG/1
300 TABLET ORAL DAILY
Qty: 90 TABLET | Refills: 3 | Status: SHIPPED | OUTPATIENT
Start: 2024-06-05

## 2024-06-05 ASSESSMENT — ANXIETY QUESTIONNAIRES
GAD7 TOTAL SCORE: 13
6. BECOMING EASILY ANNOYED OR IRRITABLE: MORE THAN HALF THE DAYS
IF YOU CHECKED OFF ANY PROBLEMS ON THIS QUESTIONNAIRE, HOW DIFFICULT HAVE THESE PROBLEMS MADE IT FOR YOU TO DO YOUR WORK, TAKE CARE OF THINGS AT HOME, OR GET ALONG WITH OTHER PEOPLE: SOMEWHAT DIFFICULT
GAD7 TOTAL SCORE: 13
7. FEELING AFRAID AS IF SOMETHING AWFUL MIGHT HAPPEN: SEVERAL DAYS
2. NOT BEING ABLE TO STOP OR CONTROL WORRYING: MORE THAN HALF THE DAYS
5. BEING SO RESTLESS THAT IT IS HARD TO SIT STILL: MORE THAN HALF THE DAYS
1. FEELING NERVOUS, ANXIOUS, OR ON EDGE: MORE THAN HALF THE DAYS
3. WORRYING TOO MUCH ABOUT DIFFERENT THINGS: MORE THAN HALF THE DAYS

## 2024-06-05 ASSESSMENT — PATIENT HEALTH QUESTIONNAIRE - PHQ9
SUM OF ALL RESPONSES TO PHQ QUESTIONS 1-9: 13
5. POOR APPETITE OR OVEREATING: MORE THAN HALF THE DAYS

## 2024-06-05 NOTE — PATIENT INSTRUCTIONS
"Preventive Care Advice   This is general advice we often give to help people stay healthy. Your care team may have specific advice just for you. Please talk to your care team about your own preventive care needs.  Lifestyle  Exercise at least 150 minutes each week (30 minutes a day, 5 days a week).  Do muscle strengthening activities 2 days a week. These help control your weight and prevent disease.  No smoking.  Wear sunscreen to prevent skin cancer.  Have your home tested for radon every 2 to 5 years. Radon is a colorless, odorless gas that can harm your lungs. To learn more, go to www.health.[email or web address].mn. and search for \"Radon in Homes.\"  Keep guns unloaded and locked up in a safe place like a safe or gun vault, or, use a gun lock and hide the keys. Always lock away bullets separately. To learn more, visit [place].gov and search for \"safe gun storage.\"  Nutrition  Eat 5 or more servings of fruits and vegetables each day.  Try wheat bread, brown rice and whole grain pasta (instead of white bread, rice, and pasta).  Get enough calcium and vitamin D. Check the label on foods and aim for 100% of the RDA (recommended daily allowance).  Regular exams  Have a dental exam and cleaning every 6 months.  See your health care team every year to talk about:  Any changes in your health.  Any medicines your care team has prescribed.  Preventive care, family planning, and ways to prevent chronic diseases.  Shots (vaccines)   HPV shots (up to age 26), if you've never had them before.  Hepatitis B shots (up to age 59), if you've never had them before.  COVID-19 shot: Get this shot when it's due.  Flu shot: Get a flu shot every year.  Tetanus shot: Get a tetanus shot every 10 years.  Pneumococcal, hepatitis A, and RSV shots: Ask your care team if you need these based on your risk.  Shingles shot (for age 50 and up).  General health tests  Diabetes screening:  Starting at age 35, Get screened for diabetes at least every 3 years.  If " you are younger than age 35, ask your care team if you should be screened for diabetes.  Cholesterol test: At age 39, start having a cholesterol test every 5 years, or more often if advised.  Bone density scan (DEXA): At age 50, ask your care team if you should have this scan for osteoporosis (brittle bones).  Hepatitis C: Get tested at least once in your life.  Abdominal aortic aneurysm screening: Talk to your doctor about having this screening if you:  Have ever smoked; and  Are biologically male; and  Are between the ages of 65 and 75.  STIs (sexually transmitted infections)  Before age 24: Ask your care team if you should be screened for STIs.  After age 24: Get screened for STIs if you're at risk. You are at risk for STIs (including HIV) if:  You are sexually active with more than one person.  You don't use condoms every time.  You or a partner was diagnosed with a sexually transmitted infection.  If you are at risk for HIV, ask about PrEP medicine to prevent HIV.  Get tested for HIV at least once in your life, whether you are at risk for HIV or not.  Cancer screening tests  Cervical cancer screening: If you have a cervix, begin getting regular cervical cancer screening tests at age 21. Most people who have regular screenings with normal results can stop after age 65. Talk about this with your provider.  Breast cancer scan (mammogram): If you've ever had breasts, begin having regular mammograms starting at age 40. This is a scan to check for breast cancer.  Colon cancer screening: It is important to start screening for colon cancer at age 45.  Have a colonoscopy test every 10 years (or more often if you're at risk) Or, ask your provider about stool tests like a FIT test every year or Cologuard test every 3 years.  To learn more about your testing options, visit: [email or web address].pdf.  For help making a decision, visit: [email or web address].  Prostate cancer screening test: If you have a prostate and are age 55  to 69, ask your provider if you would benefit from a yearly prostate cancer screening test.  Lung cancer screening: If you are a current or former smoker age 50 to 80, ask your care team if ongoing lung cancer screenings are right for you.  For informational purposes only. Not to replace the advice of your health care provider. Copyright   2023 [employer]. All rights reserved. Clinically reviewed by the [facility] Transitions Program. [employer] [id number] - REV 04/24.    Preventing Falls: Care Instructions  Injuries and health problems such as trouble walking or poor eyesight can increase your risk of falling. So can some medicines. But there are things you can do to help prevent falls. You can exercise to get stronger. You can also arrange your home to make it safer.    Talk to your doctor about the medicines you take. Ask if any of them increase the risk of falls and whether they can be changed or stopped.   Try to exercise regularly. It can help improve your strength and balance. This can help lower your risk of falling.     Practice fall safety and prevention.    Wear low-heeled shoes that fit well and give your feet good support. Talk to your doctor if you have foot problems that make this hard.  Carry a cellphone or wear a medical alert device that you can use to call for help.  Use stepladders instead of chairs to reach high objects. Don't climb if you're at risk for falls. Ask for help, if needed.  Wear the correct eyeglasses, if you need them.    Make your home safer.    Remove rugs, cords, clutter, and furniture from walkways.  Keep your house well lit. Use night-lights in hallways and bathrooms.  Install and use sturdy handrails on stairways.  Wear nonskid footwear, even inside. Don't walk barefoot or in socks without shoes.    Be safe outside.    Use handrails, curb cuts, and ramps whenever possible.  Keep your hands free by using a shoulder bag or backpack.  Try to walk in well-lit  "areas. Watch out for uneven ground, changes in pavement, and debris.  Be careful in the winter. Walk on the grass or gravel when sidewalks are slippery. Use de-icer on steps and walkways. Add non-slip devices to shoes.    Put grab bars and nonskid mats in your shower or tub and near the toilet. Try to use a shower chair or bath bench when bathing.   Get into a tub or shower by putting in your weaker leg first. Get out with your strong side first. Have a phone or medical alert device in the bathroom with you.   Where can you learn more?  Go to https://www."1,2,3 [employer]".[employer]/patiented  Enter G117 in the search box to learn more about \"Preventing Falls: Care Instructions.\"  Current as of: July 17, 2023               Content Version: 14.0    3258-1521 "[employer]".   Care instructions adapted under license by your healthcare professional. If you have questions about a medical condition or this instruction, always ask your healthcare professional. "[employer]" disclaims any warranty or liability for your use of this information.      Learning About Stress  What is stress?     Stress is your body's response to a hard situation. Your body can have a physical, emotional, or mental response. Stress is a fact of life for most people, and it affects everyone differently. What causes stress for you may not be stressful for someone else.  A lot of things can cause stress. You may feel stress when you go on a job interview, take a test, or run a race. This kind of short-term stress is normal and even useful. It can help you if you need to work hard or react quickly. For example, stress can help you finish an important job on time.  Long-term stress is caused by ongoing stressful situations or events. Examples of long-term stress include long-term health problems, ongoing problems at work, or conflicts in your family. Long-term stress can harm your health.  How does stress affect your health?  When you are " stressed, your body responds as though you are in danger. It makes hormones that speed up your heart, make you breathe faster, and give you a burst of energy. This is called the fight-or-flight stress response. If the stress is over quickly, your body goes back to normal and no harm is done.  But if stress happens too often or lasts too long, it can have bad effects. Long-term stress can make you more likely to get sick, and it can make symptoms of some diseases worse. If you tense up when you are stressed, you may develop neck, shoulder, or low back pain. Stress is linked to high blood pressure and heart disease.  Stress also harms your emotional health. It can make you patel, tense, or depressed. Your relationships may suffer, and you may not do well at work or school.  What can you do to manage stress?  You can try these things to help manage stress:   Do something active. Exercise or activity can help reduce stress. Walking is a great way to get started. Even everyday activities such as housecleaning or yard work can help.  Try yoga or rajwinder chi. These techniques combine exercise and meditation. You may need some training at first to learn them.  Do something you enjoy. For example, listen to music or go to a movie. Practice your hobby or do volunteer work.  Meditate. This can help you relax, because you are not worrying about what happened before or what may happen in the future.  Do guided imagery. Imagine yourself in any setting that helps you feel calm. You can use online videos, books, or a teacher to guide you.  Do breathing exercises. For example:  From a standing position, bend forward from the waist with your knees slightly bent. Let your arms dangle close to the floor.  Breathe in slowly and deeply as you return to a standing position. Roll up slowly and lift your head last.  Hold your breath for just a few seconds in the standing position.  Breathe out slowly and bend forward from the waist.  Let your  "feelings out. Talk, laugh, cry, and express anger when you need to. Talking with supportive friends or family, a counselor, or a aldo leader about your feelings is a healthy way to relieve stress. Avoid discussing your feelings with people who make you feel worse.  Write. It may help to write about things that are bothering you. This helps you find out how much stress you feel and what is causing it. When you know this, you can find better ways to cope.  What can you do to prevent stress?  You might try some of these things to help prevent stress:  Manage your time. This helps you find time to do the things you want and need to do.  Get enough sleep. Your body recovers from the stresses of the day while you are sleeping.  Get support. Your family, friends, and community can make a difference in how you experience stress.  Limit your news feed. Avoid or limit time on social media or news that may make you feel stressed.  Do something active. Exercise or activity can help reduce stress. Walking is a great way to get started.  Where can you learn more?  Go to https://www.[employer].net/patiented  Enter N032 in the search box to learn more about \"Learning About Stress.\"  Current as of: October 24, 2023               Content Version: 14.0    2994-9190 [employer].   Care instructions adapted under license by your healthcare professional. If you have questions about a medical condition or this instruction, always ask your healthcare professional. [employer] disclaims any warranty or liability for your use of this information.      "

## 2024-06-05 NOTE — PROGRESS NOTES
Preventive Care Visit  [facility]  Unique Negrete PA-C, Family Medicine  Jun 5, 2024    Assessment & Plan     (Z00.00) Routine general medical examination at a health care facility  (primary encounter diagnosis)  Stable exam. Routine screening labs, patient is fasting today. Follow up in 1 year for annual wellness; sooner as needed for acute concerns.  Plan: REVIEW OF HEALTH MAINTENANCE PROTOCOL ORDERS,         Comprehensive metabolic panel (BMP + Alb, Alk         Phos, ALT, AST, Total. Bili, TP), TSH with free        T4 reflex, Lipid panel reflex to direct LDL         Fasting          (I25.10) Coronary artery disease involving native coronary artery of native heart without angina pectoris  Followed by Cardiology. Last seen by them in December, doing well and stable at that time. Continue to follow with Cardiology as previously directed.    (N32.81) Overactive bladder  Currently using solifenacin. Would be interested in talking with Urology as well given persistent issues with urinary frequency and also with erectile dysfunction concerns.   Plan: solifenacin (VESICARE) 10 MG tablet, Adult         Urology  Referral          (G47.9) Sleep disorder  Well controlled with use of Trazodone. No new side effects of the medication. Refill provided.  Plan: traZODone (DESYREL) 50 MG tablet          (F33.1) Moderate episode of recurrent major depressive disorder (H)  PHQ9 and GAD7 with moderate symptoms but patient reporting overall good management of his anxiety and depression. He is currently taking Wellbutrin  mg daily and has been working on cutting back on the sertraline. Current sertraline dose is 100 mg daily. No acute concerns at this time.  Plan: buPROPion (WELLBUTRIN XL) 300 MG 24 hr tablet,         sertraline (ZOLOFT) 100 MG tablet          (K13.70) Oral lesion  Using decadron swish and spit as needed. No recent outbreaks/lesions. No acute concerns. Refilled medication.  Plan:  "dexAMETHasone (DECADRON) 0.5 MG/5ML elixir          (J01.90) Acute non-recurrent sinusitis, unspecified location  Well controlled with use of Flonase. No new side effects of the medication. Refill provided.  Plan: fluticasone (FLONASE) 50 MCG/ACT nasal spray          (N52.1) Erectile dysfunction due to diseases classified elsewhere  No improvement with sildenafil. Would be interested in speaking with Urology both for this and urinary concerns, see above. Referral given today.  Plan: Adult Urology  Referral          (J45.21) Mild intermittent asthma with exacerbation  Asthma exacerbation with recent chest congestion x 1 month. Not improving. Lungs sound clear on exam but given symptoms, duration of time since onset and asthma history will treat as exacerbation with Zpak and Prednisone course. Follow up if not improving.   Plan: albuterol (PROAIR HFA/PROVENTIL HFA/VENTOLIN         HFA) 108 (90 Base) MCG/ACT inhaler,         Comprehensive metabolic panel (BMP + Alb, Alk         Phos, ALT, AST, Total. Bili, TP), azithromycin         (ZITHROMAX) 250 MG tablet, predniSONE         (DELTASONE) 20 MG tablet          (I25.118) Coronary artery disease of native artery of native heart with stable angina pectoris (H24)  See above. Followed by Cardiology.    Patient has been advised of split billing requirements and indicates understanding: Yes      BMI  Estimated body mass index is 34.56 kg/m  as calculated from the following:    Height as of this encounter: 1.727 m (5' 8\").    Weight as of this encounter: 103.1 kg (227 lb 4.8 oz).     Counseling  Appropriate preventive services were discussed with this patient, including applicable screening as appropriate for fall prevention, nutrition, physical activity, Tobacco-use cessation, weight loss and cognition.  Checklist reviewing preventive services available has been given to the patient.  Reviewed patient's diet, addressing concerns and/or questions.   The patient's " PHQ-9 score is consistent with moderate depression. He was provided with information regarding depression.       Follow up in 1 year for physical; sooner with any acute concerns.     Gamaliel Mcdonnell is a 59 year old, presenting for the following:  Physical        6/5/2024    10:40 AM   Additional Questions   Roomed by Hannah Combs        Health Care Directive  Patient does not have a Health Care Directive or Living Will: Discussed advance care planning with patient; however, patient declined at this time.    HPI        5/31/2024   General Health   How would you rate your overall physical health? (!) FAIR   Feel stress (tense, anxious, or unable to sleep) Rather much    Rather much   (!) STRESS CONCERN      5/31/2024   Nutrition   Three or more servings of calcium each day? Yes   Diet: Low salt   How many servings of fruit and vegetables per day? (!) 2-3   How many sweetened beverages each day? 0-1         5/31/2024   Exercise   Days per week of moderate/strenous exercise 4 days    4 days   Average minutes spent exercising at this level 40 min    40 min         5/31/2024   Social Factors   Frequency of gathering with friends or relatives Once a week    Once a week   Worry food won't last until get money to buy more No    No   Food not last or not have enough money for food? No    No   Do you have housing?  No    Yes   Are you worried about losing your housing? No    No   Lack of transportation? No    No   Unable to get utilities (heat,electricity)? No    No   (!) HOUSING CONCERN PRESENT      6/5/2024   Fall Risk   Gait Speed Test (Document in seconds) 3.95   Gait Speed Test Interpretation Less than or equal to 5.00 seconds - PASS          5/31/2024   Dental   Dentist two times every year? Yes         5/31/2024   TB Screening   Were you born outside of the [place]? No     Today's PHQ-9 Score:       4/17/2023    10:51 AM   PHQ-9 SCORE   PHQ-9 Total Score MyChart 14 (Moderate depression)   PHQ-9 Total Score 14          "2024   Substance Use   Frequency of drinking alcohol? 2-4 times a month   Alcohol more than 3/day or more than 7/wk No   Do you use any other substances recreationally? No     Social History     Tobacco Use    Smoking status: Former     Current packs/day: 0.00     Types: Cigarettes, Cigars     Quit date: 2003     Years since quittin.4    Smokeless tobacco: Former     Quit date: 2000   Vaping Use    Vaping status: Never Used   Substance Use Topics    Alcohol use: Yes     Comment: 3-4 beers/week    Drug use: No         2024   STI Screening   New sexual partner(s) since last STI/HIV test? No     Last PSA:   PSA   Date Value Ref Range Status   2021 0.63 0 - 4 ug/L Final     Comment:     Assay Method:  Chemiluminescence using Siemens Vista analyzer     ASCVD Risk   The ASCVD Risk score (Amy REED, et al., 2019) failed to calculate for the following reasons:    The valid total cholesterol range is 130 to 320 mg/dL    Reviewed and updated as needed this visit by Provider   Tobacco  Allergies  Meds  Problems  Med Hx  Surg Hx  Fam Hx            Past Medical History:   Diagnosis Date    Acute duodenal ulcer without mention of hemorrhage, perforation, or obstruction     diagnosed on EGD    Allergic rhinitis, cause unspecified     Attention deficit disorder without mention of hyperactivity     \"all my life\"    CAD (coronary artery disease) 2021    Cataplexy and narcolepsy     had for many years, but diagnosed in sleep lab     Depressive disorder     Esophageal reflux 2007    Hyperlipidemia LDL goal <130 10/31/2010    Hypertriglyceridemia 08/10/2012    Mild major depression (H24) 09/15/2011    OBESITY NOS 2007    PONV (postoperative nausea and vomiting)     Sleep apnea     CPAP    Sleep disorder 2011     Past Surgical History:   Procedure Laterality Date    ARTHROSCOPY SHOULDER, OPEN ROTATOR CUFF REPAIR, COMBINED Left 2014    Procedure: COMBINED " ARTHROSCOPY SHOULDER, OPEN ROTATOR CUFF REPAIR;  Surgeon: Maykel Khalil MD;  Location: [facility] OR    COLONOSCOPY  9/9/2013    Procedure: COMBINED COLONOSCOPY, SINGLE BIOPSY/POLYPECTOMY BY BIOPSY;;  Surgeon: Hali Lema MD;  Location:  GI    CV CORONARY ANGIOGRAM N/A 7/20/2021    Procedure: Coronary Angiogram /CHP interventionalist;  Surgeon: Roxie Schrader MD;  Location:  HEART CARDIAC CATH LAB    CV HEART CATHETERIZATION WITH POSSIBLE INTERVENTION N/A 6/18/2021    Procedure: Heart Catheterization with Possible Intervention;  Surgeon: Roxie cShrader MD;  Location:  HEART CARDIAC CATH LAB    CV HEART CATHETERIZATION WITH POSSIBLE INTERVENTION N/A 6/21/2021    Procedure: Heart Catheterization with Possible Intervention;  Surgeon: Roxie Schrader MD;  Location:  HEART CARDIAC CATH LAB    CV INSTANTANEOUS WAVE-FREE RATIO N/A 7/20/2021    Procedure: Instantaneous Wave-Free Ratio;  Surgeon: Roxie Schrader MD;  Location:  HEART CARDIAC CATH LAB    CV LEFT HEART CATH N/A 6/18/2021    Procedure: Left Heart Cath;  Surgeon: Roxie Schrader MD;  Location:  HEART CARDIAC CATH LAB    CV PCI ANGIOPLASTY N/A 6/18/2021    Procedure: Percutaneous Coronary Intervention Angioplasty;  Surgeon: Roxie Schrader MD;  Location:  HEART CARDIAC CATH LAB    CV PCI STENT DRUG ELUTING N/A 6/21/2021    Procedure: Percutaneous Coronary Intervention Stent Drug Eluting;  Surgeon: Roxie Schrader MD;  Location:  HEART CARDIAC CATH LAB    CV PCI STENT DRUG ELUTING N/A 7/20/2021    Procedure: Percutaneous Coronary Intervention Stent Drug Eluting;  Surgeon: Roxie Schrader MD;  Location:  HEART CARDIAC CATH LAB    GI SURGERY      henmmriodectoomy    HC EXPLOR MAXILL SINUS,INTRANASAL  1981`    HC VASECTOMY UNILAT/BILAT W POSTOP SEMEN  4/03    ZZC HAND/FINGER SURGERY UNLISTED  1983    ORIF right middle finger     BP Readings from Last 3 Encounters:   06/05/24 110/67   12/07/23 128/76   04/17/23 138/80    Wt  Readings from Last 3 Encounters:   06/05/24 103.1 kg (227 lb 4.8 oz)   12/07/23 98 kg (216 lb 1.6 oz)   04/17/23 99.3 kg (219 lb)                  Patient Active Problem List   Diagnosis    Attention deficit disorder    Esophageal reflux    HYPERLIPIDEMIA LDL GOAL <130    Sleep disorder    Mild major depression (H)    Hypertriglyceridemia    Anxiety    Testosterone deficiency    Impaired fasting glucose    Hypogonadism male    Elevated ferritin    Unstable angina (H)    Abnormal stress test    Percutaneous transluminal coronary angioplasty status    CAD (coronary artery disease)    Coronary artery disease involving native coronary artery of native heart without angina pectoris    Abnormal findings on diagnostic imaging of heart and coronary circulation    Status post coronary angiogram    Moderate episode of recurrent major depressive disorder (H)    Overactive bladder    Erectile dysfunction due to diseases classified elsewhere    Mixed dyslipidemia     Past Surgical History:   Procedure Laterality Date    ARTHROSCOPY SHOULDER, OPEN ROTATOR CUFF REPAIR, COMBINED Left 11/5/2014    Procedure: COMBINED ARTHROSCOPY SHOULDER, OPEN ROTATOR CUFF REPAIR;  Surgeon: Maykel Khalil MD;  Location: [facility] OR    COLONOSCOPY  9/9/2013    Procedure: COMBINED COLONOSCOPY, SINGLE BIOPSY/POLYPECTOMY BY BIOPSY;;  Surgeon: Hali Lema MD;  Location:  GI    CV CORONARY ANGIOGRAM N/A 7/20/2021    Procedure: Coronary Angiogram /CHP interventionalist;  Surgeon: Roxie Schrader MD;  Location: [facility] CARDIAC CATH LAB    CV HEART CATHETERIZATION WITH POSSIBLE INTERVENTION N/A 6/18/2021    Procedure: Heart Catheterization with Possible Intervention;  Surgeon: Roxie Schrader MD;  Location: [facility] CARDIAC CATH LAB    CV HEART CATHETERIZATION WITH POSSIBLE INTERVENTION N/A 6/21/2021    Procedure: Heart Catheterization with Possible Intervention;  Surgeon: Roxie Schrader MD;  Location:  HEART CARDIAC CATH LAB    CV  INSTANTANEOUS WAVE-FREE RATIO N/A 2021    Procedure: Instantaneous Wave-Free Ratio;  Surgeon: Roxie Schrader MD;  Location:  HEART CARDIAC CATH LAB    CV LEFT HEART CATH N/A 2021    Procedure: Left Heart Cath;  Surgeon: Roxie Schrader MD;  Location:  HEART CARDIAC CATH LAB    CV PCI ANGIOPLASTY N/A 2021    Procedure: Percutaneous Coronary Intervention Angioplasty;  Surgeon: Roxie Schradre MD;  Location:  HEART CARDIAC CATH LAB    CV PCI STENT DRUG ELUTING N/A 2021    Procedure: Percutaneous Coronary Intervention Stent Drug Eluting;  Surgeon: Roxie Schrader MD;  Location:  HEART CARDIAC CATH LAB    CV PCI STENT DRUG ELUTING N/A 2021    Procedure: Percutaneous Coronary Intervention Stent Drug Eluting;  Surgeon: Roxie Schrader MD;  Location:  HEART CARDIAC CATH LAB    GI SURGERY      henmmriodectoomy    HC EXPLOR MAXILL SINUS,INTRANASAL  `    HC VASECTOMY UNILAT/BILAT W POSTOP SEMEN      ZZC HAND/FINGER SURGERY UNLISTED      ORIF right middle finger       Social History     Tobacco Use    Smoking status: Former     Current packs/day: 0.00     Types: Cigarettes, Cigars     Quit date: 2003     Years since quittin.4    Smokeless tobacco: Former     Quit date: 2000   Substance Use Topics    Alcohol use: Yes     Comment: 3-4 beers/week     Family History   Problem Relation Age of Onset    Lipids Father         On medication    Coronary Artery Disease Father     Cancer - colorectal Maternal Uncle          Current Outpatient Medications   Medication Sig Dispense Refill    albuterol (PROAIR HFA/PROVENTIL HFA/VENTOLIN HFA) 108 (90 Base) MCG/ACT inhaler Inhale 2 puffs into the lungs every 4 hours as needed for shortness of breath or wheezing 18 g 0    buPROPion (WELLBUTRIN XL) 300 MG 24 hr tablet Take 1 tablet (300 mg) by mouth daily 90 tablet 3    clopidogrel (PLAVIX) 75 MG tablet Take 1 tablet (75 mg) by mouth daily 90 tablet 3    dexamethasone  "(DECADRON) 0.5 MG/5ML elixir Take 5 mLs (0.5 mg) by mouth 2 times daily as needed (canker sores) 237 mL 0    dexamethasone alcohol-free (DECADRON) 0.1 MG/ML solution       ezetimibe (ZETIA) 10 MG tablet Take 1 tablet (10 mg) by mouth daily 90 tablet 3    fluocinonide (LIDEX) 0.05 % external solution APPLY TO SCALP DAILY AS NEEDED 60 mL 0    fluticasone (FLONASE) 50 MCG/ACT nasal spray Spray 1 spray into both nostrils daily 16 g 3    metoprolol succinate ER (TOPROL XL) 25 MG 24 hr tablet Take 1 tablet (25 mg) by mouth daily 90 tablet 3    multivitamin, therapeutic (THERA-VIT) TABS tablet Take 1 tablet by mouth daily      nitroGLYcerin (NITROSTAT) 0.4 MG sublingual tablet For chest pain place 1 tablet under the tongue every 5 minutes for 3 doses. If symptoms persist 5 minutes after 1st dose call 911. 25 tablet 3    rosuvastatin (CRESTOR) 20 MG tablet Take 1 tablet (20 mg) by mouth daily 90 tablet 3    sertraline (ZOLOFT) 100 MG tablet Take 1.5 tablets (150 mg) by mouth daily 135 tablet 3    sildenafil (VIAGRA) 100 MG tablet TAKE ONE TABLET BY MOUTH DAILY AS NEEDED. DO NOT TAKE SAME DAY AS NITRO 30 tablet 0    solifenacin (VESICARE) 10 MG tablet Take 1 tablet (10 mg) by mouth daily 90 tablet 0    traZODone (DESYREL) 50 MG tablet TAKE 3 TABLETS (150 MG) BY MOUTH AT BEDTIME 270 tablet 0     Allergies   Allergen Reactions    Penicillins Itching, Swelling and Rash     Rash       Review of Systems  Constitutional, HEENT, cardiovascular, pulmonary, GI, , musculoskeletal, neuro, skin, endocrine and psych systems are negative, except as otherwise noted.       Objective    Exam  /67 (BP Location: Right arm, Patient Position: Sitting, Cuff Size: Adult Large)   Pulse 70   Temp 98.1  F (36.7  C) (Oral)   Resp 16   Ht 1.727 m (5' 8\")   Wt 103.1 kg (227 lb 4.8 oz)   SpO2 96%   BMI 34.56 kg/m     Estimated body mass index is 34.56 kg/m  as calculated from the following:    Height as of this encounter: 1.727 m (5' " "8\").    Weight as of this encounter: 103.1 kg (227 lb 4.8 oz).    Physical Exam  GENERAL: alert and no distress  EYES: Eyes grossly normal to inspection, PERRL and conjunctivae and sclerae normal  HENT: ear canals and TM's normal, mouth without ulcers or lesions  NECK: no adenopathy, no asymmetry, masses, or scars  RESP: lungs clear to auscultation - no rales, rhonchi or wheezes  CV: regular rate and rhythm, normal S1 S2, no S3 or S4, no murmur, click or rub, no peripheral edema  ABDOMEN: soft, nontender,  no masses and bowel sounds normal  MS: no gross musculoskeletal defects noted, no edema  SKIN: no suspicious lesions or rashes  NEURO: Normal strength and tone, mentation intact and speech normal  PSYCH: mentation appears normal, affect normal/bright      Signed Electronically by: Unique Negrete PA-C    "

## 2024-06-06 LAB
ALBUMIN SERPL BCG-MCNC: 4.9 G/DL (ref 3.5–5.2)
ALP SERPL-CCNC: 60 U/L (ref 40–150)
ALT SERPL W P-5'-P-CCNC: 43 U/L (ref 0–70)
ANION GAP SERPL CALCULATED.3IONS-SCNC: 12 MMOL/L (ref 7–15)
AST SERPL W P-5'-P-CCNC: 32 U/L (ref 0–45)
BILIRUB SERPL-MCNC: 0.5 MG/DL
BUN SERPL-MCNC: 19.7 MG/DL (ref 8–23)
CALCIUM SERPL-MCNC: 9.3 MG/DL (ref 8.8–10.2)
CHLORIDE SERPL-SCNC: 105 MMOL/L (ref 98–107)
CHOLEST SERPL-MCNC: 156 MG/DL
CREAT SERPL-MCNC: 1.06 MG/DL (ref 0.67–1.17)
DEPRECATED HCO3 PLAS-SCNC: 22 MMOL/L (ref 22–29)
EGFRCR SERPLBLD CKD-EPI 2021: 80 ML/MIN/1.73M2
FASTING STATUS PATIENT QL REPORTED: YES
FASTING STATUS PATIENT QL REPORTED: YES
GLUCOSE SERPL-MCNC: 109 MG/DL (ref 70–99)
HDLC SERPL-MCNC: 38 MG/DL
LDLC SERPL CALC-MCNC: 54 MG/DL
NONHDLC SERPL-MCNC: 118 MG/DL
POTASSIUM SERPL-SCNC: 4.7 MMOL/L (ref 3.4–5.3)
PROT SERPL-MCNC: 7.2 G/DL (ref 6.4–8.3)
SODIUM SERPL-SCNC: 139 MMOL/L (ref 135–145)
T4 FREE SERPL-MCNC: 0.97 NG/DL (ref 0.9–1.7)
TRIGL SERPL-MCNC: 320 MG/DL
TSH SERPL DL<=0.005 MIU/L-ACNC: 4.93 UIU/ML (ref 0.3–4.2)

## 2024-06-24 ENCOUNTER — MYC MEDICAL ADVICE (OUTPATIENT)
Dept: FAMILY MEDICINE | Facility: CLINIC | Age: 59
End: 2024-06-24
Payer: COMMERCIAL

## 2024-06-25 NOTE — TELEPHONE ENCOUNTER
See my chart, advised Evisit     Last visit was 6/5/2024, will need new assessment     Clarita Burkett, Registered Nurse  [facility]

## 2024-06-26 ASSESSMENT — ASTHMA QUESTIONNAIRES
QUESTION_1 LAST FOUR WEEKS HOW MUCH OF THE TIME DID YOUR ASTHMA KEEP YOU FROM GETTING AS MUCH DONE AT WORK, SCHOOL OR AT HOME: SOME OF THE TIME
QUESTION_4 LAST FOUR WEEKS HOW OFTEN HAVE YOU USED YOUR RESCUE INHALER OR NEBULIZER MEDICATION (SUCH AS ALBUTEROL): ONCE A WEEK OR LESS
QUESTION_3 LAST FOUR WEEKS HOW OFTEN DID YOUR ASTHMA SYMPTOMS (WHEEZING, COUGHING, SHORTNESS OF BREATH, CHEST TIGHTNESS OR PAIN) WAKE YOU UP AT NIGHT OR EARLIER THAN USUAL IN THE MORNING: ONCE A WEEK
ACT_TOTALSCORE: 15
ACT_TOTALSCORE: 15
QUESTION_5 LAST FOUR WEEKS HOW WOULD YOU RATE YOUR ASTHMA CONTROL: SOMEWHAT CONTROLLED
QUESTION_2 LAST FOUR WEEKS HOW OFTEN HAVE YOU HAD SHORTNESS OF BREATH: ONCE A DAY

## 2024-06-27 ENCOUNTER — OFFICE VISIT (OUTPATIENT)
Dept: FAMILY MEDICINE | Facility: CLINIC | Age: 59
End: 2024-06-27
Payer: COMMERCIAL

## 2024-06-27 VITALS
HEIGHT: 67 IN | WEIGHT: 224 LBS | OXYGEN SATURATION: 98 % | HEART RATE: 66 BPM | TEMPERATURE: 98.2 F | SYSTOLIC BLOOD PRESSURE: 137 MMHG | RESPIRATION RATE: 20 BRPM | DIASTOLIC BLOOD PRESSURE: 76 MMHG | BODY MASS INDEX: 35.16 KG/M2

## 2024-06-27 DIAGNOSIS — R05.2 SUBACUTE COUGH: ICD-10-CM

## 2024-06-27 DIAGNOSIS — J45.21 MILD INTERMITTENT ASTHMA WITH EXACERBATION: Primary | ICD-10-CM

## 2024-06-27 DIAGNOSIS — R53.83 FATIGUE, UNSPECIFIED TYPE: ICD-10-CM

## 2024-06-27 DIAGNOSIS — R09.81 CONGESTION OF PARANASAL SINUS: ICD-10-CM

## 2024-06-27 DIAGNOSIS — R06.02 SOB (SHORTNESS OF BREATH): ICD-10-CM

## 2024-06-27 PROCEDURE — 99213 OFFICE O/P EST LOW 20 MIN: CPT

## 2024-06-27 RX ORDER — AZELASTINE 1 MG/ML
1 SPRAY, METERED NASAL 2 TIMES DAILY
Qty: 30 ML | Refills: 0 | Status: SHIPPED | OUTPATIENT
Start: 2024-06-27

## 2024-06-27 RX ORDER — DOXYCYCLINE 100 MG/1
100 CAPSULE ORAL 2 TIMES DAILY
Qty: 14 CAPSULE | Refills: 0 | Status: SHIPPED | OUTPATIENT
Start: 2024-06-27 | End: 2024-07-04

## 2024-06-27 NOTE — PROGRESS NOTES
"  Assessment & Plan     (J45.21) Mild intermittent asthma with exacerbation  (primary encounter diagnosis)  (R06.02) SOB (shortness of breath)  (R53.83) Fatigue, unspecified type  (R05.2) Subacute cough  Comment: overall patient respiratory exam reassuring. Seems exacerbation a bit better from prior. However, patient having rebound symptoms. Will treat w/ broader spectrum abx given duration of symptoms, suspect a component of ABRS. Doxycycline ordered. Discussed medication risks and benefits of doxycycline with patient in detail with patient verbal understanding.  Discussed the use of OTC medications such as flonase, mucinex, tylenol/ibuprofen, and honey for symptomatic treatment. If asthma symptoms are not controlled may need to be placed on ICS therapy to help. Patient fully understands and is agreeable with plan of care, at this point patient will follow up as needed unless acute concerns arise in the meantime.  Plan: doxycycline hyclate (VIBRAMYCIN) 100 MG capsule    (R09.81) Congestion of paranasal sinus  Comment: currently using flonase. Will add on astelin as well to help. Discussed medication risks and benefits of astelin with patient in detail with patient verbal understanding. Patient fully understands and is agreeable with plan of care, at this point patient will follow up as needed unless acute concerns arise in the meantime.  Plan: azelastine (ASTELIN) 0.1 % nasal spray      BMI  Estimated body mass index is 35.08 kg/m  as calculated from the following:    Height as of this encounter: 1.702 m (5' 7\").    Weight as of this encounter: 101.6 kg (224 lb).     Gamaliel Mcdonnell is a 59 year old, presenting for the following health issues:  URI        6/27/2024     9:08 AM   Additional Questions   Roomed by Parker RIVERO CMA     History of Present Illness       Reason for visit:  Headaches- sweats-congestion-temp    He eats 2-3 servings of fruits and vegetables daily.He consumes 1 sweetened beverage(s) daily.He " "exercises with enough effort to increase his heart rate 20 to 29 minutes per day.  He exercises with enough effort to increase his heart rate 4 days per week.   He is taking medications regularly.     Acute Illness  Acute illness concerns:   Onset/Duration: had a cold for couple weeks   Symptoms:  Fever: YES, subjective  Chills/Sweats: YES, more so sweats  Headache (location?): YES  Sinus Pressure: No  Conjunctivitis:  No  Ear Pain: no  Rhinorrhea: YES  Congestion: YES  Sore Throat: YES  Cough: YES-non-productive  Wheeze: YES  Decreased Appetite: No  Nausea: No  Vomiting: No  Diarrhea: No  Dysuria/Freq.: No  Dysuria or Hematuria: No  Fatigue/Achiness: YES  Sick/Strep Exposure: No  Therapies tried and outcome: was here on 06/05 and was prescribed medications, got better but it is back now - prednisone and zpak    Review of Systems  Constitutional, HEENT, cardiovascular, pulmonary, gi and gu systems are negative, except as otherwise noted.      Objective    /76 (BP Location: Right arm, Patient Position: Sitting, Cuff Size: Adult Large)   Pulse 66   Temp 98.2  F (36.8  C) (Oral)   Resp 20   Ht 1.702 m (5' 7\")   Wt 101.6 kg (224 lb)   SpO2 98%   BMI 35.08 kg/m    Body mass index is 35.08 kg/m .  Physical Exam  Vitals and nursing note reviewed.   Constitutional:       General: He is not in acute distress.     Appearance: Normal appearance. He is not ill-appearing.   HENT:      Right Ear: Tympanic membrane, ear canal and external ear normal. There is no impacted cerumen.      Left Ear: Tympanic membrane, ear canal and external ear normal. There is no impacted cerumen.      Nose: Congestion and rhinorrhea present.      Mouth/Throat:      Mouth: Mucous membranes are moist.      Pharynx: Posterior oropharyngeal erythema present. No oropharyngeal exudate.   Eyes:      General:         Right eye: No discharge.         Left eye: No discharge.      Conjunctiva/sclera: Conjunctivae normal.      Pupils: Pupils are " equal, round, and reactive to light.   Cardiovascular:      Rate and Rhythm: Normal rate and regular rhythm.      Heart sounds: No murmur heard.     No friction rub. No gallop.   Pulmonary:      Effort: No respiratory distress.      Breath sounds: Normal breath sounds. No stridor. No wheezing, rhonchi or rales.   Musculoskeletal:      Cervical back: No tenderness.   Lymphadenopathy:      Cervical: No cervical adenopathy.   Skin:     General: Skin is warm and dry.   Neurological:      Mental Status: He is alert.   Psychiatric:         Mood and Affect: Mood normal.         Behavior: Behavior normal.         Thought Content: Thought content normal.         Judgment: Judgment normal.          Signed Electronically by: LORI Little CNP

## 2024-06-28 ENCOUNTER — TELEPHONE (OUTPATIENT)
Dept: FAMILY MEDICINE | Facility: CLINIC | Age: 59
End: 2024-06-28
Payer: COMMERCIAL

## 2024-06-28 NOTE — TELEPHONE ENCOUNTER
Patient Quality Outreach    Patient is due for the following:   Colon Cancer Screening    Next Steps:   Schedule colonoscopy or complete FIT test    Type of outreach:    Sent [employer] message.      Questions for provider review:    None           Jigna Avendano, CMA

## 2024-07-08 ENCOUNTER — VIRTUAL VISIT (OUTPATIENT)
Dept: UROLOGY | Facility: CLINIC | Age: 59
End: 2024-07-08
Payer: COMMERCIAL

## 2024-07-08 DIAGNOSIS — N52.1 ERECTILE DYSFUNCTION DUE TO DISEASES CLASSIFIED ELSEWHERE: ICD-10-CM

## 2024-07-08 DIAGNOSIS — N32.81 OVERACTIVE BLADDER: ICD-10-CM

## 2024-07-08 PROCEDURE — 99204 OFFICE O/P NEW MOD 45 MIN: CPT | Mod: 95 | Performed by: PHYSICIAN ASSISTANT

## 2024-07-08 RX ORDER — MIRABEGRON 25 MG/1
25 TABLET, FILM COATED, EXTENDED RELEASE ORAL DAILY
Qty: 30 TABLET | Refills: 11 | Status: SHIPPED | OUTPATIENT
Start: 2024-07-08

## 2024-07-08 ASSESSMENT — ENCOUNTER SYMPTOMS
DIARRHEA: 0
CONSTIPATION: 0
FREQUENCY: 1
SORE THROAT: 1
HEADACHES: 1
HEMATURIA: 0
COUGH: 1
DYSURIA: 0

## 2024-07-08 ASSESSMENT — PAIN SCALES - GENERAL: PAINLEVEL: NO PAIN (0)

## 2024-07-08 NOTE — LETTER
7/8/2024       RE: John Reyes  [id number] [facility] [address]     Dear Colleague,    Thank you for referring your patient, John Reyes, to the [facility] [place] [facility]. Please see a copy of my visit note below.    Virtual Visit Details    Type of service:  Video Visit     Originating Location (pt. Location): Other Work    Distant Location (provider location):  On-site  Platform used for Video Visit: [employer]    ON: 1422  OFF: 1448    Subjective     REQUESTING PROVIDER   Unique Negrete     REASON FOR CONSULT   OAB  ED    HISTORY OF PRESENT ILLNESS   Mr. Reyes is very pleasant 59 year old year old male, who presents today for further evaluation recommendations regarding erectile dysfunction as well as overactivity of the bladder.  Patient has had longstanding diagnosis of urinary frequency with overactivity of the bladder.  He was seen by Dr. Prather in 2006.  At that time he had a slower urinary stream, nocturia x 2, and urinary frequency.  Cystoscopy at that time showed no stricture and a normal bladder and prostate.  He trialed Uroxatrol, without much benefit.    Patient has also trialed Detrol, without longstanding benefit that he remembers.  He has been on Vesicare 10 mg daily for several years.  He notes that initially it helped quite a bit.  He does feel like it is working somewhat, but is not working as well as it previously did.    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  He finds himself having to urinate approximately every hour.  He denies hematuria or dysuria.  He does endorse occasional urinary hesitancy and postvoid dribbling.  He also has some difficulty stopping his urinary stream.  He does feel like he can empty his bladder.  He is having nocturia 3+ times.  He would categorize his stream is medium in strength.  He does feel like his voids in the overnight hours are larger than his voids during  the daytime.    He denies difficulties with his bowels.    Patient does note that is difficult to drink when he is traveling due to his urinary frequency.  He does not believe he is ever tried a beta 3 agonist.    Patient also endorses difficulties with erectile dysfunction.  He does have a history of coronary artery disease requiring PCI in 2021.  He previously has had good benefit with Viagra, but at this time he take this up to 100 mg and is not able to get an erection.  He is noting that this has worsened since he has recently had a lot of issues with sore throat, cough, and allergies.  He is wondering if it is related to this.  He does note that he has been trialing the Viagra and has not been able to get an erection, but has been getting the side effect of headache.    Most recent PSA listed in 2021 at 0.63.    The following portions of the patient's history were reviewed and updated as appropriate: allergies, current medications, past family history, past medical history, past social history, past surgical history, and problem list.     REVIEW OF SYSTEMS   Review of Systems   HENT:  Positive for sore throat.    Respiratory:  Positive for cough.    Gastrointestinal:  Negative for constipation and diarrhea.   Genitourinary:  Positive for frequency. Negative for dysuria, hematuria and urgency.   Neurological:  Positive for headaches.      Per HPI.     Patient Active Problem List   Diagnosis    Attention deficit disorder    Esophageal reflux    HYPERLIPIDEMIA LDL GOAL <130    Sleep disorder    Mild major depression (H)    Hypertriglyceridemia    Anxiety    Testosterone deficiency    Impaired fasting glucose    Hypogonadism male    Elevated ferritin    Unstable angina (H)    Abnormal stress test    Percutaneous transluminal coronary angioplasty status    CAD (coronary artery disease)    Coronary artery disease of native artery of native heart with stable angina pectoris (H24)    Abnormal findings on diagnostic  "imaging of heart and coronary circulation    Status post coronary angiogram    Moderate episode of recurrent major depressive disorder (H)    Overactive bladder    Erectile dysfunction due to diseases classified elsewhere    Mixed dyslipidemia      Past Medical History:   Diagnosis Date    Acute duodenal ulcer without mention of hemorrhage, perforation, or obstruction 1990    diagnosed on EGD    Allergic rhinitis, cause unspecified     Attention deficit disorder without mention of hyperactivity     \"all my life\"    CAD (coronary artery disease) 06/29/2021    Cataplexy and narcolepsy     had for many years, but diagnosed in sleep lab 6/03    Depressive disorder     Esophageal reflux 11/28/2007    Hyperlipidemia LDL goal <130 10/31/2010    Hypertriglyceridemia 08/10/2012    Mild major depression (H24) 09/15/2011    OBESITY NOS 11/28/2007    PONV (postoperative nausea and vomiting)     Sleep apnea     CPAP    Sleep disorder 04/12/2011      Past Surgical History:   Procedure Laterality Date    ARTHROSCOPY SHOULDER, OPEN ROTATOR CUFF REPAIR, COMBINED Left 11/5/2014    Procedure: COMBINED ARTHROSCOPY SHOULDER, OPEN ROTATOR CUFF REPAIR;  Surgeon: Maykel Khalil MD;  Location: [facility] OR    COLONOSCOPY  9/9/2013    Procedure: COMBINED COLONOSCOPY, SINGLE BIOPSY/POLYPECTOMY BY BIOPSY;;  Surgeon: Hali Lema MD;  Location:  GI    CV CORONARY ANGIOGRAM N/A 7/20/2021    Procedure: Coronary Angiogram /CHP interventionalist;  Surgeon: Roxie Schrader MD;  Location: [facility] CARDIAC CATH LAB    CV HEART CATHETERIZATION WITH POSSIBLE INTERVENTION N/A 6/18/2021    Procedure: Heart Catheterization with Possible Intervention;  Surgeon: Roxie Schrader MD;  Location:  HEART CARDIAC CATH LAB    CV HEART CATHETERIZATION WITH POSSIBLE INTERVENTION N/A 6/21/2021    Procedure: Heart Catheterization with Possible Intervention;  Surgeon: Roxie Schrader MD;  Location: [facility] CARDIAC CATH LAB    CV INSTANTANEOUS WAVE-FREE " RATIO N/A 7/20/2021    Procedure: Instantaneous Wave-Free Ratio;  Surgeon: Roxie Schrader MD;  Location:  HEART CARDIAC CATH LAB    CV LEFT HEART CATH N/A 6/18/2021    Procedure: Left Heart Cath;  Surgeon: Roxie Schrader MD;  Location: [facility] HEART CARDIAC CATH LAB    CV PCI ANGIOPLASTY N/A 6/18/2021    Procedure: Percutaneous Coronary Intervention Angioplasty;  Surgeon: Roxie Schrader MD;  Location:  HEART CARDIAC CATH LAB    CV PCI STENT DRUG ELUTING N/A 6/21/2021    Procedure: Percutaneous Coronary Intervention Stent Drug Eluting;  Surgeon: Roxie Schrader MD;  Location:  HEART CARDIAC CATH LAB    CV PCI STENT DRUG ELUTING N/A 7/20/2021    Procedure: Percutaneous Coronary Intervention Stent Drug Eluting;  Surgeon: Roxie Schrader MD;  Location:  HEART CARDIAC CATH LAB    GI SURGERY      henmmriodectoomy    HC EXPLOR MAXILL SINUS,INTRANASAL  1981`    HC VASECTOMY UNILAT/BILAT W POSTOP SEMEN  4/03    ZZC HAND/FINGER SURGERY UNLISTED  1983    ORIF right middle finger      Social History:   .  Works as a salesman.     Objective     PHYSICAL EXAM   GENERAL: alert and no distress  EYES: Eyes grossly normal to inspection.  No discharge or erythema, or obvious scleral/conjunctival abnormalities.  HENT: Normal cephalic/atraumatic.  External ears, nose and mouth without ulcers or lesions.  No nasal drainage visible.  NECK: No asymmetry, visible masses or scars  RESP: No audible wheeze, cough, or visible cyanosis.    MS: No gross musculoskeletal defects noted.  Normal range of motion.  No visible edema.  SKIN: Visible skin clear. No significant rash, abnormal pigmentation or lesions.  NEURO: Cranial nerves grossly intact.  Mentation and speech appropriate for age.  PSYCH: Appropriate affect, tone, and pace of words     LABORATORY   Lab Results   Component Value Date    PSA 0.63 03/05/2021    PSA 0.63 12/18/2019    PSA 0.58 11/08/2017    PSA 0.67 03/28/2017    CR 1.06 06/05/2024       Assessment &  Plan   1. Overactive bladder    2. Erectile dysfunction due to diseases classified elsewhere         I had the pleasure today of meeting with Mr. Reyes to discuss his previously diagnosed overactivity of the bladder as well as erectile dysfunction.  Patient has documented longstanding issues with urinary frequency and nocturia.  He has previously trialed an alpha-blocker, without much benefit.  He was having good benefit with Vesicare 10 mg, which she continues at this time.  He does feel like it is possibly giving him blurry vision, but it does not seem to be working as well as it did previously.  He has not trialed a beta 3 agonist.    I did discuss with the patient that it has been several years since he has had a postvoid residual.  We should check to make sure that he is still adequately emptying his bladder and does not have any signs of infection.  We did discuss possible treatment options for overactivity of the bladder such as follow-up for physical therapy, avoiding bladder irritants, a trial of a beta 3 agonist or a different anticholinergic medication, possible posterior tibial nerve stimulation, Botox, or implantable device.    We also discussed the possibility that he may need additional testing in the future such as cystoscopy or urodynamic study.    We also discussed his erectile dysfunction.  This has worsened more recently with additional allergy and cold medications.  We did discuss how some of these can affect erections.    We also discussed additional treatment options for erectile dysfunction, other than PDE 5 inhibitors, such as intracavernosal injections, vacuum erection device with flexible penile constriction ring, and penile prosthesis.  We also briefly discussed ultrasound and wave therapy, which is not FDA approved and that part of guidelines for erectile dysfunction.  This would likely be out-of-pocket for patient.      After discussion with the patient, we will plan on the  following:    -Plan a nurse visit for urinalysis and postvoid residual.    -Will plan on trial of Myrbetriq 25 mg once daily.  Discussed how to use coupon program in conjunction with commercial insurance.  If this is affordable, stop Vesicare 10 mg once daily and start Myrbetriq 25 mg once daily.  It does not appear that you have coverage for Gemtesa.    -Continue with a healthy diet and exercise.    -At this time, you would like to improve from a ENT and respiratory standpoint prior to considering other treatment for erectile dysfunction.  Please let us know if you become interested in any of the therapies that we discussed.    -If Myrbetriq 25 mg once daily is too expensive, could consider a trial of a different anticholinergic.    -May need to consider further evaluation in the future with cystoscopy, urodynamics today, and or bladder diary.    Signed by:     Shayna Shin PA-C 7/8/2024 2:22 PM

## 2024-07-08 NOTE — PATIENT INSTRUCTIONS
-Plan a nurse visit for urinalysis and postvoid residual.    -Will plan on trial of Myrbetriq 25 mg once daily.  Discussed how to use coupon program in conjunction with commercial insurance.  If this is affordable, stop Vesicare 10 mg once daily and start Myrbetriq 25 mg once daily.  It does not appear that you have coverage for Gemtesa.    -When you start Myrbetriq, monitor your blood pressure to make sure it is not increasing on this medication.    -Continue with a healthy diet and exercise.    -At this time, you would like to improve from a ENT and respiratory standpoint prior to considering other treatment for erectile dysfunction.  Please let us know if you become interested in any of the therapies that we discussed.    -If Myrbetriq 25 mg once daily is too expensive, could consider a trial of a different anticholinergic.    -May need to consider further evaluation in the future with cystoscopy, urodynamics today, and or bladder diary.    Contact us in the interim with questions, concerns, or changes in symptomatology.  [phone]

## 2024-07-08 NOTE — NURSING NOTE
Current patient location: [id number] [place] [id number]    Is the patient currently in the state of [place]? YES    Visit mode:VIDEO    If the visit is dropped, the patient can be reconnected by: VIDEO VISIT: Text to cell phone:   Telephone Information:   Mobile [phone]       Will anyone else be joining the visit? NO  (If patient encounters technical issues they should call [phone]    How would you like to obtain your AVS? MyChart    Are changes needed to the allergy or medication list? No    Are refills needed on medications prescribed by this physician? YES    Reason for visit: Consult    Yoselin PERAZA

## 2024-07-08 NOTE — PROGRESS NOTES
02/27/24 1700   Evaluation Type   Evaluation Type Outpatient Initial   Problems & Assessment   Problems: comment/detail Bijal and Erik present with 4 day old Taco one day earlier than scheduled for breast feeding assistance with reports of painful shallow latch. Taco delivered at 37+0 weeks gestation, exclusively breastfeeding with adequate void/stool patterns reported, stool transitioning from black to green/brown. Pediatrici visit today with same recorded weight of this session, 7% weight loss.   Infant Assessment Hunger cues present   Muscle tone Appropriate for GA   Feeding Assessment   Summary Current Feeding Breastfeeding exclusively   Breastfeeding Assessment Assisted with breastfeeding w/mother's permission;Calm and ready to breastfeed;Coordinated suck/swallow;Deep latch achieved and observed;Sleepy infant, quickly pacifies;Tolerated feeding well;Sustained nutrititive latch w/audible swallows   Breastfeeding lasted # of minutes 20   Breastfeeding Positions right breast;left breast;cross cradle   Latch 2   Audible Sucks/Swallows 1   Type of Nipple 2   Comfort (Breast/Nipple) 1  (improved comfort with support and proper techniques)   Hold (Positioning) 1   LATCH Score 7   Other (comment) My Breast Friend Pillow used for support, improved comfort and active suckling with swallowing reported at this session. Reviewed early term infant potential breastfeeding challenges as well as remedy including pumping/supplementing if necessary.   Output   # Voids in 24 hours 4   # Stools in 24 hours 5   # Emesis in 24 hours denies   Pre/Post Weights   Pre-Weight Right Breast (g) 2390   Post-Weight Right Breast (g) 2410   ml of milk, RT Brst 20   Pre-Weight Left Breast (g) 2410   Post-Weight Left Breast (g) 2422   ml of milk, LT Brst 12   ml of milk, total 32   Supplement Type (other) Not offered at this time   Equipment used   Equipment used   (Nipple shield available at home, reviewed the indications, not  Virtual Visit Details    Type of service:  Video Visit     Originating Location (pt. Location): Other Work    Distant Location (provider location):  On-site  Platform used for Video Visit: Antony    ON: 1422  OFF: 1448    Subjective      REQUESTING PROVIDER   Unique Negrete     REASON FOR CONSULT   OAB  ED    HISTORY OF PRESENT ILLNESS   Mr. Reyes is very pleasant 59 year old year old male, who presents today for further evaluation recommendations regarding erectile dysfunction as well as overactivity of the bladder.  Patient has had longstanding diagnosis of urinary frequency with overactivity of the bladder.  He was seen by Dr. Prather in 2006.  At that time he had a slower urinary stream, nocturia x 2, and urinary frequency.  Cystoscopy at that time showed no stricture and a normal bladder and prostate.  He trialed Uroxatrol, without much benefit.    Patient has also trialed Detrol, without longstanding benefit that he remembers.  He has been on Vesicare 10 mg daily for several years.  He notes that initially it helped quite a bit.  He does feel like it is working somewhat, but is not working as well as it previously did.    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  He finds himself having to urinate approximately every hour.  He denies hematuria or dysuria.  He does endorse occasional urinary hesitancy and postvoid dribbling.  He also has some difficulty stopping his urinary stream.  He does feel like he can empty his bladder.  He is having nocturia 3+ times.  He would categorize his stream is medium in strength.  He does feel like his voids in the overnight hours are larger than his voids during the daytime.    He denies difficulties with his bowels.    Patient does note that is difficult to drink when he is traveling due to his urinary frequency.  He does not believe he is ever tried a beta 3 agonist.    Patient also endorses difficulties with erectile dysfunction.  He does have a history of coronary  artery disease requiring PCI in 2021.  He previously has had good benefit with Viagra, but at this time he take this up to 100 mg and is not able to get an erection.  He is noting that this has worsened since he has recently had a lot of issues with sore throat, cough, and allergies.  He is wondering if it is related to this.  He does note that he has been trialing the Viagra and has not been able to get an erection, but has been getting the side effect of headache.    Most recent PSA listed in 2021 at 0.63.    The following portions of the patient's history were reviewed and updated as appropriate: allergies, current medications, past family history, past medical history, past social history, past surgical history, and problem list.     REVIEW OF SYSTEMS   Review of Systems   HENT:  Positive for sore throat.    Respiratory:  Positive for cough.    Gastrointestinal:  Negative for constipation and diarrhea.   Genitourinary:  Positive for frequency. Negative for dysuria, hematuria and urgency.   Neurological:  Positive for headaches.      Per HPI.     Patient Active Problem List   Diagnosis    Attention deficit disorder    Esophageal reflux    HYPERLIPIDEMIA LDL GOAL <130    Sleep disorder    Mild major depression (H)    Hypertriglyceridemia    Anxiety    Testosterone deficiency    Impaired fasting glucose    Hypogonadism male    Elevated ferritin    Unstable angina (H)    Abnormal stress test    Percutaneous transluminal coronary angioplasty status    CAD (coronary artery disease)    Coronary artery disease of native artery of native heart with stable angina pectoris (H24)    Abnormal findings on diagnostic imaging of heart and coronary circulation    Status post coronary angiogram    Moderate episode of recurrent major depressive disorder (H)    Overactive bladder    Erectile dysfunction due to diseases classified elsewhere    Mixed dyslipidemia      Past Medical History:   Diagnosis Date    Acute duodenal ulcer  used or indicated at this session)        "without mention of hemorrhage, perforation, or obstruction 1990    diagnosed on EGD    Allergic rhinitis, cause unspecified     Attention deficit disorder without mention of hyperactivity     \"all my life\"    CAD (coronary artery disease) 06/29/2021    Cataplexy and narcolepsy     had for many years, but diagnosed in sleep lab 6/03    Depressive disorder     Esophageal reflux 11/28/2007    Hyperlipidemia LDL goal <130 10/31/2010    Hypertriglyceridemia 08/10/2012    Mild major depression (H24) 09/15/2011    OBESITY NOS 11/28/2007    PONV (postoperative nausea and vomiting)     Sleep apnea     CPAP    Sleep disorder 04/12/2011      Past Surgical History:   Procedure Laterality Date    ARTHROSCOPY SHOULDER, OPEN ROTATOR CUFF REPAIR, COMBINED Left 11/5/2014    Procedure: COMBINED ARTHROSCOPY SHOULDER, OPEN ROTATOR CUFF REPAIR;  Surgeon: Maykel Khalil MD;  Location: [facility] OR    COLONOSCOPY  9/9/2013    Procedure: COMBINED COLONOSCOPY, SINGLE BIOPSY/POLYPECTOMY BY BIOPSY;;  Surgeon: Hali Lema MD;  Location:  GI    CV CORONARY ANGIOGRAM N/A 7/20/2021    Procedure: Coronary Angiogram /CHP interventionalist;  Surgeon: Roxie Schrader MD;  Location:  HEART CARDIAC CATH LAB    CV HEART CATHETERIZATION WITH POSSIBLE INTERVENTION N/A 6/18/2021    Procedure: Heart Catheterization with Possible Intervention;  Surgeon: Roxie Schrader MD;  Location:  HEART CARDIAC CATH LAB    CV HEART CATHETERIZATION WITH POSSIBLE INTERVENTION N/A 6/21/2021    Procedure: Heart Catheterization with Possible Intervention;  Surgeon: Roxie Schrader MD;  Location:  HEART CARDIAC CATH LAB    CV INSTANTANEOUS WAVE-FREE RATIO N/A 7/20/2021    Procedure: Instantaneous Wave-Free Ratio;  Surgeon: Roxie Schrader MD;  Location:  HEART CARDIAC CATH LAB    CV LEFT HEART CATH N/A 6/18/2021    Procedure: Left Heart Cath;  Surgeon: Roxie Schrader MD;  Location:  HEART CARDIAC CATH LAB    CV PCI ANGIOPLASTY N/A 6/18/2021    " Procedure: Percutaneous Coronary Intervention Angioplasty;  Surgeon: Roxie Schrader MD;  Location:  HEART CARDIAC CATH LAB    CV PCI STENT DRUG ELUTING N/A 6/21/2021    Procedure: Percutaneous Coronary Intervention Stent Drug Eluting;  Surgeon: Roxie Schrader MD;  Location: [facility] CARDIAC CATH LAB    CV PCI STENT DRUG ELUTING N/A 7/20/2021    Procedure: Percutaneous Coronary Intervention Stent Drug Eluting;  Surgeon: Roxie Schrader MD;  Location: [facility] CARDIAC CATH LAB    GI SURGERY      henmmriodectoomy    HC EXPLOR MAXILL SINUS,INTRANASAL  1981`    HC VASECTOMY UNILAT/BILAT W POSTOP SEMEN  4/03    ZZC HAND/FINGER SURGERY UNLISTED  1983    ORIF right middle finger      Social History:   .  Works as a salesman.     Objective      PHYSICAL EXAM   GENERAL: alert and no distress  EYES: Eyes grossly normal to inspection.  No discharge or erythema, or obvious scleral/conjunctival abnormalities.  HENT: Normal cephalic/atraumatic.  External ears, nose and mouth without ulcers or lesions.  No nasal drainage visible.  NECK: No asymmetry, visible masses or scars  RESP: No audible wheeze, cough, or visible cyanosis.    MS: No gross musculoskeletal defects noted.  Normal range of motion.  No visible edema.  SKIN: Visible skin clear. No significant rash, abnormal pigmentation or lesions.  NEURO: Cranial nerves grossly intact.  Mentation and speech appropriate for age.  PSYCH: Appropriate affect, tone, and pace of words     LABORATORY   Lab Results   Component Value Date    PSA 0.63 03/05/2021    PSA 0.63 12/18/2019    PSA 0.58 11/08/2017    PSA 0.67 03/28/2017    CR 1.06 06/05/2024       Assessment & Plan    1. Overactive bladder    2. Erectile dysfunction due to diseases classified elsewhere         I had the pleasure today of meeting with Mr. Reyes to discuss his previously diagnosed overactivity of the bladder as well as erectile dysfunction.  Patient has documented longstanding issues with urinary  frequency and nocturia.  He has previously trialed an alpha-blocker, without much benefit.  He was having good benefit with Vesicare 10 mg, which she continues at this time.  He does feel like it is possibly giving him blurry vision, but it does not seem to be working as well as it did previously.  He has not trialed a beta 3 agonist.    I did discuss with the patient that it has been several years since he has had a postvoid residual.  We should check to make sure that he is still adequately emptying his bladder and does not have any signs of infection.  We did discuss possible treatment options for overactivity of the bladder such as follow-up for physical therapy, avoiding bladder irritants, a trial of a beta 3 agonist or a different anticholinergic medication, possible posterior tibial nerve stimulation, Botox, or implantable device.    We also discussed the possibility that he may need additional testing in the future such as cystoscopy or urodynamic study.    We also discussed his erectile dysfunction.  This has worsened more recently with additional allergy and cold medications.  We did discuss how some of these can affect erections.    We also discussed additional treatment options for erectile dysfunction, other than PDE 5 inhibitors, such as intracavernosal injections, vacuum erection device with flexible penile constriction ring, and penile prosthesis.  We also briefly discussed ultrasound and wave therapy, which is not FDA approved and that part of guidelines for erectile dysfunction.  This would likely be out-of-pocket for patient.      After discussion with the patient, we will plan on the following:    -Plan a nurse visit for urinalysis and postvoid residual.    -Will plan on trial of Myrbetriq 25 mg once daily.  Discussed how to use [employer] program in conjunction with commercial insurance.  If this is affordable, stop Vesicare 10 mg once daily and start Myrbetriq 25 mg once daily.  It does not appear  that you have coverage for Gemtesa.    -Continue with a healthy diet and exercise.    -At this time, you would like to improve from a ENT and respiratory standpoint prior to considering other treatment for erectile dysfunction.  Please let us know if you become interested in any of the therapies that we discussed.    -If Myrbetriq 25 mg once daily is too expensive, could consider a trial of a different anticholinergic.    -May need to consider further evaluation in the future with cystoscopy, urodynamics today, and or bladder diary.    Signed by:     Shayna Shin PA-C 7/8/2024 2:22 PM

## 2024-07-10 ENCOUNTER — TELEPHONE (OUTPATIENT)
Dept: UROLOGY | Facility: CLINIC | Age: 59
End: 2024-07-10
Payer: COMMERCIAL

## 2024-07-10 NOTE — TELEPHONE ENCOUNTER
----- Message from [place] sent at 7/10/2024 10:26 AM CDT -----  Check-out Note  Nurse visit ua/pvr    HYF

## 2024-07-18 ENCOUNTER — ALLIED HEALTH/NURSE VISIT (OUTPATIENT)
Dept: UROLOGY | Facility: CLINIC | Age: 59
End: 2024-07-18
Payer: COMMERCIAL

## 2024-07-18 DIAGNOSIS — N32.81 OVERACTIVE BLADDER: Primary | ICD-10-CM

## 2024-07-18 LAB
ALBUMIN UR-MCNC: NEGATIVE MG/DL
APPEARANCE UR: CLEAR
BILIRUB UR QL STRIP: NEGATIVE
COLOR UR AUTO: YELLOW
GLUCOSE UR STRIP-MCNC: NEGATIVE MG/DL
HGB UR QL STRIP: NEGATIVE
KETONES UR STRIP-MCNC: NEGATIVE MG/DL
LEUKOCYTE ESTERASE UR QL STRIP: NEGATIVE
NITRATE UR QL: NEGATIVE
PH UR STRIP: 5.5 [PH] (ref 5–7)
RESIDUAL VOLUME (RV) (EXTERNAL): 31
SP GR UR STRIP: 1.02 (ref 1–1.03)
UROBILINOGEN UR STRIP-ACNC: 0.2 E.U./DL

## 2024-07-18 PROCEDURE — 81003 URINALYSIS AUTO W/O SCOPE: CPT | Mod: QW

## 2024-07-18 PROCEDURE — 51798 US URINE CAPACITY MEASURE: CPT

## 2024-07-18 NOTE — PROGRESS NOTES
John LUBA MatamorosEric comes into clinic today for OAB,  at the request of MANDY Bee, Ordering Provider for UA/ PVR.    PVR: 31 mL by bladder scan    This service provided today was under the supervising provider of the day Dr. Alfred, who was available if needed.    Elvia Sanford, EMT

## 2024-07-23 ENCOUNTER — TELEPHONE (OUTPATIENT)
Dept: FAMILY MEDICINE | Facility: CLINIC | Age: 59
End: 2024-07-23
Payer: COMMERCIAL

## 2024-07-23 ENCOUNTER — MYC MEDICAL ADVICE (OUTPATIENT)
Dept: FAMILY MEDICINE | Facility: CLINIC | Age: 59
End: 2024-07-23
Payer: COMMERCIAL

## 2024-07-23 DIAGNOSIS — Z12.11 SCREEN FOR COLON CANCER: Primary | ICD-10-CM

## 2024-07-23 NOTE — TELEPHONE ENCOUNTER
Patient Quality Outreach    Patient is due for the following:   Colon Cancer Screening    Next Steps:   Contacted patient to schedule their Colon Cancer screening     Type of outreach:    Sent [employer] message.      Questions for provider review:    None           Ankita Black CMA

## 2024-08-12 ENCOUNTER — HOSPITAL ENCOUNTER (OUTPATIENT)
Facility: CLINIC | Age: 59
End: 2024-08-12
Attending: INTERNAL MEDICINE | Admitting: INTERNAL MEDICINE
Payer: COMMERCIAL

## 2024-08-12 ENCOUNTER — TELEPHONE (OUTPATIENT)
Dept: GASTROENTEROLOGY | Facility: CLINIC | Age: 59
End: 2024-08-12
Payer: COMMERCIAL

## 2024-08-12 NOTE — TELEPHONE ENCOUNTER
"Endoscopy Scheduling Screen    Have you had a positive Covid test in the last 14 days?  No    What is your communication preference for Instructions and/or Bowel Prep?   MyChart    What insurance is in the chart?  Other:  [employer]    Ordering/Referring Provider: Unique Negrete   (If ordering provider performs procedure, schedule with ordering provider unless otherwise instructed. )    BMI: Estimated body mass index is 35.08 kg/m  as calculated from the following:    Height as of 6/27/24: 1.702 m (5' 7\").    Weight as of 6/27/24: 101.6 kg (224 lb).     Sedation Ordered  moderate sedation.   If patient BMI > 50 do not schedule in [facility].    If patient BMI > 45 do not schedule at [facility].    Are you taking methadone or Suboxone?  No    Have you had difficulties, pain, or discomfort during past endoscopy procedures?  No    Are you taking any prescription medications for pain 3 or more times per week?   NO, No RN review required.    Do you have a history of malignant hyperthermia?  No    (Females) Are you currently pregnant?   No     Have you been diagnosed or told you have pulmonary hypertension?   No    Do you have an LVAD?  No    Have you been told you have moderate to severe sleep apnea?  Yes (RN Review required for scheduling unless scheduling in Hospital.)    Have you been told you have COPD, asthma, or any other lung disease?  No    Do you have any heart conditions?  Yes     In the past year, have you had any hospitalizations for heart related issues including cardiomyopathy, heart attack, or stent placement?  No    Do you have any implantable devices in your body (pacemaker, ICD)?  No    Do you take nitroglycerine?  Yes, less than 1 time per week    Have you ever had or are you waiting for an organ transplant?  No. Continue scheduling, no site restrictions.    Have you had a stroke or transient ischemic attack (TIA aka \"mini stroke\" in the last 6 months?   No    Have you been diagnosed with or been told you have cirrhosis of " "the liver?   No    Are you currently on dialysis?   No    Do you need assistance transferring?   No    BMI: Estimated body mass index is 35.08 kg/m  as calculated from the following:    Height as of 6/27/24: 1.702 m (5' 7\").    Weight as of 6/27/24: 101.6 kg (224 lb).     Is patients BMI > 40 and scheduling location UP?  No    Do you take an injectable medication for weight loss or diabetes (excluding insulin)?  No    Do you take the medication Naltrexone?  No    Do you take blood thinners?  No       Prep   Are you currently on dialysis or do you have chronic kidney disease?  No    Do you have a diagnosis of diabetes?  No    Do you have a diagnosis of cystic fibrosis (CF)?  No    On a regular basis do you go 3 -5 days between bowel movements?  No    BMI > 40?  No    [place]    [facility] [place] [id number] [facility]  [address] [place] [id number]  Phone: [phone] Fax: [phone]    Final Scheduling Details     Procedure scheduled  Colonoscopy    Surgeon:  Shell      Date of procedure:  10.2.241     Pre-OP / PAC:   No - Not required for this site.    Location  [facility] - Per exclusion criteria.    Sedation   Moderate Sedation - Per order.      Patient Reminders:   You will receive a call from a Nurse to review instructions and health history.  This assessment must be completed prior to your procedure.  Failure to complete the Nurse assessment may result in the procedure being cancelled.      On the day of your procedure, please designate an adult(s) who can drive you home stay with you for the next 24 hours. The medicines used in the exam will make you sleepy. You will not be able to drive.      You cannot take public transportation, ride share services, or non-medical taxi service without a responsible caregiver.  Medical transport services are allowed with the requirement that a responsible caregiver will receive you at your destination.  We require that " drivers and caregivers are confirmed prior to your procedure.

## 2024-08-21 NOTE — ED PROVIDER NOTES
History     Chief Complaint:    Toe Injury     HPI   John Reyes is a 54 year old male who presents with toe injury. 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 He notes that at the time he did not have his work shoes on and rather dress shoes. The patient states that since then he has been experiencing greater toe pain and bruising with pain at a 7/10. He has taken 800 mg ibuprofen to combat the pain. The patient is unsure when his Tetaus was last updated.    Allergies:  Penicillins      Medications:    Lipitor  Wellbutrin  Index  Zoloft  Vesicare  Desyrel     Past Medical History:    Acute duodenal ulcer without mention of hemorrhage, perforation, or obstruction   Allergic rhinitis  Attention deficit disorder without mention of hyperactivity   Cataplexy and narcolepsy   Esophageal reflux   Testosterone deficiency   hypogonadism   Hyperlipidemia   Hypertriglyceridemia   Mild major depression     PONV   Sleep apnea   Sleep disorder     Past Surgical History:    Arthroscopy shoulder, open rotator cuff repair  Hand and finger surgery  Colonoscopy   hemorrhoidectomy  explor maxill sinus   vasectomy     Family History:    Father: lipids   Maternal uncle: Colorectal cancer     Social History:  The patient was accompanied to the ED by wife.  Smoking Status: Former Smoker  Smokeless Tobacco: Never Used  Alcohol Use: Positive  Drug Use: Negative  PCP: Remy Mcknight   Marital Status:        Review of Systems   Musculoskeletal:        Greater toe pain and bruising   All other systems reviewed and are negative.    Physical Exam     Patient Vitals for the past 24 hrs:   BP Temp Temp src Heart Rate Resp SpO2 Weight   07/11/19 2122 (!) 150/100 98.4  F (36.9  C) Oral 72 14 94 % 102.1 kg (225 lb)       Physical Exam  VS: Reviewed per above  HENT: Mucous membranes mosit  EYES: sclera anicteric  CV: Rate as noted, regular rhythm.   RESP: Effort normal.   NEURO: Alert, moving all extremities  MSK: Contusion and tenderness about the right great toe.  No evidence of subungual hematoma or nail fracture.  SKIN: Warm and dry.  No evidence of skin violation around the right great toe.    Emergency Department Course     Imaging:  Radiology findings were communicated with the patient who voiced understanding of the findings.    XR Toe Right G/E 2 Views   Preliminary Result   IMPRESSION: Three views of the right great toe. No acute fracture or   dislocation.        Reading per radiology     Interventions:  Medications   oxyCODONE-acetaminophen (PERCOCET) 5-325 MG per tablet 2 tablet (2 tablets Oral Given 7/11/19 2139)      Emergency Department Course:    2128 Nursing notes and vitals reviewed. I performed an exam of the patient as documented above.     2156 The patient was sent for a XR while in the emergency department, results above.      Impression & Plan      Medical Decision Making:  John Reyes is a 54 year old male who presents to the emergency department today for evaluation of right great toe pain and swelling.  Initial vital signs unrevealing.  Exam reveals evidence of right great toe bruising, tenderness.  X-ray did not reveal fracture.  Last Tdap was 2012.  Patient was provided hard sole shoe to assist with healing of this bone versus soft tissue contusion.  He was given a short prescription of Percocet and ibuprofen/icing was recommended.  Close return precautions discussed prior to discharge.    Diagnosis:      ICD-10-CM    1. Crushing injury of toe of right foot, initial encounter S97.101A    2. Contusion of right great toe without damage to nail, initial encounter S90.111A         Disposition:   The patient is discharged to home.     Discharge Medications:       Review of your medicines       UNREVIEWED medicines. Ask your doctor about these medicines      Dose / Directions   atorvastatin 40 MG tablet  Commonly known as:  LIPITOR  Used for:  Hyperlipidemia LDL goal <130      TAKE ONE TABLET BY MOUTH ONCE DAILY  Quantity:  90 tablet  Refills:  1     buPROPion 300 MG 24 hr tablet  Commonly known as:  WELLBUTRIN XL  Used for:  Moderate major depression (H)      TAKE ONE TABLET BY MOUTH EVERY MORNING  Quantity:  90 tablet  Refills:  1     fluocinonide 0.05 % external solution  Commonly known as:  LIDEX  Used for:  Dandruff      Apply to scalp daily as needed  Quantity:  60 mL  Refills:  1     sertraline 100 MG tablet  Commonly known as:  ZOLOFT  Used for:  Moderate major depression (H)      TAKE ONE AND ONE-HALF TABLETS BY MOUTH ONCE DAILY  Quantity:  135 tablet  Refills:  1     solifenacin 5 MG tablet  Commonly known as:  VESICARE  Used for:  Urinary urgency      Dose:  5 mg  Take 1 tablet (5 mg) by mouth daily  Quantity:  90 tablet  Refills:  1     traZODone 50 MG tablet  Commonly known as:  DESYREL  Used for:  Sleep disorder      TAKE THREE TABLETS BY MOUTH AT BEDTIME  Quantity:  270 tablet  Refills:  1        START taking      Dose / Directions   oxyCODONE-acetaminophen 5-325 MG tablet  Commonly known as:  PERCOCET      Dose:  1-2 tablet  Take 1-2 tablets by mouth every 6 hours as needed for severe pain  Quantity:  6 tablet  Refills:  0           Where to get your medicines      Some of these will need a paper prescription and others can be bought over the counter. Ask your nurse if you have questions.    Bring a paper prescription for each of these medications    oxyCODONE-acetaminophen 5-325 MG tablet         Scribe Disclosure:  I, Orla Severson, am serving as a scribe at 9:36 PM on 7/11/2019 to document services personally performed by Denis Saleem MD based on my observations and the provider's statements to me.     [facility] EMERGENCY DEPARTMENT       Denis Saleem,  MD  07/11/19 1276     Lithuanian

## 2024-09-11 NOTE — TELEPHONE ENCOUNTER
Standard Miralax Bowel Prep recommended due to standard bowel prep. Instructions were sent via [employer].

## 2024-09-23 ENCOUNTER — TELEPHONE (OUTPATIENT)
Dept: GASTROENTEROLOGY | Facility: CLINIC | Age: 59
End: 2024-09-23
Payer: COMMERCIAL

## 2024-09-23 NOTE — TELEPHONE ENCOUNTER
Pre visit planning completed.      Procedure details:    Patient scheduled for Colonoscopy on 10/2/24.     Arrival time: 0730. Procedure time 0815    Facility location: [facility]; [place] [address]. Check in location: Main entrance, door #1 on the North side of the building under roundabout awning. DO NOT GO TO SURGERY/ED ENTRANCE.     Sedation type: Conscious sedation     Pre op exam needed? No.    Indication for procedure: screening       Chart review:     Electronic implanted devices? No    Recent diagnosis of diverticulitis within the last 6 weeks? No      Medication review:    Diabetic? No    Anticoagulants? Yes Clopidogrel (Plavix): Recommended HOLD 5 days before procedure.  Consult with your managing provider.    Weight loss medication/injectable? No GLP-1 medication per patient's medication list.  RN will verify with pre-assessment call.    Other medication HOLDING recommendations:  N/A      Prep for procedure:     Bowel prep recommendation: Standard Miralax  Due to: standard bowel prep.    Prep instructions sent via [employer] by CRC team.         Janay Vasquez RN  Endoscopy Procedure Pre Assessment RN  [phone] option 2

## 2024-09-24 NOTE — TELEPHONE ENCOUNTER
Pre assessment completed for upcoming procedure.   (Please see previous telephone encounter notes for complete details)        Procedure details:    Arrival time and facility location reviewed.    Pre op exam needed? No.    Designated  policy reviewed. Instructed to have someone stay 6  hours post procedure.       Medication review:    Medications reviewed. Please see supporting documentation below. Holding recommendations discussed (if applicable).   Blood thinner/Anti-platelet medication(s):  Clopidogrel (Plavix): Recommended HOLD 5 days before procedure.  Consult with your managing provider.      Prep for procedure:     Procedure prep instructions reviewed.        Any additional information needed:  N/A      Patient  verbalized understanding and had no questions or concerns at this time.      Ana Butterfield RN  Endoscopy Procedure Pre Assessment   [phone] option 2

## 2024-09-30 ENCOUNTER — TELEPHONE (OUTPATIENT)
Dept: FAMILY MEDICINE | Facility: CLINIC | Age: 59
End: 2024-09-30
Payer: COMMERCIAL

## 2024-10-02 ENCOUNTER — TELEPHONE (OUTPATIENT)
Dept: GASTROENTEROLOGY | Facility: CLINIC | Age: 59
End: 2024-10-02
Payer: COMMERCIAL

## 2024-10-02 ENCOUNTER — TELEPHONE (OUTPATIENT)
Dept: FAMILY MEDICINE | Facility: CLINIC | Age: 59
End: 2024-10-02
Payer: COMMERCIAL

## 2024-10-02 NOTE — TELEPHONE ENCOUNTER
Patient Quality Outreach    Patient is due for the following:   Colon Cancer Screening    Next Steps:   Contacted patient to remind them to reschedule their Colon Cancer Screening    Type of outreach:    Sent [employer] message.      Questions for provider review:    None           Ankita Black CMA

## 2024-10-02 NOTE — TELEPHONE ENCOUNTER
Caller: No call made    Reason for Reschedule/Cancellation   (please be detailed, any staff messages or encounters to note?): pt no show for procedure. Site called pt and no answer to see if they were showing up      Prior to reschedule please review:  Ordering Provider: Unique Negrete PA-C  Sedation Determined: Moderate  Does patient have any ASC Exclusions, please identify?: yes, VENKAT      Notes on Cancelled Procedure:  Procedure: Lower Endoscopy [Colonoscopy]   Date: 10/02/2024  Location: [facility]; [address] [place] [address]  Surgeon: TORO      Rescheduled: No,         Did you cancel or rescheduled an EUS procedure? No.

## 2024-10-11 ENCOUNTER — APPOINTMENT (OUTPATIENT)
Dept: GENERAL RADIOLOGY | Facility: CLINIC | Age: 59
End: 2024-10-11
Attending: PHYSICIAN ASSISTANT
Payer: COMMERCIAL

## 2024-10-11 ENCOUNTER — HOSPITAL ENCOUNTER (EMERGENCY)
Facility: CLINIC | Age: 59
Discharge: HOME | End: 2024-10-11
Attending: PHYSICIAN ASSISTANT | Admitting: PHYSICIAN ASSISTANT
Payer: COMMERCIAL

## 2024-10-11 VITALS
BODY MASS INDEX: 34.46 KG/M2 | DIASTOLIC BLOOD PRESSURE: 85 MMHG | WEIGHT: 220 LBS | RESPIRATION RATE: 18 BRPM | TEMPERATURE: 98.2 F | HEART RATE: 67 BPM | SYSTOLIC BLOOD PRESSURE: 123 MMHG | OXYGEN SATURATION: 97 %

## 2024-10-11 DIAGNOSIS — R53.83 FATIGUE, UNSPECIFIED TYPE: ICD-10-CM

## 2024-10-11 DIAGNOSIS — R07.9 CHEST PAIN, UNSPECIFIED TYPE: ICD-10-CM

## 2024-10-11 LAB
ALBUMIN SERPL BCG-MCNC: 4.4 G/DL (ref 3.5–5.2)
ALP SERPL-CCNC: 64 U/L (ref 40–150)
ALT SERPL W P-5'-P-CCNC: 34 U/L (ref 0–70)
ANION GAP SERPL CALCULATED.3IONS-SCNC: 11 MMOL/L (ref 7–15)
AST SERPL W P-5'-P-CCNC: 40 U/L (ref 0–45)
ATRIAL RATE - MUSE: 79 BPM
BASOPHILS # BLD AUTO: 0 10E3/UL (ref 0–0.2)
BASOPHILS NFR BLD AUTO: 0 %
BILIRUB SERPL-MCNC: 0.4 MG/DL
BUN SERPL-MCNC: 17.3 MG/DL (ref 8–23)
CALCIUM SERPL-MCNC: 9.3 MG/DL (ref 8.8–10.4)
CHLORIDE SERPL-SCNC: 104 MMOL/L (ref 98–107)
CREAT SERPL-MCNC: 0.96 MG/DL (ref 0.67–1.17)
D DIMER PPP FEU-MCNC: <0.27 UG/ML FEU (ref 0–0.5)
DIASTOLIC BLOOD PRESSURE - MUSE: NORMAL MMHG
EGFRCR SERPLBLD CKD-EPI 2021: 90 ML/MIN/1.73M2
EOSINOPHIL # BLD AUTO: 0.2 10E3/UL (ref 0–0.7)
EOSINOPHIL NFR BLD AUTO: 3 %
ERYTHROCYTE [DISTWIDTH] IN BLOOD BY AUTOMATED COUNT: 11.9 % (ref 10–15)
FLUAV RNA SPEC QL NAA+PROBE: NEGATIVE
FLUBV RNA RESP QL NAA+PROBE: NEGATIVE
GLUCOSE SERPL-MCNC: 127 MG/DL (ref 70–99)
HCO3 SERPL-SCNC: 24 MMOL/L (ref 22–29)
HCT VFR BLD AUTO: 40.6 % (ref 40–53)
HGB BLD-MCNC: 14 G/DL (ref 13.3–17.7)
HOLD SPECIMEN: NORMAL
IMM GRANULOCYTES # BLD: 0 10E3/UL
IMM GRANULOCYTES NFR BLD: 0 %
INTERPRETATION ECG - MUSE: NORMAL
LIPASE SERPL-CCNC: 37 U/L (ref 13–60)
LYMPHOCYTES # BLD AUTO: 1.6 10E3/UL (ref 0.8–5.3)
LYMPHOCYTES NFR BLD AUTO: 20 %
MCH RBC QN AUTO: 31.7 PG (ref 26.5–33)
MCHC RBC AUTO-ENTMCNC: 34.5 G/DL (ref 31.5–36.5)
MCV RBC AUTO: 92 FL (ref 78–100)
MONOCYTES # BLD AUTO: 0.9 10E3/UL (ref 0–1.3)
MONOCYTES NFR BLD AUTO: 12 %
NEUTROPHILS # BLD AUTO: 5 10E3/UL (ref 1.6–8.3)
NEUTROPHILS NFR BLD AUTO: 64 %
NRBC # BLD AUTO: 0 10E3/UL
NRBC BLD AUTO-RTO: 0 /100
P AXIS - MUSE: 16 DEGREES
PLATELET # BLD AUTO: 139 10E3/UL (ref 150–450)
POTASSIUM SERPL-SCNC: 4.4 MMOL/L (ref 3.4–5.3)
PR INTERVAL - MUSE: 132 MS
PROT SERPL-MCNC: 6.8 G/DL (ref 6.4–8.3)
QRS DURATION - MUSE: 84 MS
QT - MUSE: 366 MS
QTC - MUSE: 419 MS
R AXIS - MUSE: 37 DEGREES
RBC # BLD AUTO: 4.42 10E6/UL (ref 4.4–5.9)
RSV RNA SPEC NAA+PROBE: NEGATIVE
SARS-COV-2 RNA RESP QL NAA+PROBE: NEGATIVE
SODIUM SERPL-SCNC: 139 MMOL/L (ref 135–145)
SYSTOLIC BLOOD PRESSURE - MUSE: NORMAL MMHG
T AXIS - MUSE: 15 DEGREES
TROPONIN T SERPL HS-MCNC: <6 NG/L
VENTRICULAR RATE- MUSE: 79 BPM
WBC # BLD AUTO: 7.8 10E3/UL (ref 4–11)

## 2024-10-11 PROCEDURE — 85025 COMPLETE CBC W/AUTO DIFF WBC: CPT | Performed by: EMERGENCY MEDICINE

## 2024-10-11 PROCEDURE — 85379 FIBRIN DEGRADATION QUANT: CPT | Performed by: PHYSICIAN ASSISTANT

## 2024-10-11 PROCEDURE — 36415 COLL VENOUS BLD VENIPUNCTURE: CPT | Performed by: EMERGENCY MEDICINE

## 2024-10-11 PROCEDURE — 83690 ASSAY OF LIPASE: CPT | Performed by: PHYSICIAN ASSISTANT

## 2024-10-11 PROCEDURE — 80053 COMPREHEN METABOLIC PANEL: CPT | Performed by: PHYSICIAN ASSISTANT

## 2024-10-11 PROCEDURE — 99285 EMERGENCY DEPT VISIT HI MDM: CPT | Mod: 25

## 2024-10-11 PROCEDURE — 36415 COLL VENOUS BLD VENIPUNCTURE: CPT | Performed by: PHYSICIAN ASSISTANT

## 2024-10-11 PROCEDURE — 93005 ELECTROCARDIOGRAM TRACING: CPT

## 2024-10-11 PROCEDURE — 85025 COMPLETE CBC W/AUTO DIFF WBC: CPT | Performed by: PHYSICIAN ASSISTANT

## 2024-10-11 PROCEDURE — 84484 ASSAY OF TROPONIN QUANT: CPT | Performed by: PHYSICIAN ASSISTANT

## 2024-10-11 PROCEDURE — 71046 X-RAY EXAM CHEST 2 VIEWS: CPT

## 2024-10-11 PROCEDURE — 80053 COMPREHEN METABOLIC PANEL: CPT | Performed by: EMERGENCY MEDICINE

## 2024-10-11 PROCEDURE — 87637 SARSCOV2&INF A&B&RSV AMP PRB: CPT | Performed by: PHYSICIAN ASSISTANT

## 2024-10-11 RX ORDER — NITROGLYCERIN 0.4 MG/1
0.4 TABLET SUBLINGUAL ONCE
Status: DISCONTINUED | OUTPATIENT
Start: 2024-10-11 | End: 2024-10-11

## 2024-10-11 ASSESSMENT — COLUMBIA-SUICIDE SEVERITY RATING SCALE - C-SSRS
6. HAVE YOU EVER DONE ANYTHING, STARTED TO DO ANYTHING, OR PREPARED TO DO ANYTHING TO END YOUR LIFE?: NO
2. HAVE YOU ACTUALLY HAD ANY THOUGHTS OF KILLING YOURSELF IN THE PAST MONTH?: NO
1. IN THE PAST MONTH, HAVE YOU WISHED YOU WERE DEAD OR WISHED YOU COULD GO TO SLEEP AND NOT WAKE UP?: NO

## 2024-10-11 ASSESSMENT — ACTIVITIES OF DAILY LIVING (ADL)
ADLS_ACUITY_SCORE: 33
ADLS_ACUITY_SCORE: 35

## 2024-10-12 LAB
ATRIAL RATE - MUSE: 71 BPM
DIASTOLIC BLOOD PRESSURE - MUSE: NORMAL MMHG
INTERPRETATION ECG - MUSE: NORMAL
P AXIS - MUSE: 20 DEGREES
PR INTERVAL - MUSE: 134 MS
QRS DURATION - MUSE: 80 MS
QT - MUSE: 386 MS
QTC - MUSE: 419 MS
R AXIS - MUSE: 21 DEGREES
SYSTOLIC BLOOD PRESSURE - MUSE: NORMAL MMHG
T AXIS - MUSE: 33 DEGREES
VENTRICULAR RATE- MUSE: 71 BPM

## 2024-10-12 NOTE — DISCHARGE INSTRUCTIONS
Your labs and imaging are reassuring today. Continue to monitor symptoms and you may take Tylenol or ibuprofen for pain. Contact your cardiologist for close outpatient follow-up. Return to ED for any new or worsening symptoms including persistent chest pain, shortness of breath, or any other concerns.

## 2024-10-12 NOTE — ED PROVIDER NOTES
Emergency Department Note      History of Present Illness     Chief Complaint   Chest Pain      HPI   John Reyes is a 59 year old male with history of CAD with cardiac stent placement on Plavix who presents with chest pain. Patient reports three days of worsening left-sided chest pain with radiation into the left flank and jaw. Patient reports he has been in [place] until today for a conference. He also endorses congestion, diarrhea, and fatigue. Denies fevers, abdominal pain, hematuria, dysuria. He reports he had three cardiac stents placed last year. Denies recent falls.    Independent Historian   None    Review of External Notes   None    Past Medical History     Medical History and Problem List   Past Medical History:   Diagnosis Date    Acute duodenal ulcer without mention of hemorrhage, perforation, or obstruction 1990    Allergic rhinitis, cause unspecified     Attention deficit disorder without mention of hyperactivity     CAD (coronary artery disease) 06/29/2021    Cataplexy and narcolepsy     Depressive disorder     Esophageal reflux 11/28/2007    Hyperlipidemia LDL goal <130 10/31/2010    Hypertriglyceridemia 08/10/2012    Mild major depression (H) 09/15/2011    OBESITY NOS 11/28/2007    PONV (postoperative nausea and vomiting)     Sleep apnea     Sleep disorder 04/12/2011       Medications   albuterol (PROAIR HFA/PROVENTIL HFA/VENTOLIN HFA) 108 (90 Base) MCG/ACT inhaler  azelastine (ASTELIN) 0.1 % nasal spray  buPROPion (WELLBUTRIN XL) 300 MG 24 hr tablet  clopidogrel (PLAVIX) 75 MG tablet  dexAMETHasone (DECADRON) 0.5 MG/5ML elixir  dexamethasone alcohol-free (DECADRON) 0.1 MG/ML solution  ezetimibe (ZETIA) 10 MG tablet  fluocinonide (LIDEX) 0.05 % external solution  fluticasone (FLONASE) 50 MCG/ACT nasal spray  metoprolol succinate ER (TOPROL XL) 25 MG 24 hr tablet  mirabegron (MYRBETRIQ) 25 MG 24 hr tablet  multivitamin, therapeutic (THERA-VIT) TABS tablet  nitroGLYcerin (NITROSTAT) 0.4 MG  sublingual tablet  rosuvastatin (CRESTOR) 20 MG tablet  sertraline (ZOLOFT) 100 MG tablet  sildenafil (VIAGRA) 100 MG tablet  solifenacin (VESICARE) 10 MG tablet  traZODone (DESYREL) 50 MG tablet        Surgical History   Past Surgical History:   Procedure Laterality Date    ARTHROSCOPY SHOULDER, OPEN ROTATOR CUFF REPAIR, COMBINED Left 11/5/2014    Procedure: COMBINED ARTHROSCOPY SHOULDER, OPEN ROTATOR CUFF REPAIR;  Surgeon: Maykel Khalil MD;  Location: [facility] OR    COLONOSCOPY  9/9/2013    Procedure: COMBINED COLONOSCOPY, SINGLE BIOPSY/POLYPECTOMY BY BIOPSY;;  Surgeon: Hali Lema MD;  Location:  GI    CV CORONARY ANGIOGRAM N/A 7/20/2021    Procedure: Coronary Angiogram /CHP interventionalist;  Surgeon: Roxie Schrader MD;  Location:  HEART CARDIAC CATH LAB    CV HEART CATHETERIZATION WITH POSSIBLE INTERVENTION N/A 6/18/2021    Procedure: Heart Catheterization with Possible Intervention;  Surgeon: Roxie Schrader MD;  Location:  HEART CARDIAC CATH LAB    CV HEART CATHETERIZATION WITH POSSIBLE INTERVENTION N/A 6/21/2021    Procedure: Heart Catheterization with Possible Intervention;  Surgeon: Roxie Schrader MD;  Location:  HEART CARDIAC CATH LAB    CV INSTANTANEOUS WAVE-FREE RATIO N/A 7/20/2021    Procedure: Instantaneous Wave-Free Ratio;  Surgeon: Roxie Schrader MD;  Location:  HEART CARDIAC CATH LAB    CV LEFT HEART CATH N/A 6/18/2021    Procedure: Left Heart Cath;  Surgeon: Roxie Schrader MD;  Location:  HEART CARDIAC CATH LAB    CV PCI ANGIOPLASTY N/A 6/18/2021    Procedure: Percutaneous Coronary Intervention Angioplasty;  Surgeon: Roxie Schrader MD;  Location:  HEART CARDIAC CATH LAB    CV PCI STENT DRUG ELUTING N/A 6/21/2021    Procedure: Percutaneous Coronary Intervention Stent Drug Eluting;  Surgeon: Roxie Schrader MD;  Location:  HEART CARDIAC CATH LAB    CV PCI STENT DRUG ELUTING N/A 7/20/2021    Procedure: Percutaneous Coronary Intervention Stent Drug  Eluting;  Surgeon: Roxie Schrader MD;  Location:  HEART CARDIAC CATH LAB    GI SURGERY      henmmriodectoomy    HC EXPLOR MAXILL SINUS,INTRANASAL  1981`    HC VASECTOMY UNILAT/BILAT W POSTOP SEMEN  4/03    ZZ HAND/FINGER SURGERY UNLISTED  1983    ORIF right middle finger       Physical Exam     Patient Vitals for the past 24 hrs:   BP Temp Temp src Pulse Resp SpO2 Weight   10/11/24 2213 123/85 -- -- 67 -- -- --   10/11/24 1905 (!) 154/96 98.2  F (36.8  C) Temporal 79 18 97 % 99.8 kg (220 lb)     Physical Exam  Constitutional: Alert, attentive, GCS 15  HENT:    Nose: Nose normal.    Mouth/Throat: Oropharynx is clear, mucous membranes are moist   Eyes: EOM are normal.   CV: regular rate and rhythm; no murmurs, rubs or gallups  Chest: Effort normal and breath sounds normal.  Reproducible lateral left chest wall tenderness with palpation.  GI:  There is no tenderness. No distension. Normal bowel sounds  MSK: Normal range of motion.   Neurological: Alert, attentive  Skin: Skin is warm and dry. No dermatomal rash.      Diagnostics     Lab Results   Labs Ordered and Resulted from Time of ED Arrival to Time of ED Departure   COMPREHENSIVE METABOLIC PANEL - Abnormal       Result Value    Sodium 139      Potassium 4.4      Carbon Dioxide (CO2) 24      Anion Gap 11      Urea Nitrogen 17.3      Creatinine 0.96      GFR Estimate 90      Calcium 9.3      Chloride 104      Glucose 127 (*)     Alkaline Phosphatase 64      AST 40      ALT 34      Protein Total 6.8      Albumin 4.4      Bilirubin Total 0.4     CBC WITH PLATELETS AND DIFFERENTIAL - Abnormal    WBC Count 7.8      RBC Count 4.42      Hemoglobin 14.0      Hematocrit 40.6      MCV 92      MCH 31.7      MCHC 34.5      RDW 11.9      Platelet Count 139 (*)     % Neutrophils 64      % Lymphocytes 20      % Monocytes 12      % Eosinophils 3      % Basophils 0      % Immature Granulocytes 0      NRBCs per 100 WBC 0      Absolute Neutrophils 5.0      Absolute Lymphocytes  1.6      Absolute Monocytes 0.9      Absolute Eosinophils 0.2      Absolute Basophils 0.0      Absolute Immature Granulocytes 0.0      Absolute NRBCs 0.0     TROPONIN T, HIGH SENSITIVITY - Normal    Troponin T, High Sensitivity <6     D DIMER QUANTITATIVE - Normal    D-Dimer Quantitative <0.27     INFLUENZA A/B, RSV, & SARS-COV2 PCR - Normal    Influenza A PCR Negative      Influenza B PCR Negative      RSV PCR Negative      SARS CoV2 PCR Negative     LIPASE - Normal    Lipase 37         Imaging   Chest XR,  PA & LAT   Final Result   IMPRESSION:       No focal airspace disease. No pleural effusion or pneumothorax.      The cardiomediastinal silhouette is unremarkable.          EKG   ECG taken at 1900, ECG read at 1904  Normal sinus rhythm   similar as compared to prior, dated 12/07/2023.  Rate 79 bpm. VT interval 132 ms. QRS duration 84 ms. QT/QTc 366/419 ms. P-R-T axes 16 37 15.    Independent Interpretation   CXR: No pneumothorax or infiltrate.    ED Course      Medications Administered   Medications - No data to display    Procedures   Procedures     Discussion of Management   None    ED Course        Additional Documentation  None    Medical Decision Making / Diagnosis     CMS Diagnoses: None    MIPS       None    MDM   John Reyes is a 59 year old male with history of CAD with cardiac stent placement on Plavix who presents with three days of left-sided chest pain. He also fatigue and diarrhea without abdominal pain.  He is afebrile, normotensive, nonhypoxic on arrival.  Physical exam reveals reproducible lateral chest wall tenderness without crepitus or deformity. No dermatomal rash to suggest shingles. Differential includes ACS, PE, rib fracture, pneumonia.  Labs today are reassuring. EKG shows normal sinus rhythm without ischemia and his troponin is undetectable. Given 3 days of symptoms, ACS is unlikely. Dimer is also undetectable making PE unlikely.  Chest x-ray shows no evidence of widened  mediastinum to suggest dissection or evidence of pneumonia.  COVID-19 testing is negative.  Labs otherwise reassuring and without leukocytosis or anemia.  Lipase and LFTs are also normal.  Results discussed with patient and wife at bedside.  Given workup today, ACS and PE is unlikely.  His heart score is moderate at 4 points given his history however patient would like to follow-up as outpatient which is fine.  He declined to give a UA and so I cautioned him I am unable to rule out kidney stone however he has not had any urinary symptoms or hematuria.  He also declines any sublingual nitro in the department.  Recommend he follow-up with his cardiologist in the next 2 to 3 days and then return to the ED with any new or worsening symptoms including fevers, shortness of breath, or or persistent chest pain.  Patient is comfortable with this plan and is discharged home.    Disposition   The patient was discharged.     Diagnosis     ICD-10-CM    1. Chest pain, unspecified type  R07.9       2. Fatigue, unspecified type  R53.83            Discharge Medications   New Prescriptions    No medications on file         9:12 PM  October 11, 2024  TONE DEVINE Emily, PA-C  10/11/24 4406

## 2024-10-12 NOTE — ED TRIAGE NOTES
Patient states chest pain for a couple days.  Pain under left breast area.  Fatigue.  SOB.  Pain goes into left neck with deep breaths.  Chest pain, SOB with exertion also.    Flew back from [place] today.

## 2024-10-14 ENCOUNTER — PATIENT OUTREACH (OUTPATIENT)
Dept: FAMILY MEDICINE | Facility: CLINIC | Age: 59
End: 2024-10-14
Payer: COMMERCIAL

## 2024-10-14 ASSESSMENT — ASTHMA QUESTIONNAIRES
ACT_TOTALSCORE: 14
QUESTION_5 LAST FOUR WEEKS HOW WOULD YOU RATE YOUR ASTHMA CONTROL: SOMEWHAT CONTROLLED
QUESTION_3 LAST FOUR WEEKS HOW OFTEN DID YOUR ASTHMA SYMPTOMS (WHEEZING, COUGHING, SHORTNESS OF BREATH, CHEST TIGHTNESS OR PAIN) WAKE YOU UP AT NIGHT OR EARLIER THAN USUAL IN THE MORNING: TWO OR THREE NIGHTS A WEEK
ACT_TOTALSCORE: 14
QUESTION_2 LAST FOUR WEEKS HOW OFTEN HAVE YOU HAD SHORTNESS OF BREATH: ONCE OR TWICE A WEEK
QUESTION_1 LAST FOUR WEEKS HOW MUCH OF THE TIME DID YOUR ASTHMA KEEP YOU FROM GETTING AS MUCH DONE AT WORK, SCHOOL OR AT HOME: MOST OF THE TIME
QUESTION_4 LAST FOUR WEEKS HOW OFTEN HAVE YOU USED YOUR RESCUE INHALER OR NEBULIZER MEDICATION (SUCH AS ALBUTEROL): TWO OR THREE TIMES PER WEEK

## 2024-10-14 ASSESSMENT — PATIENT HEALTH QUESTIONNAIRE - PHQ9
SUM OF ALL RESPONSES TO PHQ QUESTIONS 1-9: 9
10. IF YOU CHECKED OFF ANY PROBLEMS, HOW DIFFICULT HAVE THESE PROBLEMS MADE IT FOR YOU TO DO YOUR WORK, TAKE CARE OF THINGS AT HOME, OR GET ALONG WITH OTHER PEOPLE: SOMEWHAT DIFFICULT
SUM OF ALL RESPONSES TO PHQ QUESTIONS 1-9: 9

## 2024-10-14 ASSESSMENT — ANXIETY QUESTIONNAIRES
2. NOT BEING ABLE TO STOP OR CONTROL WORRYING: SEVERAL DAYS
7. FEELING AFRAID AS IF SOMETHING AWFUL MIGHT HAPPEN: NOT AT ALL
6. BECOMING EASILY ANNOYED OR IRRITABLE: SEVERAL DAYS
1. FEELING NERVOUS, ANXIOUS, OR ON EDGE: SEVERAL DAYS
4. TROUBLE RELAXING: SEVERAL DAYS
7. FEELING AFRAID AS IF SOMETHING AWFUL MIGHT HAPPEN: NOT AT ALL
3. WORRYING TOO MUCH ABOUT DIFFERENT THINGS: SEVERAL DAYS
GAD7 TOTAL SCORE: 6
8. IF YOU CHECKED OFF ANY PROBLEMS, HOW DIFFICULT HAVE THESE MADE IT FOR YOU TO DO YOUR WORK, TAKE CARE OF THINGS AT HOME, OR GET ALONG WITH OTHER PEOPLE?: SOMEWHAT DIFFICULT
IF YOU CHECKED OFF ANY PROBLEMS ON THIS QUESTIONNAIRE, HOW DIFFICULT HAVE THESE PROBLEMS MADE IT FOR YOU TO DO YOUR WORK, TAKE CARE OF THINGS AT HOME, OR GET ALONG WITH OTHER PEOPLE: SOMEWHAT DIFFICULT
GAD7 TOTAL SCORE: 6
5. BEING SO RESTLESS THAT IT IS HARD TO SIT STILL: SEVERAL DAYS
GAD7 TOTAL SCORE: 6

## 2024-10-14 NOTE — TELEPHONE ENCOUNTER
Hospital Follow Up   10/11/2024 (4 hours) [facility] Emergency Dept      Diagnosis   Chest pain, unspecified type  Fatigue, unspecified type    Medications   No medication changes     Follow Up instructions   Schedule an appointment with Unique Negrete PA-C (Family Medicine)  Schedule an appointment with Roxie Schrader MD (Cardiovascular Disease) [phone]    Routing to VALENTINA Burkett, Registered Nurse  [facility]

## 2024-10-14 NOTE — TELEPHONE ENCOUNTER
"  Transitions of Care Outreach  Chief Complaint   Patient presents with    Hospital F/U     Chest pain, unspecified type Fatigue, unspecified type       Most Recent Admission Date: 10/11/2024   Most Recent Admission Diagnosis:      Most Recent Discharge Date: 10/11/2024   Most Recent Discharge Diagnosis: Chest pain, unspecified type - R07.9  Fatigue, unspecified type - R53.83     Transitions of Care Assessment    Discharge Assessment  How are you doing now that you are home?: patient travelled last week and feels that he picked up a \"bug\", he is feeling well now  How are your symptoms? (Red Flag symptoms escalate to triage hotline per guidelines): Improved  Do you know how to contact your clinic care team if you have future questions or changes to your health status? : Yes  Does the patient have their discharge instructions? : Yes  Does the patient have questions regarding their discharge instructions? : No  Were you started on any new medications or were there changes to any of your previous medications? : No  Does the patient have all of their medications?: Yes  Do you have questions regarding any of your medications? : No  Do you have all of your needed medical supplies or equipment (DME)?  (i.e. oxygen tank, CPAP, cane, etc.): Yes    Follow up Plan     Discharge Follow-Up  Discharge follow up appointment scheduled in alignment with recommended follow up timeframe or Transitions of Risk Category? (Low = within 30 days; Moderate= within 14 days; High= within 7 days): No (RN also provided patient number to Dr. Schrader cardiovascular - AVS advised to schedule this as well as pcp [phone])  Patient's follow up appointment not scheduled: Patient accepted scheduling support. Appt scheduled/requested per protocol.    Future Appointments   Date Time Provider Department Center   10/15/2024  3:30 PM Ravindra Vasquez APRN CNP [facility]       Outpatient Plan as outlined on AVS reviewed with patient.    For any urgent " concerns, please contact our 24 hour nurse triage line: [phone] ([phone])       Clarita Burkett RN

## 2024-10-15 ENCOUNTER — OFFICE VISIT (OUTPATIENT)
Dept: FAMILY MEDICINE | Facility: CLINIC | Age: 59
End: 2024-10-15
Payer: COMMERCIAL

## 2024-10-15 VITALS
SYSTOLIC BLOOD PRESSURE: 129 MMHG | TEMPERATURE: 98.1 F | HEIGHT: 70 IN | OXYGEN SATURATION: 96 % | WEIGHT: 228.3 LBS | HEART RATE: 75 BPM | DIASTOLIC BLOOD PRESSURE: 74 MMHG | BODY MASS INDEX: 32.69 KG/M2 | RESPIRATION RATE: 17 BRPM

## 2024-10-15 DIAGNOSIS — R07.9 CHEST PAIN, UNSPECIFIED TYPE: Primary | ICD-10-CM

## 2024-10-15 DIAGNOSIS — Z23 ENCOUNTER FOR IMMUNIZATION: ICD-10-CM

## 2024-10-15 DIAGNOSIS — L98.9 SKIN LESION: ICD-10-CM

## 2024-10-15 PROCEDURE — 90471 IMMUNIZATION ADMIN: CPT

## 2024-10-15 PROCEDURE — 91320 SARSCV2 VAC 30MCG TRS-SUC IM: CPT

## 2024-10-15 PROCEDURE — 90673 RIV3 VACCINE NO PRESERV IM: CPT

## 2024-10-15 PROCEDURE — 90480 ADMN SARSCOV2 VAC 1/ONLY CMP: CPT

## 2024-10-15 PROCEDURE — 99213 OFFICE O/P EST LOW 20 MIN: CPT | Mod: 25

## 2024-10-15 ASSESSMENT — PAIN SCALES - GENERAL: PAINLEVEL: NO PAIN (0)

## 2024-10-15 NOTE — PROGRESS NOTES
"  Assessment & Plan     (R07.9) Chest pain, unspecified type  (primary encounter diagnosis)  Comment: has not had any recurrence. Patient feels may have had a viral illness that was main contributor. Nonetheless, discussed w/ patient to get scheduled/follow up w/ cardiology. It sounds like patient  has already done this and will be seeing cardiology before the end of the year. Any cardiac red flags patient to re present to ED for evaluation. Patient fully understands and is agreeable with plan of care, at this point patient will follow up as needed unless acute concerns arise in the meantime.    (L98.9) Skin lesion  Comment: noted multiple skin lesions on bilateral elbows, suspect seborrheic keratosis. Patient also has skin lesion on top of scalp that is irregular, diameter is > 6 mm, persistently bleeds and itches. Would like to have evaluated and potentially biopsied by dermatology, referral placed. Patient fully understands and is agreeable with plan of care, at this point patient will follow up as needed unless acute concerns arise in the meantime.  Plan: Adult Dermatology  Referral    (Z23) Encounter for immunization  Comment: updated.   Plan: INFLUENZA VACCINE TRIVALENT(FLUBLOK), COVID-19         12+ (PFIZER)     The longitudinal plan of care for the diagnosis(es)/condition(s) as documented were addressed during this visit. Due to the added complexity in care, I will continue to support Sathya in the subsequent management and with ongoing continuity of care.      MED REC REQUIRED  Post Medication Reconciliation Status: discharge medications reconciled, continue medications without change  BMI  Estimated body mass index is 32.76 kg/m  as calculated from the following:    Height as of this encounter: 1.778 m (5' 10\").    Weight as of this encounter: 103.6 kg (228 lb 4.8 oz).     Subjective   Sathya is a 59 year old, presenting for the following health issues:  Hospital F/U (Chest Pain), Imm/Inj (COVID & " "FLU), and Derm Problem (Skin Tag check - Upper arms bilaterally)        10/15/2024     3:27 PM   Additional Questions   Roomed by Torri Dangelo EMT-B     Hospitals in [place]     ED/UC Followup:    Facility:  [facility]  Date of visit: 10/11/24  Reason for visit: Chest Pain  Current Status: Improved    ED cardiac work up overall negative   Intending to follow up w/ Cardiology soon     Derm Problem:  Pt would like some skin tags checked, on upper arms bilaterally.    Review of Systems  Constitutional, cardiovascular, pulmonary, GI, , musculoskeletal, neuro, skin, endocrine and psych systems are negative, except as otherwise noted.      Objective    /74 (BP Location: Right arm, Patient Position: Sitting, Cuff Size: Adult Large)   Pulse 75   Temp 98.1  F (36.7  C) (Oral)   Resp 17   Ht 1.778 m (5' 10\")   Wt 103.6 kg (228 lb 4.8 oz)   SpO2 96%   BMI 32.76 kg/m    Body mass index is 32.76 kg/m .  Physical Exam  Vitals and nursing note reviewed.   Constitutional:       General: He is not in acute distress.     Appearance: Normal appearance. He is obese. He is not ill-appearing.   Cardiovascular:      Rate and Rhythm: Normal rate and regular rhythm.   Pulmonary:      Effort: Pulmonary effort is normal.   Skin:     General: Skin is warm and dry.      Comments: Multiple SK lesions bilateral elbows. Irregular, >6mm, bleeding  lesion on top  of scalp.   Neurological:      Mental Status: He is alert.   Psychiatric:         Mood and Affect: Mood normal.         Behavior: Behavior normal. Behavior is cooperative.         Thought Content: Thought content normal.         Judgment: Judgment normal.          Signed Electronically by: LORI Little CNP    Answers submitted by the patient for this visit:  Patient Health Questionnaire (Submitted on 10/14/2024)  If you checked off any problems, how difficult have these problems made it for you to do your work, take care of things at home, or get along with other " people?: Somewhat difficult  PHQ9 TOTAL SCORE: 9  Patient Health Questionnaire (G7) (Submitted on 10/14/2024)  CARLOS 7 TOTAL SCORE: 6

## 2024-10-15 NOTE — PATIENT INSTRUCTIONS
Follow up w/ Cardiology regarding ED visit     Dermatology referral to assess scalp lesion, suspect lesions on elbows may be sebborheic keratosis    Flu and COVID shot today

## 2024-10-16 ENCOUNTER — TELEPHONE (OUTPATIENT)
Dept: DERMATOLOGY | Facility: CLINIC | Age: 59
End: 2024-10-16
Payer: COMMERCIAL

## 2024-10-16 NOTE — TELEPHONE ENCOUNTER
This encounter is being sent to inform the clinic that this patient has a referral from Ravindra Vasquez APRN CNP for the diagnoses of Skin lesion [L98.9], and has requested that this patient be seen within 1-2 weeks.  Based on the availability of our provider(s), we are unable to accommodate this request.    Were all sites offered this patient?  Yes    Does scheduling algorithm request to schedule next available?  Patient has been scheduled for the first available opening with Dr. Arce on 7/17/25.  We have informed the patient that the clinic will review their referral and reach out if a sooner appointment is medically necessary.

## 2024-10-16 NOTE — TELEPHONE ENCOUNTER
Caller: Sathya        Rescheduled: Yes,   Procedure: Lower Endoscopy [Colonoscopy]    Date: 11/5   Location: [facility]; [facility] [address] [place] [address]   Surgeon: Jerry   Sedation Level Scheduled  CS ,  Reason for Sedation Level order   Instructions updated and sent: y     Does patient need PAC or Pre -Op Rescheduled? : n       Did you cancel or rescheduled an EUS procedure? No.

## 2024-10-17 NOTE — TELEPHONE ENCOUNTER
Patient called back- spot  only check.    Thank you,    Vianey DUARTERN BSN  [facility]- [phone]

## 2024-10-17 NOTE — TELEPHONE ENCOUNTER
Patient Contact    Attempt # 1    Was call answered?  No.    Left message on answering machine for patient to call back.    Thank you,    Vianey DUARTERN BSN  [facility]- [phone]

## 2024-10-23 ENCOUNTER — OFFICE VISIT (OUTPATIENT)
Dept: DERMATOLOGY | Facility: CLINIC | Age: 59
End: 2024-10-23
Payer: COMMERCIAL

## 2024-10-23 DIAGNOSIS — L21.9 DERMATITIS, SEBORRHEIC: Primary | ICD-10-CM

## 2024-10-23 DIAGNOSIS — L29.9 PRURITUS: ICD-10-CM

## 2024-10-23 DIAGNOSIS — L82.1 SEBORRHEIC KERATOSIS: ICD-10-CM

## 2024-10-23 PROCEDURE — 99204 OFFICE O/P NEW MOD 45 MIN: CPT | Mod: 25 | Performed by: STUDENT IN AN ORGANIZED HEALTH CARE EDUCATION/TRAINING PROGRAM

## 2024-10-23 PROCEDURE — 17110 DESTRUCTION B9 LES UP TO 14: CPT | Performed by: STUDENT IN AN ORGANIZED HEALTH CARE EDUCATION/TRAINING PROGRAM

## 2024-10-23 RX ORDER — KETOCONAZOLE 20 MG/ML
SHAMPOO, SUSPENSION TOPICAL
Qty: 120 ML | Refills: 11 | Status: SHIPPED | OUTPATIENT
Start: 2024-10-23

## 2024-10-23 NOTE — PATIENT INSTRUCTIONS
Cryotherapy    What is it?  Use of a very cold liquid, such as liquid nitrogen, to freeze and destroy abnormal skin cells that need to be removed    What should I expect?  Tenderness and redness  A small blister that might grow and fill with dark purple blood. There may be crusting.  More than one treatment may be needed if the lesions do not go away.    How do I care for the treated area?  Gently wash the area with your hands when bathing.  Use a thin layer of Vaseline to help with healing. You may use a Band-Aid.   The area should heal within 7-10 days and may leave behind a pink or lighter color.   Do not use an antibiotic or Neosporin ointment.   You may take acetaminophen (Tylenol) for pain.     Call your doctor if you have:  Severe pain  Signs of infection (warmth, redness, cloudy yellow drainage, and or a bad smell)  Questions or concerns    Who should I call with questions?      [facility]: [phone]      [facility]: [phone]      For urgent needs outside of business hours call the [facility] at [phone] and ask for the dermatology resident on call

## 2024-10-23 NOTE — LETTER
10/23/2024      John Reyes  [address] [facility]  [place] [address]      Dear Colleague,    Thank you for referring your patient, John Reyes, to the [facility]. Please see a copy of my visit note below.    [facility] Dermatology Note  Encounter Date: Oct 23, 2024  Office Visit     Reviewed patients past medical history and pertinent chart review prior to patients visit today.     Dermatology Problem List:  # Seborrheic dermatitis, scalp  -Current tx: Ketoconazole shampoo and Lidex solution  # ISK, mid parietal scalp, s/p cryo 10/23/24    Personal Hx: Negative for skin cancer  Family Hx: Negative for skin cancer  ____________________________________________    Assessment & Plan:     # Inflamed Seborrheic Keratosis, mid parietal scalp   -The benign nature of the skin lesion(s) was discussed with the patient. The patient requested treatment intervention due to finding the lesions to be bothersome. Discussed cryotherapy treatment with patient. Patient elects to pursue cryotherapy today. After verbal consent and discussion of risks and benefits including but no limited to dyspigmentation/scar, blister, and pain, 1 was(were) treated with 1-2mm freeze border for 2 cycles with liquid nitrogen. Post cryotherapy instructions were provided.     # Seborrheic dermatitis, scalp  # Pruritus  Discussed that this is a recurring rash for most people and will need some maintenance even after rash has resolved. Begin washing with ketoconazole shampoo 3x weekly. Advised to leave on for 2-5 minutes before rinsing. He has a prescription for fluocinonide 0.05% solution which he may continue to use sparingly as needed for up to 2 weeks at a time. Stop use when rash is not symptomatic and use for flares only. Advised to avoid face, groin and skin folds. Councled about use and side effects of thinning of the skin, striae, erythema, and worsening of rash.         Follow up: as  needed     All risks, benefits and alternatives were discussed with patient.  Patient is in agreement and understands the assessment and plan.  All questions were answered.  Marine Turpin PA-C  [facility]  _______________________________________    CC: Derm Problem (Lesions on the scalp have been there for 6 months denies bleeding, discomfort, and pain )    HPI:  Mr. John Reyes is a(n) 59 year old male who presents today as a new patient for evaluation of a skin lesion involving the mid parietal scalp. This lesion has been present for 6 months. It does not itch, bleed, or cause pain. However, the lesion does become bothersome when it catches on his comb. He is interested in treatment. It has not grown in size.    Patient is otherwise feeling well, without additional skin concerns.    Physical Exam:  SKIN: Focused examination of the scalp only was performed per patient request.  - mid parietal scalp, waxy, stuck on tan to brown papule   - scalp, most prominent at the right occipital/parietal scalp, macular erythema with overlying adherent scale     - No other lesions of concern on areas examined.     Medications:  Current Outpatient Medications   Medication Sig Dispense Refill     albuterol (PROAIR HFA/PROVENTIL HFA/VENTOLIN HFA) 108 (90 Base) MCG/ACT inhaler Inhale 2 puffs into the lungs every 4 hours as needed for shortness of breath or wheezing 18 g 0     azelastine (ASTELIN) 0.1 % nasal spray Spray 1 spray into both nostrils 2 times daily 30 mL 0     buPROPion (WELLBUTRIN XL) 300 MG 24 hr tablet Take 1 tablet (300 mg) by mouth daily 90 tablet 3     clopidogrel (PLAVIX) 75 MG tablet Take 1 tablet (75 mg) by mouth daily 90 tablet 3     dexAMETHasone (DECADRON) 0.5 MG/5ML elixir Take 5 mLs (0.5 mg) by mouth 2 times daily as needed (canker sores) 237 mL 1     dexamethasone alcohol-free (DECADRON) 0.1 MG/ML solution        ezetimibe (ZETIA) 10 MG tablet Take 1 tablet (10 mg) by mouth daily 90  tablet 3     fluocinonide (LIDEX) 0.05 % external solution APPLY TO SCALP DAILY AS NEEDED 60 mL 0     fluticasone (FLONASE) 50 MCG/ACT nasal spray Spray 1 spray into both nostrils daily 16 g 3     metoprolol succinate ER (TOPROL XL) 25 MG 24 hr tablet Take 1 tablet (25 mg) by mouth daily 90 tablet 3     mirabegron (MYRBETRIQ) 25 MG 24 hr tablet Take 1 tablet (25 mg) by mouth daily 30 tablet 11     multivitamin, therapeutic (THERA-VIT) TABS tablet Take 1 tablet by mouth daily       nitroGLYcerin (NITROSTAT) 0.4 MG sublingual tablet For chest pain place 1 tablet under the tongue every 5 minutes for 3 doses. If symptoms persist 5 minutes after 1st dose call 911. 25 tablet 3     rosuvastatin (CRESTOR) 20 MG tablet Take 1 tablet (20 mg) by mouth daily 90 tablet 3     sertraline (ZOLOFT) 100 MG tablet Take 1 tablet (100 mg) by mouth daily 90 tablet 3     sildenafil (VIAGRA) 100 MG tablet TAKE ONE TABLET BY MOUTH DAILY AS NEEDED. DO NOT TAKE SAME DAY AS NITRO 30 tablet 0     solifenacin (VESICARE) 10 MG tablet Take 1 tablet (10 mg) by mouth daily 90 tablet 3     traZODone (DESYREL) 50 MG tablet Take 3 tablets (150 mg) by mouth at bedtime 270 tablet 3     No current facility-administered medications for this visit.      Past Medical History:   Patient Active Problem List   Diagnosis     Attention deficit disorder     Esophageal reflux     HYPERLIPIDEMIA LDL GOAL <130     Sleep disorder     Mild major depression (H)     Hypertriglyceridemia     Anxiety     Testosterone deficiency     Impaired fasting glucose     Hypogonadism male     Elevated ferritin     Unstable angina (H)     Abnormal stress test     Percutaneous transluminal coronary angioplasty status     CAD (coronary artery disease)     Coronary artery disease of native artery of native heart with stable angina pectoris (H)     Abnormal findings on diagnostic imaging of heart and coronary circulation     Status post coronary angiogram     Moderate episode of recurrent  "major depressive disorder (H)     Overactive bladder     Erectile dysfunction due to diseases classified elsewhere     Mixed dyslipidemia     Past Medical History:   Diagnosis Date     Acute duodenal ulcer without mention of hemorrhage, perforation, or obstruction 1990    diagnosed on EGD     Allergic rhinitis, cause unspecified      Attention deficit disorder without mention of hyperactivity     \"all my life\"     CAD (coronary artery disease) 06/29/2021     Cataplexy and narcolepsy     had for many years, but diagnosed in sleep lab 6/03     Depressive disorder      Esophageal reflux 11/28/2007     Hyperlipidemia LDL goal <130 10/31/2010     Hypertriglyceridemia 08/10/2012     Mild major depression (H) 09/15/2011     OBESITY NOS 11/28/2007     PONV (postoperative nausea and vomiting)      Sleep apnea     CPAP     Sleep disorder 04/12/2011       CC Referred Self, MD  No address on file on close of this encounter.     Attestation with edits by Ed Hancock MD at 10/23/2024  9:31 AM:  I have personally examined this patient and agree with thePA's documentation and plan of care. I have reviewed and amended the PA's note. The documentation accurately reflects my clinical observations, diagnoses, treatment and follow-up plans. I was present for key portions of the procedure(s).       Ed Hancock MD  Dermatology Staff      Again, thank you for allowing me to participate in the care of your patient.        Sincerely,        Ed Hancock MD  "

## 2024-10-23 NOTE — PROGRESS NOTES
[facility] Dermatology Note  Encounter Date: Oct 23, 2024  Office Visit     Reviewed patients past medical history and pertinent chart review prior to patients visit today.     Dermatology Problem List:  # Seborrheic dermatitis, scalp  -Current tx: Ketoconazole shampoo and Lidex solution  # ISK, mid parietal scalp, s/p cryo 10/23/24    Personal Hx: Negative for skin cancer  Family Hx: Negative for skin cancer  ____________________________________________    Assessment & Plan:     # Inflamed Seborrheic Keratosis, mid parietal scalp   -The benign nature of the skin lesion(s) was discussed with the patient. The patient requested treatment intervention due to finding the lesions to be bothersome. Discussed cryotherapy treatment with patient. Patient elects to pursue cryotherapy today. After verbal consent and discussion of risks and benefits including but no limited to dyspigmentation/scar, blister, and pain, 1 was(were) treated with 1-2mm freeze border for 2 cycles with liquid nitrogen. Post cryotherapy instructions were provided.     # Seborrheic dermatitis, scalp  # Pruritus  Discussed that this is a recurring rash for most people and will need some maintenance even after rash has resolved. Begin washing with ketoconazole shampoo 3x weekly. Advised to leave on for 2-5 minutes before rinsing. He has a prescription for fluocinonide 0.05% solution which he may continue to use sparingly as needed for up to 2 weeks at a time. Stop use when rash is not symptomatic and use for flares only. Advised to avoid face, groin and skin folds. Councled about use and side effects of thinning of the skin, striae, erythema, and worsening of rash.         Follow up: as needed     All risks, benefits and alternatives were discussed with patient.  Patient is in agreement and understands the assessment and plan.  All questions were answered.  [facility]  Dermatology  _______________________________________    CC: Derm Problem (Lesions on the scalp have been there for 6 months denies bleeding, discomfort, and pain )    HPI:  Mr. John Reyes is a(n) 59 year old male who presents today as a new patient for evaluation of a skin lesion involving the mid parietal scalp. This lesion has been present for 6 months. It does not itch, bleed, or cause pain. However, the lesion does become bothersome when it catches on his comb. He is interested in treatment. It has not grown in size.    Patient is otherwise feeling well, without additional skin concerns.    Physical Exam:  SKIN: Focused examination of the scalp only was performed per patient request.  - mid parietal scalp, waxy, stuck on tan to brown papule   - scalp, most prominent at the right occipital/parietal scalp, macular erythema with overlying adherent scale     - No other lesions of concern on areas examined.     Medications:  Current Outpatient Medications   Medication Sig Dispense Refill    albuterol (PROAIR HFA/PROVENTIL HFA/VENTOLIN HFA) 108 (90 Base) MCG/ACT inhaler Inhale 2 puffs into the lungs every 4 hours as needed for shortness of breath or wheezing 18 g 0    azelastine (ASTELIN) 0.1 % nasal spray Spray 1 spray into both nostrils 2 times daily 30 mL 0    buPROPion (WELLBUTRIN XL) 300 MG 24 hr tablet Take 1 tablet (300 mg) by mouth daily 90 tablet 3    clopidogrel (PLAVIX) 75 MG tablet Take 1 tablet (75 mg) by mouth daily 90 tablet 3    dexAMETHasone (DECADRON) 0.5 MG/5ML elixir Take 5 mLs (0.5 mg) by mouth 2 times daily as needed (canker sores) 237 mL 1    dexamethasone alcohol-free (DECADRON) 0.1 MG/ML solution       ezetimibe (ZETIA) 10 MG tablet Take 1 tablet (10 mg) by mouth daily 90 tablet 3    fluocinonide (LIDEX) 0.05 % external solution APPLY TO SCALP DAILY AS NEEDED 60 mL 0    fluticasone (FLONASE) 50 MCG/ACT nasal spray Spray 1 spray into both nostrils daily 16 g 3    metoprolol succinate ER  (TOPROL XL) 25 MG 24 hr tablet Take 1 tablet (25 mg) by mouth daily 90 tablet 3    mirabegron (MYRBETRIQ) 25 MG 24 hr tablet Take 1 tablet (25 mg) by mouth daily 30 tablet 11    multivitamin, therapeutic (THERA-VIT) TABS tablet Take 1 tablet by mouth daily      nitroGLYcerin (NITROSTAT) 0.4 MG sublingual tablet For chest pain place 1 tablet under the tongue every 5 minutes for 3 doses. If symptoms persist 5 minutes after 1st dose call 911. 25 tablet 3    rosuvastatin (CRESTOR) 20 MG tablet Take 1 tablet (20 mg) by mouth daily 90 tablet 3    sertraline (ZOLOFT) 100 MG tablet Take 1 tablet (100 mg) by mouth daily 90 tablet 3    sildenafil (VIAGRA) 100 MG tablet TAKE ONE TABLET BY MOUTH DAILY AS NEEDED. DO NOT TAKE SAME DAY AS NITRO 30 tablet 0    solifenacin (VESICARE) 10 MG tablet Take 1 tablet (10 mg) by mouth daily 90 tablet 3    traZODone (DESYREL) 50 MG tablet Take 3 tablets (150 mg) by mouth at bedtime 270 tablet 3     No current facility-administered medications for this visit.      Past Medical History:   Patient Active Problem List   Diagnosis    Attention deficit disorder    Esophageal reflux    HYPERLIPIDEMIA LDL GOAL <130    Sleep disorder    Mild major depression (H)    Hypertriglyceridemia    Anxiety    Testosterone deficiency    Impaired fasting glucose    Hypogonadism male    Elevated ferritin    Unstable angina (H)    Abnormal stress test    Percutaneous transluminal coronary angioplasty status    CAD (coronary artery disease)    Coronary artery disease of native artery of native heart with stable angina pectoris (H)    Abnormal findings on diagnostic imaging of heart and coronary circulation    Status post coronary angiogram    Moderate episode of recurrent major depressive disorder (H)    Overactive bladder    Erectile dysfunction due to diseases classified elsewhere    Mixed dyslipidemia     Past Medical History:   Diagnosis Date    Acute duodenal ulcer without mention of hemorrhage, perforation,  "or obstruction 1990    diagnosed on EGD    Allergic rhinitis, cause unspecified     Attention deficit disorder without mention of hyperactivity     \"all my life\"    CAD (coronary artery disease) 06/29/2021    Cataplexy and narcolepsy     had for many years, but diagnosed in sleep lab 6/03    Depressive disorder     Esophageal reflux 11/28/2007    Hyperlipidemia LDL goal <130 10/31/2010    Hypertriglyceridemia 08/10/2012    Mild major depression (H) 09/15/2011    OBESITY NOS 11/28/2007    PONV (postoperative nausea and vomiting)     Sleep apnea     CPAP    Sleep disorder 04/12/2011       CC Referred Self, MD  No address on file on close of this encounter.   "

## 2024-11-05 ENCOUNTER — HOSPITAL ENCOUNTER (OUTPATIENT)
Facility: CLINIC | Age: 59
Discharge: HOME | End: 2024-11-05
Attending: COLON & RECTAL SURGERY | Admitting: COLON & RECTAL SURGERY
Payer: COMMERCIAL

## 2024-11-05 VITALS
OXYGEN SATURATION: 95 % | RESPIRATION RATE: 15 BRPM | SYSTOLIC BLOOD PRESSURE: 116 MMHG | HEART RATE: 66 BPM | DIASTOLIC BLOOD PRESSURE: 75 MMHG

## 2024-11-05 LAB — COLONOSCOPY: NORMAL

## 2024-11-05 PROCEDURE — 45382 COLONOSCOPY W/CONTROL BLEED: CPT | Performed by: COLON & RECTAL SURGERY

## 2024-11-05 PROCEDURE — 250N000011 HC RX IP 250 OP 636: Performed by: COLON & RECTAL SURGERY

## 2024-11-05 PROCEDURE — G0500 MOD SEDAT ENDO SERVICE >5YRS: HCPCS | Performed by: COLON & RECTAL SURGERY

## 2024-11-05 PROCEDURE — 88305 TISSUE EXAM BY PATHOLOGIST: CPT | Mod: TC | Performed by: COLON & RECTAL SURGERY

## 2024-11-05 PROCEDURE — 88305 TISSUE EXAM BY PATHOLOGIST: CPT | Mod: 26 | Performed by: PATHOLOGY

## 2024-11-05 PROCEDURE — 45385 COLONOSCOPY W/LESION REMOVAL: CPT | Performed by: COLON & RECTAL SURGERY

## 2024-11-05 PROCEDURE — 250N000013 HC RX MED GY IP 250 OP 250 PS 637: Performed by: COLON & RECTAL SURGERY

## 2024-11-05 RX ORDER — FLUMAZENIL 0.1 MG/ML
0.2 INJECTION, SOLUTION INTRAVENOUS
Status: DISCONTINUED | OUTPATIENT
Start: 2024-11-05 | End: 2024-11-05 | Stop reason: HOSPADM

## 2024-11-05 RX ORDER — ONDANSETRON 2 MG/ML
4 INJECTION INTRAMUSCULAR; INTRAVENOUS
Status: DISCONTINUED | OUTPATIENT
Start: 2024-11-05 | End: 2024-11-05 | Stop reason: HOSPADM

## 2024-11-05 RX ORDER — FENTANYL CITRATE 50 UG/ML
50-100 INJECTION, SOLUTION INTRAMUSCULAR; INTRAVENOUS EVERY 5 MIN PRN
Status: DISCONTINUED | OUTPATIENT
Start: 2024-11-05 | End: 2024-11-05 | Stop reason: HOSPADM

## 2024-11-05 RX ORDER — NALOXONE HYDROCHLORIDE 0.4 MG/ML
0.4 INJECTION, SOLUTION INTRAMUSCULAR; INTRAVENOUS; SUBCUTANEOUS
Status: DISCONTINUED | OUTPATIENT
Start: 2024-11-05 | End: 2024-11-05 | Stop reason: HOSPADM

## 2024-11-05 RX ORDER — ONDANSETRON 2 MG/ML
INJECTION INTRAMUSCULAR; INTRAVENOUS PRN
Status: DISCONTINUED | OUTPATIENT
Start: 2024-11-05 | End: 2024-11-05 | Stop reason: HOSPADM

## 2024-11-05 RX ORDER — ONDANSETRON 4 MG/1
4 TABLET, ORALLY DISINTEGRATING ORAL EVERY 6 HOURS PRN
Status: DISCONTINUED | OUTPATIENT
Start: 2024-11-05 | End: 2024-11-05 | Stop reason: HOSPADM

## 2024-11-05 RX ORDER — PROCHLORPERAZINE MALEATE 10 MG
10 TABLET ORAL EVERY 6 HOURS PRN
Status: DISCONTINUED | OUTPATIENT
Start: 2024-11-05 | End: 2024-11-05 | Stop reason: HOSPADM

## 2024-11-05 RX ORDER — DIPHENHYDRAMINE HYDROCHLORIDE 50 MG/ML
25-50 INJECTION INTRAMUSCULAR; INTRAVENOUS
Status: DISCONTINUED | OUTPATIENT
Start: 2024-11-05 | End: 2024-11-05 | Stop reason: HOSPADM

## 2024-11-05 RX ORDER — LIDOCAINE 40 MG/G
CREAM TOPICAL
Status: DISCONTINUED | OUTPATIENT
Start: 2024-11-05 | End: 2024-11-05 | Stop reason: HOSPADM

## 2024-11-05 RX ORDER — NALOXONE HYDROCHLORIDE 0.4 MG/ML
0.2 INJECTION, SOLUTION INTRAMUSCULAR; INTRAVENOUS; SUBCUTANEOUS
Status: DISCONTINUED | OUTPATIENT
Start: 2024-11-05 | End: 2024-11-05 | Stop reason: HOSPADM

## 2024-11-05 RX ORDER — EPINEPHRINE 1 MG/ML
0.1 INJECTION, SOLUTION, CONCENTRATE INTRAVENOUS
Status: DISCONTINUED | OUTPATIENT
Start: 2024-11-05 | End: 2024-11-05 | Stop reason: HOSPADM

## 2024-11-05 RX ORDER — SIMETHICONE 40MG/0.6ML
133 SUSPENSION, DROPS(FINAL DOSAGE FORM)(ML) ORAL
Status: COMPLETED | OUTPATIENT
Start: 2024-11-05 | End: 2024-11-05

## 2024-11-05 RX ORDER — ATROPINE SULFATE 0.1 MG/ML
1 INJECTION INTRAVENOUS
Status: DISCONTINUED | OUTPATIENT
Start: 2024-11-05 | End: 2024-11-05 | Stop reason: HOSPADM

## 2024-11-05 RX ORDER — ONDANSETRON 2 MG/ML
4 INJECTION INTRAMUSCULAR; INTRAVENOUS EVERY 6 HOURS PRN
Status: DISCONTINUED | OUTPATIENT
Start: 2024-11-05 | End: 2024-11-05 | Stop reason: HOSPADM

## 2024-11-05 RX ADMIN — MIDAZOLAM 2 MG: 1 INJECTION INTRAMUSCULAR; INTRAVENOUS at 09:21

## 2024-11-05 RX ADMIN — SIMETHICONE 133 MG: 20 SUSPENSION/ DROPS ORAL at 09:28

## 2024-11-05 RX ADMIN — FENTANYL CITRATE 100 MCG: 50 INJECTION, SOLUTION INTRAMUSCULAR; INTRAVENOUS at 09:21

## 2024-11-05 ASSESSMENT — ACTIVITIES OF DAILY LIVING (ADL)
ADLS_ACUITY_SCORE: 0
ADLS_ACUITY_SCORE: 0

## 2024-11-05 NOTE — H&P
Pre-Endoscopy History and Physical     John Reyes MRN# [id number]   YOB: 1965 Age: 59 year old     Date of Procedure: 11/5/2024  Primary care provider: Unique Negrete  Type of Endoscopy: Colonoscopy  Reason for Procedure: H/O polyps  Type of Anesthesia Anticipated: Moderate Sedation    HPI:    John is a 59 year old male who will be undergoing the above procedure.      A history and physical has been performed. The patient's medications and allergies have been reviewed. The risks and benefits of the procedure and the sedation options and risks were discussed with the patient.  All questions were answered and informed consent was obtained.      He denies a personal or family history of anesthesia complications or bleeding disorders.     Allergies   Allergen Reactions    Penicillins Itching, Swelling and Rash     Rash        Current Facility-Administered Medications   Medication Dose Route Frequency Provider Last Rate Last Admin    atropine injection 1 mg  1 mg Intravenous Once PRN Shakira Edwards MD        benzocaine 20% (HURRICAINE/TOPEX) 20 % spray 0.5 mL  1 spray Mouth/Throat Once PRN Shakira Edwards MD        diphenhydrAMINE (BENADRYL) injection 25-50 mg  25-50 mg Intravenous Once PRN Shakira Edwards MD        EPINEPHrine PF (ADRENALIN) injection 0.1 mg  0.1 mg Submucosal Once PRN Shakira Edwards MD        fentaNYL (PF) (SUBLIMAZE) injection  mcg   mcg Intravenous Q5 Min PRN Shakira Edwards MD        flumazenil (ROMAZICON) injection 0.2 mg  0.2 mg Intravenous q1 min prn Shakira Edwards MD        glucagon injection 0.5 mg  0.5 mg Intravenous Once PRN Shakira Edwards MD        midazolam (VERSED) injection 0.5-2 mg  0.5-2 mg Intravenous Q4 Min PRN Shakira Edwards MD        naloxone (NARCAN) injection 0.2 mg  0.2 mg Intravenous Q2 Min PRN Shakira Edwards MD        Or    naloxone (NARCAN) injection 0.4 mg  0.4 mg Intravenous Q2  "Min PRN Shakira Edwards MD        Or    naloxone (NARCAN) injection 0.2 mg  0.2 mg Intramuscular Q2 Min PRN Shakira Edwards MD        Or    naloxone (NARCAN) injection 0.4 mg  0.4 mg Intramuscular Q2 Min PRN Shakira Edwards MD        ondansetron (ZOFRAN) injection   Intravenous PRN Shakira Edwards MD   4 mg at 11/05/24 0914    simethicone (MYLICON) suspension 133 mg  133 mg Oral Once PRN Shakira dEwards MD        sodium chloride (PF) 0.9% PF flush 3 mL  3 mL Intravenous q1 min prn Shakira Edwards MD        sodium chloride 0.9% BOLUS 500 mL  500 mL Intravenous Once PRN Shakira Edwards MD           Patient Active Problem List   Diagnosis    Attention deficit disorder    Esophageal reflux    HYPERLIPIDEMIA LDL GOAL <130    Sleep disorder    Mild major depression (H)    Hypertriglyceridemia    Anxiety    Testosterone deficiency    Impaired fasting glucose    Hypogonadism male    Elevated ferritin    Unstable angina (H)    Abnormal stress test    Percutaneous transluminal coronary angioplasty status    CAD (coronary artery disease)    Coronary artery disease of native artery of native heart with stable angina pectoris (H)    Abnormal findings on diagnostic imaging of heart and coronary circulation    Status post coronary angiogram    Moderate episode of recurrent major depressive disorder (H)    Overactive bladder    Erectile dysfunction due to diseases classified elsewhere    Mixed dyslipidemia        Past Medical History:   Diagnosis Date    Acute duodenal ulcer without mention of hemorrhage, perforation, or obstruction 1990    diagnosed on EGD    Allergic rhinitis, cause unspecified     Attention deficit disorder without mention of hyperactivity     \"all my life\"    CAD (coronary artery disease) 06/29/2021    Cataplexy and narcolepsy     had for many years, but diagnosed in sleep lab 6/03    Depressive disorder     Esophageal reflux 11/28/2007    Hyperlipidemia LDL goal <130 " 10/31/2010    Hypertriglyceridemia 08/10/2012    Mild major depression (H) 09/15/2011    OBESITY NOS 11/28/2007    PONV (postoperative nausea and vomiting)     Sleep apnea     CPAP    Sleep disorder 04/12/2011        Past Surgical History:   Procedure Laterality Date    ARTHROSCOPY SHOULDER, OPEN ROTATOR CUFF REPAIR, COMBINED Left 11/5/2014    Procedure: COMBINED ARTHROSCOPY SHOULDER, OPEN ROTATOR CUFF REPAIR;  Surgeon: Maykel Khalil MD;  Location: [facility] OR    COLONOSCOPY  9/9/2013    Procedure: COMBINED COLONOSCOPY, SINGLE BIOPSY/POLYPECTOMY BY BIOPSY;;  Surgeon: Hali Lema MD;  Location:  GI    CV CORONARY ANGIOGRAM N/A 7/20/2021    Procedure: Coronary Angiogram /CHP interventionalist;  Surgeon: Roxie Schrader MD;  Location:  HEART CARDIAC CATH LAB    CV HEART CATHETERIZATION WITH POSSIBLE INTERVENTION N/A 6/18/2021    Procedure: Heart Catheterization with Possible Intervention;  Surgeon: Roxie Schrader MD;  Location:  HEART CARDIAC CATH LAB    CV HEART CATHETERIZATION WITH POSSIBLE INTERVENTION N/A 6/21/2021    Procedure: Heart Catheterization with Possible Intervention;  Surgeon: Roxie Schrader MD;  Location:  HEART CARDIAC CATH LAB    CV INSTANTANEOUS WAVE-FREE RATIO N/A 7/20/2021    Procedure: Instantaneous Wave-Free Ratio;  Surgeon: Roxie Schrader MD;  Location:  HEART CARDIAC CATH LAB    CV LEFT HEART CATH N/A 6/18/2021    Procedure: Left Heart Cath;  Surgeon: Roxie Schrader MD;  Location:  HEART CARDIAC CATH LAB    CV PCI ANGIOPLASTY N/A 6/18/2021    Procedure: Percutaneous Coronary Intervention Angioplasty;  Surgeon: Roxie Schrader MD;  Location:  HEART CARDIAC CATH LAB    CV PCI STENT DRUG ELUTING N/A 6/21/2021    Procedure: Percutaneous Coronary Intervention Stent Drug Eluting;  Surgeon: Roxie Schrader MD;  Location:  HEART CARDIAC CATH LAB    CV PCI STENT DRUG ELUTING N/A 7/20/2021    Procedure: Percutaneous Coronary Intervention Stent Drug Eluting;   "Surgeon: Roxie Schrader MD;  Location:  HEART CARDIAC CATH LAB    GI SURGERY      henmmriodectoomy    HC EXPLOR MAXILL SINUS,INTRANASAL  `    HC VASECTOMY UNILAT/BILAT W POSTOP SEMEN      ZZC HAND/FINGER SURGERY UNLISTED      ORIF right middle finger       Social History     Tobacco Use    Smoking status: Former     Current packs/day: 0.00     Types: Cigarettes, Cigars     Quit date: 2003     Years since quittin.8    Smokeless tobacco: Former     Quit date: 2000   Substance Use Topics    Alcohol use: Yes     Comment: 3-4 beers/week       Family History   Problem Relation Age of Onset    Lipids Father         On medication    Coronary Artery Disease Father     Cancer - colorectal Maternal Uncle     Colon Cancer No family hx of        REVIEW OF SYSTEMS:     5 point ROS negative except as noted above in HPI, including Gen., Resp., CV, GI &  system review.      PHYSICAL EXAM:   /76   Pulse 66   Resp 16   SpO2 98%  Estimated body mass index is 32.76 kg/m  as calculated from the following:    Height as of 10/15/24: 1.778 m (5' 10\").    Weight as of 10/15/24: 103.6 kg (228 lb 4.8 oz).   GENERAL APPEARANCE: healthy and alert  MENTAL STATUS: alert  AIRWAY EXAM: Mallampatti Class I (visualization of the soft palate, fauces, uvula, anterior and posterior pillars)  RESP: lungs clear to auscultation - no rales, rhonchi or wheezes  CV: regular rates and rhythm      IMPRESSION   ASA Class 3 - Severe systemic disease, but not incapacitating        PLAN:     Plan for colonoscopy. We discussed the risks, benefits and alternatives and the patient wished to proceed.    The above has been forwarded to the consulting provider.      Kerrie Edwards MD  [facility]  Phone: [phone]  Fax: [phone]  2024    "

## 2024-11-06 LAB
PATH REPORT.COMMENTS IMP SPEC: NORMAL
PATH REPORT.COMMENTS IMP SPEC: NORMAL
PATH REPORT.FINAL DX SPEC: NORMAL
PATH REPORT.GROSS SPEC: NORMAL
PATH REPORT.MICROSCOPIC SPEC OTHER STN: NORMAL
PATH REPORT.RELEVANT HX SPEC: NORMAL
PHOTO IMAGE: NORMAL

## 2024-11-19 ENCOUNTER — PATIENT OUTREACH (OUTPATIENT)
Dept: GASTROENTEROLOGY | Facility: CLINIC | Age: 59
End: 2024-11-19
Payer: COMMERCIAL

## 2024-12-26 ENCOUNTER — TELEPHONE (OUTPATIENT)
Dept: CARDIOLOGY | Facility: CLINIC | Age: 59
End: 2024-12-26
Payer: COMMERCIAL

## 2024-12-26 DIAGNOSIS — I25.118 CORONARY ARTERY DISEASE OF NATIVE ARTERY OF NATIVE HEART WITH STABLE ANGINA PECTORIS (H): ICD-10-CM

## 2024-12-26 RX ORDER — ROSUVASTATIN CALCIUM 20 MG/1
20 TABLET, COATED ORAL DAILY
Qty: 90 TABLET | Refills: 0 | Status: SHIPPED | OUTPATIENT
Start: 2024-12-26

## 2025-01-08 DIAGNOSIS — I25.118 CORONARY ARTERY DISEASE OF NATIVE ARTERY OF NATIVE HEART WITH STABLE ANGINA PECTORIS: ICD-10-CM

## 2025-01-08 RX ORDER — METOPROLOL SUCCINATE 25 MG/1
25 TABLET, EXTENDED RELEASE ORAL DAILY
Qty: 90 TABLET | Refills: 0 | Status: SHIPPED | OUTPATIENT
Start: 2025-01-08

## 2025-01-08 NOTE — TELEPHONE ENCOUNTER
Pending Prescriptions:                       Disp   Refills    metoprolol succinate ER (TOPROL XL) 25 MG*90 tab*3            Sig: Take 1 tablet (25 mg) by mouth daily.    Last filled 10-3-24    Thank you,  Argenis Cabrera [facility]  [phone]

## 2025-02-10 ENCOUNTER — MYC MEDICAL ADVICE (OUTPATIENT)
Dept: FAMILY MEDICINE | Facility: CLINIC | Age: 60
End: 2025-02-10

## 2025-02-10 ENCOUNTER — OFFICE VISIT (OUTPATIENT)
Dept: CARDIOLOGY | Facility: CLINIC | Age: 60
End: 2025-02-10
Payer: COMMERCIAL

## 2025-02-10 VITALS
WEIGHT: 234.1 LBS | SYSTOLIC BLOOD PRESSURE: 116 MMHG | DIASTOLIC BLOOD PRESSURE: 76 MMHG | HEART RATE: 68 BPM | OXYGEN SATURATION: 96 % | HEIGHT: 70 IN | BODY MASS INDEX: 33.52 KG/M2

## 2025-02-10 DIAGNOSIS — Z98.61 PERCUTANEOUS TRANSLUMINAL CORONARY ANGIOPLASTY STATUS: ICD-10-CM

## 2025-02-10 DIAGNOSIS — I25.10 CORONARY ARTERY DISEASE INVOLVING NATIVE CORONARY ARTERY OF NATIVE HEART WITHOUT ANGINA PECTORIS: ICD-10-CM

## 2025-02-10 DIAGNOSIS — I25.118 CORONARY ARTERY DISEASE OF NATIVE ARTERY OF NATIVE HEART WITH STABLE ANGINA PECTORIS: ICD-10-CM

## 2025-02-10 PROCEDURE — G2211 COMPLEX E/M VISIT ADD ON: HCPCS | Performed by: INTERNAL MEDICINE

## 2025-02-10 PROCEDURE — 99214 OFFICE O/P EST MOD 30 MIN: CPT | Performed by: INTERNAL MEDICINE

## 2025-02-10 RX ORDER — ROSUVASTATIN CALCIUM 20 MG/1
20 TABLET, COATED ORAL DAILY
Qty: 90 TABLET | Refills: 3 | Status: SHIPPED | OUTPATIENT
Start: 2025-02-10

## 2025-02-10 RX ORDER — CLOPIDOGREL BISULFATE 75 MG/1
75 TABLET ORAL DAILY
Qty: 90 TABLET | Refills: 3 | Status: SHIPPED | OUTPATIENT
Start: 2025-02-10

## 2025-02-10 RX ORDER — METOPROLOL SUCCINATE 25 MG/1
25 TABLET, EXTENDED RELEASE ORAL DAILY
Qty: 90 TABLET | Refills: 3 | Status: CANCELLED | OUTPATIENT
Start: 2025-02-10

## 2025-02-10 RX ORDER — EZETIMIBE 10 MG/1
10 TABLET ORAL DAILY
Qty: 90 TABLET | Refills: 3 | Status: SHIPPED | OUTPATIENT
Start: 2025-02-10

## 2025-02-10 NOTE — PROGRESS NOTES
[facility] Progress note    Assessment:  CAD 2021 PCI OM and staged complex PCI LAD.  Stable very mild angina at high level exertion which has improved since last year.   Dyslipidemia LDL 54 on 6/2024 on rosuvastatin 20mg daily and ezetimibe 10mg PO daily. Myalgias at higher setatin dose  Obstructive sleep apnea, uses CPAP  Erectile dysfunction  Weight gain      Plan:  Stop metoprolol. EF is normal, last stent was 2021. Very minimal exertional angina, and concerned about fatigue, weight gain and has h/o ED.   Alternative therapy could be amlodipine if angina gets worse vs resume metoprolol.  He will discuss GLP-1 type therapies with PCP.   Follow up in one year or sooner if needed. Echo prior to next visit.     The longitudinal plan of care for the diagnosis(es)/condition(s) as documented were addressed during this visit. Due to the added complexity in care, I will continue to support Sathya in the subsequent management and with ongoing continuity of care.      HPI:   60 yo M with CAD h/o PCI OM and LAD in 2021 on chronic clopidogrel here for follow up. He is doing well. Gets chest pain at high level of exertion only if it is very hot out. He is concerned primarily about weight gain. He is exercising regularly and more than ever. He tries to follow a heart healthy diet and monitor calories. He has tried weight loss programs in the past.  Also notes chronic fatigue and has some erectile dysfunction. We discussed trying stopping metoprolol. He is interested in ozempic-type medications.       Diagnostic studies reviewed for today's visit:  EKG 10/11/24: sinus w/sinus arrhythmia. I personally reviewed and agree w/interpration.  Echo 11/8/21: EF 55-60%. Normal RV aortic valve sclerosis      CURRENT MEDICATIONS:  Current Outpatient Medications   Medication Sig Dispense Refill    albuterol (PROAIR HFA/PROVENTIL HFA/VENTOLIN HFA) 108 (90 Base) MCG/ACT inhaler Inhale 2 puffs into the lungs every 4 hours as needed for  shortness of breath or wheezing 18 g 0    azelastine (ASTELIN) 0.1 % nasal spray Spray 1 spray into both nostrils 2 times daily 30 mL 0    buPROPion (WELLBUTRIN XL) 300 MG 24 hr tablet Take 1 tablet (300 mg) by mouth daily 90 tablet 3    clopidogrel (PLAVIX) 75 MG tablet Take 1 tablet (75 mg) by mouth daily 90 tablet 3    dexAMETHasone (DECADRON) 0.5 MG/5ML elixir Take 5 mLs (0.5 mg) by mouth 2 times daily as needed (canker sores) 237 mL 1    dexamethasone alcohol-free (DECADRON) 0.1 MG/ML solution       ezetimibe (ZETIA) 10 MG tablet Take 1 tablet (10 mg) by mouth daily 90 tablet 3    fluocinonide (LIDEX) 0.05 % external solution APPLY TO SCALP DAILY AS NEEDED 60 mL 0    fluticasone (FLONASE) 50 MCG/ACT nasal spray Spray 1 spray into both nostrils daily 16 g 3    ketoconazole (NIZORAL) 2 % external shampoo Use every 1-2 days when flared. Leave in few minutes before rinsing. Use twice weekly to prevent flares. 120 mL 11    metoprolol succinate ER (TOPROL XL) 25 MG 24 hr tablet Take 1 tablet (25 mg) by mouth daily. 90 tablet 0    mirabegron (MYRBETRIQ) 25 MG 24 hr tablet Take 1 tablet (25 mg) by mouth daily 30 tablet 11    multivitamin, therapeutic (THERA-VIT) TABS tablet Take 1 tablet by mouth daily      nitroGLYcerin (NITROSTAT) 0.4 MG sublingual tablet For chest pain place 1 tablet under the tongue every 5 minutes for 3 doses. If symptoms persist 5 minutes after 1st dose call 911. 25 tablet 3    rosuvastatin (CRESTOR) 20 MG tablet Take 1 tablet (20 mg) by mouth daily. Appointment required for further refills 90 tablet 0    sertraline (ZOLOFT) 100 MG tablet Take 1 tablet (100 mg) by mouth daily 90 tablet 3    sildenafil (VIAGRA) 100 MG tablet TAKE ONE TABLET BY MOUTH DAILY AS NEEDED. DO NOT TAKE SAME DAY AS NITRO 30 tablet 0    solifenacin (VESICARE) 10 MG tablet Take 1 tablet (10 mg) by mouth daily 90 tablet 3    traZODone (DESYREL) 50 MG tablet Take 3 tablets (150 mg) by mouth at bedtime 270 tablet 3        ALLERGIES     Allergies   Allergen Reactions    Penicillins Itching, Swelling and Rash     Rash       PAST MEDICAL, SURGICAL, FAMILY, SOCIAL HISTORY:  History was reviewed and updated as needed, see medical record.    REVIEW OF SYSTEMS:  Skin:        Eyes:       ENT:       Respiratory:  Positive for sleep apnea, CPAP, dyspnea on exertion, shortness of breath  Cardiovascular:    fatigue, Positive for  Gastroenterology:      Genitourinary:       Musculoskeletal:       Neurologic:       Psychiatric:       Heme/Lymph/Imm:       Endocrine:         PHYSICAL EXAM:      BP: 116/76 Pulse: 68     SpO2: 96 %      Vital Signs with Ranges  Pulse:  [68] 68  BP: (116)/(76) 116/76  SpO2:  [96 %] 96 %  234 lbs 1.6 oz    Constitutional: awake, alert, no distress  Eyes: sclera nonicteric  ENT: trachea midline  Respiratory: clear bilaterally  Cardiovascular: regular rate and rhythm   GI: nondistended, nontender, bowel sounds present  Skin: dry, no rash no edema  Musculoskeletal: grossly normal muscle bulk and tone  Neurologic: no  gross focal deficits  Neuropsychiatric: normal affect    Recent Lab Results:  The following labs were reviewed today:    LIPID RESULTS:  Lab Results   Component Value Date    CHOL 156 06/05/2024    CHOL 255 (H) 03/05/2021    HDL 38 (L) 06/05/2024    HDL 35 (L) 03/05/2021    LDL 54 06/05/2024    LDL 96 08/09/2022    LDL  03/05/2021     Cannot estimate LDL when triglyceride exceeds 400 mg/dL     (H) 03/05/2021    TRIG 320 (H) 06/05/2024    TRIG 404 (H) 03/05/2021    CHOLHDLRATIO 4.8 10/30/2015       LIVER ENZYME RESULTS:  Lab Results   Component Value Date    AST 40 10/11/2024    AST 30 03/05/2021    ALT 34 10/11/2024    ALT 65 03/05/2021       CBC RESULTS:  Lab Results   Component Value Date    WBC 7.8 10/11/2024    WBC 8.2 06/22/2021    RBC 4.42 10/11/2024    RBC 4.41 06/22/2021    HGB 14.0 10/11/2024    HGB 13.7 06/22/2021    HCT 40.6 10/11/2024    HCT 40.1 06/22/2021    MCV 92 10/11/2024    MCV  91 06/22/2021    MCH 31.7 10/11/2024    MCH 31.1 06/22/2021    MCHC 34.5 10/11/2024    MCHC 34.2 06/22/2021    RDW 11.9 10/11/2024    RDW 12.0 06/22/2021     (L) 10/11/2024     (L) 06/22/2021       BMP RESULTS:  Lab Results   Component Value Date     10/11/2024     06/23/2021    POTASSIUM 4.4 10/11/2024    POTASSIUM 4.1 07/20/2021    POTASSIUM 4.1 06/23/2021    CHLORIDE 104 10/11/2024    CHLORIDE 105 07/20/2021    CHLORIDE 109 06/23/2021    CO2 24 10/11/2024    CO2 28 07/20/2021    CO2 31 06/23/2021    ANIONGAP 11 10/11/2024    ANIONGAP 7 07/20/2021    ANIONGAP 1 (L) 06/23/2021     (H) 10/11/2024     (H) 07/20/2021     (H) 06/23/2021    BUN 17.3 10/11/2024    BUN 16 07/20/2021    BUN 18 06/23/2021    CR 0.96 10/11/2024    CR 1.00 06/23/2021    GFRESTIMATED 90 10/11/2024    GFRESTIMATED 82 06/23/2021    GFRESTBLACK >90 06/23/2021    FRANCISCO 9.3 10/11/2024    FRANCISCO 8.8 06/23/2021        A1C RESULTS:  Lab Results   Component Value Date    A1C 5.1 06/18/2021       INR RESULTS:  Lab Results   Component Value Date    INR 1.04 07/20/2021    INR 1.03 06/18/2021    INR 1.00 12/13/2007

## 2025-02-10 NOTE — LETTER
2/10/2025    Unique Negrete PA-C  [id number]  [facility] [address]    RE: John Reyes       Dear Colleague,     I had the pleasure of seeing John Reyes in the [facility].  [facility] Progress note    Assessment:  CAD 2021 PCI OM and staged complex PCI LAD.  Stable very mild angina at high level exertion which has improved since last year.   Dyslipidemia LDL 54 on 6/2024 on rosuvastatin 20mg daily and ezetimibe 10mg PO daily. Myalgias at higher setatin dose  Obstructive sleep apnea, uses CPAP  Erectile dysfunction  Weight gain      Plan:  Stop metoprolol. EF is normal, last stent was 2021. Very minimal exertional angina, and concerned about fatigue, weight gain and has h/o ED.   Alternative therapy could be amlodipine if angina gets worse vs resume metoprolol.  He will discuss GLP-1 type therapies with PCP.   Follow up in one year or sooner if needed. Echo prior to next visit.     The longitudinal plan of care for the diagnosis(es)/condition(s) as documented were addressed during this visit. Due to the added complexity in care, I will continue to support Sathya in the subsequent management and with ongoing continuity of care.      HPI:   58 yo M with CAD h/o PCI OM and LAD in 2021 on chronic clopidogrel here for follow up. He is doing well. Gets chest pain at high level of exertion only if it is very hot out. He is concerned primarily about weight gain. He is exercising regularly and more than ever. He tries to follow a heart healthy diet and monitor calories. He has tried weight loss programs in the past.  Also notes chronic fatigue and has some erectile dysfunction. We discussed trying stopping metoprolol. He is interested in ozempic-type medications.       Diagnostic studies reviewed for today's visit:  EKG 10/11/24: sinus w/sinus arrhythmia. I personally reviewed and agree w/interpration.  Echo 11/8/21: EF 55-60%. Normal RV aortic valve sclerosis      CURRENT  MEDICATIONS:  Current Outpatient Medications   Medication Sig Dispense Refill     albuterol (PROAIR HFA/PROVENTIL HFA/VENTOLIN HFA) 108 (90 Base) MCG/ACT inhaler Inhale 2 puffs into the lungs every 4 hours as needed for shortness of breath or wheezing 18 g 0     azelastine (ASTELIN) 0.1 % nasal spray Spray 1 spray into both nostrils 2 times daily 30 mL 0     buPROPion (WELLBUTRIN XL) 300 MG 24 hr tablet Take 1 tablet (300 mg) by mouth daily 90 tablet 3     clopidogrel (PLAVIX) 75 MG tablet Take 1 tablet (75 mg) by mouth daily 90 tablet 3     dexAMETHasone (DECADRON) 0.5 MG/5ML elixir Take 5 mLs (0.5 mg) by mouth 2 times daily as needed (canker sores) 237 mL 1     dexamethasone alcohol-free (DECADRON) 0.1 MG/ML solution        ezetimibe (ZETIA) 10 MG tablet Take 1 tablet (10 mg) by mouth daily 90 tablet 3     fluocinonide (LIDEX) 0.05 % external solution APPLY TO SCALP DAILY AS NEEDED 60 mL 0     fluticasone (FLONASE) 50 MCG/ACT nasal spray Spray 1 spray into both nostrils daily 16 g 3     ketoconazole (NIZORAL) 2 % external shampoo Use every 1-2 days when flared. Leave in few minutes before rinsing. Use twice weekly to prevent flares. 120 mL 11     metoprolol succinate ER (TOPROL XL) 25 MG 24 hr tablet Take 1 tablet (25 mg) by mouth daily. 90 tablet 0     mirabegron (MYRBETRIQ) 25 MG 24 hr tablet Take 1 tablet (25 mg) by mouth daily 30 tablet 11     multivitamin, therapeutic (THERA-VIT) TABS tablet Take 1 tablet by mouth daily       nitroGLYcerin (NITROSTAT) 0.4 MG sublingual tablet For chest pain place 1 tablet under the tongue every 5 minutes for 3 doses. If symptoms persist 5 minutes after 1st dose call 911. 25 tablet 3     rosuvastatin (CRESTOR) 20 MG tablet Take 1 tablet (20 mg) by mouth daily. Appointment required for further refills 90 tablet 0     sertraline (ZOLOFT) 100 MG tablet Take 1 tablet (100 mg) by mouth daily 90 tablet 3     sildenafil (VIAGRA) 100 MG tablet TAKE ONE TABLET BY MOUTH DAILY AS NEEDED.  DO NOT TAKE SAME DAY AS NITRO 30 tablet 0     solifenacin (VESICARE) 10 MG tablet Take 1 tablet (10 mg) by mouth daily 90 tablet 3     traZODone (DESYREL) 50 MG tablet Take 3 tablets (150 mg) by mouth at bedtime 270 tablet 3       ALLERGIES     Allergies   Allergen Reactions     Penicillins Itching, Swelling and Rash     Rash       PAST MEDICAL, SURGICAL, FAMILY, SOCIAL HISTORY:  History was reviewed and updated as needed, see medical record.    REVIEW OF SYSTEMS:  Skin:        Eyes:       ENT:       Respiratory:  Positive for sleep apnea, CPAP, dyspnea on exertion, shortness of breath  Cardiovascular:    fatigue, Positive for  Gastroenterology:      Genitourinary:       Musculoskeletal:       Neurologic:       Psychiatric:       Heme/Lymph/Imm:       Endocrine:         PHYSICAL EXAM:      BP: 116/76 Pulse: 68     SpO2: 96 %      Vital Signs with Ranges  Pulse:  [68] 68  BP: (116)/(76) 116/76  SpO2:  [96 %] 96 %  234 lbs 1.6 oz    Constitutional: awake, alert, no distress  Eyes: sclera nonicteric  ENT: trachea midline  Respiratory: clear bilaterally  Cardiovascular: regular rate and rhythm   GI: nondistended, nontender, bowel sounds present  Skin: dry, no rash no edema  Musculoskeletal: grossly normal muscle bulk and tone  Neurologic: no  gross focal deficits  Neuropsychiatric: normal affect    Recent Lab Results:  The following labs were reviewed today:    LIPID RESULTS:  Lab Results   Component Value Date    CHOL 156 06/05/2024    CHOL 255 (H) 03/05/2021    HDL 38 (L) 06/05/2024    HDL 35 (L) 03/05/2021    LDL 54 06/05/2024    LDL 96 08/09/2022    LDL  03/05/2021     Cannot estimate LDL when triglyceride exceeds 400 mg/dL     (H) 03/05/2021    TRIG 320 (H) 06/05/2024    TRIG 404 (H) 03/05/2021    CHOLHDLRATIO 4.8 10/30/2015       LIVER ENZYME RESULTS:  Lab Results   Component Value Date    AST 40 10/11/2024    AST 30 03/05/2021    ALT 34 10/11/2024    ALT 65 03/05/2021       CBC RESULTS:  Lab Results    Component Value Date    WBC 7.8 10/11/2024    WBC 8.2 06/22/2021    RBC 4.42 10/11/2024    RBC 4.41 06/22/2021    HGB 14.0 10/11/2024    HGB 13.7 06/22/2021    HCT 40.6 10/11/2024    HCT 40.1 06/22/2021    MCV 92 10/11/2024    MCV 91 06/22/2021    MCH 31.7 10/11/2024    MCH 31.1 06/22/2021    MCHC 34.5 10/11/2024    MCHC 34.2 06/22/2021    RDW 11.9 10/11/2024    RDW 12.0 06/22/2021     (L) 10/11/2024     (L) 06/22/2021       BMP RESULTS:  Lab Results   Component Value Date     10/11/2024     06/23/2021    POTASSIUM 4.4 10/11/2024    POTASSIUM 4.1 07/20/2021    POTASSIUM 4.1 06/23/2021    CHLORIDE 104 10/11/2024    CHLORIDE 105 07/20/2021    CHLORIDE 109 06/23/2021    CO2 24 10/11/2024    CO2 28 07/20/2021    CO2 31 06/23/2021    ANIONGAP 11 10/11/2024    ANIONGAP 7 07/20/2021    ANIONGAP 1 (L) 06/23/2021     (H) 10/11/2024     (H) 07/20/2021     (H) 06/23/2021    BUN 17.3 10/11/2024    BUN 16 07/20/2021    BUN 18 06/23/2021    CR 0.96 10/11/2024    CR 1.00 06/23/2021    GFRESTIMATED 90 10/11/2024    GFRESTIMATED 82 06/23/2021    GFRESTBLACK >90 06/23/2021    FRANCISCO 9.3 10/11/2024    FRANCISCO 8.8 06/23/2021        A1C RESULTS:  Lab Results   Component Value Date    A1C 5.1 06/18/2021       INR RESULTS:  Lab Results   Component Value Date    INR 1.04 07/20/2021    INR 1.03 06/18/2021    INR 1.00 12/13/2007                             Thank you for allowing me to participate in the care of your patient.      Sincerely,     Roxie Schrader MD     [facility]  cc:   Unique Negrete PA-C  [address] [place] [address]

## 2025-02-19 ASSESSMENT — ASTHMA QUESTIONNAIRES
QUESTION_3 LAST FOUR WEEKS HOW OFTEN DID YOUR ASTHMA SYMPTOMS (WHEEZING, COUGHING, SHORTNESS OF BREATH, CHEST TIGHTNESS OR PAIN) WAKE YOU UP AT NIGHT OR EARLIER THAN USUAL IN THE MORNING: ONCE OR TWICE
QUESTION_4 LAST FOUR WEEKS HOW OFTEN HAVE YOU USED YOUR RESCUE INHALER OR NEBULIZER MEDICATION (SUCH AS ALBUTEROL): ONCE A WEEK OR LESS
QUESTION_5 LAST FOUR WEEKS HOW WOULD YOU RATE YOUR ASTHMA CONTROL: WELL CONTROLLED
QUESTION_2 LAST FOUR WEEKS HOW OFTEN HAVE YOU HAD SHORTNESS OF BREATH: ONCE OR TWICE A WEEK
ACT_TOTALSCORE: 20
QUESTION_1 LAST FOUR WEEKS HOW MUCH OF THE TIME DID YOUR ASTHMA KEEP YOU FROM GETTING AS MUCH DONE AT WORK, SCHOOL OR AT HOME: A LITTLE OF THE TIME
ACT_TOTALSCORE: 20

## 2025-02-19 ASSESSMENT — ANXIETY QUESTIONNAIRES
GAD7 TOTAL SCORE: 6
3. WORRYING TOO MUCH ABOUT DIFFERENT THINGS: SEVERAL DAYS
7. FEELING AFRAID AS IF SOMETHING AWFUL MIGHT HAPPEN: NOT AT ALL
8. IF YOU CHECKED OFF ANY PROBLEMS, HOW DIFFICULT HAVE THESE MADE IT FOR YOU TO DO YOUR WORK, TAKE CARE OF THINGS AT HOME, OR GET ALONG WITH OTHER PEOPLE?: SOMEWHAT DIFFICULT
2. NOT BEING ABLE TO STOP OR CONTROL WORRYING: SEVERAL DAYS
7. FEELING AFRAID AS IF SOMETHING AWFUL MIGHT HAPPEN: NOT AT ALL
4. TROUBLE RELAXING: SEVERAL DAYS
GAD7 TOTAL SCORE: 6
GAD7 TOTAL SCORE: 6
IF YOU CHECKED OFF ANY PROBLEMS ON THIS QUESTIONNAIRE, HOW DIFFICULT HAVE THESE PROBLEMS MADE IT FOR YOU TO DO YOUR WORK, TAKE CARE OF THINGS AT HOME, OR GET ALONG WITH OTHER PEOPLE: SOMEWHAT DIFFICULT
1. FEELING NERVOUS, ANXIOUS, OR ON EDGE: SEVERAL DAYS
6. BECOMING EASILY ANNOYED OR IRRITABLE: SEVERAL DAYS
5. BEING SO RESTLESS THAT IT IS HARD TO SIT STILL: SEVERAL DAYS

## 2025-02-19 ASSESSMENT — PATIENT HEALTH QUESTIONNAIRE - PHQ9
10. IF YOU CHECKED OFF ANY PROBLEMS, HOW DIFFICULT HAVE THESE PROBLEMS MADE IT FOR YOU TO DO YOUR WORK, TAKE CARE OF THINGS AT HOME, OR GET ALONG WITH OTHER PEOPLE: SOMEWHAT DIFFICULT
SUM OF ALL RESPONSES TO PHQ QUESTIONS 1-9: 14
SUM OF ALL RESPONSES TO PHQ QUESTIONS 1-9: 14

## 2025-02-20 ENCOUNTER — VIRTUAL VISIT (OUTPATIENT)
Dept: FAMILY MEDICINE | Facility: CLINIC | Age: 60
End: 2025-02-20
Payer: COMMERCIAL

## 2025-02-20 DIAGNOSIS — I25.118 CORONARY ARTERY DISEASE OF NATIVE ARTERY OF NATIVE HEART WITH STABLE ANGINA PECTORIS: ICD-10-CM

## 2025-02-20 DIAGNOSIS — E78.2 MIXED DYSLIPIDEMIA: ICD-10-CM

## 2025-02-20 DIAGNOSIS — E66.09 CLASS 1 OBESITY DUE TO EXCESS CALORIES WITH SERIOUS COMORBIDITY AND BODY MASS INDEX (BMI) OF 33.0 TO 33.9 IN ADULT: Primary | ICD-10-CM

## 2025-02-20 DIAGNOSIS — I20.0 UNSTABLE ANGINA (H): ICD-10-CM

## 2025-02-20 DIAGNOSIS — E66.811 CLASS 1 OBESITY DUE TO EXCESS CALORIES WITH SERIOUS COMORBIDITY AND BODY MASS INDEX (BMI) OF 33.0 TO 33.9 IN ADULT: Primary | ICD-10-CM

## 2025-02-20 NOTE — PROGRESS NOTES
Sathya is a 59 year old who is being evaluated via a billable video visit.    How would you like to obtain your AVS? MyChart  If the video visit is dropped, the invitation should be resent by: Send to e-mail at: [email or web address]  Will anyone else be joining your video visit? No      Assessment & Plan     (E66.811,  E66.09,  Z68.33) Class 1 obesity due to excess calories with serious comorbidity and body mass index (BMI) of 33.0 to 33.9 in adult  (primary encounter diagnosis)  (I25.118) Coronary artery disease of native artery of native heart with stable angina pectoris  (E78.2) Mixed dyslipidemia  (I20.0) Unstable angina (H)  Has noticed weight gain of ~20 pounds in the last year. Has subclinical hypothyroidism but very mild and thyroid function normal, do not feel that low dose levothyroxine would adjust weight much. He does have notable cardiac history of unstable angina and CAD. Discussed weight and heart concerns with Cardiologist recently who agrees that GLP1 medication for weight loss could be beneficial. We discussed at length today and after discussion would like to go forward with seeing if GLP1 medication is covered by insurance for weight and heart concerns. Prior authorization sent. Pending results will either sent Wegovy if Zepbound is not approved and if neither GLP1 is covered can discuss whether wanting to do compounding pharmacy vs oral medication options. Once started on medication would recommend 3 month follow up visit; physical and follow up visit already scheduled for early June. Sooner for any acute concerns.  Plan: tirzepatide-Weight Management (ZEPBOUND) 2.5         MG/0.5ML prefilled pen         The longitudinal plan of care for the diagnosis(es)/condition(s) as documented were addressed during this visit. Due to the added complexity in care, I will continue to support Sathya in the subsequent management and with ongoing continuity of care.    BMI  Estimated body mass index is 33.59 kg/m  as  "calculated from the following:    Height as of 2/10/25: 1.778 m (5' 10\").    Weight as of 2/10/25: 106.2 kg (234 lb 1.6 oz).   Weight management plan: Discussed healthy diet and exercise guidelines and discussed GLP1 medication during visit today.      Follow up in June for physical and weight recheck/med check; sooner with any acute concerns.    Gamaliel Mcdonnell is a 59 year old, presenting for the following health issues:  Consult (Patient would like to discuss weight gain and options)        2/20/2025     9:14 AM   Additional Questions   Roomed by Meagan     History of Present Illness       Reason for visit:  Current weight gain concern what are options   He is taking medications regularly.     Interested in discussing GLP1 medication or other options for weight after discussion with Cardiologist and ongoing weight gain/concerns.      Review of Systems  Constitutional, HEENT, cardiovascular, pulmonary, gi and gu systems are negative, except as otherwise noted.        Objective    Vitals - Patient Reported  Weight (Patient Reported): 106.1 kg (234 lb)  Height (Patient Reported): 177.8 cm (5' 10\")  BMI (Based on Pt Reported Ht/Wt): 33.58      Vitals:  No vitals were obtained today due to virtual visit.    Physical Exam   GENERAL: alert and no distress  EYES: Eyes grossly normal to inspection.  No discharge or erythema, or obvious scleral/conjunctival abnormalities.  RESP: No audible wheeze, cough, or visible cyanosis.    SKIN: Visible skin clear. No significant rash, abnormal pigmentation or lesions.  NEURO: Cranial nerves grossly intact.  Mentation and speech appropriate for age.  PSYCH: Appropriate affect, tone, and pace of words      Video-Visit Details    Type of service:  Video Visit     Originating Location (pt. Location): Home  Distant Location (provider location):  Off-site  Platform used for Video Visit: Antony    Signed Electronically by: Unique Negrete PA-C    "

## 2025-03-14 ENCOUNTER — MYC MEDICAL ADVICE (OUTPATIENT)
Dept: FAMILY MEDICINE | Facility: CLINIC | Age: 60
End: 2025-03-14
Payer: COMMERCIAL

## 2025-03-14 DIAGNOSIS — E78.2 MIXED DYSLIPIDEMIA: ICD-10-CM

## 2025-03-14 DIAGNOSIS — E66.811 CLASS 1 OBESITY DUE TO EXCESS CALORIES WITH SERIOUS COMORBIDITY AND BODY MASS INDEX (BMI) OF 33.0 TO 33.9 IN ADULT: ICD-10-CM

## 2025-03-14 DIAGNOSIS — I20.0 UNSTABLE ANGINA (H): ICD-10-CM

## 2025-03-14 DIAGNOSIS — I25.118 CORONARY ARTERY DISEASE OF NATIVE ARTERY OF NATIVE HEART WITH STABLE ANGINA PECTORIS: ICD-10-CM

## 2025-03-14 DIAGNOSIS — E66.09 CLASS 1 OBESITY DUE TO EXCESS CALORIES WITH SERIOUS COMORBIDITY AND BODY MASS INDEX (BMI) OF 33.0 TO 33.9 IN ADULT: ICD-10-CM

## 2025-03-17 NOTE — TELEPHONE ENCOUNTER
Unique Negrete PA-C- See pt's Fio message. Please review and advise.     Routed to PCP    Hali BARLOW RN   Clinic RN  [facility]

## 2025-03-19 NOTE — TELEPHONE ENCOUNTER
See my chart message    Replied via Lynnette Hughes RN BSN  Clinic Nurse  [facility]

## 2025-03-19 NOTE — TELEPHONE ENCOUNTER
Sent compounded medication at 0.5 mg dose to the [place] compounding pharmacy.     Unique Negrete PA-C on 3/19/2025 at 2:10 PM

## 2025-03-19 NOTE — TELEPHONE ENCOUNTER
See my chart - awaiting response from patient     Clarita Burkett, Registered Nurse  [facility]

## 2025-04-02 NOTE — TELEPHONE ENCOUNTER
Office Visit       Date: 04/02/2025   Patient Name: Rebeka Servin  MRN: [id number]  YOB: 1963    Referring Physician: No ref. provider found     Chief Complaint:   Chief Complaint   Patient presents with    Follow-up    Arthritis       History of Present Illness: Rebeka Servin is a 61 y.o. female who is here today for follow-up of fibromyalgia and osteoarthritis.  Today she rates her pain 2 out of 10, global 2 out of 10 and 20 minutes of morning stiffness.  She reports feeling the same.  She was last seen on March 6, 2024.      Subjective   Review of Systems:  Positive for excessive sweating, fatigue, congestion, ear pain, mouth sores, runny nose, sinus pressure, sneezing, ringing in the ears, dry mouth, blurred vision, eye discharge, eye itching, eye redness, dry eyes, sleep apnea, chest tightness, chest pain, leg swelling, palpitations, GERD, indigestion, excessive thirst, decreased libido, side pain, genital sores, pelvic pain, joint pain, back pain, joint swelling, muscle pain, dry skin, skin lesions, rash, environmental allergies, weakness, bruising easily, nervousness anxiety, sleep disturbance and stress.  All other review of symptoms are negative.    Past Medical History:   Past Medical History:   Diagnosis Date    Abnormal ECG 09/25/23    Acute bronchitis     Allergic     Anxiety     Arrhythmia Unsure    Depression     Diverticulosis     Fatigue     Fibromyalgia     Fibromyalgia, primary     GERD (gastroesophageal reflux disease)     Glaucoma     Hemorrhage of rectum and anus     Hyperlipidemia     IBS (irritable bowel syndrome)     Low back pain     Obesity     Osteoarthritis of both knees     Pain in joint, multiple sites     Pain, joint, knee, right     Petechiae     Vitamin B 12 deficiency     Vitamin D deficiency        Past Surgical History:   Past Surgical History:   Procedure Laterality Date    CHOLECYSTECTOMY      COLONOSCOPY      INTERVENTIONAL RADIOLOGY PROCEDURE  Re-ordered stress echo.    Roosevelt Gleason PA-C on 6/9/2021 at 2:51 PM     N/A 2021    Procedure: IR myelogram, lumbar;  Surgeon: Ivan Rico MD;  Location:  [facility];  Service: Interventional Radiology;  Laterality: N/A;    JOINT REPLACEMENT      LUMBAR DISCECTOMY Right 2021    Procedure: microdiscectomy and lateral recess decompression Right L5-S1;  Surgeon: Jarocho Robison MD;  Location:  [facility] OR;  Service: Neurosurgery;  Laterality: Right;    REPLACEMENT TOTAL KNEE Right     TUBAL ABDOMINAL LIGATION         Family History:   Family History   Problem Relation Age of Onset    Heart disease Mother     Hypertension Mother     COPD Mother     Arthritis Mother     Hypertension Brother     Arthritis Daughter     Cancer Brother         Pancreatic    Breast cancer Neg Hx     Ovarian cancer Neg Hx        Social History:   Social History     Socioeconomic History    Marital status:    Tobacco Use    Smoking status: Former     Current packs/day: 0.00     Average packs/day: 0.3 packs/day for 20.0 years (5.0 ttl pk-yrs)     Types: Cigarettes     Start date: 1989     Quit date: 2009     Years since quittin.2     Passive exposure: Past    Smokeless tobacco: Never    Tobacco comments:     off and on for about 20 years   Vaping Use    Vaping status: Never Used   Substance and Sexual Activity    Alcohol use: Yes     Alcohol/week: 6.0 standard drinks of alcohol     Types: 6 Glasses of wine per week     Comment: moderate 2-3 drinks/day    Drug use: No    Sexual activity: Not Currently     Partners: Male     Birth control/protection: Post-menopausal, Surgical     Comment:        Medications:   Current Outpatient Medications:     ALPRAZolam (XANAX) 0.5 MG tablet, TAKE ONE TABLET BY MOUTH TWICE A DAY AS NEEDED FOR ANXIETY, Disp: 60 tablet, Rfl: 3    celecoxib (CeleBREX) 200 MG capsule, Take 1 capsule by mouth 2 (Two) Times a Day As Needed., Disp: , Rfl:     citalopram (CeleXA) 20 MG tablet, TAKE 1 TABLET BY MOUTH DAILY, Disp: 90  tablet, Rfl: 3    Diclofenac Sodium (VOLTAREN) 1 % gel gel, apply (2G)  by topical route 4 times every day to the affected area(s) as needed, Disp: , Rfl:     gabapentin (NEURONTIN) 100 MG capsule, Take 1 capsule by mouth 3 (Three) Times a Day. As needed, prescribed by prince rodriguez, Disp: , Rfl:     gabapentin (NEURONTIN) 300 MG capsule, Take 1 capsule by mouth 2 (Two) Times a Day. Prescribed by Prince Rodriguez, Disp: , Rfl:     halobetasol (ULTRAVATE) 0.05 % ointment, 1 Application., Disp: , Rfl:     Halobetasol Prop-Tazarotene (Duobrii) 0.01-0.045 % lotion, Apply  topically., Disp: , Rfl:     latanoprost (XALATAN) 0.005 % ophthalmic solution, , Disp: , Rfl:     Loratadine 10 MG capsule, Take 1 capsule by mouth 2 (two) times a day., Disp: , Rfl:     Multiple Vitamins-Minerals (OCUVITE ADULT 50+ PO), Take 1 tablet by mouth Daily., Disp: , Rfl:     omeprazole (priLOSEC) 20 MG capsule, TAKE 1 CAPSULE BY MOUTH DAILY, Disp: 90 capsule, Rfl: 3    raNITIdine HCl (ZANTAC 150 MAXIMUM STRENGTH PO), Take 1 capsule by mouth Daily As Needed., Disp: , Rfl:     simvastatin (ZOCOR) 40 MG tablet, TAKE 1 TABLET BY MOUTH IN THE  EVENING, Disp: 90 tablet, Rfl: 3    timolol (TIMOPTIC) 0.5 % ophthalmic solution, Apply  to eye(s) as directed by provider Every 12 (Twelve) Hours., Disp: , Rfl:     valACYclovir (VALTREX) 500 MG tablet, Take 1 tablet by mouth Daily As Needed., Disp: , Rfl:     fluticasone (FLONASE) 50 MCG/ACT nasal spray, 2 sprays into the nostril(s) as directed by provider Daily for 14 days., Disp: 9.9 mL, Rfl: 0    Allergies:   Allergies   Allergen Reactions    Butrans [Buprenorphine] Rash    Dust Mite Extract Other (See Comments)     Sneezing/sinus congestion       I have reviewed and updated the patient's chief complaint, history of present illness, review of systems, past medical history, surgical history, family history, social history, medications and allergy list as appropriate.     Objective    Vital Signs:  "  Vitals:    04/02/25 0854   BP: 122/72   Pulse: 78   Temp: 98.1 °F (36.7 °C)   Weight: 103 kg (228 lb)   Height: 170.2 cm (67\")   PainSc: 2      Body mass index is 35.71 kg/m².   Defer to PCP       Physical Exam:  Physical Exam  Vitals reviewed.   Constitutional:       Appearance: Normal appearance.   HENT:      Head: Normocephalic and atraumatic.      Mouth/Throat:      Mouth: Mucous membranes are moist.   Eyes:      Conjunctiva/sclera: Conjunctivae normal.   Cardiovascular:      Rate and Rhythm: Normal rate and regular rhythm.      Pulses: Normal pulses.      Heart sounds: Normal heart sounds.   Pulmonary:      Effort: Pulmonary effort is normal.      Breath sounds: Normal breath sounds.   Musculoskeletal:         General: Normal range of motion.      Cervical back: Normal range of motion and neck supple.      Comments: Diffuse allodynia  No soft tissue swelling aside from right knee  Crepitus bilateral shoulders  Right knee has been replaced.  There is mild soft tissue swelling and warmth of right knee  Left knee tender  Tender right trochanteric bursa   Skin:     General: Skin is warm and dry.   Neurological:      General: No focal deficit present.      Mental Status: She is alert and oriented to person, place, and time. Mental status is at baseline.   Psychiatric:         Mood and Affect: Mood normal.         Behavior: Behavior normal.         Thought Content: Thought content normal.         Judgment: Judgment normal.          Results Review:   Imaging Results (Last 24 Hours)       ** No results found for the last 24 hours. **            Procedures    Assessment / Plan    Assessment/Plan:   Diagnoses and all orders for this visit:    1. Fibromyalgia (Primary)  Assessment & Plan:  prior cymbalta, viibryd, prozac  Current:  Gabapentin, Celebrex, on ssri.    Improved on gabapentin and Celebrex.  No side effects.   po in AM and 400mg in PM.  She is occasionally taking the afternoon dose.  Gabapentin causes vivid " dreams and this makes her heart race.  Regular exercise encouraged.  Weight loss through Mediterranean diet.  Labs ordered for monitoring.  Medication refilled  rtc 6 months      2. Primary osteoarthritis of both knees  Assessment & Plan:  sp right knee replacement nov 2015 with dr kirk.    Some aching to both knees.  Right knee throbs on occasion, this is the replaced knee.  Twisted right knee, right knee painful and still with swelling and warmth.  She declines referral to orthopedics.  She is using ice and will wear a brace.  Continue  Celebrex to b.i.d. as needed which is effective   Risks of NSAIDs discussed including GI upset, GI bleeding, renal and hepatic risks and the risks of cardiovascular disease and stroke. Warned patient not to take with other NSAIDs including OTC NSAIDs.      3. Osteoarthritis of both hands, unspecified osteoarthritis type  Assessment & Plan:  Continue Celebrex as needed  Intermittent pain, more noticeable in her MCP's bilaterally  She had a flare of pain 3/2025.  She slept for 3 days      4. Osteopenia, unspecified location  Assessment & Plan:  DEXA performed 2/1/22 shows normal bone mineral density  Recommend reg exercise and calcium + D  Bone density scan ordered     Orders:  -     DEXA Bone Density Axial; Future    5. Biotin deficiency  Assessment & Plan:  She is not been taking the injections.    B12 470 3/2023  tingling and pain in feet improved with B12 and gabapentin  Work on avoiding alcohol completely as alcohol abuse could cause a neuropathy  Recheck today      6. Positive CATHI (antinuclear antibody)  Assessment & Plan:  1:80; neg cathi-9 2015; neg rf ccp    unspecified basis; low titer  Likely false positive given low titer and lack of clinical features ctdz      7. Alcohol dependence with unspecified alcohol-induced disorder  Assessment & Plan:  No longer drinks 2-3 glasses of wine per day.    She has greatly reduced her alcohol consumption.  No longer drinks ETOH  regularly and congratulated her on decrease of ETOH use.      8. Abnormal liver function tests  Assessment & Plan:  Mild and chronic LFT elevation  She has reduced her ETOH intake.      9. Vitamin B 12 deficiency  Assessment & Plan:  She is not been taking the injections.    B12 470 3/2023  tingling and pain in feet improved with B12 and gabapentin  Work on avoiding alcohol completely as alcohol abuse could cause a neuropathy  Recheck today    Orders:  -     Vitamin B12 & Folate; Future    10. Chronic pain of both shoulders  Assessment & Plan:  Audible crepitus in bilateral shoulders.  She does notice pain with sleeping on her shoulders.      11. Chronic back pain, unspecified back location, unspecified back pain laterality  Assessment & Plan:  hx of herniated disc  Status post lumbar surgery micro diskectomy Dr. Robison April 2021.    Improved after lumbar surgery micro diskectomy April 2021.   She still has gluteal pain on right as well as right hip intermittently.  She has been told she has trochanteric bursitis  Continue Celebrex as needed      12. Bilateral carpal tunnel syndrome  Assessment & Plan:  ecommend bilateral carpal tunnel wrist splints at night.  She does not wear braces.   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 Encounter for medication monitoring  Assessment & Plan:  Gabapentin    Controlled substance agreement reviewed and signed 4/2/25  PDMP reviewed  UDS ordered 4/2/25  No suspicion for abuse        Orders:  -     Urine Drug Screen - Urine, Clean Catch; Future  -     Gabapentin, Urine -; Future        Follow Up:   Return in about 6 months (around 10/2/2025) for NP.        ADRIA Zeng   [facility]

## 2025-04-14 ENCOUNTER — MYC MEDICAL ADVICE (OUTPATIENT)
Dept: FAMILY MEDICINE | Facility: CLINIC | Age: 60
End: 2025-04-14
Payer: COMMERCIAL

## 2025-04-14 DIAGNOSIS — N52.1 ERECTILE DYSFUNCTION DUE TO DISEASES CLASSIFIED ELSEWHERE: ICD-10-CM

## 2025-04-14 RX ORDER — SILDENAFIL 100 MG/1
TABLET, FILM COATED ORAL
Qty: 30 TABLET | Refills: 0 | Status: SHIPPED | OUTPATIENT
Start: 2025-04-14

## 2025-04-14 NOTE — TELEPHONE ENCOUNTER
Patient notified via my chart of message below from Caden Calhoun PA-C Theresa Robertson, Registered Nurse  [facility]

## 2025-04-14 NOTE — TELEPHONE ENCOUNTER
Caden Calhoun, PA-C    Please see my chart update   Request to change weight loss meds due to side effects   RN assumes you would like visit to discuss, do you prefer virtual or F2F?     Thank you   Clarita Burkett, Registered Nurse  [facility]

## 2025-04-14 NOTE — TELEPHONE ENCOUNTER
Would expect Tirzepatide to have the same side effects and not an option for compounding through [facility]. If he is wanting something additional outside current medication at the pharmacy let me know.     Thanks!  Unique Negrete PA-C

## 2025-04-22 ENCOUNTER — HOSPITAL ENCOUNTER (EMERGENCY)
Facility: CLINIC | Age: 60
Discharge: HOME | End: 2025-04-22
Attending: EMERGENCY MEDICINE | Admitting: EMERGENCY MEDICINE
Payer: COMMERCIAL

## 2025-04-22 ENCOUNTER — OFFICE VISIT (OUTPATIENT)
Dept: URGENT CARE | Facility: URGENT CARE | Age: 60
End: 2025-04-22
Payer: COMMERCIAL

## 2025-04-22 VITALS
SYSTOLIC BLOOD PRESSURE: 158 MMHG | TEMPERATURE: 98.3 F | WEIGHT: 235 LBS | HEART RATE: 77 BPM | DIASTOLIC BLOOD PRESSURE: 100 MMHG | OXYGEN SATURATION: 96 % | RESPIRATION RATE: 20 BRPM | HEIGHT: 67 IN | BODY MASS INDEX: 36.88 KG/M2

## 2025-04-22 VITALS
OXYGEN SATURATION: 94 % | SYSTOLIC BLOOD PRESSURE: 138 MMHG | HEIGHT: 67 IN | DIASTOLIC BLOOD PRESSURE: 83 MMHG | HEART RATE: 86 BPM | TEMPERATURE: 98.6 F | RESPIRATION RATE: 18 BRPM | BODY MASS INDEX: 36.88 KG/M2 | WEIGHT: 235 LBS

## 2025-04-22 DIAGNOSIS — R03.0 ELEVATED BLOOD PRESSURE READING: ICD-10-CM

## 2025-04-22 DIAGNOSIS — R19.7 NAUSEA VOMITING AND DIARRHEA: ICD-10-CM

## 2025-04-22 DIAGNOSIS — R11.2 NAUSEA VOMITING AND DIARRHEA: ICD-10-CM

## 2025-04-22 DIAGNOSIS — G44.89 OTHER HEADACHE SYNDROME: ICD-10-CM

## 2025-04-22 DIAGNOSIS — R10.32 ABDOMINAL PAIN, LEFT LOWER QUADRANT: Primary | ICD-10-CM

## 2025-04-22 DIAGNOSIS — R11.2 NAUSEA AND VOMITING, UNSPECIFIED VOMITING TYPE: ICD-10-CM

## 2025-04-22 LAB
ALBUMIN SERPL BCG-MCNC: 5 G/DL (ref 3.5–5.2)
ALP SERPL-CCNC: 66 U/L (ref 40–150)
ALT SERPL W P-5'-P-CCNC: 54 U/L (ref 0–70)
ANION GAP SERPL CALCULATED.3IONS-SCNC: 12 MMOL/L (ref 7–15)
AST SERPL W P-5'-P-CCNC: 41 U/L (ref 0–45)
BASOPHILS # BLD AUTO: 0 10E3/UL (ref 0–0.2)
BASOPHILS NFR BLD AUTO: 0 %
BILIRUB SERPL-MCNC: 0.4 MG/DL
BUN SERPL-MCNC: 13.6 MG/DL (ref 8–23)
CALCIUM SERPL-MCNC: 9.5 MG/DL (ref 8.8–10.4)
CHLORIDE SERPL-SCNC: 100 MMOL/L (ref 98–107)
CREAT SERPL-MCNC: 0.96 MG/DL (ref 0.67–1.17)
EGFRCR SERPLBLD CKD-EPI 2021: 89 ML/MIN/1.73M2
EOSINOPHIL # BLD AUTO: 0.1 10E3/UL (ref 0–0.7)
EOSINOPHIL NFR BLD AUTO: 1 %
ERYTHROCYTE [DISTWIDTH] IN BLOOD BY AUTOMATED COUNT: 11.9 % (ref 10–15)
GLUCOSE SERPL-MCNC: 117 MG/DL (ref 70–99)
HCO3 SERPL-SCNC: 25 MMOL/L (ref 22–29)
HCT VFR BLD AUTO: 43.4 % (ref 40–53)
HGB BLD-MCNC: 15.2 G/DL (ref 13.3–17.7)
HOLD SPECIMEN: NORMAL
HOLD SPECIMEN: NORMAL
IMM GRANULOCYTES # BLD: 0.1 10E3/UL
IMM GRANULOCYTES NFR BLD: 1 %
LYMPHOCYTES # BLD AUTO: 1.2 10E3/UL (ref 0.8–5.3)
LYMPHOCYTES NFR BLD AUTO: 11 %
MCH RBC QN AUTO: 31.2 PG (ref 26.5–33)
MCHC RBC AUTO-ENTMCNC: 35 G/DL (ref 31.5–36.5)
MCV RBC AUTO: 89 FL (ref 78–100)
MONOCYTES # BLD AUTO: 0.7 10E3/UL (ref 0–1.3)
MONOCYTES NFR BLD AUTO: 7 %
NEUTROPHILS # BLD AUTO: 8.6 10E3/UL (ref 1.6–8.3)
NEUTROPHILS NFR BLD AUTO: 80 %
NRBC # BLD AUTO: 0 10E3/UL
NRBC BLD AUTO-RTO: 0 /100
PLATELET # BLD AUTO: 142 10E3/UL (ref 150–450)
POTASSIUM SERPL-SCNC: 4.1 MMOL/L (ref 3.4–5.3)
PROT SERPL-MCNC: 7.2 G/DL (ref 6.4–8.3)
RBC # BLD AUTO: 4.87 10E6/UL (ref 4.4–5.9)
SODIUM SERPL-SCNC: 137 MMOL/L (ref 135–145)
WBC # BLD AUTO: 10.7 10E3/UL (ref 4–11)

## 2025-04-22 PROCEDURE — 82947 ASSAY GLUCOSE BLOOD QUANT: CPT | Performed by: EMERGENCY MEDICINE

## 2025-04-22 PROCEDURE — 36415 COLL VENOUS BLD VENIPUNCTURE: CPT | Performed by: EMERGENCY MEDICINE

## 2025-04-22 PROCEDURE — 3080F DIAST BP >= 90 MM HG: CPT | Performed by: PHYSICIAN ASSISTANT

## 2025-04-22 PROCEDURE — 84155 ASSAY OF PROTEIN SERUM: CPT | Performed by: EMERGENCY MEDICINE

## 2025-04-22 PROCEDURE — 258N000003 HC RX IP 258 OP 636: Performed by: EMERGENCY MEDICINE

## 2025-04-22 PROCEDURE — 99214 OFFICE O/P EST MOD 30 MIN: CPT | Performed by: PHYSICIAN ASSISTANT

## 2025-04-22 PROCEDURE — 85004 AUTOMATED DIFF WBC COUNT: CPT | Performed by: EMERGENCY MEDICINE

## 2025-04-22 PROCEDURE — 99284 EMERGENCY DEPT VISIT MOD MDM: CPT | Mod: 25

## 2025-04-22 PROCEDURE — 96361 HYDRATE IV INFUSION ADD-ON: CPT

## 2025-04-22 PROCEDURE — 250N000011 HC RX IP 250 OP 636: Performed by: EMERGENCY MEDICINE

## 2025-04-22 PROCEDURE — 96374 THER/PROPH/DIAG INJ IV PUSH: CPT

## 2025-04-22 PROCEDURE — 3077F SYST BP >= 140 MM HG: CPT | Performed by: PHYSICIAN ASSISTANT

## 2025-04-22 PROCEDURE — 96375 TX/PRO/DX INJ NEW DRUG ADDON: CPT

## 2025-04-22 RX ORDER — ONDANSETRON 4 MG/1
4 TABLET, ORALLY DISINTEGRATING ORAL ONCE
Status: COMPLETED | OUTPATIENT
Start: 2025-04-22 | End: 2025-04-22

## 2025-04-22 RX ORDER — DEXAMETHASONE SODIUM PHOSPHATE 10 MG/ML
10 INJECTION, SOLUTION INTRAMUSCULAR; INTRAVENOUS ONCE
Status: COMPLETED | OUTPATIENT
Start: 2025-04-22 | End: 2025-04-22

## 2025-04-22 RX ORDER — METOCLOPRAMIDE HYDROCHLORIDE 5 MG/ML
10 INJECTION INTRAMUSCULAR; INTRAVENOUS ONCE
Status: COMPLETED | OUTPATIENT
Start: 2025-04-22 | End: 2025-04-22

## 2025-04-22 RX ORDER — DIPHENHYDRAMINE HYDROCHLORIDE 50 MG/ML
25 INJECTION, SOLUTION INTRAMUSCULAR; INTRAVENOUS ONCE
Status: COMPLETED | OUTPATIENT
Start: 2025-04-22 | End: 2025-04-22

## 2025-04-22 RX ORDER — PROMETHAZINE HYDROCHLORIDE 25 MG/1
25 TABLET ORAL EVERY 6 HOURS PRN
Qty: 12 TABLET | Refills: 0 | Status: SHIPPED | OUTPATIENT
Start: 2025-04-22

## 2025-04-22 RX ORDER — MORPHINE SULFATE 4 MG/ML
4 INJECTION, SOLUTION INTRAMUSCULAR; INTRAVENOUS ONCE
Status: COMPLETED | OUTPATIENT
Start: 2025-04-22 | End: 2025-04-22

## 2025-04-22 RX ORDER — KETOROLAC TROMETHAMINE 15 MG/ML
15 INJECTION, SOLUTION INTRAMUSCULAR; INTRAVENOUS ONCE
Status: COMPLETED | OUTPATIENT
Start: 2025-04-22 | End: 2025-04-22

## 2025-04-22 RX ADMIN — SODIUM CHLORIDE 1000 ML: 9 INJECTION, SOLUTION INTRAVENOUS at 21:06

## 2025-04-22 RX ADMIN — ONDANSETRON 4 MG: 4 TABLET, ORALLY DISINTEGRATING ORAL at 19:22

## 2025-04-22 RX ADMIN — KETOROLAC TROMETHAMINE 15 MG: 15 INJECTION, SOLUTION INTRAMUSCULAR; INTRAVENOUS at 21:07

## 2025-04-22 RX ADMIN — MORPHINE SULFATE 4 MG: 4 INJECTION INTRAVENOUS at 22:00

## 2025-04-22 RX ADMIN — DIPHENHYDRAMINE HYDROCHLORIDE 25 MG: 50 INJECTION, SOLUTION INTRAMUSCULAR; INTRAVENOUS at 21:06

## 2025-04-22 RX ADMIN — METOCLOPRAMIDE 10 MG: 5 INJECTION, SOLUTION INTRAMUSCULAR; INTRAVENOUS at 21:06

## 2025-04-22 RX ADMIN — DEXAMETHASONE SODIUM PHOSPHATE 10 MG: 10 INJECTION, SOLUTION INTRAMUSCULAR; INTRAVENOUS at 21:06

## 2025-04-22 ASSESSMENT — COLUMBIA-SUICIDE SEVERITY RATING SCALE - C-SSRS
1. IN THE PAST MONTH, HAVE YOU WISHED YOU WERE DEAD OR WISHED YOU COULD GO TO SLEEP AND NOT WAKE UP?: NO
2. HAVE YOU ACTUALLY HAD ANY THOUGHTS OF KILLING YOURSELF IN THE PAST MONTH?: NO
6. HAVE YOU EVER DONE ANYTHING, STARTED TO DO ANYTHING, OR PREPARED TO DO ANYTHING TO END YOUR LIFE?: NO

## 2025-04-22 ASSESSMENT — ACTIVITIES OF DAILY LIVING (ADL)
ADLS_ACUITY_SCORE: 50

## 2025-04-22 NOTE — PROGRESS NOTES
Urgent Care Clinic Visit    Chief Complaint   Patient presents with    Nausea     X3 Days nausea ,headache frontal, nausea, today vomited 3 times, no constipation or diarrhea, abdominal cramps,mild dizziness,fatgued,  after getting his wegovy injection on Sunday sx worsened, tried tylenol, ice, heat, water, massage,               4/22/2025     6:54 PM   Additional Questions   Roomed by Lissett Ahmadi CMA   Accompanied by Tracy Yeung

## 2025-04-23 NOTE — PATIENT INSTRUCTIONS
Vomiting, HA, LLQ abdominal pain worsening symptoms today    Ddx: diverticulitis, bowel obstruction, bowel perforation, pyelonephritis vs other    Given limited diagnostic imaging and lab capabilities in the urgent care setting, recommend further evaluation in the ED

## 2025-04-23 NOTE — PROGRESS NOTES
Assessment & Plan     Abdominal pain, left lower quadrant  Ddx: Diverticulitis, bowel obstruction, bowel perforation, epiploic appendagitis, pyelonephritis, appendicitis versus other.    Acute problem.  Ongoing symptoms for the past couple of days now with vomiting all day and severe headache.  Concern is for acute diverticulitis versus other.  Given limited diagnostic imaging and lab capabilities in the urgent care setting, recommend further evaluation in the ED.  Will need comprehensive metabolic panel, CT abdominal pelvis for further evaluation, etc....  Transfer to [facility] ED via private car.        Nausea and vomiting, unspecified vomiting type  We gave him Zofran in urgent care today prior to departure.  Recommend follow-up in the emergency room given additional associated symptoms of left lower quadrant abdominal pain and severe headache.  Patient and his wife agree with the plan.  - ondansetron (ZOFRAN ODT) ODT tab 4 mg    Other headache syndrome  Patient reports severe headache.  Not improving with tylenol. No hx of migraine HA. No recent head injury or trauma.  No focal neurological deficits noted. Recommend further evaluation at [facility] ED.  Transfer to [facility] ED via private car.       Return today (on 4/22/2025) for Follow up at [facility] ED for further evaluation.    Phoebe Rodríguez PA-C  [facility] CARLOS Mcdonnell is a 60 year old male who presents to clinic today for the following health issues:  Chief Complaint   Patient presents with    Nausea     X3 Days nausea ,headache frontal, nausea, today vomited 3 times, no constipation or diarrhea, abdominal cramps,mild dizziness,fatgued,  after getting his wegovy injection on Sunday sx worsened, tried tylenol, ice, heat, water, massage,          4/22/2025     6:54 PM   Additional Questions   Roomed by Lissett Ahmadi CMA   Accompanied by Tracy -m Wife     HPI    Patient with medical history  "significant for CAD, asthma, depression, hyperlipidemia among others is presenting to urgent care today accompanied by his wife  with complaint of abdominal cramping, dizziness, fatigue ,headache, neck stiffness.  Onset of symptoms 2 days ago.  Vomiting today.  No fever. No diarrhea. No blood in stools. Patient denies any hematuria or dysuria.  No new medication.  Patient reported wegovy injection 2 days ago.  Treatment tried: Tylenol, ice, heat, massage.   Per chart review, last colonoscopy was in November 2024, revealed diverticulosis in the sigmoid colon.       Review of Systems  Constitutional, HEENT, cardiovascular, pulmonary, GI, , musculoskeletal, neuro, skin, endocrine and psych systems are negative, except as otherwise noted.      Objective    BP (!) 158/100   Pulse 77   Temp 98.3  F (36.8  C) (Oral)   Resp 20   Ht 1.702 m (5' 7\")   Wt 106.6 kg (235 lb)   SpO2 96%   BMI 36.81 kg/m    Physical Exam   GENERAL: alert and no distress  EYES: PERRL, EOM are normal  HENT: ear canals and TM's normal, mouth without ulcers or lesions  NECK: Tenderness to palpation posterior neck in the trapezius muscle, range of motion of the neck is normal  RESP: lungs clear to auscultation - no rales, rhonchi or wheezes  CV: regular rate and rhythm, normal S1 S2  ABDOMEN: soft, moderate tenderness to palpation left lower quadrant, no hepatosplenomegaly, no masses and bowel sounds normal  MS: no gross musculoskeletal defects noted, no edema  SKIN: no suspicious lesions or rashes  NEURO: Patient is alert and oriented, mentation and speech intact, cranial nerves II through XII grossly intact          "

## 2025-04-23 NOTE — ED TRIAGE NOTES
Arrived with complaints of headache since Sunday. Nausea and vomiting since this morning.   Denies having any fevers. Just prior to arrival was at , and was given Zofran but was recommended to go to the ER. Denies having vision changes. Headache 9/10 Last took Tylenol 1000 MG AT 4 PM.   Hx headaches but feels different from normal.     Triage Assessment (Adult)       Row Name 04/22/25 2005          Triage Assessment    Airway WDL WDL        Respiratory WDL    Respiratory WDL WDL        Skin Circulation/Temperature WDL    Skin Circulation/Temperature WDL WDL        Cardiac WDL    Cardiac WDL WDL        Peripheral/Neurovascular WDL    Peripheral Neurovascular WDL WDL        Cognitive/Neuro/Behavioral WDL    Cognitive/Neuro/Behavioral WDL WDL

## 2025-04-23 NOTE — ED PROVIDER NOTES
"  Emergency Department Note      History of Present Illness     Chief Complaint   Headache, Abdominal Pain, and Nausea & Vomiting    HPI   John Reyes is a 60 year old male with a history of hyperlipidemia who presents to the ED for headache, nausea, and vomiting. For the last three nights, the patient has been experiencing a \"pressing\" headache to the bilateral sides of his headache. Last night, he was not able to sleep. The headache has not changed since its onset, and he has never had anything like this. He has tried Tylenol, ibuprofen, and ice with no relief. Today, he started to experience some nausea and vomiting. He has had 4-5 episodes of vomiting in the last couple of hours. He states the vomiting helps his headache. He also endorses some fatigue and dehydration. He has been trying to push fluids. No photophobia. No recent falls or trauma. No recent air travel. No history of migraines. He also denies rhinorrhea, fever, or cough. No sinus pressure. He notes minor nonbloody, nonblack diarrhea. He also notes minor LLQ pain with palpitation, as well as a decreased appetite.     Independent Historian   None    Review of External Notes   Reviewed Urgent Care note from 4/22/2025. Patient was seen for LLQ pain and vomiting, as well as headache.    Past Medical History   Medical History and Problem List   Generalized anxiety disorder  Cataplexy and narcolepsy  Social anxiety disorder  ADD  Hyperlipidemia  Depression  Sleep disorder  Sleep apnea  Rotator cuff tear  Coronary artery disease  Erectile dysfunction  Esophageal reflux  Hypogonadism  Overactive bladder  Unstable angina    Medications   Albuterol  Bupropion  Clopidogrel  Ezetimibe  Fluticasone  Nitroglycerin  Rosuvastatin  Semaglutide  Sertraline  Solifenacin  Tirzepatide  Sildenafil  Trazodone    Surgical History   Shoulder arthroscopy  Colonoscopy  Coronary angiogram  Heart catheterization  Hemorrhoidectomy  Vasectomy  Right 3rd finger " "ORIF    Physical Exam   Patient Vitals for the past 24 hrs:   BP Temp Temp src Pulse Resp SpO2 Height Weight   04/22/25 2222 -- -- -- -- -- 94 % -- --   04/22/25 2158 138/83 -- -- -- -- 97 % -- --   04/22/25 2128 (!) 141/77 -- -- -- -- 96 % -- --   04/22/25 2036 (!) 160/87 -- -- -- -- 95 % -- --   04/22/25 2008 (!) 169/101 98.6  F (37  C) Oral 86 18 99 % 1.702 m (5' 7\") 106.6 kg (235 lb)     Physical Exam  General: The patient is alert, in no respiratory distress. In a dark room.     HENT: Mucous membranes moist.    Cardiovascular: Regular rate and rhythm. Good pulses in all four extremities. Normal capillary refill and skin turgor.     Respiratory: Lungs are clear. No nasal flaring. No retractions. No wheezing, no crackles.    Gastrointestinal: Abdomen soft. No guarding, no rebound. No palpable hernias. No abdominal tenderness.    Musculoskeletal: No gross deformity.     Skin: No rashes or petechiae.     Neurologic: The patient is alert and oriented x3. GCS 15. No testable cranial nerve deficit. Follows commands with clear and appropriate speech. Gives appropriate answers. Good strength in all extremities. No gross neurologic deficit. Gross sensation intact. Pupils are round and reactive. No meningismus.     Lymphatic: No cervical adenopathy. No lower extremity swelling.    Psychiatric: The patient is non-tearful.    Diagnostics   Lab Results   Labs Ordered and Resulted from Time of ED Arrival to Time of ED Departure   COMPREHENSIVE METABOLIC PANEL - Abnormal       Result Value    Sodium 137      Potassium 4.1      Carbon Dioxide (CO2) 25      Anion Gap 12      Urea Nitrogen 13.6      Creatinine 0.96      GFR Estimate 89      Calcium 9.5      Chloride 100      Glucose 117 (*)     Alkaline Phosphatase 66      AST 41      ALT 54      Protein Total 7.2      Albumin 5.0      Bilirubin Total 0.4     CBC WITH PLATELETS AND DIFFERENTIAL - Abnormal    WBC Count 10.7      RBC Count 4.87      Hemoglobin 15.2      Hematocrit " 43.4      MCV 89      MCH 31.2      MCHC 35.0      RDW 11.9      Platelet Count 142 (*)     % Neutrophils 80      % Lymphocytes 11      % Monocytes 7      % Eosinophils 1      % Basophils 0      % Immature Granulocytes 1      NRBCs per 100 WBC 0      Absolute Neutrophils 8.6 (*)     Absolute Lymphocytes 1.2      Absolute Monocytes 0.7      Absolute Eosinophils 0.1      Absolute Basophils 0.0      Absolute Immature Granulocytes 0.1      Absolute NRBCs 0.0       Imaging   No orders to display     Independent Interpretation   None    ED Course    Medications Administered   Medications   sodium chloride 0.9% BOLUS 1,000 mL (0 mLs Intravenous Stopped 4/22/25 2222)   metoclopramide (REGLAN) injection 10 mg (10 mg Intravenous $Given 4/22/25 2106)   ketorolac (TORADOL) injection 15 mg (15 mg Intravenous $Given 4/22/25 2107)   diphenhydrAMINE (BENADRYL) injection 25 mg (25 mg Intravenous $Given 4/22/25 2106)   dexAMETHasone PF (DECADRON) injection 10 mg (10 mg Intravenous $Given 4/22/25 2106)   morphine (PF) injection 4 mg (4 mg Intravenous $Given 4/22/25 2200)     Procedures   Procedures     Discussion of Management   None    ED Course   ED Course as of 04/22/25 2308 Tue Apr 22, 2025 2045 I obtained history and examined the patient as noted above.     2152 I rechecked and updated the patient. Patient states headache is better, but it has not completely subsided.    2208 The patient is comfortable with plan for discharge.       Additional Documentation  None    Medical Decision Making / Diagnosis   CMS Diagnoses: None    MIPS       None    MDM   John Reyes is a 60 year old male who was sent in from urgent care due to a concern for diverticulitis or abdominal causes but I do feel that the deep palpation of the abdomen likely led to that tenderness on top of the vomiting and currently he does not complain of abdominal pain.  He does complain of 3 days of a bitemporal headache that is pressure and nothing makes the  the pain better or worse I would find that to be unlikely with a vascular type headache subarachnoid encephalitis meningitis there is also no radiation.  I did consider sinusitis he did not have particular tenderness no overt URI signs he does however have nausea diarrhea and now has developed vomiting appears dehydrated he did improve with medication for tension and headache I did give him 1 dose of morphine so that he could sleep tonight he said he was feeling much better prior to that and was discharged home.  I did stress that he were to worsen or develop new symptoms he would need to return but my suspicion for intracranial processes or vascular processes is low.  I did encourage him to follow-up with a primary care doctor.  Blood pressure is elevated and will need to be rechecked    Disposition   The patient was discharged.     Diagnosis     ICD-10-CM    1. Other headache syndrome  G44.89       2. Nausea vomiting and diarrhea  R11.2     R19.7       3. Elevated blood pressure reading  R03.0          Discharge Medications   Discharge Medication List as of 4/22/2025 10:24 PM        START taking these medications    Details   promethazine (PHENERGAN) 25 MG tablet Take 1 tablet (25 mg) by mouth every 6 hours as needed for nausea., Disp-12 tablet, R-0, Local Print           Scribe Disclosure:  I, Chon Paz, am serving as a scribe at 8:57 PM on 4/22/2025 to document services personally performed by Uday Bajwa MD based on my observations and the provider's statements to me.        Uday Bajwa MD  04/22/25 [id number]

## 2025-05-02 ENCOUNTER — MYC MEDICAL ADVICE (OUTPATIENT)
Dept: FAMILY MEDICINE | Facility: CLINIC | Age: 60
End: 2025-05-02
Payer: COMMERCIAL

## 2025-05-02 NOTE — TELEPHONE ENCOUNTER
Unique- see [employer] message below.  Is E-Visit appropriate for this med change?  Please advise.  Khloe Núñez, RN    2/20/2025  [facility]    Has noticed weight gain of ~20 pounds in the last year. Has subclinical hypothyroidism but very mild and thyroid function normal, do not feel that low dose levothyroxine would adjust weight much. He does have notable cardiac history of unstable angina and CAD. Discussed weight and heart concerns with Cardiologist recently who agrees that GLP1 medication for weight loss could be beneficial. We discussed at length today and after discussion would like to go forward with seeing if GLP1 medication is covered by insurance for weight and heart concerns. Prior authorization sent. Pending results will either sent Wegovy if Zepbound is not approved and if neither GLP1 is covered can discuss whether wanting to do compounding pharmacy vs oral medication options. Once started on medication would recommend 3 month follow up visit; physical and follow up visit already scheduled for early June. Sooner for any acute concerns.  Plan: tirzepatide-Weight Management (ZEPBOUND) 2.5         MG/0.5ML prefilled pen    Signed Electronically by: Unique Negrete PA-C

## 2025-05-05 ENCOUNTER — E-VISIT (OUTPATIENT)
Dept: FAMILY MEDICINE | Facility: CLINIC | Age: 60
End: 2025-05-05
Payer: COMMERCIAL

## 2025-05-05 DIAGNOSIS — I20.0 UNSTABLE ANGINA (H): ICD-10-CM

## 2025-05-05 DIAGNOSIS — E78.2 MIXED DYSLIPIDEMIA: ICD-10-CM

## 2025-05-05 DIAGNOSIS — I25.118 CORONARY ARTERY DISEASE OF NATIVE ARTERY OF NATIVE HEART WITH STABLE ANGINA PECTORIS: ICD-10-CM

## 2025-05-05 DIAGNOSIS — E66.811 CLASS 1 OBESITY DUE TO EXCESS CALORIES WITH SERIOUS COMORBIDITY AND BODY MASS INDEX (BMI) OF 33.0 TO 33.9 IN ADULT: Primary | ICD-10-CM

## 2025-05-05 DIAGNOSIS — E66.09 CLASS 1 OBESITY DUE TO EXCESS CALORIES WITH SERIOUS COMORBIDITY AND BODY MASS INDEX (BMI) OF 33.0 TO 33.9 IN ADULT: Primary | ICD-10-CM

## 2025-05-05 NOTE — TELEPHONE ENCOUNTER
Can we request an e-visit for weight loss medication change. We can also let him know that I can print this prescription for him to  at the  if not wanting sent directly.     Thanks!  Unique Negrete PA-C

## 2025-05-06 ENCOUNTER — TELEPHONE (OUTPATIENT)
Dept: FAMILY MEDICINE | Facility: CLINIC | Age: 60
End: 2025-05-06
Payer: COMMERCIAL

## 2025-05-06 NOTE — TELEPHONE ENCOUNTER
PRIOR AUTHORIZATION DENIED    Medication: MOUNJARO 5 MG/0.5ML SC SOAJ  Insurance Company: [employer] - Phone [phone] Fax [phone]  Denial Date: 5/6/2025  Denial Reason(s): Only covered for type II diabetes      Appeal Information:       Patient Notified: No

## 2025-05-21 ENCOUNTER — MYC MEDICAL ADVICE (OUTPATIENT)
Dept: FAMILY MEDICINE | Facility: CLINIC | Age: 60
End: 2025-05-21
Payer: COMMERCIAL

## 2025-05-21 DIAGNOSIS — E66.09 CLASS 1 OBESITY DUE TO EXCESS CALORIES WITH SERIOUS COMORBIDITY AND BODY MASS INDEX (BMI) OF 33.0 TO 33.9 IN ADULT: Primary | ICD-10-CM

## 2025-05-21 DIAGNOSIS — I25.118 CORONARY ARTERY DISEASE OF NATIVE ARTERY OF NATIVE HEART WITH STABLE ANGINA PECTORIS: ICD-10-CM

## 2025-05-21 DIAGNOSIS — E66.811 CLASS 1 OBESITY DUE TO EXCESS CALORIES WITH SERIOUS COMORBIDITY AND BODY MASS INDEX (BMI) OF 33.0 TO 33.9 IN ADULT: Primary | ICD-10-CM

## 2025-05-21 DIAGNOSIS — E78.2 MIXED DYSLIPIDEMIA: ICD-10-CM

## 2025-05-21 DIAGNOSIS — I20.0 UNSTABLE ANGINA (H): ICD-10-CM

## 2025-06-03 ENCOUNTER — MYC MEDICAL ADVICE (OUTPATIENT)
Dept: FAMILY MEDICINE | Facility: CLINIC | Age: 60
End: 2025-06-03
Payer: COMMERCIAL

## 2025-06-03 NOTE — TELEPHONE ENCOUNTER
See my chart - advised to discuss medication change in upcoming visit 6/12/25 per note below     2/20/25 visit notes   excess calories with serious comorbidity and body mass index (BMI) of 33.0 to 33.9 in adult  (primary encounter diagnosis)  (I25.118) Coronary artery disease of native artery of native heart with stable angina pectoris  (E78.2) Mixed dyslipidemia  (I20.0) Unstable angina (H)  Has noticed weight gain of ~20 pounds in the last year. Has subclinical hypothyroidism but very mild and thyroid function normal, do not feel that low dose levothyroxine would adjust weight much. He does have notable cardiac history of unstable angina and CAD. Discussed weight and heart concerns with Cardiologist recently who agrees that GLP1 medication for weight loss could be beneficial. We discussed at length today and after discussion would like to go forward with seeing if GLP1 medication is covered by insurance for weight and heart concerns. Prior authorization sent. Pending results will either sent Wegovy if Zepbound is not approved and if neither GLP1 is covered can discuss whether wanting to do compounding pharmacy vs oral medication options. Once started on medication would recommend 3 month follow up visit; physical and follow up visit already scheduled for early June. Sooner for any acute concerns.  Plan: tirzepatide-Weight Management (ZEPBOUND) 2.5         MG/0.5ML prefilled pen    Clarita Burkett, Registered Nurse  [facility]

## 2025-07-15 ENCOUNTER — OFFICE VISIT (OUTPATIENT)
Dept: FAMILY MEDICINE | Facility: CLINIC | Age: 60
End: 2025-07-15
Payer: COMMERCIAL

## 2025-07-15 ENCOUNTER — MYC MEDICAL ADVICE (OUTPATIENT)
Dept: FAMILY MEDICINE | Facility: CLINIC | Age: 60
End: 2025-07-15

## 2025-07-15 VITALS
WEIGHT: 221.3 LBS | BODY MASS INDEX: 34.73 KG/M2 | TEMPERATURE: 97.6 F | HEART RATE: 70 BPM | OXYGEN SATURATION: 96 % | DIASTOLIC BLOOD PRESSURE: 76 MMHG | RESPIRATION RATE: 16 BRPM | HEIGHT: 67 IN | SYSTOLIC BLOOD PRESSURE: 121 MMHG

## 2025-07-15 DIAGNOSIS — Z29.11 NEED FOR RSV VACCINATION: ICD-10-CM

## 2025-07-15 DIAGNOSIS — E03.8 SUBCLINICAL HYPOTHYROIDISM: ICD-10-CM

## 2025-07-15 DIAGNOSIS — L21.9 DERMATITIS, SEBORRHEIC: ICD-10-CM

## 2025-07-15 DIAGNOSIS — G47.9 SLEEP DISORDER: ICD-10-CM

## 2025-07-15 DIAGNOSIS — E66.811 CLASS 1 OBESITY DUE TO EXCESS CALORIES WITH SERIOUS COMORBIDITY AND BODY MASS INDEX (BMI) OF 33.0 TO 33.9 IN ADULT: ICD-10-CM

## 2025-07-15 DIAGNOSIS — I25.118 CORONARY ARTERY DISEASE OF NATIVE ARTERY OF NATIVE HEART WITH STABLE ANGINA PECTORIS: ICD-10-CM

## 2025-07-15 DIAGNOSIS — E78.2 MIXED DYSLIPIDEMIA: ICD-10-CM

## 2025-07-15 DIAGNOSIS — Z00.00 ROUTINE GENERAL MEDICAL EXAMINATION AT A HEALTH CARE FACILITY: Primary | ICD-10-CM

## 2025-07-15 DIAGNOSIS — E66.09 CLASS 1 OBESITY DUE TO EXCESS CALORIES WITH SERIOUS COMORBIDITY AND BODY MASS INDEX (BMI) OF 33.0 TO 33.9 IN ADULT: Primary | ICD-10-CM

## 2025-07-15 DIAGNOSIS — F33.1 MODERATE EPISODE OF RECURRENT MAJOR DEPRESSIVE DISORDER (H): ICD-10-CM

## 2025-07-15 DIAGNOSIS — E66.09 CLASS 1 OBESITY DUE TO EXCESS CALORIES WITH SERIOUS COMORBIDITY AND BODY MASS INDEX (BMI) OF 33.0 TO 33.9 IN ADULT: ICD-10-CM

## 2025-07-15 DIAGNOSIS — N32.81 OVERACTIVE BLADDER: ICD-10-CM

## 2025-07-15 DIAGNOSIS — E66.811 CLASS 1 OBESITY DUE TO EXCESS CALORIES WITH SERIOUS COMORBIDITY AND BODY MASS INDEX (BMI) OF 33.0 TO 33.9 IN ADULT: Primary | ICD-10-CM

## 2025-07-15 LAB
EST. AVERAGE GLUCOSE BLD GHB EST-MCNC: 94 MG/DL
HBA1C MFR BLD: 4.9 % (ref 0–5.6)

## 2025-07-15 PROCEDURE — 84443 ASSAY THYROID STIM HORMONE: CPT

## 2025-07-15 PROCEDURE — 99396 PREV VISIT EST AGE 40-64: CPT | Mod: 25

## 2025-07-15 PROCEDURE — 99214 OFFICE O/P EST MOD 30 MIN: CPT | Mod: 25

## 2025-07-15 PROCEDURE — 83036 HEMOGLOBIN GLYCOSYLATED A1C: CPT

## 2025-07-15 PROCEDURE — G2211 COMPLEX E/M VISIT ADD ON: HCPCS

## 2025-07-15 PROCEDURE — 80061 LIPID PANEL: CPT

## 2025-07-15 PROCEDURE — 3044F HG A1C LEVEL LT 7.0%: CPT

## 2025-07-15 PROCEDURE — 90471 IMMUNIZATION ADMIN: CPT

## 2025-07-15 PROCEDURE — 3078F DIAST BP <80 MM HG: CPT

## 2025-07-15 PROCEDURE — 3074F SYST BP LT 130 MM HG: CPT

## 2025-07-15 PROCEDURE — 36415 COLL VENOUS BLD VENIPUNCTURE: CPT

## 2025-07-15 PROCEDURE — 90678 RSV VACC PREF BIVALENT IM: CPT

## 2025-07-15 RX ORDER — TRAZODONE HYDROCHLORIDE 50 MG/1
150 TABLET ORAL AT BEDTIME
Qty: 270 TABLET | Refills: 3 | Status: SHIPPED | OUTPATIENT
Start: 2025-07-15

## 2025-07-15 RX ORDER — SERTRALINE HYDROCHLORIDE 100 MG/1
100 TABLET, FILM COATED ORAL DAILY
Qty: 90 TABLET | Refills: 3 | Status: SHIPPED | OUTPATIENT
Start: 2025-07-15

## 2025-07-15 RX ORDER — SOLIFENACIN SUCCINATE 10 MG/1
10 TABLET, FILM COATED ORAL DAILY
Qty: 90 TABLET | Refills: 3 | Status: SHIPPED | OUTPATIENT
Start: 2025-07-15

## 2025-07-15 RX ORDER — BUPROPION HYDROCHLORIDE 300 MG/1
300 TABLET ORAL DAILY
Qty: 90 TABLET | Refills: 3 | Status: SHIPPED | OUTPATIENT
Start: 2025-07-15

## 2025-07-15 RX ORDER — KETOCONAZOLE 20 MG/ML
SHAMPOO, SUSPENSION TOPICAL
Qty: 120 ML | Refills: 11 | Status: SHIPPED | OUTPATIENT
Start: 2025-07-15

## 2025-07-15 SDOH — HEALTH STABILITY: PHYSICAL HEALTH: ON AVERAGE, HOW MANY MINUTES DO YOU ENGAGE IN EXERCISE AT THIS LEVEL?: 40 MIN

## 2025-07-15 SDOH — HEALTH STABILITY: PHYSICAL HEALTH: ON AVERAGE, HOW MANY DAYS PER WEEK DO YOU ENGAGE IN MODERATE TO STRENUOUS EXERCISE (LIKE A BRISK WALK)?: 4 DAYS

## 2025-07-15 ASSESSMENT — PATIENT HEALTH QUESTIONNAIRE - PHQ9
10. IF YOU CHECKED OFF ANY PROBLEMS, HOW DIFFICULT HAVE THESE PROBLEMS MADE IT FOR YOU TO DO YOUR WORK, TAKE CARE OF THINGS AT HOME, OR GET ALONG WITH OTHER PEOPLE: SOMEWHAT DIFFICULT
SUM OF ALL RESPONSES TO PHQ QUESTIONS 1-9: 5
SUM OF ALL RESPONSES TO PHQ QUESTIONS 1-9: 5

## 2025-07-15 ASSESSMENT — SOCIAL DETERMINANTS OF HEALTH (SDOH): HOW OFTEN DO YOU GET TOGETHER WITH FRIENDS OR RELATIVES?: ONCE A WEEK

## 2025-07-15 NOTE — PATIENT INSTRUCTIONS
Patient Education   Preventive Care Advice   This is general advice given by our system to help you stay healthy. However, your care team may have specific advice just for you. Please talk to your care team about your preventive care needs.  Nutrition  Eat 5 or more servings of fruits and vegetables each day.  Try wheat bread, brown rice and whole grain pasta (instead of white bread, rice, and pasta).  Get enough calcium and vitamin D. Check the label on foods and aim for 100% of the RDA (recommended daily allowance).  Lifestyle  Exercise at least 150 minutes each week  (30 minutes a day, 5 days a week).  Do muscle strengthening activities 2 days a week. These help control your weight and prevent disease.  No smoking.  Wear sunscreen to prevent skin cancer.  Have a dental exam and cleaning every 6 months.  Yearly exams  See your health care team every year to talk about:  Any changes in your health.  Any medicines your care team has prescribed.  Preventive care, family planning, and ways to prevent chronic diseases.  Shots (vaccines)   HPV shots (up to age 26), if you've never had them before.  Hepatitis B shots (up to age 59), if you've never had them before.  COVID-19 shot: Get this shot when it's due.  Flu shot: Get a flu shot every year.  Tetanus shot: Get a tetanus shot every 10 years.  Pneumococcal, hepatitis A, and RSV shots: Ask your care team if you need these based on your risk.  Shingles shot (for age 50 and up)  General health tests  Diabetes screening:  Starting at age 35, Get screened for diabetes at least every 3 years.  If you are younger than age 35, ask your care team if you should be screened for diabetes.  Cholesterol test: At age 39, start having a cholesterol test every 5 years, or more often if advised.  Bone density scan (DEXA): At age 50, ask your care team if you should have this scan for osteoporosis (brittle bones).  Hepatitis C: Get tested at least once in your life.  STIs (sexually  transmitted infections)  Before age 24: Ask your care team if you should be screened for STIs.  After age 24: Get screened for STIs if you're at risk. You are at risk for STIs (including HIV) if:  You are sexually active with more than one person.  You don't use condoms every time.  You or a partner was diagnosed with a sexually transmitted infection.  If you are at risk for HIV, ask about PrEP medicine to prevent HIV.  Get tested for HIV at least once in your life, whether you are at risk for HIV or not.  Cancer screening tests  Cervical cancer screening: If you have a cervix, begin getting regular cervical cancer screening tests starting at age 21.  Breast cancer scan (mammogram): If you've ever had breasts, begin having regular mammograms starting at age 40. This is a scan to check for breast cancer.  Colon cancer screening: It is important to start screening for colon cancer at age 45.  Have a colonoscopy test every 10 years (or more often if you're at risk) Or, ask your provider about stool tests like a FIT test every year or Cologuard test every 3 years.  To learn more about your testing options, visit:   .  For help making a decision, visit:   https://bit.ly/ot04939.  Prostate cancer screening test: If you have a prostate, ask your care team if a prostate cancer screening test (PSA) at age 55 is right for you.  Lung cancer screening: If you are a current or former smoker ages 50 to 80, ask your care team if ongoing lung cancer screenings are right for you.  For informational purposes only. Not to replace the advice of your health care provider. Copyright   2023 [employer]. All rights reserved. Clinically reviewed by the [facility] Transitions Program. [employer] [id number] - REV 01/24.

## 2025-07-15 NOTE — PROGRESS NOTES
Preventive Care Visit  [facility]  Unique Negrete PA-C, Family Medicine  Jul 15, 2025    Assessment & Plan     (Z00.00) Routine general medical examination at a health care facility  (primary encounter diagnosis)  Stable exam. Routine screening labs, he is fasting today. RSV vaccination updated in clinic. Follow up in 1 year for annual wellness; sooner as needed for acute concerns.  Plan: PRIMARY CARE FOLLOW-UP SCHEDULING, TSH with         free T4 reflex, Hemoglobin A1c, Lipid panel         reflex to direct LDL Fasting          (I25.118) Coronary artery disease of native artery of native heart with stable angina pectoris  Continues to follow with Cardiology. Blood pressure well controlled. On statin. No acute concerns at this time or new cardiac symptoms. Check lipid panel today. Will continue to monitor.   Plan: Hemoglobin A1c          (F33.1) Moderate episode of recurrent major depressive disorder (H)  Well controlled with use of wellbutrin and sertraline overall. Does feel there could be further improvement but hesitant to increase medication and agree that with symptoms overall being manageable would recommend continuing on current dosing for now. No new side effects of the medication. Refill provided. Will continue to monitor.   Plan: buPROPion (WELLBUTRIN XL) 300 MG 24 hr tablet,         sertraline (ZOLOFT) 100 MG tablet    (E66.811,  E66.09,  Z68.33) Class 1 obesity due to excess calories with serious comorbidity and body mass index (BMI) of 33.0 to 33.9 in adult  Weight seemed to be better managed with use of semaglutide. Switched to tirzepatide but not working as well. Would be interested in going back on semaglutide but no longer available through compounding so would need to either go through commercial pharmacy or the . Will send through insurance to see if covered, prior authorization started. If not covered can send through .  Plan: Semaglutide-Weight  Management (WEGOVY) 0.5         MG/0.5ML pen    (E03.8) Subclinical hypothyroidism  Thyroid function consistent with subclinical hypothyroidism in September. Plan to recheck thyroid levels today. Further recommendations pending lab results.   Plan: TSH with free T4 reflex          (E78.2) Mixed dyslipidemia  Well controlled with use of statin medication, prescribed by Cardiology. Check lipid panel today. No new side effects of the medication. Refill provided. Will continue to monitor.   Plan: Lipid panel reflex to direct LDL Fasting          (L21.9) Dermatitis, seborrheic  Well controlled with use of ketoconazole shampoo. No new side effects of the medication. Refill provided. Will continue to monitor.   Plan: ketoconazole (NIZORAL) 2 % external shampoo          (G47.9) Sleep disorder  Well controlled with use of trazodone. Typically using 100-125 mg at night but occasionally will use 150 mg. No new side effects of the medication. Refill provided. Will continue to monitor.   Plan: traZODone (DESYREL) 50 MG tablet          (N32.81) Overactive bladder  Well controlled with use of solifenacin daily. No new side effects of the medication. Refill provided. Will continue to monitor.   Plan: solifenacin (VESICARE) 10 MG tablet          (Z29.11) Need for RSV vaccination  Plan: RSV VACCINE (ABRYSVO)          The longitudinal plan of care for the diagnosis(es)/condition(s) as documented were addressed during this visit. Due to the added complexity in care, I will continue to support Sathya in the subsequent management and with ongoing continuity of care.    Patient has been advised of split billing requirements and indicates understanding: Yes    Counseling  Appropriate preventive services were addressed with this patient via screening, questionnaire, or discussion as appropriate for fall prevention, nutrition, physical activity, Tobacco-use cessation, social engagement, weight loss and cognition.  Checklist reviewing  preventive services available has been given to the patient.  Reviewed patient's diet, addressing concerns and/or questions.   Reviewed preventive health counseling, as reflected in patient instructions    Follow up in 1 year for annual wellness visit; sooner with any acute concerns.    Gamaliel Mcdonnell is a 60 year old, presenting for the following:  Physical and Depression        7/15/2025    12:50 PM   Additional Questions   Roomed by Hannah Combs        HPI    Depression   How are you doing with your depression since your last visit? No change  Are you having other symptoms that might be associated with depression? No  Have you had a significant life event?  No   Are you feeling anxious or having panic attacks?   No  Do you have any concerns with your use of alcohol or other drugs? No    Social History     Tobacco Use    Smoking status: Former     Current packs/day: 0.00     Types: Cigarettes, Cigars     Quit date: 2003     Years since quittin.5    Smokeless tobacco: Former     Quit date: 2000   Vaping Use    Vaping status: Never Used   Substance Use Topics    Alcohol use: Yes     Comment: 3-4 beers/week    Drug use: No         10/14/2024     3:47 PM 2025     1:14 PM 7/15/2025    12:40 PM   PHQ   PHQ-9 Total Score 9 14  5    Q9: Thoughts of better off dead/self-harm past 2 weeks Not at all Not at all Not at all       Patient-reported         2024    12:48 PM 10/14/2024     3:48 PM 2025     1:15 PM   CARLOS-7 SCORE   Total Score  6 (mild anxiety) 6 (mild anxiety)   Total Score 13 6 6        Patient-reported         7/15/2025    12:40 PM   Last PHQ-9   1.  Little interest or pleasure in doing things 0   2.  Feeling down, depressed, or hopeless 0   3.  Trouble falling or staying asleep, or sleeping too much 1   4.  Feeling tired or having little energy 1   5.  Poor appetite or overeating 1   6.  Feeling bad about yourself 0   7.  Trouble concentrating 1   8.  Moving slowly or restless 1    Q9: Thoughts of better off dead/self-harm past 2 weeks 0   PHQ-9 Total Score 5        Patient-reported     Advance Care Planning  Discussed advance care planning with patient; however, patient declined at this time.        7/15/2025   General Health   How would you rate your overall physical health? Good   Feel stress (tense, anxious, or unable to sleep) Only a little   (!) STRESS CONCERN        7/15/2025   Nutrition   Three or more servings of calcium each day? Yes   Diet: Low salt   How many servings of fruit and vegetables per day? (!) 2-3   How many sweetened beverages each day? 0-1         7/15/2025   Exercise   Days per week of moderate/strenous exercise 4 days   Average minutes spent exercising at this level 40 min         7/15/2025   Social Factors   Frequency of gathering with friends or relatives Once a week   Worry food won't last until get money to buy more No   Food not last or not have enough money for food? No   Do you have housing? (Housing is defined as stable permanent housing and does not include staying outside in a car, in a tent, in an abandoned building, in an overnight shelter, or couch-surfing.) Yes   Are you worried about losing your housing? No   Lack of transportation? No   Unable to get utilities (heat,electricity)? No         7/15/2025   Fall Risk   Fallen 2 or more times in the past year? No   Trouble with walking or balance? No          7/15/2025   Dental   Dentist two times every year? Yes     Today's PHQ-9 Score:       7/15/2025    12:40 PM   PHQ-9 SCORE   PHQ-9 Total Score MyChart 5 (Mild depression)   PHQ-9 Total Score 5        Patient-reported         7/15/2025   Substance Use   Alcohol more than 3/day or more than 7/wk No   Do you use any other substances recreationally? No     Social History     Tobacco Use    Smoking status: Former     Current packs/day: 0.00     Types: Cigarettes, Cigars     Quit date: 2003     Years since quittin.5    Smokeless tobacco: Former      "Quit date: 1/2/2000   Vaping Use    Vaping status: Never Used   Substance Use Topics    Alcohol use: Yes     Comment: 3-4 beers/week    Drug use: No         7/15/2025   STI Screening   New sexual partner(s) since last STI/HIV test? No     Last PSA:   PSA   Date Value Ref Range Status   03/05/2021 0.63 0 - 4 ug/L Final     Comment:     Assay Method:  Chemiluminescence using Siemens Vista analyzer     ASCVD Risk   The 10-year ASCVD risk score (Amy REED, et al., 2019) is: 10.5%    Values used to calculate the score:      Age: 60 years      Sex: Male      Is Non- : No      Diabetic: No      Tobacco smoker: No      Systolic Blood Pressure: 121 mmHg      Is BP treated: Yes      HDL Cholesterol: 38 mg/dL      Total Cholesterol: 156 mg/dL    Reviewed and updated as needed this visit by Provider   Tobacco  Allergies  Meds  Problems  Med Hx  Surg Hx  Fam Hx            Past Medical History:   Diagnosis Date    Acute duodenal ulcer without mention of hemorrhage, perforation, or obstruction 1990    diagnosed on EGD    Allergic rhinitis, cause unspecified     Attention deficit disorder without mention of hyperactivity     \"all my life\"    CAD (coronary artery disease) 06/29/2021    Cataplexy and narcolepsy     had for many years, but diagnosed in sleep lab 6/03    Depressive disorder     Esophageal reflux 11/28/2007    Hyperlipidemia LDL goal <130 10/31/2010    Hypertriglyceridemia 08/10/2012    Mild major depression 09/15/2011    OBESITY NOS 11/28/2007    PONV (postoperative nausea and vomiting)     Sleep apnea     CPAP    Sleep disorder 04/12/2011     Past Surgical History:   Procedure Laterality Date    ARTHROSCOPY SHOULDER, OPEN ROTATOR CUFF REPAIR, COMBINED Left 11/5/2014    Procedure: COMBINED ARTHROSCOPY SHOULDER, OPEN ROTATOR CUFF REPAIR;  Surgeon: Maykel Khalil MD;  Location: [facility] OR    COLONOSCOPY  9/9/2013    Procedure: COMBINED COLONOSCOPY, SINGLE BIOPSY/POLYPECTOMY BY " BIOPSY;;  Surgeon: Hali Lema MD;  Location:  GI    COLONOSCOPY N/A 11/5/2024    Procedure: Colonoscopy with polypectomy using exacto snare, 1 clip placed;  Surgeon: Shakira Edwards MD;  Location:  GI    CV CORONARY ANGIOGRAM N/A 7/20/2021    Procedure: Coronary Angiogram /CHP interventionalist;  Surgeon: Roxie Schrader MD;  Location:  HEART CARDIAC CATH LAB    CV HEART CATHETERIZATION WITH POSSIBLE INTERVENTION N/A 6/18/2021    Procedure: Heart Catheterization with Possible Intervention;  Surgeon: Roxie Schrader MD;  Location:  HEART CARDIAC CATH LAB    CV HEART CATHETERIZATION WITH POSSIBLE INTERVENTION N/A 6/21/2021    Procedure: Heart Catheterization with Possible Intervention;  Surgeon: Roxie Schrader MD;  Location:  HEART CARDIAC CATH LAB    CV INSTANTANEOUS WAVE-FREE RATIO N/A 7/20/2021    Procedure: Instantaneous Wave-Free Ratio;  Surgeon: Roxie Schrader MD;  Location:  HEART CARDIAC CATH LAB    CV LEFT HEART CATH N/A 6/18/2021    Procedure: Left Heart Cath;  Surgeon: Roxie Schrader MD;  Location:  HEART CARDIAC CATH LAB    CV PCI ANGIOPLASTY N/A 6/18/2021    Procedure: Percutaneous Coronary Intervention Angioplasty;  Surgeon: Roxie Schrader MD;  Location:  HEART CARDIAC CATH LAB    CV PCI STENT DRUG ELUTING N/A 6/21/2021    Procedure: Percutaneous Coronary Intervention Stent Drug Eluting;  Surgeon: Roxie Schrader MD;  Location:  HEART CARDIAC CATH LAB    CV PCI STENT DRUG ELUTING N/A 7/20/2021    Procedure: Percutaneous Coronary Intervention Stent Drug Eluting;  Surgeon: Roxie Schrader MD;  Location:  HEART CARDIAC CATH LAB    GI SURGERY      henmmriodectoomy    HC EXPLOR MAXILL SINUS,INTRANASAL  1981`    HC VASECTOMY UNILAT/BILAT W POSTOP SEMEN  4/03    Z HAND/FINGER SURGERY UNLISTED  1983    ORIF right middle finger     BP Readings from Last 3 Encounters:   07/15/25 121/76   04/22/25 138/83   04/22/25 (!) 158/100    Wt Readings from Last 3  Encounters:   07/15/25 100.4 kg (221 lb 4.8 oz)   04/22/25 106.6 kg (235 lb)   04/22/25 106.6 kg (235 lb)         Patient Active Problem List   Diagnosis    Attention deficit disorder    Esophageal reflux    HYPERLIPIDEMIA LDL GOAL <130    Sleep disorder    Mild major depression (H)    Hypertriglyceridemia    Anxiety    Testosterone deficiency    Impaired fasting glucose    Hypogonadism male    Elevated ferritin    Unstable angina (H)    Abnormal stress test    Percutaneous transluminal coronary angioplasty status    CAD (coronary artery disease)    Coronary artery disease of native artery of native heart with stable angina pectoris    Abnormal findings on diagnostic imaging of heart and coronary circulation    Status post coronary angiogram    Moderate episode of recurrent major depressive disorder (H)    Overactive bladder    Erectile dysfunction due to diseases classified elsewhere    Mixed dyslipidemia     Past Surgical History:   Procedure Laterality Date    ARTHROSCOPY SHOULDER, OPEN ROTATOR CUFF REPAIR, COMBINED Left 11/5/2014    Procedure: COMBINED ARTHROSCOPY SHOULDER, OPEN ROTATOR CUFF REPAIR;  Surgeon: Maykel Khalil MD;  Location: [facility] OR    COLONOSCOPY  9/9/2013    Procedure: COMBINED COLONOSCOPY, SINGLE BIOPSY/POLYPECTOMY BY BIOPSY;;  Surgeon: Hali Lema MD;  Location:  GI    COLONOSCOPY N/A 11/5/2024    Procedure: Colonoscopy with polypectomy using exacto snare, 1 clip placed;  Surgeon: Shakira Edwards MD;  Location:  GI    CV CORONARY ANGIOGRAM N/A 7/20/2021    Procedure: Coronary Angiogram /CHP interventionalist;  Surgeon: Roxie Schrader MD;  Location:  HEART CARDIAC CATH LAB    CV HEART CATHETERIZATION WITH POSSIBLE INTERVENTION N/A 6/18/2021    Procedure: Heart Catheterization with Possible Intervention;  Surgeon: Roxie Schrader MD;  Location:  HEART CARDIAC CATH LAB    CV HEART CATHETERIZATION WITH POSSIBLE INTERVENTION N/A 6/21/2021    Procedure: Heart  Catheterization with Possible Intervention;  Surgeon: Roxie Schrader MD;  Location:  HEART CARDIAC CATH LAB    CV INSTANTANEOUS WAVE-FREE RATIO N/A 2021    Procedure: Instantaneous Wave-Free Ratio;  Surgeon: Roxie Schrader MD;  Location:  HEART CARDIAC CATH LAB    CV LEFT HEART CATH N/A 2021    Procedure: Left Heart Cath;  Surgeon: Roxie Schrader MD;  Location: [facility]    CV PCI ANGIOPLASTY N/A 2021    Procedure: Percutaneous Coronary Intervention Angioplasty;  Surgeon: Roxie Schrader MD;  Location:  HEART CARDIAC CATH LAB    CV PCI STENT DRUG ELUTING N/A 2021    Procedure: Percutaneous Coronary Intervention Stent Drug Eluting;  Surgeon: Roxie Schrader MD;  Location:  HEART CARDIAC CATH LAB    CV PCI STENT DRUG ELUTING N/A 2021    Procedure: Percutaneous Coronary Intervention Stent Drug Eluting;  Surgeon: Roxie Schrader MD;  Location:  HEART CARDIAC CATH LAB    GI SURGERY      henmmriodectoomy    HC EXPLOR MAXILL SINUS,INTRANASAL  `    HC VASECTOMY UNILAT/BILAT W POSTOP SEMEN      ZZC HAND/FINGER SURGERY UNLISTED      ORIF right middle finger       Social History     Tobacco Use    Smoking status: Former     Current packs/day: 0.00     Types: Cigarettes, Cigars     Quit date: 2003     Years since quittin.5    Smokeless tobacco: Former     Quit date: 2000   Substance Use Topics    Alcohol use: Yes     Comment: 3-4 beers/week     Family History   Problem Relation Age of Onset    Hypertension Mother     Cerebrovascular Disease Mother     Osteoporosis Mother     Lipids Father         On medication    Coronary Artery Disease Father     Cancer - colorectal Maternal Uncle     Colon Cancer No family hx of          Current Outpatient Medications   Medication Sig Dispense Refill    albuterol (PROAIR HFA/PROVENTIL HFA/VENTOLIN HFA) 108 (90 Base) MCG/ACT inhaler Inhale 2 puffs into the lungs every 4 hours as needed for shortness of  breath or wheezing 18 g 0    buPROPion (WELLBUTRIN XL) 300 MG 24 hr tablet Take 1 tablet (300 mg) by mouth daily 90 tablet 3    clopidogrel (PLAVIX) 75 MG tablet Take 1 tablet (75 mg) by mouth daily. 90 tablet 3    COMPOUNDED NON-CONTROLLED SUBSTANCE (CMPD RX) - PHARMACY TO MIX COMPOUNDED MEDICATION Compounded semaglutide 0.5 mg subcutaneous injection once weekly. 4 each 2    ezetimibe (ZETIA) 10 MG tablet Take 1 tablet (10 mg) by mouth daily. 90 tablet 3    fluticasone (FLONASE) 50 MCG/ACT nasal spray Spray 1 spray into both nostrils daily 16 g 3    ketoconazole (NIZORAL) 2 % external shampoo Use every 1-2 days when flared. Leave in few minutes before rinsing. Use twice weekly to prevent flares. 120 mL 11    multivitamin, therapeutic (THERA-VIT) TABS tablet Take 1 tablet by mouth daily      nitroGLYcerin (NITROSTAT) 0.4 MG sublingual tablet For chest pain place 1 tablet under the tongue every 5 minutes for 3 doses. If symptoms persist 5 minutes after 1st dose call 911. 25 tablet 3    promethazine (PHENERGAN) 25 MG tablet Take 1 tablet (25 mg) by mouth every 6 hours as needed for nausea. 12 tablet 0    rosuvastatin (CRESTOR) 20 MG tablet Take 1 tablet (20 mg) by mouth daily. Appointment required for further refills 90 tablet 3    sertraline (ZOLOFT) 100 MG tablet Take 1 tablet (100 mg) by mouth daily 90 tablet 3    sildenafil (VIAGRA) 100 MG tablet TAKE ONE TABLET BY MOUTH DAILY AS NEEDED. DO NOT TAKE SAME DAY AS NITRO 30 tablet 0    solifenacin (VESICARE) 10 MG tablet Take 1 tablet (10 mg) by mouth daily 90 tablet 3    tirzepatide (MOUNJARO) 5 MG/0.5ML SOAJ auto-injector pen Inject 0.5 mLs (5 mg) subcutaneously once a week. 2 mL 0    Tirzepatide (MOUNJARO) 7.5 MG/0.5ML SOAJ auto-injector pen Inject 0.5 mLs (7.5 mg) subcutaneously once a week. 2 mL 1    traZODone (DESYREL) 50 MG tablet Take 3 tablets (150 mg) by mouth at bedtime 270 tablet 3    azelastine (ASTELIN) 0.1 % nasal spray Spray 1 spray into both nostrils  "2 times daily (Patient not taking: Reported on 7/15/2025) 30 mL 0    dexAMETHasone (DECADRON) 0.5 MG/5ML elixir Take 5 mLs (0.5 mg) by mouth 2 times daily as needed (canker sores) 237 mL 1    dexamethasone alcohol-free (DECADRON) 0.1 MG/ML solution  (Patient not taking: Reported on 7/15/2025)      fluocinonide (LIDEX) 0.05 % external solution APPLY TO SCALP DAILY AS NEEDED (Patient not taking: Reported on 7/15/2025) 60 mL 0    mirabegron (MYRBETRIQ) 25 MG 24 hr tablet Take 1 tablet (25 mg) by mouth daily (Patient not taking: Reported on 7/15/2025) 30 tablet 11     Allergies   Allergen Reactions    Penicillins Itching, Swelling and Rash     Rash     Review of Systems  Constitutional, HEENT, cardiovascular, pulmonary, GI, , musculoskeletal, neuro, skin, endocrine and psych systems are negative, except as otherwise noted.       Objective    Exam  /76 (BP Location: Right arm, Patient Position: Sitting, Cuff Size: Adult Regular)   Pulse 70   Temp 97.6  F (36.4  C) (Oral)   Resp 16   Ht 1.702 m (5' 7\")   Wt 100.4 kg (221 lb 4.8 oz)   SpO2 96%   BMI 34.66 kg/m     Estimated body mass index is 34.66 kg/m  as calculated from the following:    Height as of this encounter: 1.702 m (5' 7\").    Weight as of this encounter: 100.4 kg (221 lb 4.8 oz).    Physical Exam  GENERAL: alert and no distress  EYES: Eyes grossly normal to inspection, PERRL and conjunctivae and sclerae normal  HENT: ear canals and TM's normal, nose and mouth without ulcers or lesions  NECK: no adenopathy, no asymmetry, masses, or scars  RESP: lungs clear to auscultation - no rales, rhonchi or wheezes  CV: regular rate and rhythm, normal S1 S2, no S3 or S4, no murmur, click or rub, no peripheral edema  MS: no gross musculoskeletal defects noted, no edema  SKIN: no suspicious lesions or rashes  NEURO: Normal strength and tone, mentation intact and speech normal  PSYCH: mentation appears normal, affect normal/bright        Signed Electronically " by: Unique Negrete PA-C    Answers submitted by the patient for this visit:  Patient Health Questionnaire (Submitted on 7/15/2025)  If you checked off any problems, how difficult have these problems made it for you to do your work, take care of things at home, or get along with other people?: Somewhat difficult  PHQ9 TOTAL SCORE: 5

## 2025-07-16 LAB
CHOLEST SERPL-MCNC: 158 MG/DL
FASTING STATUS PATIENT QL REPORTED: ABNORMAL
HDLC SERPL-MCNC: 43 MG/DL
LDLC SERPL CALC-MCNC: 63 MG/DL
NONHDLC SERPL-MCNC: 115 MG/DL
TRIGL SERPL-MCNC: 258 MG/DL
TSH SERPL DL<=0.005 MIU/L-ACNC: 4.12 UIU/ML (ref 0.3–4.2)

## 2025-07-17 DIAGNOSIS — F33.1 MODERATE EPISODE OF RECURRENT MAJOR DEPRESSIVE DISORDER (H): ICD-10-CM

## 2025-07-17 RX ORDER — BUPROPION HYDROCHLORIDE 300 MG/1
300 TABLET ORAL DAILY
Qty: 90 TABLET | Refills: 3 | OUTPATIENT
Start: 2025-07-17

## (undated) DEVICE — STENT SYNERGY DRUG ELUTING 2.75X38MM  H7493926038270: Type: IMPLANTABLE DEVICE | Status: NON-FUNCTIONAL

## (undated) DEVICE — CATH BALLOON NC EMERGE 3.50X12MM H7493926712350

## (undated) DEVICE — INTRO TERUMO 7FRX25CM W/MARKER RSB703

## (undated) DEVICE — TOTE ANGIO CORP PC15AT SAN32CC83O

## (undated) DEVICE — SHEATH INTRODUCER 7FR 10CML 0.021" DIL 22GA 25MM NDL 60-1070

## (undated) DEVICE — INTRODUCER CATH VASC 5FRX10CM  MPIS-501-NT-U-SST

## (undated) DEVICE — CATH ANGIO JUDKINS JL3.5 6FRX100CM INFINITI 534618T

## (undated) DEVICE — GUIDEWIRE VASC 0.014INX180CM RUNTHROUGH 25-1011

## (undated) DEVICE — CATH BALLOON EMERGE 3.0X20MM H7493918920300

## (undated) DEVICE — INFL DVC KIT W/10CC NITRO IN4530

## (undated) DEVICE — CATH BALLOON EMERGE 2.5X12MM H7493918912250

## (undated) DEVICE — CATH LAUNCHER 6FR LA6EBU375

## (undated) DEVICE — CUTTING BALLOON WOLVERINE 2.5X6MM

## (undated) DEVICE — CATH GUIDELINER 6FR 5571

## (undated) DEVICE — DEFIB PRO-PADZ LVP LQD GEL ADULT 8900-2105-01

## (undated) DEVICE — SMART CAPNOLINE H PLUS, ADULT/INTERMEDIATE O2, LONG

## (undated) DEVICE — CATH BALLOON NC EMERGE 4.00X15MM H7493926715400

## (undated) DEVICE — MANIFOLD KIT ANGIO AUTOMATED 014613

## (undated) DEVICE — VALVE HEMOSTASIS .096" COPILOT MECH 1003331

## (undated) DEVICE — RAD CLOSURE ANGIOSEAL 8FR  610131

## (undated) DEVICE — KIT HAND CONTROL ANGIOTOUCH ACIST 65CM AT-P65

## (undated) DEVICE — Device

## (undated) DEVICE — SYR ANGIOGRAPHY MULTIUSE KIT ACIST 014612

## (undated) DEVICE — STENT SYNERGY DRUG ELUTING 2.75X12MM  H7493926012270: Type: IMPLANTABLE DEVICE | Status: NON-FUNCTIONAL

## (undated) DEVICE — CATH BALLOON NC EMERGE 2.50X15MM H7493926715250

## (undated) DEVICE — ENDO TRAP POLYP QUICK CATCH 710201

## (undated) DEVICE — CATH BALLOON EMERGE 3.0X12MM H7493918912300

## (undated) DEVICE — GW VASC OMNIWIRE J L185CM PRESSURE 89185J

## (undated) DEVICE — INTRO GLIDESHEATH SLENDER 6FR 10X45CM 60-1060

## (undated) DEVICE — GUIDEWIRE VASC 0.014"X300CM PLATINUM TIP 25-1013

## (undated) DEVICE — CATH GUIDING  7F MACH1 CLS3.5

## (undated) DEVICE — CATH BALLOON EMERGE 2.0X15MM H7493918915200

## (undated) DEVICE — CATH GUIDING  7F MACH1 CLS3

## (undated) DEVICE — SLEEVE TR BAND RADIAL COMPRESSION DEVICE 24CM TRB24-REG

## (undated) DEVICE — GW MINAMO STRAIGHT 190CM

## (undated) DEVICE — GUIDEWIRE J TIP 1.5MM 210CM

## (undated) DEVICE — RAD INFLATOR BASIC COMPAK  IN4130

## (undated) DEVICE — KIT LG BORE TOUHY ACCESS PLUS MAP152

## (undated) DEVICE — KIT ENDO TURNOVER/PROCEDURE W/CLEAN A SCOPE LINERS 103888

## (undated) DEVICE — GUIDEWIRE VASC 0.035INX150CM INQWIRE J TIP IQ35F150J3F/A

## (undated) DEVICE — CATH LAUNCHER 6FR EBU 3.5 LA6EBU35

## (undated) DEVICE — CLIP HEMOSTASIS ASSURANCE W18 MM 00711885

## (undated) DEVICE — CATH BALLOON NC EMERGE 2.75X15MM H7493926715270

## (undated) DEVICE — ENDO SNARE EXACTO COLD 9MM LOOP 2.4MMX230CM 00711115

## (undated) DEVICE — CATH ANGIO INFINITI JR4 4FRX100CM 538421

## (undated) DEVICE — GUIDEWIRE ROTAWIRE .014 325CM 228240022

## (undated) DEVICE — 5FR X 100CM INFINITI TL DIAGNOSTIC CATHETER, JUDKINS LEFT CORONARY, JL 3.5, FEMORAL SELECTIVE THRULUMEN, SMALL, 0.038IN MAX GUIDEWIRE (EA/1)

## (undated) DEVICE — CATH BALLOON NC EMERGE 2.50X12MM H7493926712250

## (undated) RX ORDER — FENTANYL CITRATE 50 UG/ML
INJECTION, SOLUTION INTRAMUSCULAR; INTRAVENOUS
Status: DISPENSED
Start: 2021-07-20

## (undated) RX ORDER — NITROGLYCERIN 5 MG/ML
VIAL (ML) INTRAVENOUS
Status: DISPENSED
Start: 2021-06-21

## (undated) RX ORDER — FENTANYL CITRATE 50 UG/ML
INJECTION, SOLUTION INTRAMUSCULAR; INTRAVENOUS
Status: DISPENSED
Start: 2024-11-05

## (undated) RX ORDER — VERAPAMIL HYDROCHLORIDE 2.5 MG/ML
INJECTION, SOLUTION INTRAVENOUS
Status: DISPENSED
Start: 2021-06-21

## (undated) RX ORDER — VERAPAMIL HYDROCHLORIDE 2.5 MG/ML
INJECTION, SOLUTION INTRAVENOUS
Status: DISPENSED
Start: 2021-07-20

## (undated) RX ORDER — ONDANSETRON 2 MG/ML
INJECTION INTRAMUSCULAR; INTRAVENOUS
Status: DISPENSED
Start: 2024-11-05

## (undated) RX ORDER — NITROGLYCERIN 0.4 MG/1
TABLET SUBLINGUAL
Status: DISPENSED
Start: 2021-06-21

## (undated) RX ORDER — HEPARIN SODIUM 1000 [USP'U]/ML
INJECTION, SOLUTION INTRAVENOUS; SUBCUTANEOUS
Status: DISPENSED
Start: 2021-07-20

## (undated) RX ORDER — ADENOSINE 3 MG/ML
INJECTION, SOLUTION INTRAVENOUS
Status: DISPENSED
Start: 2021-07-20

## (undated) RX ORDER — HEPARIN SODIUM 200 [USP'U]/100ML
INJECTION, SOLUTION INTRAVENOUS
Status: DISPENSED
Start: 2021-06-21

## (undated) RX ORDER — HEPARIN SODIUM 1000 [USP'U]/ML
INJECTION, SOLUTION INTRAVENOUS; SUBCUTANEOUS
Status: DISPENSED
Start: 2021-06-21

## (undated) RX ORDER — NITROGLYCERIN 5 MG/ML
VIAL (ML) INTRAVENOUS
Status: DISPENSED
Start: 2021-07-20

## (undated) RX ORDER — FENTANYL CITRATE 50 UG/ML
INJECTION, SOLUTION INTRAMUSCULAR; INTRAVENOUS
Status: DISPENSED
Start: 2021-06-21

## (undated) RX ORDER — LIDOCAINE HYDROCHLORIDE 10 MG/ML
INJECTION, SOLUTION EPIDURAL; INFILTRATION; INTRACAUDAL; PERINEURAL
Status: DISPENSED
Start: 2021-06-21

## (undated) RX ORDER — CLOPIDOGREL 300 MG/1
TABLET, FILM COATED ORAL
Status: DISPENSED
Start: 2021-07-20

## (undated) RX ORDER — HEPARIN SODIUM 200 [USP'U]/100ML
INJECTION, SOLUTION INTRAVENOUS
Status: DISPENSED
Start: 2021-07-20

## (undated) RX ORDER — LIDOCAINE HYDROCHLORIDE 10 MG/ML
INJECTION, SOLUTION EPIDURAL; INFILTRATION; INTRACAUDAL; PERINEURAL
Status: DISPENSED
Start: 2021-07-20

## (undated) RX ORDER — SIMETHICONE 40MG/0.6ML
SUSPENSION, DROPS(FINAL DOSAGE FORM)(ML) ORAL
Status: DISPENSED
Start: 2024-11-05